# Patient Record
Sex: FEMALE | Race: WHITE | NOT HISPANIC OR LATINO | Employment: OTHER | URBAN - METROPOLITAN AREA
[De-identification: names, ages, dates, MRNs, and addresses within clinical notes are randomized per-mention and may not be internally consistent; named-entity substitution may affect disease eponyms.]

---

## 2017-04-18 ENCOUNTER — OFFICE VISIT (OUTPATIENT)
Dept: FAMILY MEDICINE CLINIC | Facility: CLINIC | Age: 82
End: 2017-04-18

## 2017-04-18 VITALS
DIASTOLIC BLOOD PRESSURE: 55 MMHG | WEIGHT: 152 LBS | RESPIRATION RATE: 16 BRPM | TEMPERATURE: 98.4 F | SYSTOLIC BLOOD PRESSURE: 139 MMHG | HEART RATE: 73 BPM | BODY MASS INDEX: 24.43 KG/M2 | HEIGHT: 66 IN

## 2017-04-18 DIAGNOSIS — Z00.00 ANNUAL PHYSICAL EXAM: Primary | ICD-10-CM

## 2017-04-18 DIAGNOSIS — I10 BENIGN ESSENTIAL HYPERTENSION: ICD-10-CM

## 2017-04-18 DIAGNOSIS — E03.9 ACQUIRED HYPOTHYROIDISM: ICD-10-CM

## 2017-04-18 DIAGNOSIS — G25.81 RESTLESS LEG SYNDROME: ICD-10-CM

## 2017-04-18 DIAGNOSIS — E78.2 MIXED HYPERLIPIDEMIA: ICD-10-CM

## 2017-04-18 DIAGNOSIS — E11.9 TYPE 2 DIABETES MELLITUS WITHOUT COMPLICATION, WITHOUT LONG-TERM CURRENT USE OF INSULIN (HCC): ICD-10-CM

## 2017-04-18 LAB
ALBUMIN SERPL-MCNC: 4.6 G/DL (ref 3.5–5.2)
ALBUMIN/GLOB SERPL: 1.9 G/DL
ALP SERPL-CCNC: 28 U/L (ref 39–117)
ALT SERPL-CCNC: 14 U/L (ref 1–33)
AST SERPL-CCNC: 19 U/L (ref 1–32)
BASOPHILS # BLD AUTO: 0.01 10*3/MM3 (ref 0–0.2)
BASOPHILS NFR BLD AUTO: 0.2 % (ref 0–1.5)
BILIRUB SERPL-MCNC: 0.4 MG/DL (ref 0.1–1.2)
BUN SERPL-MCNC: 27 MG/DL (ref 8–23)
BUN/CREAT SERPL: 12.7 (ref 7–25)
CALCIUM SERPL-MCNC: 10.2 MG/DL (ref 8.2–9.6)
CHLORIDE SERPL-SCNC: 103 MMOL/L (ref 98–107)
CHOLEST SERPL-MCNC: 134 MG/DL (ref 0–200)
CO2 SERPL-SCNC: 27 MMOL/L (ref 22–29)
CREAT SERPL-MCNC: 2.12 MG/DL (ref 0.57–1)
EOSINOPHIL # BLD AUTO: 0.02 10*3/MM3 (ref 0–0.7)
EOSINOPHIL NFR BLD AUTO: 0.3 % (ref 0.3–6.2)
ERYTHROCYTE [DISTWIDTH] IN BLOOD BY AUTOMATED COUNT: 14 % (ref 11.7–13)
GLOBULIN SER CALC-MCNC: 2.4 GM/DL
GLUCOSE SERPL-MCNC: 136 MG/DL (ref 65–99)
HCT VFR BLD AUTO: 39.2 % (ref 35.6–45.5)
HDLC SERPL-MCNC: 56 MG/DL (ref 40–60)
HGB BLD-MCNC: 12.3 G/DL (ref 11.9–15.5)
IMM GRANULOCYTES # BLD: 0 10*3/MM3 (ref 0–0.03)
IMM GRANULOCYTES NFR BLD: 0 % (ref 0–0.5)
LDLC SERPL CALC-MCNC: 62 MG/DL (ref 0–100)
LYMPHOCYTES # BLD AUTO: 1.38 10*3/MM3 (ref 0.9–4.8)
LYMPHOCYTES NFR BLD AUTO: 21.2 % (ref 19.6–45.3)
MCH RBC QN AUTO: 28.7 PG (ref 26.9–32)
MCHC RBC AUTO-ENTMCNC: 31.4 G/DL (ref 32.4–36.3)
MCV RBC AUTO: 91.6 FL (ref 80.5–98.2)
MONOCYTES # BLD AUTO: 0.57 10*3/MM3 (ref 0.2–1.2)
MONOCYTES NFR BLD AUTO: 8.8 % (ref 5–12)
NEUTROPHILS # BLD AUTO: 4.52 10*3/MM3 (ref 1.9–8.1)
NEUTROPHILS NFR BLD AUTO: 69.5 % (ref 42.7–76)
PLATELET # BLD AUTO: 205 10*3/MM3 (ref 140–500)
POTASSIUM SERPL-SCNC: 4.5 MMOL/L (ref 3.5–5.2)
PROT SERPL-MCNC: 7 G/DL (ref 6–8.5)
RBC # BLD AUTO: 4.28 10*6/MM3 (ref 3.9–5.2)
SODIUM SERPL-SCNC: 143 MMOL/L (ref 136–145)
T4 FREE SERPL-MCNC: 1.54 NG/DL (ref 0.93–1.7)
TRIGL SERPL-MCNC: 82 MG/DL (ref 0–150)
TSH SERPL DL<=0.005 MIU/L-ACNC: 1.12 MIU/ML (ref 0.27–4.2)
VLDLC SERPL CALC-MCNC: 16.4 MG/DL (ref 5–40)
WBC # BLD AUTO: 6.5 10*3/MM3 (ref 4.5–10.7)

## 2017-04-18 PROCEDURE — G0438 PPPS, INITIAL VISIT: HCPCS | Performed by: FAMILY MEDICINE

## 2017-04-18 PROCEDURE — 99214 OFFICE O/P EST MOD 30 MIN: CPT | Performed by: FAMILY MEDICINE

## 2017-04-18 RX ORDER — LEVOTHYROXINE SODIUM 0.07 MG/1
75 TABLET ORAL DAILY
Qty: 90 TABLET | Refills: 1 | Status: SHIPPED | OUTPATIENT
Start: 2017-04-18 | End: 2017-12-13 | Stop reason: SDUPTHER

## 2017-04-18 RX ORDER — LOVASTATIN 40 MG/1
40 TABLET ORAL DAILY
Qty: 90 TABLET | Refills: 1 | Status: SHIPPED | OUTPATIENT
Start: 2017-04-18 | End: 2017-12-13 | Stop reason: SDUPTHER

## 2017-04-18 RX ORDER — TRAZODONE HYDROCHLORIDE 100 MG/1
TABLET ORAL
Qty: 180 TABLET | Refills: 1 | Status: CANCELLED | OUTPATIENT
Start: 2017-04-18

## 2017-04-18 RX ORDER — PRAMIPEXOLE DIHYDROCHLORIDE 1 MG/1
1 TABLET ORAL DAILY
Qty: 90 TABLET | Refills: 1 | Status: SHIPPED | OUTPATIENT
Start: 2017-04-18 | End: 2017-12-13 | Stop reason: SDUPTHER

## 2017-04-18 RX ORDER — FENOFIBRATE 160 MG/1
160 TABLET ORAL DAILY
Qty: 90 TABLET | Refills: 1 | Status: SHIPPED | OUTPATIENT
Start: 2017-04-18 | End: 2017-12-13 | Stop reason: SDUPTHER

## 2017-04-18 RX ORDER — AMLODIPINE BESYLATE 5 MG/1
5 TABLET ORAL DAILY
Qty: 90 TABLET | Refills: 1 | Status: SHIPPED | OUTPATIENT
Start: 2017-04-18 | End: 2017-12-13 | Stop reason: SDUPTHER

## 2017-04-18 RX ORDER — GLIPIZIDE 5 MG/1
5 TABLET ORAL
Qty: 90 TABLET | Refills: 1 | Status: SHIPPED | OUTPATIENT
Start: 2017-04-18 | End: 2017-12-13 | Stop reason: SDUPTHER

## 2017-04-18 RX ORDER — CLOPIDOGREL BISULFATE 75 MG/1
75 TABLET ORAL DAILY
Qty: 90 TABLET | Refills: 1 | Status: SHIPPED | OUTPATIENT
Start: 2017-04-18 | End: 2017-12-13 | Stop reason: SDUPTHER

## 2017-04-18 NOTE — PROGRESS NOTES
Subjective   Elissa Marrero is a 89 y.o. female.     History of Present Illness     Chief Complaint:   Chief Complaint   Patient presents with   • Diabetes     MED REFILL - MEDICARE WELLNESS EXAM DUE  - PHQ2 PHQ9 DONE - NO DAILY ASA    • Hypertension   • Hyperlipidemia   • Hypothyroidism       Elissa Marrero 89 y.o. female who presents today for Medical Management of the below listed issues and medication refills.  she has a history of   Patient Active Problem List   Diagnosis   • Diabetes mellitus, type 2   • Hyperlipidemia   • Benign essential hypertension   • Hypothyroidism   • Insomnia   • Lymphedema   • Osteoarthritis   • Osteopenia   • Renal insufficiency   • Restless leg syndrome   • Vitamin D deficiency   .  Since the last visit, she has overall felt well.  she has been compliant with   Current Outpatient Prescriptions:   •  amLODIPine (NORVASC) 5 MG tablet, Take 1 tablet by mouth Daily., Disp: 90 tablet, Rfl: 1  •  clopidogrel (PLAVIX) 75 MG tablet, Take 1 tablet by mouth Daily., Disp: 90 tablet, Rfl: 1  •  fenofibrate 160 MG tablet, Take 1 tablet by mouth Daily., Disp: 90 tablet, Rfl: 1  •  glipiZIDE (GLUCOTROL) 5 MG tablet, Take 1 tablet by mouth Daily Before Lunch., Disp: 90 tablet, Rfl: 1  •  levothyroxine (SYNTHROID, LEVOTHROID) 75 MCG tablet, Take 1 tablet by mouth Daily., Disp: 90 tablet, Rfl: 1  •  lovastatin (MEVACOR) 40 MG tablet, Take 1 tablet by mouth Daily., Disp: 90 tablet, Rfl: 1  •  metoprolol tartrate (LOPRESSOR) 25 MG tablet, Take 1 tablet by mouth 2 (Two) Times a Day., Disp: 180 tablet, Rfl: 1  •  pramipexole (MIRAPEX) 1 MG tablet, Take 1 tablet by mouth Daily., Disp: 90 tablet, Rfl: 1  •  traZODone (DESYREL) 100 MG tablet, Take 0.5-2 tablets by oral route once a day (in the evening) for 90 days, Disp: 180 tablet, Rfl: 1.  she denies medication side effects.    All of the chronic condition(s) listed above are stable w/o issues.    /55  Pulse 73  Temp 98.4 °F (36.9 °C) (Oral)   Resp  "16  Ht 66\" (167.6 cm)  Wt 152 lb (68.9 kg)  BMI 24.53 kg/m2    Results for orders placed or performed in visit on 10/18/16   Hemoglobin A1c   Result Value Ref Range    Hemoglobin A1C 5.66 (H) 4.80 - 5.60 %   Basic Metabolic Panel   Result Value Ref Range    Glucose 63 (L) 65 - 99 mg/dL    BUN 16 8 - 23 mg/dL    Creatinine 1.87 (H) 0.57 - 1.00 mg/dL    eGFR Non African Am 25 (L) >60 mL/min/1.73    eGFR African Am 31 (L) >60 mL/min/1.73    BUN/Creatinine Ratio 8.6 7.0 - 25.0    Sodium 143 136 - 145 mmol/L    Potassium 5.3 (H) 3.5 - 5.2 mmol/L    Chloride 103 98 - 107 mmol/L    Total CO2 28.1 22.0 - 29.0 mmol/L    Calcium 10.3 (H) 8.2 - 9.6 mg/dL   MicroAlbumin, Urine, Random   Result Value Ref Range    Microalbumin, Urine 17.4 Not Estab. ug/mL         The following portions of the patient's history were reviewed and updated as appropriate: allergies, current medications, past family history, past medical history, past social history, past surgical history and problem list.    Review of Systems   Constitutional: Negative for activity change, chills, fatigue and fever.   Respiratory: Negative for cough and chest tightness.    Cardiovascular: Negative for chest pain and palpitations.   Gastrointestinal: Negative for abdominal pain and nausea.   Endocrine: Negative for cold intolerance.   Psychiatric/Behavioral: Negative for behavioral problems and dysphoric mood.       Objective   Physical Exam   Constitutional: She appears well-developed and well-nourished.   Neck: Neck supple. No thyromegaly present.   Cardiovascular: Normal rate and regular rhythm.    No murmur heard.  Pulmonary/Chest: Effort normal and breath sounds normal.   Abdominal: Bowel sounds are normal.   Psychiatric: She has a normal mood and affect. Her behavior is normal.   Nursing note and vitals reviewed.      Assessment/Plan   Elissa was seen today for diabetes, hypertension, hyperlipidemia and hypothyroidism.    Diagnoses and all orders for this " visit:    Annual physical exam    Type 2 diabetes mellitus without complication, without long-term current use of insulin  -     glipiZIDE (GLUCOTROL) 5 MG tablet; Take 1 tablet by mouth Daily Before Lunch.  -     clopidogrel (PLAVIX) 75 MG tablet; Take 1 tablet by mouth Daily.  -     Comprehensive metabolic panel  -     Lipid panel    Benign essential hypertension  -     amLODIPine (NORVASC) 5 MG tablet; Take 1 tablet by mouth Daily.  -     metoprolol tartrate (LOPRESSOR) 25 MG tablet; Take 1 tablet by mouth 2 (Two) Times a Day.  -     CBC and Differential    Mixed hyperlipidemia  -     fenofibrate 160 MG tablet; Take 1 tablet by mouth Daily.  -     lovastatin (MEVACOR) 40 MG tablet; Take 1 tablet by mouth Daily.    Acquired hypothyroidism  -     levothyroxine (SYNTHROID, LEVOTHROID) 75 MCG tablet; Take 1 tablet by mouth Daily.  -     TSH  -     T4, Free    Restless leg syndrome  -     pramipexole (MIRAPEX) 1 MG tablet; Take 1 tablet by mouth Daily.    Other orders  -     Cancel: traZODone (DESYREL) 100 MG tablet; Take 0.5-2 tablets by oral route once a day (in the evening) for 90 days

## 2017-04-18 NOTE — PROGRESS NOTES
QUICK REFERENCE INFORMATION:  The ABCs of the Annual Wellness Visit    Initial Medicare Wellness Visit    HEALTH RISK ASSESSMENT    3/15/1925    Recent Hospitalizations:  No recent hospitalization(s)..        Current Medical Providers:  Patient Care Team:  Onel Mann MD as PCP - General  Onel Mann MD as PCP - Family Medicine  Julien Gaming MD as PCP - Claims Attributed - PLEASE DO NOT REMOVE        Smoking Status:  History   Smoking Status   • Never Smoker   Smokeless Tobacco   • Not on file       Alcohol Consumption:  History   Alcohol Use No       Depression Screen:   PHQ-9 Depression Screening 4/18/2017   Little interest or pleasure in doing things 0   Feeling down, depressed, or hopeless 0   Trouble falling or staying asleep, or sleeping too much 0   Feeling tired or having little energy 0   Poor appetite or overeating 0   Feeling bad about yourself - or that you are a failure or have let yourself or your family down 0   Trouble concentrating on things, such as reading the newspaper or watching television 0   Moving or speaking so slowly that other people could have noticed. Or the opposite - being so fidgety or restless that you have been moving around a lot more than usual 0   Thoughts that you would be better off dead, or of hurting yourself in some way 0   PHQ-9 Total Score 0       Health Habits and Functional and Cognitive Screening:  Functional & Cognitive Status 4/18/2017   Do you have difficulty preparing food and eating? No   Do you have difficulty bathing yourself? No   Do you have difficulty getting dressed? No   Do you have difficulty using the toilet? No   Do you have difficulty moving around from place to place? No   In the past year have you fallen or experienced a near fall? No   Do you need help using the phone?  No   Are you deaf or do you have serious difficulty hearing?  No   Do you need help with transportation? No   Do you need help shopping? No   Do you need help preparing  meals?  No   Do you need help with housework?  No   Do you need help with laundry? No   Do you need help taking your medications? No   Do you need help managing money? No   Do you have difficulty concentrating, remembering or making decisions? No       Health Habits  Current Diet: Well Balanced Diet  Dental Exam: Unknown  Eye Exam: Up to date  Exercise (times per week): 3 times per week  Current Exercise Activities Include: Walking      Patient questioned in depth regarding fall risks and prevention (i.e.removing rugs that may slip, etc.) during visit today. They deny prior falls, gait, or balance issues today.      Does the patient have evidence of cognitive impairment? No    Asiprin use counseling: Does not need ASA (and currently is not on it)      Recent Lab Results:    Visual Acuity:  No exam data present    Age-appropriate Screening Schedule:  Refer to the list below for future screening recommendations based on patient's age, sex and/or medical conditions. Orders for these recommended tests are listed in the plan section. The patient has been provided with a written plan.    Health Maintenance   Topic Date Due   • LIPID PANEL  05/23/2017 (Originally 3/14/2017)   • HEMOGLOBIN A1C  05/26/2017 (Originally 4/18/2017)   • PNEUMOCOCCAL VACCINES (65+ LOW/MEDIUM RISK) (2 of 2 - PPSV23) 10/18/2017   • DIABETIC FOOT EXAM  10/18/2017   • MAMMOGRAM  10/18/2017   • URINE MICROALBUMIN  10/18/2017   • DIABETIC EYE EXAM  11/28/2017   • DXA SCAN  10/18/2018   • INFLUENZA VACCINE  Completed   • ZOSTER VACCINE  Addressed   • TDAP/TD VACCINES  Excluded        Subjective   History of Present Illness    Elissa Marrero is a 89 y.o. female who presents for an Annual Wellness Visit.    The following portions of the patient's history were reviewed and updated as appropriate: allergies, current medications, past family history, past medical history, past social history, past surgical history and problem list.    Outpatient Medications  "Prior to Visit   Medication Sig Dispense Refill   • traZODone (DESYREL) 100 MG tablet Take 0.5-2 tablets by oral route once a day (in the evening) for 90 days 180 tablet 1   • amLODIPine (NORVASC) 5 MG tablet Take 1 tablet by mouth Daily. 90 tablet 1   • clopidogrel (PLAVIX) 75 MG tablet Take 1 tablet by mouth Daily. 90 tablet 1   • fenofibrate 160 MG tablet Take 1 tablet by mouth Daily. 90 tablet 1   • glipiZIDE (GLUCOTROL) 5 MG tablet Take 1 tablet by mouth Daily Before Lunch. 90 tablet 1   • levothyroxine (SYNTHROID, LEVOTHROID) 75 MCG tablet Take 1 tablet by mouth Daily. 90 tablet 1   • lovastatin (MEVACOR) 40 MG tablet Take 1 tablet by mouth Daily. 90 tablet 1   • metoprolol tartrate (LOPRESSOR) 25 MG tablet Take 1 tablet by mouth 2 (Two) Times a Day. 180 tablet 1   • pramipexole (MIRAPEX) 1 MG tablet Take 1 tablet by mouth Daily. 90 tablet 1     No facility-administered medications prior to visit.        Patient Active Problem List   Diagnosis   • Diabetes mellitus, type 2   • Hyperlipidemia   • Benign essential hypertension   • Hypothyroidism   • Insomnia   • Lymphedema   • Osteoarthritis   • Osteopenia   • Renal insufficiency   • Restless leg syndrome   • Vitamin D deficiency       Advance Care Planning:  has an advance directive - a copy HAS NOT been provided    Identification of Risk Factors:  Risk factors include: cardiovascular risk.    Review of Systems    Compared to one year ago, the patient feels her physical health is the same.  Compared to one year ago, the patient feels her mental health is the same.    Objective     Physical Exam    Vitals:    04/18/17 0944   BP: 139/55   Pulse: 73   Resp: 16   Temp: 98.4 °F (36.9 °C)   TempSrc: Oral   Weight: 152 lb (68.9 kg)   Height: 66\" (167.6 cm)   PainSc: 0-No pain       Body mass index is 24.53 kg/(m^2).  Discussed the patient's BMI with her. The BMI is in the acceptable range.    Assessment/Plan   Patient Self-Management and Personalized Health " Advice  The patient has been provided with information about: diet, exercise and weight management and preventive services including:   · Exercise counseling provided, Fall Risk assessment done, Nutrition counseling provided.    Visit Diagnoses:    ICD-10-CM ICD-9-CM   1. Annual physical exam Z00.00 V70.0   2. Type 2 diabetes mellitus without complication, without long-term current use of insulin E11.9 250.00   3. Benign essential hypertension I10 401.1   4. Mixed hyperlipidemia E78.2 272.2   5. Acquired hypothyroidism E03.9 244.9   6. Restless leg syndrome G25.81 333.94       Orders Placed This Encounter   Procedures   • Comprehensive metabolic panel   • Lipid panel   • TSH   • T4, Free       Outpatient Encounter Prescriptions as of 4/18/2017   Medication Sig Dispense Refill   • amLODIPine (NORVASC) 5 MG tablet Take 1 tablet by mouth Daily. 90 tablet 1   • clopidogrel (PLAVIX) 75 MG tablet Take 1 tablet by mouth Daily. 90 tablet 1   • fenofibrate 160 MG tablet Take 1 tablet by mouth Daily. 90 tablet 1   • glipiZIDE (GLUCOTROL) 5 MG tablet Take 1 tablet by mouth Daily Before Lunch. 90 tablet 1   • levothyroxine (SYNTHROID, LEVOTHROID) 75 MCG tablet Take 1 tablet by mouth Daily. 90 tablet 1   • lovastatin (MEVACOR) 40 MG tablet Take 1 tablet by mouth Daily. 90 tablet 1   • metoprolol tartrate (LOPRESSOR) 25 MG tablet Take 1 tablet by mouth 2 (Two) Times a Day. 180 tablet 1   • pramipexole (MIRAPEX) 1 MG tablet Take 1 tablet by mouth Daily. 90 tablet 1   • traZODone (DESYREL) 100 MG tablet Take 0.5-2 tablets by oral route once a day (in the evening) for 90 days 180 tablet 1   • [DISCONTINUED] amLODIPine (NORVASC) 5 MG tablet Take 1 tablet by mouth Daily. 90 tablet 1   • [DISCONTINUED] clopidogrel (PLAVIX) 75 MG tablet Take 1 tablet by mouth Daily. 90 tablet 1   • [DISCONTINUED] fenofibrate 160 MG tablet Take 1 tablet by mouth Daily. 90 tablet 1   • [DISCONTINUED] glipiZIDE (GLUCOTROL) 5 MG tablet Take 1 tablet by  mouth Daily Before Lunch. 90 tablet 1   • [DISCONTINUED] levothyroxine (SYNTHROID, LEVOTHROID) 75 MCG tablet Take 1 tablet by mouth Daily. 90 tablet 1   • [DISCONTINUED] lovastatin (MEVACOR) 40 MG tablet Take 1 tablet by mouth Daily. 90 tablet 1   • [DISCONTINUED] metoprolol tartrate (LOPRESSOR) 25 MG tablet Take 1 tablet by mouth 2 (Two) Times a Day. 180 tablet 1   • [DISCONTINUED] pramipexole (MIRAPEX) 1 MG tablet Take 1 tablet by mouth Daily. 90 tablet 1     No facility-administered encounter medications on file as of 4/18/2017.        Reviewed use of high risk medication in the elderly: yes  Reviewed for potential of harmful drug interactions in the elderly: not applicable    Follow Up:  Return in about 6 months (around 10/18/2017) for Recheck.     An After Visit Summary and PPPS with all of these plans were given to the patient.

## 2017-11-28 DIAGNOSIS — I10 BENIGN ESSENTIAL HYPERTENSION: ICD-10-CM

## 2017-11-28 DIAGNOSIS — E78.2 MIXED HYPERLIPIDEMIA: ICD-10-CM

## 2017-11-28 DIAGNOSIS — E11.9 TYPE 2 DIABETES MELLITUS WITHOUT COMPLICATION, WITHOUT LONG-TERM CURRENT USE OF INSULIN (HCC): ICD-10-CM

## 2017-11-28 DIAGNOSIS — E03.9 ACQUIRED HYPOTHYROIDISM: ICD-10-CM

## 2017-11-29 RX ORDER — CLOPIDOGREL BISULFATE 75 MG/1
TABLET ORAL
Qty: 90 TABLET | Refills: 1 | OUTPATIENT
Start: 2017-11-29

## 2017-11-29 RX ORDER — AMLODIPINE BESYLATE 5 MG/1
TABLET ORAL
Qty: 90 TABLET | Refills: 1 | OUTPATIENT
Start: 2017-11-29

## 2017-11-29 RX ORDER — LOVASTATIN 40 MG/1
TABLET ORAL
Qty: 90 TABLET | Refills: 1 | OUTPATIENT
Start: 2017-11-29

## 2017-11-29 RX ORDER — FENOFIBRATE 160 MG/1
TABLET ORAL
Qty: 90 TABLET | Refills: 1 | OUTPATIENT
Start: 2017-11-29

## 2017-11-29 RX ORDER — LEVOTHYROXINE SODIUM 0.07 MG/1
TABLET ORAL
Qty: 90 TABLET | Refills: 1 | OUTPATIENT
Start: 2017-11-29

## 2017-11-29 RX ORDER — GLIPIZIDE 5 MG/1
TABLET ORAL
Qty: 90 TABLET | Refills: 1 | OUTPATIENT
Start: 2017-11-29

## 2017-12-13 ENCOUNTER — OFFICE VISIT (OUTPATIENT)
Dept: FAMILY MEDICINE CLINIC | Facility: CLINIC | Age: 82
End: 2017-12-13

## 2017-12-13 VITALS
BODY MASS INDEX: 23.07 KG/M2 | WEIGHT: 147 LBS | SYSTOLIC BLOOD PRESSURE: 136 MMHG | HEART RATE: 59 BPM | HEIGHT: 67 IN | RESPIRATION RATE: 16 BRPM | DIASTOLIC BLOOD PRESSURE: 62 MMHG | TEMPERATURE: 97.8 F

## 2017-12-13 DIAGNOSIS — E11.9 TYPE 2 DIABETES MELLITUS WITHOUT COMPLICATION, WITHOUT LONG-TERM CURRENT USE OF INSULIN (HCC): Primary | ICD-10-CM

## 2017-12-13 DIAGNOSIS — F51.01 PRIMARY INSOMNIA: ICD-10-CM

## 2017-12-13 DIAGNOSIS — E78.2 MIXED HYPERLIPIDEMIA: ICD-10-CM

## 2017-12-13 DIAGNOSIS — G25.81 RESTLESS LEG SYNDROME: ICD-10-CM

## 2017-12-13 DIAGNOSIS — E03.9 ACQUIRED HYPOTHYROIDISM: ICD-10-CM

## 2017-12-13 DIAGNOSIS — I10 BENIGN ESSENTIAL HYPERTENSION: ICD-10-CM

## 2017-12-13 PROCEDURE — 99214 OFFICE O/P EST MOD 30 MIN: CPT | Performed by: FAMILY MEDICINE

## 2017-12-13 RX ORDER — PRAMIPEXOLE DIHYDROCHLORIDE 1.5 MG/1
1.5 TABLET ORAL DAILY
Qty: 90 TABLET | Refills: 1 | Status: SHIPPED | OUTPATIENT
Start: 2017-12-13 | End: 2018-07-01 | Stop reason: SDUPTHER

## 2017-12-13 RX ORDER — LEVOTHYROXINE SODIUM 0.07 MG/1
75 TABLET ORAL DAILY
Qty: 90 TABLET | Refills: 1 | Status: SHIPPED | OUTPATIENT
Start: 2017-12-13 | End: 2018-07-01 | Stop reason: SDUPTHER

## 2017-12-13 RX ORDER — AMLODIPINE BESYLATE 5 MG/1
5 TABLET ORAL DAILY
Qty: 90 TABLET | Refills: 1 | Status: SHIPPED | OUTPATIENT
Start: 2017-12-13 | End: 2018-07-01 | Stop reason: SDUPTHER

## 2017-12-13 RX ORDER — GLIPIZIDE 5 MG/1
5 TABLET ORAL
Qty: 90 TABLET | Refills: 1 | Status: SHIPPED | OUTPATIENT
Start: 2017-12-13 | End: 2018-07-01 | Stop reason: SDUPTHER

## 2017-12-13 RX ORDER — LOVASTATIN 40 MG/1
40 TABLET ORAL DAILY
Qty: 90 TABLET | Refills: 1 | Status: SHIPPED | OUTPATIENT
Start: 2017-12-13 | End: 2018-07-01 | Stop reason: SDUPTHER

## 2017-12-13 RX ORDER — TRAZODONE HYDROCHLORIDE 100 MG/1
TABLET ORAL
Qty: 180 TABLET | Refills: 1 | Status: SHIPPED | OUTPATIENT
Start: 2017-12-13 | End: 2018-07-01 | Stop reason: SDUPTHER

## 2017-12-13 RX ORDER — FENOFIBRATE 160 MG/1
160 TABLET ORAL DAILY
Qty: 90 TABLET | Refills: 1 | Status: SHIPPED | OUTPATIENT
Start: 2017-12-13 | End: 2018-07-01 | Stop reason: SDUPTHER

## 2017-12-13 RX ORDER — CLOPIDOGREL BISULFATE 75 MG/1
75 TABLET ORAL DAILY
Qty: 90 TABLET | Refills: 1 | Status: SHIPPED | OUTPATIENT
Start: 2017-12-13 | End: 2018-07-01 | Stop reason: SDUPTHER

## 2017-12-13 RX ORDER — PRAMIPEXOLE DIHYDROCHLORIDE 1 MG/1
1 TABLET ORAL DAILY
Qty: 90 TABLET | Refills: 1 | Status: CANCELLED | OUTPATIENT
Start: 2017-12-13

## 2017-12-13 NOTE — PROGRESS NOTES
Subjective   Elissa Marrero is a 89 y.o. female.     History of Present Illness     Chief Complaint:   Chief Complaint   Patient presents with   • Diabetes     MED REFILL - NO LABS DONE   • Hypothyroidism   • Hypertension     MELISA BP  LEFT ARM    • Hyperlipidemia   • Anxiety       Elissa Marrero 89 y.o. female who presents today for Medical Management of the below listed issues and medication refills.  she has a problem list of   Patient Active Problem List   Diagnosis   • Diabetes mellitus, type 2   • Hyperlipidemia   • Benign essential hypertension   • Hypothyroidism   • Insomnia   • Lymphedema   • Osteoarthritis   • Osteopenia   • Renal insufficiency   • Restless leg syndrome   • Vitamin D deficiency   .  Since the last visit, she has overall felt well.  she has been compliant with   Current Outpatient Prescriptions:   •  amLODIPine (NORVASC) 5 MG tablet, Take 1 tablet by mouth Daily., Disp: 90 tablet, Rfl: 1  •  clopidogrel (PLAVIX) 75 MG tablet, Take 1 tablet by mouth Daily., Disp: 90 tablet, Rfl: 1  •  fenofibrate 160 MG tablet, Take 1 tablet by mouth Daily., Disp: 90 tablet, Rfl: 1  •  glipiZIDE (GLUCOTROL) 5 MG tablet, Take 1 tablet by mouth Daily Before Lunch., Disp: 90 tablet, Rfl: 1  •  levothyroxine (SYNTHROID, LEVOTHROID) 75 MCG tablet, Take 1 tablet by mouth Daily., Disp: 90 tablet, Rfl: 1  •  lovastatin (MEVACOR) 40 MG tablet, Take 1 tablet by mouth Daily., Disp: 90 tablet, Rfl: 1  •  pramipexole (MIRAPEX) 1.5 MG tablet, Take 1 tablet by mouth Daily., Disp: 90 tablet, Rfl: 1  •  traZODone (DESYREL) 100 MG tablet, Take 0.5-2 tablets by oral route once a day (in the evening) for 90 days, Disp: 180 tablet, Rfl: 1  •  metoprolol tartrate (LOPRESSOR) 25 MG tablet, Take 1 tablet by mouth 2 (Two) Times a Day., Disp: 180 tablet, Rfl: 1.  she denies medication side effects.    She is still having RLS sx on the current dose.    All of the chronic condition(s) listed above are stable w/o issues.    /62   "Pulse 59  Temp 97.8 °F (36.6 °C) (Oral)   Resp 16  Ht 170.2 cm (67\")  Wt 66.7 kg (147 lb)  BMI 23.02 kg/m2    Results for orders placed or performed in visit on 04/18/17   Comprehensive metabolic panel   Result Value Ref Range    Glucose 136 (H) 65 - 99 mg/dL    BUN 27 (H) 8 - 23 mg/dL    Creatinine 2.12 (H) 0.57 - 1.00 mg/dL    eGFR Non African Am 22 (L) >60 mL/min/1.73    eGFR African Am 26 (L) >60 mL/min/1.73    BUN/Creatinine Ratio 12.7 7.0 - 25.0    Sodium 143 136 - 145 mmol/L    Potassium 4.5 3.5 - 5.2 mmol/L    Chloride 103 98 - 107 mmol/L    Total CO2 27.0 22.0 - 29.0 mmol/L    Calcium 10.2 (H) 8.2 - 9.6 mg/dL    Total Protein 7.0 6.0 - 8.5 g/dL    Albumin 4.60 3.50 - 5.20 g/dL    Globulin 2.4 gm/dL    A/G Ratio 1.9 g/dL    Total Bilirubin 0.4 0.1 - 1.2 mg/dL    Alkaline Phosphatase 28 (L) 39 - 117 U/L    AST (SGOT) 19 1 - 32 U/L    ALT (SGPT) 14 1 - 33 U/L   Lipid panel   Result Value Ref Range    Total Cholesterol 134 0 - 200 mg/dL    Triglycerides 82 0 - 150 mg/dL    HDL Cholesterol 56 40 - 60 mg/dL    VLDL Cholesterol 16.4 5 - 40 mg/dL    LDL Cholesterol  62 0 - 100 mg/dL   TSH   Result Value Ref Range    TSH 1.120 0.270 - 4.200 mIU/mL   T4, Free   Result Value Ref Range    Free T4 1.54 0.93 - 1.70 ng/dL   CBC and Differential   Result Value Ref Range    WBC 6.50 4.50 - 10.70 10*3/mm3    RBC 4.28 3.90 - 5.20 10*6/mm3    Hemoglobin 12.3 11.9 - 15.5 g/dL    Hematocrit 39.2 35.6 - 45.5 %    MCV 91.6 80.5 - 98.2 fL    MCH 28.7 26.9 - 32.0 pg    MCHC 31.4 (L) 32.4 - 36.3 g/dL    RDW 14.0 (H) 11.7 - 13.0 %    Platelets 205 140 - 500 10*3/mm3    Neutrophil Rel % 69.5 42.7 - 76.0 %    Lymphocyte Rel % 21.2 19.6 - 45.3 %    Monocyte Rel % 8.8 5.0 - 12.0 %    Eosinophil Rel % 0.3 0.3 - 6.2 %    Basophil Rel % 0.2 0.0 - 1.5 %    Neutrophils Absolute 4.52 1.90 - 8.10 10*3/mm3    Lymphocytes Absolute 1.38 0.90 - 4.80 10*3/mm3    Monocytes Absolute 0.57 0.20 - 1.20 10*3/mm3    Eosinophils Absolute 0.02 0.00 - " 0.70 10*3/mm3    Basophils Absolute 0.01 0.00 - 0.20 10*3/mm3    Immature Granulocyte Rel % 0.0 0.0 - 0.5 %    Immature Grans Absolute 0.00 0.00 - 0.03 10*3/mm3           The following portions of the patient's history were reviewed and updated as appropriate: allergies, current medications, past family history, past medical history, past social history, past surgical history and problem list.    Review of Systems   Constitutional: Negative for activity change, chills, fatigue and fever.   Respiratory: Negative for cough and chest tightness.    Cardiovascular: Negative for chest pain and palpitations.   Gastrointestinal: Negative for abdominal pain and nausea.   Endocrine: Negative for cold intolerance.   Psychiatric/Behavioral: Negative for behavioral problems and dysphoric mood.       Objective   Physical Exam   Constitutional: She appears well-developed and well-nourished.   Neck: Neck supple. No thyromegaly present.   Cardiovascular: Normal rate and regular rhythm.    No murmur heard.  Pulmonary/Chest: Effort normal and breath sounds normal.   Abdominal: Bowel sounds are normal.   Psychiatric: She has a normal mood and affect. Her behavior is normal.   Nursing note and vitals reviewed.      Assessment/Plan   Elissa was seen today for diabetes, hypothyroidism, hypertension, hyperlipidemia and anxiety.    Diagnoses and all orders for this visit:    Type 2 diabetes mellitus without complication, without long-term current use of insulin  -     glipiZIDE (GLUCOTROL) 5 MG tablet; Take 1 tablet by mouth Daily Before Lunch.  -     clopidogrel (PLAVIX) 75 MG tablet; Take 1 tablet by mouth Daily.  -     Hemoglobin A1c  -     MicroAlbumin, Urine, Random - Urine, Clean Catch  -     Basic Metabolic Panel    Mixed hyperlipidemia  -     fenofibrate 160 MG tablet; Take 1 tablet by mouth Daily.  -     lovastatin (MEVACOR) 40 MG tablet; Take 1 tablet by mouth Daily.    Benign essential hypertension  -     amLODIPine (NORVASC) 5  MG tablet; Take 1 tablet by mouth Daily.  -     metoprolol tartrate (LOPRESSOR) 25 MG tablet; Take 1 tablet by mouth 2 (Two) Times a Day.    Acquired hypothyroidism  -     levothyroxine (SYNTHROID, LEVOTHROID) 75 MCG tablet; Take 1 tablet by mouth Daily.    Restless leg syndrome  -     pramipexole (MIRAPEX) 1.5 MG tablet; Take 1 tablet by mouth Daily.    Primary insomnia  -     traZODone (DESYREL) 100 MG tablet; Take 0.5-2 tablets by oral route once a day (in the evening) for 90 days    Other orders  -     Cancel: pramipexole (MIRAPEX) 1 MG tablet; Take 1 tablet by mouth Daily.

## 2017-12-14 LAB
BUN SERPL-MCNC: 27 MG/DL (ref 8–23)
BUN/CREAT SERPL: 16 (ref 7–25)
CALCIUM SERPL-MCNC: 10 MG/DL (ref 8.2–9.6)
CHLORIDE SERPL-SCNC: 105 MMOL/L (ref 98–107)
CO2 SERPL-SCNC: 27.2 MMOL/L (ref 22–29)
CREAT SERPL-MCNC: 1.69 MG/DL (ref 0.57–1)
GFR SERPLBLD CREATININE-BSD FMLA CKD-EPI: 28 ML/MIN/1.73
GFR SERPLBLD CREATININE-BSD FMLA CKD-EPI: 34 ML/MIN/1.73
GLUCOSE SERPL-MCNC: 116 MG/DL (ref 65–99)
HBA1C MFR BLD: 6.39 % (ref 4.8–5.6)
MICROALBUMIN UR-MCNC: 35.2 UG/ML
POTASSIUM SERPL-SCNC: 4.9 MMOL/L (ref 3.5–5.2)
SODIUM SERPL-SCNC: 144 MMOL/L (ref 136–145)

## 2018-05-19 ENCOUNTER — OFFICE VISIT (OUTPATIENT)
Dept: RETAIL CLINIC | Facility: CLINIC | Age: 83
End: 2018-05-19

## 2018-05-19 VITALS
OXYGEN SATURATION: 98 % | HEART RATE: 69 BPM | TEMPERATURE: 97.7 F | DIASTOLIC BLOOD PRESSURE: 77 MMHG | RESPIRATION RATE: 16 BRPM | SYSTOLIC BLOOD PRESSURE: 162 MMHG

## 2018-05-19 DIAGNOSIS — L03.116 CELLULITIS OF LEFT LOWER EXTREMITY: Primary | ICD-10-CM

## 2018-05-19 PROCEDURE — 99213 OFFICE O/P EST LOW 20 MIN: CPT | Performed by: NURSE PRACTITIONER

## 2018-05-19 RX ORDER — CEPHALEXIN 250 MG/1
250 CAPSULE ORAL 3 TIMES DAILY
Qty: 30 CAPSULE | Refills: 0 | Status: SHIPPED | OUTPATIENT
Start: 2018-05-19 | End: 2018-05-29

## 2018-05-19 NOTE — PATIENT INSTRUCTIONS
Cellulitis, Adult  Cellulitis is a skin infection. The infected area is usually red and tender. This condition occurs most often in the arms and lower legs. The infection can travel to the muscles, blood, and underlying tissue and become serious. It is very important to get treated for this condition.  What are the causes?  Cellulitis is caused by bacteria. The bacteria enter through a break in the skin, such as a cut, burn, insect bite, open sore, or crack.  What increases the risk?  This condition is more likely to occur in people who:  · Have a weak defense system (immune system).  · Have open wounds on the skin such as cuts, burns, bites, and scrapes. Bacteria can enter the body through these open wounds.  · Are older.  · Have diabetes.  · Have a type of long-lasting (chronic) liver disease (cirrhosis) or kidney disease.  · Use IV drugs.  What are the signs or symptoms?  Symptoms of this condition include:  · Redness, streaking, or spotting on the skin.  · Swollen area of the skin.  · Tenderness or pain when an area of the skin is touched.  · Warm skin.  · Fever.  · Chills.  · Blisters.  How is this diagnosed?  This condition is diagnosed based on a medical history and physical exam. You may also have tests, including:  · Blood tests.  · Lab tests.  · Imaging tests.  How is this treated?  Treatment for this condition may include:  · Medicines, such as antibiotic medicines or antihistamines.  · Supportive care, such as rest and application of cold or warm cloths (cold or warm compresses) to the skin.  · Hospital care, if the condition is severe.  The infection usually gets better within 1-2 days of treatment.  Follow these instructions at home:  · Take over-the-counter and prescription medicines only as told by your health care provider.  · If you were prescribed an antibiotic medicine, take it as told by your health care provider. Do not stop taking the antibiotic even if you start to feel better.  · Drink  enough fluid to keep your urine clear or pale yellow.  · Do not touch or rub the infected area.  · Raise (elevate) the infected area above the level of your heart while you are sitting or lying down.  · Apply warm or cold compresses to the affected area as told by your health care provider.  · Keep all follow-up visits as told by your health care provider. This is important. These visits let your health care provider make sure a more serious infection is not developing.  Contact a health care provider if:  · You have a fever.  · Your symptoms do not improve within 1-2 days of starting treatment.  · Your bone or joint underneath the infected area becomes painful after the skin has healed.  · Your infection returns in the same area or another area.  · You notice a swollen bump in the infected area.  · You develop new symptoms.  · You have a general ill feeling (malaise) with muscle aches and pains.  Get help right away if:  · Your symptoms get worse.  · You feel very sleepy.  · You develop vomiting or diarrhea that persists.  · You notice red streaks coming from the infected area.  · Your red area gets larger or turns dark in color.  This information is not intended to replace advice given to you by your health care provider. Make sure you discuss any questions you have with your health care provider.  Document Released: 09/27/2006 Document Revised: 04/27/2017 Document Reviewed: 10/26/2016  [employer] Interactive Patient Education © 2017 [employer]

## 2018-05-19 NOTE — PROGRESS NOTES
Waldo Marrero is a 89 y.o. female.     Patient reports working out in her garden 5/14/18 and later that night after she had returned inside noticed she had a small wound on her left lower leg. Her daughter has brought her to the clinic after noticing the wound looking red yesterday and pt had complaints it was becoming painful. Pt is diabetic.       Wound Infection   This is a new problem. The current episode started in the past 7 days. The problem occurs constantly. The problem has been gradually worsening. Pertinent negatives include no abdominal pain, chest pain, chills, coughing, diaphoresis, fatigue, fever, headaches, joint swelling, myalgias, nausea, neck pain, numbness, swollen glands, vomiting or weakness. Nothing aggravates the symptoms. She has tried nothing for the symptoms.       The following portions of the patient's history were reviewed and updated as appropriate: allergies, current medications, past medical history, past social history, past surgical history and problem list.    Review of Systems   Constitutional: Negative for appetite change, chills, diaphoresis, fatigue and fever.   HENT: Negative for facial swelling and mouth sores.    Respiratory: Negative for cough, chest tightness, shortness of breath, wheezing and stridor.    Cardiovascular: Negative for chest pain, palpitations and leg swelling.   Gastrointestinal: Negative for abdominal pain, diarrhea, nausea and vomiting.   Musculoskeletal: Negative for joint swelling, myalgias, neck pain and neck stiffness.   Skin: Positive for color change and wound.   Allergic/Immunologic: Positive for immunocompromised state.   Neurological: Negative for dizziness, syncope, weakness, numbness and headaches.   Psychiatric/Behavioral: Negative for confusion.       Objective   Physical Exam   Constitutional: She is oriented to person, place, and time. She appears well-developed and well-nourished. She is cooperative.  Non-toxic appearance.  She does not appear ill. No distress.   Cardiovascular: Normal rate, regular rhythm, S1 normal and S2 normal.    Pulmonary/Chest: Effort normal and breath sounds normal.   Neurological: She is alert and oriented to person, place, and time.   Skin: Skin is dry. Capillary refill takes less than 2 seconds. Lesion noted. She is not diaphoretic. There is erythema. No pallor.        Vitals reviewed.      Assessment/Plan   Elissa was seen today for wound check.    Diagnoses and all orders for this visit:    Cellulitis of left lower extremity  -     cephalexin (KEFLEX) 250 MG capsule; Take 1 capsule by mouth 3 (Three) Times a Day for 10 days.          -     Dose adjusted d/t most recent GFR 28        -     Clean area with mild soap and water daily, do not apply any bandages or peroxide        -     Follow up with PCP for persistent symptoms or ER for worsening symptoms

## 2018-07-01 NOTE — PROGRESS NOTES
Subjective   Elissa Marrero is a 89 y.o. female.     History of Present Illness     Chief Complaint:   Chief Complaint   Patient presents with   • MEDICARE WELLNESS MANUEL BP IN TODAY JS   • Hypothyroidism     MED REFILL    • Hypertension   • Hyperlipidemia   • Insomnia       Elissa Marrero 89 y.o. female who presents today for Medical Management of the below listed issues and medication refills.  she has a problem list of   Patient Active Problem List   Diagnosis   • Diabetes mellitus, type 2   • Hyperlipidemia   • Benign essential hypertension   • Hypothyroidism   • Insomnia   • Lymphedema   • Osteoarthritis   • Osteopenia   • Renal insufficiency   • Restless leg syndrome   • Vitamin D deficiency   .  Since the last visit, she has overall felt well.  she has been compliant with   Current Outpatient Prescriptions:   •  amLODIPine (NORVASC) 5 MG tablet, Take 1 tablet by mouth Daily., Disp: 90 tablet, Rfl: 1  •  Cholecalciferol (VITAMIN D3) 5000 units capsule capsule, Take 2,000 Units by mouth Daily., Disp: , Rfl:   •  clopidogrel (PLAVIX) 75 MG tablet, Take 1 tablet by mouth Daily., Disp: 90 tablet, Rfl: 1  •  fenofibrate 160 MG tablet, Take 1 tablet by mouth Daily., Disp: 90 tablet, Rfl: 1  •  glipiZIDE (GLUCOTROL) 5 MG tablet, Take 1 tablet by mouth Daily Before Lunch., Disp: 90 tablet, Rfl: 1  •  levothyroxine (SYNTHROID, LEVOTHROID) 75 MCG tablet, Take 1 tablet by mouth Daily., Disp: 90 tablet, Rfl: 1  •  lovastatin (MEVACOR) 40 MG tablet, Take 1 tablet by mouth Daily., Disp: 90 tablet, Rfl: 1  •  metoprolol tartrate (LOPRESSOR) 25 MG tablet, Take 1 tablet by mouth 2 (Two) Times a Day., Disp: 180 tablet, Rfl: 1  •  pramipexole (MIRAPEX) 1.5 MG tablet, Take 1 tablet by mouth Daily., Disp: 90 tablet, Rfl: 1  •  traZODone (DESYREL) 100 MG tablet, Take 0.5-2 tablets by oral route once a day (in the evening) for 90 days, Disp: 180 tablet, Rfl: 1.  she denies medication side effects.    All of the chronic  "condition(s) listed above are stable w/o issues.    /65   Pulse 58   Temp 98.2 °F (36.8 °C) (Oral)   Resp 16   Ht 170.2 cm (67\")   Wt 67.6 kg (149 lb)   BMI 23.34 kg/m²     Results for orders placed or performed in visit on 12/13/17   Hemoglobin A1c   Result Value Ref Range    Hemoglobin A1C 6.39 (H) 4.80 - 5.60 %   MicroAlbumin, Urine, Random - Urine, Clean Catch   Result Value Ref Range    Microalbumin, Urine 35.2 Not Estab. ug/mL   Basic Metabolic Panel   Result Value Ref Range    Glucose 116 (H) 65 - 99 mg/dL    BUN 27 (H) 8 - 23 mg/dL    Creatinine 1.69 (H) 0.57 - 1.00 mg/dL    eGFR Non African Am 28 (L) >60 mL/min/1.73    eGFR African Am 34 (L) >60 mL/min/1.73    BUN/Creatinine Ratio 16.0 7.0 - 25.0    Sodium 144 136 - 145 mmol/L    Potassium 4.9 3.5 - 5.2 mmol/L    Chloride 105 98 - 107 mmol/L    Total CO2 27.2 22.0 - 29.0 mmol/L    Calcium 10.0 (H) 8.2 - 9.6 mg/dL           The following portions of the patient's history were reviewed and updated as appropriate: allergies, current medications, past family history, past medical history, past social history, past surgical history and problem list.    Review of Systems   Constitutional: Negative for activity change, chills, fatigue and fever.   Respiratory: Negative for cough and chest tightness.    Cardiovascular: Negative for chest pain and palpitations.   Gastrointestinal: Negative for abdominal pain and nausea.   Endocrine: Negative for cold intolerance.   Psychiatric/Behavioral: Negative for behavioral problems and dysphoric mood.       Objective   Physical Exam   Constitutional: She appears well-developed and well-nourished.   Neck: Neck supple. No thyromegaly present.   Cardiovascular: Normal rate and regular rhythm.    No murmur heard.  Pulmonary/Chest: Effort normal and breath sounds normal.   Abdominal: Bowel sounds are normal. There is no tenderness.   Neurological: She is alert.   Psychiatric: She has a normal mood and affect. Her " behavior is normal.   Nursing note and vitals reviewed.      Assessment/Plan   Elissa was seen today for medicare wellness, hypothyroidism, hypertension, hyperlipidemia and insomnia.    Diagnoses and all orders for this visit:    Medicare annual wellness visit, subsequent    Type 2 diabetes mellitus without complication, without long-term current use of insulin  -     glipiZIDE (GLUCOTROL) 5 MG tablet; Take 1 tablet by mouth Daily Before Lunch.  -     clopidogrel (PLAVIX) 75 MG tablet; Take 1 tablet by mouth Daily.  -     Comprehensive metabolic panel  -     Lipid panel  -     Hemoglobin A1c    Benign essential hypertension  -     amLODIPine (NORVASC) 5 MG tablet; Take 1 tablet by mouth Daily.  -     metoprolol tartrate (LOPRESSOR) 25 MG tablet; Take 1 tablet by mouth 2 (Two) Times a Day.  -     Lipid panel  -     CBC and Differential    Mixed hyperlipidemia  -     fenofibrate 160 MG tablet; Take 1 tablet by mouth Daily.  -     lovastatin (MEVACOR) 40 MG tablet; Take 1 tablet by mouth Daily.  -     Lipid panel    Acquired hypothyroidism  -     levothyroxine (SYNTHROID, LEVOTHROID) 75 MCG tablet; Take 1 tablet by mouth Daily.  -     T4, Free  -     TSH    Restless leg syndrome  -     pramipexole (MIRAPEX) 1.5 MG tablet; Take 1 tablet by mouth Daily.    Primary insomnia  -     traZODone (DESYREL) 100 MG tablet; Take 0.5-2 tablets by oral route once a day (in the evening) for 90 days

## 2018-07-02 ENCOUNTER — OFFICE VISIT (OUTPATIENT)
Dept: FAMILY MEDICINE CLINIC | Facility: CLINIC | Age: 83
End: 2018-07-02

## 2018-07-02 VITALS
HEART RATE: 58 BPM | WEIGHT: 149 LBS | SYSTOLIC BLOOD PRESSURE: 150 MMHG | DIASTOLIC BLOOD PRESSURE: 65 MMHG | HEIGHT: 67 IN | RESPIRATION RATE: 16 BRPM | BODY MASS INDEX: 23.39 KG/M2 | TEMPERATURE: 98.2 F

## 2018-07-02 DIAGNOSIS — F51.01 PRIMARY INSOMNIA: ICD-10-CM

## 2018-07-02 DIAGNOSIS — I10 BENIGN ESSENTIAL HYPERTENSION: ICD-10-CM

## 2018-07-02 DIAGNOSIS — Z00.00 MEDICARE ANNUAL WELLNESS VISIT, SUBSEQUENT: Primary | ICD-10-CM

## 2018-07-02 DIAGNOSIS — G25.81 RESTLESS LEG SYNDROME: ICD-10-CM

## 2018-07-02 DIAGNOSIS — E11.9 TYPE 2 DIABETES MELLITUS WITHOUT COMPLICATION, WITHOUT LONG-TERM CURRENT USE OF INSULIN (HCC): ICD-10-CM

## 2018-07-02 DIAGNOSIS — E78.2 MIXED HYPERLIPIDEMIA: ICD-10-CM

## 2018-07-02 DIAGNOSIS — E03.9 ACQUIRED HYPOTHYROIDISM: ICD-10-CM

## 2018-07-02 PROCEDURE — G0439 PPPS, SUBSEQ VISIT: HCPCS | Performed by: FAMILY MEDICINE

## 2018-07-02 PROCEDURE — 99214 OFFICE O/P EST MOD 30 MIN: CPT | Performed by: FAMILY MEDICINE

## 2018-07-02 RX ORDER — GLIPIZIDE 5 MG/1
5 TABLET ORAL
Qty: 90 TABLET | Refills: 1 | Status: SHIPPED | OUTPATIENT
Start: 2018-07-02 | End: 2018-11-29 | Stop reason: SDUPTHER

## 2018-07-02 RX ORDER — LOVASTATIN 40 MG/1
40 TABLET ORAL DAILY
Qty: 90 TABLET | Refills: 1 | Status: SHIPPED | OUTPATIENT
Start: 2018-07-02 | End: 2018-11-29 | Stop reason: SDUPTHER

## 2018-07-02 RX ORDER — PRAMIPEXOLE DIHYDROCHLORIDE 1.5 MG/1
1.5 TABLET ORAL DAILY
Qty: 90 TABLET | Refills: 1 | Status: SHIPPED | OUTPATIENT
Start: 2018-07-02 | End: 2018-11-29 | Stop reason: SDUPTHER

## 2018-07-02 RX ORDER — LEVOTHYROXINE SODIUM 0.07 MG/1
75 TABLET ORAL DAILY
Qty: 90 TABLET | Refills: 1 | Status: SHIPPED | OUTPATIENT
Start: 2018-07-02 | End: 2018-11-29 | Stop reason: SDUPTHER

## 2018-07-02 RX ORDER — AMLODIPINE BESYLATE 5 MG/1
5 TABLET ORAL DAILY
Qty: 90 TABLET | Refills: 1 | Status: SHIPPED | OUTPATIENT
Start: 2018-07-02 | End: 2018-11-29 | Stop reason: SDUPTHER

## 2018-07-02 RX ORDER — FENOFIBRATE 160 MG/1
160 TABLET ORAL DAILY
Qty: 90 TABLET | Refills: 1 | Status: SHIPPED | OUTPATIENT
Start: 2018-07-02 | End: 2018-11-29 | Stop reason: SDUPTHER

## 2018-07-02 RX ORDER — TRAZODONE HYDROCHLORIDE 100 MG/1
TABLET ORAL
Qty: 180 TABLET | Refills: 1 | Status: SHIPPED | OUTPATIENT
Start: 2018-07-02 | End: 2018-11-29 | Stop reason: SDUPTHER

## 2018-07-02 RX ORDER — CLOPIDOGREL BISULFATE 75 MG/1
75 TABLET ORAL DAILY
Qty: 90 TABLET | Refills: 1 | Status: SHIPPED | OUTPATIENT
Start: 2018-07-02 | End: 2018-11-29 | Stop reason: SDUPTHER

## 2018-07-02 NOTE — PATIENT INSTRUCTIONS
Medicare Wellness  Personal Prevention Plan of Service     Date of Office Visit:  2018  Encounter Provider:  Onel Mann MD  Place of Service:  [facility]  Patient Name: Elissa Marrero  :  3/15/1925    As part of the Medicare Wellness portion of your visit today, we are providing you with this personalized preventive plan of services (PPPS). This plan is based upon recommendations of the [place] Preventive Services Task Force (USPSTF) and the Advisory Committee on Immunization Practices (ACIP).    This lists the preventive care services that should be considered, and provides dates of when you are due. Items listed as completed are up-to-date and do not require any further intervention.    Health Maintenance   Topic Date Due   • LIPID PANEL  2018   • HEMOGLOBIN A1C  2018   • DIABETIC FOOT EXAM  2018 (Originally 10/18/2017)   • ZOSTER VACCINE (2 of 2) 2019 (Originally 2016)   • PNEUMOCOCCAL VACCINES (65+ LOW/MEDIUM RISK) (2 of 2 - PPSV23) 2019 (Originally 10/18/2017)   • INFLUENZA VACCINE  2018   • DXA SCAN  10/18/2018   • MAMMOGRAM  2018   • URINE MICROALBUMIN  2018   • MEDICARE ANNUAL WELLNESS  2019   • TDAP/TD VACCINES  Excluded       Orders Placed This Encounter   Procedures   • T4, Free   • Comprehensive metabolic panel   • Lipid panel   • TSH   • Hemoglobin A1c   • CBC and Differential     Order Specific Question:   Manual Differential     Answer:   Yes       No Follow-up on file.

## 2018-07-02 NOTE — PROGRESS NOTES
QUICK REFERENCE INFORMATION:  The ABCs of the Annual Wellness Visit    Subsequent Medicare Wellness Visit    HEALTH RISK ASSESSMENT    3/15/1925    Recent Hospitalizations:  No hospitalization(s) within the last year..        Current Medical Providers:  Patient Care Team:  Oenl Mann MD as PCP - General  Onel Mann MD as PCP - Family Medicine  Anthony Sabillon MD as PCP - Claims Attributed  Anthony Sabillon MD as Consulting Physician (Nephrology)        Smoking Status:  History   Smoking Status   • Never Smoker   Smokeless Tobacco   • Never Used       Alcohol Consumption:  History   Alcohol Use No       Depression Screen:   PHQ-2/PHQ-9 Depression Screening 7/2/2018   Little interest or pleasure in doing things 0   Feeling down, depressed, or hopeless 0   Trouble falling or staying asleep, or sleeping too much -   Feeling tired or having little energy -   Poor appetite or overeating -   Feeling bad about yourself - or that you are a failure or have let yourself or your family down -   Trouble concentrating on things, such as reading the newspaper or watching television -   Moving or speaking so slowly that other people could have noticed. Or the opposite - being so fidgety or restless that you have been moving around a lot more than usual -   Thoughts that you would be better off dead, or of hurting yourself in some way -   Total Score 0       Health Habits and Functional and Cognitive Screening:  Functional & Cognitive Status 7/2/2018   Do you have difficulty preparing food and eating? No   Do you have difficulty bathing yourself, getting dressed or grooming yourself? No   Do you have difficulty using the toilet? No   Do you have difficulty moving around from place to place? No   Do you have trouble with steps or getting out of a bed or a chair? No   In the past year have you fallen or experienced a near fall? No   Current Diet Well Balanced Diet   Dental Exam Not up to date   Eye Exam Up to date    Exercise (times per week) 3 times per week   Current Exercise Activities Include -   Do you need help using the phone?  No   Are you deaf or do you have serious difficulty hearing?  No   Do you need help with transportation? Yes   Do you need help shopping? Yes   Do you need help preparing meals?  No   Do you need help with housework?  No   Do you need help with laundry? No   Do you need help taking your medications? No   Do you need help managing money? No   Do you ever drive or ride in a car without wearing a seat belt? No   Have you felt unusual stress, anger or loneliness in the last month? No   Who do you live with? Alone   If you need help, do you have trouble finding someone available to you? Yes   Have you been bothered in the last four weeks by sexual problems? No   Do you have difficulty concentrating, remembering or making decisions? No           Does the patient have evidence of cognitive impairment? No    Aspirin use counseling: Does not need ASA (and currently is not on it)      Recent Lab Results:  CMP:  Lab Results   Component Value Date     (H) 12/13/2017    BUN 27 (H) 12/13/2017    CREATININE 1.69 (H) 12/13/2017    EGFRIFNONA 28 (L) 12/13/2017    EGFRIFAFRI 34 (L) 12/13/2017    BCR 16.0 12/13/2017     12/13/2017    K 4.9 12/13/2017    CO2 27.2 12/13/2017    CALCIUM 10.0 (H) 12/13/2017    PROTENTOTREF 7.0 04/18/2017    ALBUMIN 4.60 04/18/2017    LABGLOBREF 2.4 04/18/2017    LABIL2 1.9 04/18/2017    BILITOT 0.4 04/18/2017    ALKPHOS 28 (L) 04/18/2017    AST 19 04/18/2017    ALT 14 04/18/2017     Lipid Panel:  Lab Results   Component Value Date    TRIG 82 04/18/2017    HDL 56 04/18/2017    VLDL 16.4 04/18/2017     HbA1c:  Lab Results   Component Value Date    HGBA1C 6.39 (H) 12/13/2017       Visual Acuity:  No exam data present    Age-appropriate Screening Schedule:  Refer to the list below for future screening recommendations based on patient's age, sex and/or medical conditions.  Orders for these recommended tests are listed in the plan section. The patient has been provided with a written plan.    Health Maintenance   Topic Date Due   • LIPID PANEL  04/18/2018   • HEMOGLOBIN A1C  06/13/2018   • DIABETIC FOOT EXAM  12/20/2018 (Originally 10/18/2017)   • ZOSTER VACCINE (2 of 2) 01/30/2019 (Originally 12/13/2016)   • PNEUMOCOCCAL VACCINES (65+ LOW/MEDIUM RISK) (2 of 2 - PPSV23) 02/20/2019 (Originally 10/18/2017)   • INFLUENZA VACCINE  08/01/2018   • DXA SCAN  10/18/2018   • MAMMOGRAM  12/13/2018   • URINE MICROALBUMIN  12/13/2018   • TDAP/TD VACCINES  Excluded        Subjective   History of Present Illness    Elissa Marrero is a 89 y.o. female who presents for an Subsequent Wellness Visit.    The following portions of the patient's history were reviewed and updated as appropriate: allergies, current medications, past family history, past medical history, past social history, past surgical history and problem list.    Outpatient Medications Prior to Visit   Medication Sig Dispense Refill   • Cholecalciferol (VITAMIN D3) 5000 units capsule capsule Take 2,000 Units by mouth Daily.     • amLODIPine (NORVASC) 5 MG tablet Take 1 tablet by mouth Daily. 90 tablet 1   • clopidogrel (PLAVIX) 75 MG tablet Take 1 tablet by mouth Daily. 90 tablet 1   • fenofibrate 160 MG tablet Take 1 tablet by mouth Daily. 90 tablet 1   • glipiZIDE (GLUCOTROL) 5 MG tablet Take 1 tablet by mouth Daily Before Lunch. 90 tablet 1   • levothyroxine (SYNTHROID, LEVOTHROID) 75 MCG tablet Take 1 tablet by mouth Daily. 90 tablet 1   • lovastatin (MEVACOR) 40 MG tablet Take 1 tablet by mouth Daily. 90 tablet 1   • metoprolol tartrate (LOPRESSOR) 25 MG tablet Take 1 tablet by mouth 2 (Two) Times a Day. 180 tablet 1   • pramipexole (MIRAPEX) 1.5 MG tablet Take 1 tablet by mouth Daily. 90 tablet 1   • traZODone (DESYREL) 100 MG tablet Take 0.5-2 tablets by oral route once a day (in the evening) for 90 days 180 tablet 1     No  "facility-administered medications prior to visit.        Patient Active Problem List   Diagnosis   • Diabetes mellitus, type 2   • Hyperlipidemia   • Benign essential hypertension   • Hypothyroidism   • Insomnia   • Lymphedema   • Osteoarthritis   • Osteopenia   • Renal insufficiency   • Restless leg syndrome   • Vitamin D deficiency       Advance Care Planning:  has an advance directive - a copy HAS NOT been provided    Identification of Risk Factors:  Risk factors include: cardiovascular risk.    Review of Systems    Compared to one year ago, the patient feels her physical health is the same.  Compared to one year ago, the patient feels her mental health is the same.    Objective     Physical Exam    Vitals:    07/02/18 0854   BP: 150/65   Pulse: 58   Resp: 16   Temp: 98.2 °F (36.8 °C)   TempSrc: Oral   Weight: 67.6 kg (149 lb)   Height: 170.2 cm (67\")   PainSc: 0-No pain       Patient's Body mass index is 23.34 kg/m². BMI is within normal parameters. No follow-up required.      Assessment/Plan   Patient Self-Management and Personalized Health Advice  The patient has been provided with information about: diet, exercise and weight management and preventive services including:   · Exercise counseling provided, Fall Risk assessment done, Nutrition counseling provided.    Visit Diagnoses:    ICD-10-CM ICD-9-CM   1. Medicare annual wellness visit, subsequent Z00.00 V70.0   2. Type 2 diabetes mellitus without complication, without long-term current use of insulin E11.9 250.00   3. Benign essential hypertension I10 401.1   4. Mixed hyperlipidemia E78.2 272.2   5. Acquired hypothyroidism E03.9 244.9   6. Restless leg syndrome G25.81 333.94   7. Primary insomnia F51.01 307.42       Orders Placed This Encounter   Procedures   • T4, Free   • Comprehensive metabolic panel   • Lipid panel   • TSH   • Hemoglobin A1c   • CBC and Differential     Order Specific Question:   Manual Differential     Answer:   Yes       Outpatient " Encounter Prescriptions as of 7/2/2018   Medication Sig Dispense Refill   • amLODIPine (NORVASC) 5 MG tablet Take 1 tablet by mouth Daily. 90 tablet 1   • Cholecalciferol (VITAMIN D3) 5000 units capsule capsule Take 2,000 Units by mouth Daily.     • clopidogrel (PLAVIX) 75 MG tablet Take 1 tablet by mouth Daily. 90 tablet 1   • fenofibrate 160 MG tablet Take 1 tablet by mouth Daily. 90 tablet 1   • glipiZIDE (GLUCOTROL) 5 MG tablet Take 1 tablet by mouth Daily Before Lunch. 90 tablet 1   • levothyroxine (SYNTHROID, LEVOTHROID) 75 MCG tablet Take 1 tablet by mouth Daily. 90 tablet 1   • lovastatin (MEVACOR) 40 MG tablet Take 1 tablet by mouth Daily. 90 tablet 1   • metoprolol tartrate (LOPRESSOR) 25 MG tablet Take 1 tablet by mouth 2 (Two) Times a Day. 180 tablet 1   • pramipexole (MIRAPEX) 1.5 MG tablet Take 1 tablet by mouth Daily. 90 tablet 1   • traZODone (DESYREL) 100 MG tablet Take 0.5-2 tablets by oral route once a day (in the evening) for 90 days 180 tablet 1   • [DISCONTINUED] amLODIPine (NORVASC) 5 MG tablet Take 1 tablet by mouth Daily. 90 tablet 1   • [DISCONTINUED] clopidogrel (PLAVIX) 75 MG tablet Take 1 tablet by mouth Daily. 90 tablet 1   • [DISCONTINUED] fenofibrate 160 MG tablet Take 1 tablet by mouth Daily. 90 tablet 1   • [DISCONTINUED] glipiZIDE (GLUCOTROL) 5 MG tablet Take 1 tablet by mouth Daily Before Lunch. 90 tablet 1   • [DISCONTINUED] levothyroxine (SYNTHROID, LEVOTHROID) 75 MCG tablet Take 1 tablet by mouth Daily. 90 tablet 1   • [DISCONTINUED] lovastatin (MEVACOR) 40 MG tablet Take 1 tablet by mouth Daily. 90 tablet 1   • [DISCONTINUED] metoprolol tartrate (LOPRESSOR) 25 MG tablet Take 1 tablet by mouth 2 (Two) Times a Day. 180 tablet 1   • [DISCONTINUED] pramipexole (MIRAPEX) 1.5 MG tablet Take 1 tablet by mouth Daily. 90 tablet 1   • [DISCONTINUED] traZODone (DESYREL) 100 MG tablet Take 0.5-2 tablets by oral route once a day (in the evening) for 90 days 180 tablet 1     No  facility-administered encounter medications on file as of 7/2/2018.        Reviewed use of high risk medication in the elderly: not applicable  Reviewed for potential of harmful drug interactions in the elderly: not applicable    Follow Up:  No Follow-up on file.     An After Visit Summary and PPPS with all of these plans were given to the patient.

## 2018-07-04 LAB
ALBUMIN SERPL-MCNC: 4.4 G/DL (ref 3.5–5.2)
ALBUMIN/GLOB SERPL: 2.1 G/DL
ALP SERPL-CCNC: 31 U/L (ref 39–117)
ALT SERPL-CCNC: 16 U/L (ref 1–33)
AST SERPL-CCNC: 17 U/L (ref 1–32)
BASOPHILS # BLD AUTO: 0.02 10*3/MM3 (ref 0–0.2)
BASOPHILS NFR BLD AUTO: 0.3 % (ref 0–1.5)
BILIRUB SERPL-MCNC: 0.3 MG/DL (ref 0.1–1.2)
BUN SERPL-MCNC: 19 MG/DL (ref 8–23)
BUN/CREAT SERPL: 9.4 (ref 7–25)
CALCIUM SERPL-MCNC: 10.2 MG/DL (ref 8.2–9.6)
CHLORIDE SERPL-SCNC: 103 MMOL/L (ref 98–107)
CHOLEST SERPL-MCNC: 131 MG/DL (ref 0–200)
CO2 SERPL-SCNC: 24.8 MMOL/L (ref 22–29)
CREAT SERPL-MCNC: 2.03 MG/DL (ref 0.57–1)
EOSINOPHIL # BLD AUTO: 0.1 10*3/MM3 (ref 0–0.7)
EOSINOPHIL NFR BLD AUTO: 1.5 % (ref 0.3–6.2)
ERYTHROCYTE [DISTWIDTH] IN BLOOD BY AUTOMATED COUNT: 13.8 % (ref 11.7–13)
GLOBULIN SER CALC-MCNC: 2.1 GM/DL
GLUCOSE SERPL-MCNC: 210 MG/DL (ref 65–99)
HBA1C MFR BLD: 6.83 % (ref 4.8–5.6)
HCT VFR BLD AUTO: 41.3 % (ref 35.6–45.5)
HDLC SERPL-MCNC: 40 MG/DL (ref 40–60)
HGB BLD-MCNC: 12.3 G/DL (ref 11.9–15.5)
IMM GRANULOCYTES # BLD: 0 10*3/MM3 (ref 0–0.03)
IMM GRANULOCYTES NFR BLD: 0 % (ref 0–0.5)
LDLC SERPL CALC-MCNC: 51 MG/DL (ref 0–100)
LYMPHOCYTES # BLD AUTO: 2.51 10*3/MM3 (ref 0.9–4.8)
LYMPHOCYTES NFR BLD AUTO: 37 % (ref 19.6–45.3)
MCH RBC QN AUTO: 28.2 PG (ref 26.9–32)
MCHC RBC AUTO-ENTMCNC: 29.8 G/DL (ref 32.4–36.3)
MCV RBC AUTO: 94.7 FL (ref 80.5–98.2)
MONOCYTES # BLD AUTO: 0.52 10*3/MM3 (ref 0.2–1.2)
MONOCYTES NFR BLD AUTO: 7.7 % (ref 5–12)
NEUTROPHILS # BLD AUTO: 3.64 10*3/MM3 (ref 1.9–8.1)
NEUTROPHILS NFR BLD AUTO: 53.5 % (ref 42.7–76)
PLATELET # BLD AUTO: 220 10*3/MM3 (ref 140–500)
POTASSIUM SERPL-SCNC: 5.8 MMOL/L (ref 3.5–5.2)
PROT SERPL-MCNC: 6.5 G/DL (ref 6–8.5)
RBC # BLD AUTO: 4.36 10*6/MM3 (ref 3.9–5.2)
SODIUM SERPL-SCNC: 141 MMOL/L (ref 136–145)
T4 FREE SERPL-MCNC: 1.45 NG/DL (ref 0.93–1.7)
TRIGL SERPL-MCNC: 199 MG/DL (ref 0–150)
TSH SERPL DL<=0.005 MIU/L-ACNC: 2.55 MIU/ML (ref 0.27–4.2)
VLDLC SERPL CALC-MCNC: 39.8 MG/DL (ref 5–40)
WBC # BLD AUTO: 6.79 10*3/MM3 (ref 4.5–10.7)

## 2018-07-05 DIAGNOSIS — E87.5 SERUM POTASSIUM ELEVATED: Primary | ICD-10-CM

## 2018-07-10 ENCOUNTER — RESULTS ENCOUNTER (OUTPATIENT)
Dept: FAMILY MEDICINE CLINIC | Facility: CLINIC | Age: 83
End: 2018-07-10

## 2018-07-10 DIAGNOSIS — E87.5 SERUM POTASSIUM ELEVATED: ICD-10-CM

## 2018-07-10 LAB
BUN SERPL-MCNC: 19 MG/DL (ref 8–23)
BUN/CREAT SERPL: 10.7 (ref 7–25)
CALCIUM SERPL-MCNC: 9.8 MG/DL (ref 8.2–9.6)
CHLORIDE SERPL-SCNC: 102 MMOL/L (ref 98–107)
CO2 SERPL-SCNC: 27.3 MMOL/L (ref 22–29)
CREAT SERPL-MCNC: 1.77 MG/DL (ref 0.57–1)
GLUCOSE SERPL-MCNC: 122 MG/DL (ref 65–99)
POTASSIUM SERPL-SCNC: 5.2 MMOL/L (ref 3.5–5.2)
SODIUM SERPL-SCNC: 141 MMOL/L (ref 136–145)

## 2018-08-20 ENCOUNTER — OFFICE VISIT (OUTPATIENT)
Dept: FAMILY MEDICINE CLINIC | Facility: CLINIC | Age: 83
End: 2018-08-20

## 2018-08-20 VITALS
DIASTOLIC BLOOD PRESSURE: 59 MMHG | BODY MASS INDEX: 22.6 KG/M2 | RESPIRATION RATE: 16 BRPM | OXYGEN SATURATION: 98 % | TEMPERATURE: 98 F | HEIGHT: 67 IN | HEART RATE: 64 BPM | SYSTOLIC BLOOD PRESSURE: 174 MMHG | WEIGHT: 144 LBS

## 2018-08-20 DIAGNOSIS — M25.551 RIGHT HIP PAIN: Primary | ICD-10-CM

## 2018-08-20 PROCEDURE — 73502 X-RAY EXAM HIP UNI 2-3 VIEWS: CPT | Performed by: FAMILY MEDICINE

## 2018-08-20 PROCEDURE — 99213 OFFICE O/P EST LOW 20 MIN: CPT | Performed by: FAMILY MEDICINE

## 2018-08-20 RX ORDER — PREDNISONE 20 MG/1
20 TABLET ORAL 2 TIMES DAILY
Qty: 10 TABLET | Refills: 0 | Status: SHIPPED | OUTPATIENT
Start: 2018-08-20 | End: 2018-08-28 | Stop reason: SDUPTHER

## 2018-08-20 NOTE — PROGRESS NOTES
"Subjective   Elissa Marrero is a 89 y.o. female.     CC: Leg Pain/Hip Pain    History of Present Illness     Pt comes in today c/o right hip/leg pain x 2 weeks w/o injury. Hurts just to walk, not with sitting or laying down. Tylenol mildly helps.      The following portions of the patient's history were reviewed and updated as appropriate: allergies, current medications, past family history, past medical history, past social history, past surgical history and problem list.    Review of Systems   Constitutional: Negative for activity change, chills, fatigue and fever.   Respiratory: Negative for cough and chest tightness.    Cardiovascular: Negative for chest pain and palpitations.   Gastrointestinal: Negative for abdominal pain and nausea.   Endocrine: Negative for cold intolerance.   Musculoskeletal:        Hip/leg pain   Psychiatric/Behavioral: Negative for behavioral problems and dysphoric mood.     /59   Pulse 64   Temp 98 °F (36.7 °C) (Oral)   Resp 16   Ht 170.2 cm (67\")   Wt 65.3 kg (144 lb)   SpO2 98%   BMI 22.55 kg/m²     Objective   Physical Exam   Constitutional: She appears well-developed and well-nourished.   Neck: Neck supple. No thyromegaly present.   Cardiovascular: Normal rate and regular rhythm.    No murmur heard.  Pulmonary/Chest: Effort normal and breath sounds normal.   Abdominal: Bowel sounds are normal. There is no tenderness.   Musculoskeletal: Normal range of motion. She exhibits no tenderness.   Neurological: She is alert.   Psychiatric: She has a normal mood and affect. Her behavior is normal.   Nursing note and vitals reviewed.  XR Right Hip: ordered due to pain, no comparison, read by me: age-related DJD    Assessment/Plan   Elissa was seen today for right hip pain.    Diagnoses and all orders for this visit:    Right hip pain  -     XR Hip With or Without Pelvis 2 - 3 View Right  -     predniSONE (DELTASONE) 20 MG tablet; Take 1 tablet by mouth 2 (Two) Times a Day for 5 " days.      Heat/stretching and appt with orttho.if not better.

## 2018-08-21 DIAGNOSIS — M25.551 HIP PAIN, ACUTE, RIGHT: Primary | ICD-10-CM

## 2018-08-28 DIAGNOSIS — M25.551 RIGHT HIP PAIN: ICD-10-CM

## 2018-08-28 RX ORDER — PREDNISONE 20 MG/1
20 TABLET ORAL 2 TIMES DAILY
Qty: 10 TABLET | Refills: 0 | Status: SHIPPED | OUTPATIENT
Start: 2018-08-28 | End: 2018-09-02

## 2018-09-17 ENCOUNTER — OFFICE VISIT (OUTPATIENT)
Dept: ORTHOPEDIC SURGERY | Facility: CLINIC | Age: 83
End: 2018-09-17

## 2018-09-17 VITALS — WEIGHT: 150 LBS | HEIGHT: 66 IN | BODY MASS INDEX: 24.11 KG/M2 | TEMPERATURE: 97.8 F

## 2018-09-17 DIAGNOSIS — M25.551 HIP PAIN, RIGHT: Primary | ICD-10-CM

## 2018-09-17 PROCEDURE — 73502 X-RAY EXAM HIP UNI 2-3 VIEWS: CPT | Performed by: ORTHOPAEDIC SURGERY

## 2018-09-17 PROCEDURE — 99203 OFFICE O/P NEW LOW 30 MIN: CPT | Performed by: ORTHOPAEDIC SURGERY

## 2018-09-17 NOTE — PROGRESS NOTES
Patient Name: Elissa Marrero   YOB: 1925  Referring Primary Care Physician: Onel Mann MD  BMI: Body mass index is 24.21 kg/m².    Chief Complaint:    Chief Complaint   Patient presents with   • Right Hip - Pain        Subjective:    HPI:   Elissa Marrero is a pleasant 89 y.o. year old who presents today for evaluation of   Chief Complaint   Patient presents with   • Right Hip - Pain    (intermittent right hip pain that radiates down the ant thigh. Here with daughter.  Denies falls etc.  Reports no chronic known medical problems.    This problem is new to this examiner.     Medications:   Home Medications:  Current Outpatient Prescriptions on File Prior to Visit   Medication Sig   • amLODIPine (NORVASC) 5 MG tablet Take 1 tablet by mouth Daily.   • clopidogrel (PLAVIX) 75 MG tablet Take 1 tablet by mouth Daily.   • fenofibrate 160 MG tablet Take 1 tablet by mouth Daily.   • glipiZIDE (GLUCOTROL) 5 MG tablet Take 1 tablet by mouth Daily Before Lunch.   • levothyroxine (SYNTHROID, LEVOTHROID) 75 MCG tablet Take 1 tablet by mouth Daily.   • lovastatin (MEVACOR) 40 MG tablet Take 1 tablet by mouth Daily.   • metoprolol tartrate (LOPRESSOR) 25 MG tablet Take 1 tablet by mouth 2 (Two) Times a Day.   • pramipexole (MIRAPEX) 1.5 MG tablet Take 1 tablet by mouth Daily.   • Cholecalciferol (VITAMIN D3) 5000 units capsule capsule Take 2,000 Units by mouth Daily.   • traZODone (DESYREL) 100 MG tablet Take 0.5-2 tablets by oral route once a day (in the evening) for 90 days     No current facility-administered medications on file prior to visit.      Current Medications:  Scheduled Meds:  Continuous Infusions:  No current facility-administered medications for this visit.   PRN Meds:.    I have reviewed the patient's medical history in detail and updated the computerized patient record.  Review and summarization of old records includes:    Past Medical History:   Diagnosis Date   • Benign essential hypertension     • Diabetes mellitus, type 2 (CMS/[facility]) 12/07/2012   • Encounter for diabetic foot exam (CMS/[facility]) 03/2015   • History of complete eye exam 11/01/2017    Dr Leslie   • Hyperlipidemia    • Hypothyroidism    • Insomnia    • Lymphedema 04/14/2010   • Osteoarthritis    • Osteopenia    • Renal insufficiency 03/04/2009   • Restless leg syndrome 12/05/2012   • Vitamin D deficiency 10/12/2011        Past Surgical History:   Procedure Laterality Date   • APPENDECTOMY     • CHOLECYSTECTOMY     • EYE SURGERY     • TONSILLECTOMY          Social History     Occupational History   • Not on file.     Social History Main Topics   • Smoking status: Never Smoker   • Smokeless tobacco: Never Used   • Alcohol use No   • Drug use: Unknown   • Sexual activity: Defer    Social History     Social History Narrative   • No narrative on file        Family History   Problem Relation Age of Onset   • Diabetes Sister    • Diabetes Brother    • Diabetes Daughter    • Cancer Son         pancreatic       ROS: 14 point review of systems was performed and all other systems were reviewed and are negative except for documented findings in HPI and today's encounter.     Allergies:   Allergies   Allergen Reactions   • Benazepril      Constitutional:  Denies fever, shaking or chills   Eyes:  Denies change in visual acuity   HENT:  Denies nasal congestion or sore throat   Respiratory:  Denies cough or shortness of breath   Cardiovascular:  Denies chest pain or severe LE edema   GI:  Denies abdominal pain, nausea, vomiting, bloody stools or diarrhea   Musculoskeletal:  Numbness, tingling, pain, or loss of motor function only as noted above in history of present illness.  : Denies painful urination or hematuria  Integument:  Denies rash, lesion or ulceration   Neurologic:  Denies headache or focal weakness  Endocrine:  Denies lymphadenopathy  Psych:  Denies confusion or change in mental status   Hem:  Denies active bleeding    Subjective  "    Objective:    Physical Exam: 89 y.o. female  Wt Readings from Last 3 Encounters:   09/17/18 68 kg (150 lb)   08/20/18 65.3 kg (144 lb)   07/02/18 67.6 kg (149 lb)     Ht Readings from Last 3 Encounters:   09/17/18 167.6 cm (66\")   08/20/18 170.2 cm (67\")   07/02/18 170.2 cm (67\")     Body mass index is 24.21 kg/m².    Vitals:    09/17/18 1001   Temp: 97.8 °F (36.6 °C)       Vital signs reviewed.   General Appearance:    Alert, cooperative, in no acute distress                  Eyes: conjunctiva clear  ENT: external ears and nose atraumatic  CV: no peripheral edema  Resp: normal respiratory effort  Skin: no rashes or wounds; normal turgor  Psych: mood and affect appropriate  Lymph: no nodes appreciated  Neuro: gross sensation intact  Vascular:  Palpable peripheral pulse in noted extremity  Musculoskeletal Extremities: HIP Exam: stiff-legged gait without assistive device right hip, stiffness, guteal pain and [place] slightly positive 2+ pedal pulses and brisk capillary refill Pedal edema none      Radiology:   Imaging done today and discussed at length with the patient:    Indication: pain related symptoms,  Views: 2V AP&LAT right hip(s)   Findings: moderate arthritis, but with asymetric widespread osteopenia and cortical differences versus the left hip.  Comparison views: not available      Assessment:     ICD-10-CM ICD-9-CM   1. Hip pain, right M25.551 719.45        Procedures       Plan: Biomechanics of pertinent body area discussed.  Risks, benefits, alternatives, comparisons, and complications of accepted medicines, injections, recommendations, surgical procedures, and therapies explained and education provided in laymen's terms. The patient was given the opportunity to ask questions and they were answerved to their satisfaction.   Natural history and expected course of this patient's diagnosis discussed along with evaluation of therapies. Questions answered.  MRI.to ro stress fx or intramedullary " process      9/17/2018

## 2018-09-25 ENCOUNTER — HOSPITAL ENCOUNTER (OUTPATIENT)
Dept: MRI IMAGING | Facility: HOSPITAL | Age: 83
Discharge: HOME | End: 2018-09-25
Attending: ORTHOPAEDIC SURGERY | Admitting: ORTHOPAEDIC SURGERY

## 2018-09-25 DIAGNOSIS — M25.551 HIP PAIN, RIGHT: ICD-10-CM

## 2018-09-25 PROCEDURE — 73721 MRI JNT OF LWR EXTRE W/O DYE: CPT

## 2018-10-02 ENCOUNTER — TELEPHONE (OUTPATIENT)
Dept: ORTHOPEDIC SURGERY | Facility: CLINIC | Age: 83
End: 2018-10-02

## 2018-10-02 NOTE — TELEPHONE ENCOUNTER
Called patient to advise of MRI results. Patient voiced her understanding and thanked me for calling her back.     ----- Message from TEE Monrela sent at 9/26/2018  5:11 PM EDT -----    SPM reviewed MRI result, there is no evidence of fracture in the right hip.

## 2018-11-27 ENCOUNTER — TELEPHONE (OUTPATIENT)
Dept: FAMILY MEDICINE CLINIC | Facility: CLINIC | Age: 83
End: 2018-11-27

## 2018-11-27 DIAGNOSIS — E03.9 HYPOTHYROIDISM, ACQUIRED: Primary | ICD-10-CM

## 2018-11-27 RX ORDER — LEVOTHYROXINE SODIUM 0.07 MG/1
75 TABLET ORAL DAILY
Qty: 30 TABLET | Refills: 0 | Status: SHIPPED | OUTPATIENT
Start: 2018-11-27 | End: 2019-07-15 | Stop reason: SDUPTHER

## 2018-11-29 DIAGNOSIS — E11.9 TYPE 2 DIABETES MELLITUS WITHOUT COMPLICATION, WITHOUT LONG-TERM CURRENT USE OF INSULIN (HCC): ICD-10-CM

## 2018-11-29 DIAGNOSIS — F51.01 PRIMARY INSOMNIA: ICD-10-CM

## 2018-11-29 DIAGNOSIS — I10 BENIGN ESSENTIAL HYPERTENSION: ICD-10-CM

## 2018-11-29 DIAGNOSIS — E78.2 MIXED HYPERLIPIDEMIA: ICD-10-CM

## 2018-11-29 DIAGNOSIS — E03.9 ACQUIRED HYPOTHYROIDISM: ICD-10-CM

## 2018-11-29 DIAGNOSIS — G25.81 RESTLESS LEG SYNDROME: ICD-10-CM

## 2018-11-29 RX ORDER — FENOFIBRATE 160 MG/1
TABLET ORAL
Qty: 90 TABLET | Refills: 0 | Status: SHIPPED | OUTPATIENT
Start: 2018-11-29 | End: 2019-07-15 | Stop reason: SDUPTHER

## 2018-11-29 RX ORDER — LOVASTATIN 40 MG/1
TABLET ORAL
Qty: 90 TABLET | Refills: 0 | Status: SHIPPED | OUTPATIENT
Start: 2018-11-29 | End: 2019-07-15 | Stop reason: SDUPTHER

## 2018-11-29 RX ORDER — LEVOTHYROXINE SODIUM 0.07 MG/1
TABLET ORAL
Qty: 90 TABLET | Refills: 0 | Status: SHIPPED | OUTPATIENT
Start: 2018-11-29 | End: 2019-07-15 | Stop reason: SDUPTHER

## 2018-11-29 RX ORDER — PRAMIPEXOLE DIHYDROCHLORIDE 1.5 MG/1
TABLET ORAL
Qty: 90 TABLET | Refills: 1 | Status: SHIPPED | OUTPATIENT
Start: 2018-11-29 | End: 2019-07-15 | Stop reason: SDUPTHER

## 2018-11-29 RX ORDER — CLOPIDOGREL BISULFATE 75 MG/1
TABLET ORAL
Qty: 90 TABLET | Refills: 0 | Status: SHIPPED | OUTPATIENT
Start: 2018-11-29 | End: 2019-07-15 | Stop reason: SDUPTHER

## 2018-11-29 RX ORDER — GLIPIZIDE 5 MG/1
TABLET ORAL
Qty: 90 TABLET | Refills: 1 | Status: SHIPPED | OUTPATIENT
Start: 2018-11-29 | End: 2019-07-15 | Stop reason: SDUPTHER

## 2018-11-29 RX ORDER — AMLODIPINE BESYLATE 5 MG/1
TABLET ORAL
Qty: 90 TABLET | Refills: 0 | Status: SHIPPED | OUTPATIENT
Start: 2018-11-29 | End: 2019-07-15 | Stop reason: SDUPTHER

## 2018-11-29 RX ORDER — TRAZODONE HYDROCHLORIDE 100 MG/1
TABLET ORAL
Qty: 180 TABLET | Refills: 1 | Status: SHIPPED | OUTPATIENT
Start: 2018-11-29 | End: 2019-07-15 | Stop reason: SDUPTHER

## 2019-01-01 NOTE — PATIENT INSTRUCTIONS
Medicare Wellness  Personal Prevention Plan of Service     Date of Office Visit:  2017  Encounter Provider:  Onel Mann MD  Place of Service:  [facility]  Patient Name: Elissa Marrero  :  3/15/1925    As part of the Medicare Wellness portion of your visit today, we are providing you with this personalized preventive plan of services (PPPS). This plan is based upon recommendations of the [place] Preventive Services Task Force (USPSTF) and the Advisory Committee on Immunization Practices (ACIP).    This lists the preventive care services that should be considered, and provides dates of when you are due. Items listed as completed are up-to-date and do not require any further intervention.    Health Maintenance   Topic Date Due   • LIPID PANEL  2017 (Originally 3/14/2017)   • HEMOGLOBIN A1C  2017 (Originally 2017)   • PNEUMOCOCCAL VACCINES (65+ LOW/MEDIUM RISK) (2 of 2 - PPSV23) 10/18/2017   • DIABETIC FOOT EXAM  10/18/2017   • MAMMOGRAM  10/18/2017   • URINE MICROALBUMIN  10/18/2017   • DIABETIC EYE EXAM  2017   • MEDICARE ANNUAL WELLNESS  2018   • DXA SCAN  10/18/2018   • INFLUENZA VACCINE  Completed   • ZOSTER VACCINE  Addressed   • TDAP/TD VACCINES  Excluded       Orders Placed This Encounter   Procedures   • Comprehensive metabolic panel   • Lipid panel   • TSH   • T4, Free       Return in about 6 months (around 10/18/2017) for Recheck.        
14.1

## 2019-07-08 ENCOUNTER — TELEPHONE (OUTPATIENT)
Dept: FAMILY MEDICINE CLINIC | Facility: CLINIC | Age: 84
End: 2019-07-08

## 2019-07-08 DIAGNOSIS — I10 BENIGN ESSENTIAL HYPERTENSION: Primary | ICD-10-CM

## 2019-07-08 DIAGNOSIS — E55.9 VITAMIN D DEFICIENCY: ICD-10-CM

## 2019-07-08 DIAGNOSIS — E78.2 MIXED HYPERLIPIDEMIA: ICD-10-CM

## 2019-07-08 DIAGNOSIS — E03.9 ACQUIRED HYPOTHYROIDISM: ICD-10-CM

## 2019-07-08 DIAGNOSIS — E11.9 TYPE 2 DIABETES MELLITUS WITHOUT COMPLICATION, WITHOUT LONG-TERM CURRENT USE OF INSULIN (HCC): ICD-10-CM

## 2019-07-08 NOTE — TELEPHONE ENCOUNTER
----- Message from Sandor Ford sent at 7/5/2019 11:27 AM EDT -----  Regarding: LAB ORDERS  PLEASE PUT IN LAB ORDERS FOR 7/15 APPT WITH DR BURKETT

## 2019-07-10 LAB
25(OH)D3+25(OH)D2 SERPL-MCNC: 46 NG/ML (ref 30–100)
ALBUMIN SERPL-MCNC: 4.4 G/DL (ref 3.5–5.2)
ALBUMIN/GLOB SERPL: 1.8 G/DL
ALP SERPL-CCNC: 33 U/L (ref 39–117)
ALT SERPL-CCNC: 15 U/L (ref 1–33)
AST SERPL-CCNC: 18 U/L (ref 1–32)
BASOPHILS # BLD AUTO: 0.03 10*3/MM3 (ref 0–0.2)
BASOPHILS NFR BLD AUTO: 0.4 % (ref 0–1.5)
BILIRUB SERPL-MCNC: 0.3 MG/DL (ref 0.2–1.2)
BUN SERPL-MCNC: 26 MG/DL (ref 8–23)
BUN/CREAT SERPL: 14.1 (ref 7–25)
CALCIUM SERPL-MCNC: 10 MG/DL (ref 8.2–9.6)
CHLORIDE SERPL-SCNC: 104 MMOL/L (ref 98–107)
CHOLEST SERPL-MCNC: 130 MG/DL (ref 0–200)
CO2 SERPL-SCNC: 26 MMOL/L (ref 22–29)
CREAT SERPL-MCNC: 1.85 MG/DL (ref 0.57–1)
EOSINOPHIL # BLD AUTO: 0.06 10*3/MM3 (ref 0–0.4)
EOSINOPHIL NFR BLD AUTO: 0.8 % (ref 0.3–6.2)
ERYTHROCYTE [DISTWIDTH] IN BLOOD BY AUTOMATED COUNT: 13.6 % (ref 12.3–15.4)
GLOBULIN SER CALC-MCNC: 2.4 GM/DL
GLUCOSE SERPL-MCNC: 123 MG/DL (ref 65–99)
HBA1C MFR BLD: 6.5 % (ref 4.8–5.6)
HCT VFR BLD AUTO: 41.2 % (ref 34–46.6)
HDLC SERPL-MCNC: 48 MG/DL (ref 40–60)
HGB BLD-MCNC: 12.1 G/DL (ref 12–15.9)
IMM GRANULOCYTES # BLD AUTO: 0.02 10*3/MM3 (ref 0–0.05)
IMM GRANULOCYTES NFR BLD AUTO: 0.3 % (ref 0–0.5)
LDLC SERPL CALC-MCNC: 62 MG/DL (ref 0–100)
LDLC/HDLC SERPL: 1.29 {RATIO}
LYMPHOCYTES # BLD AUTO: 2.87 10*3/MM3 (ref 0.7–3.1)
LYMPHOCYTES NFR BLD AUTO: 38.1 % (ref 19.6–45.3)
MCH RBC QN AUTO: 27.8 PG (ref 26.6–33)
MCHC RBC AUTO-ENTMCNC: 29.4 G/DL (ref 31.5–35.7)
MCV RBC AUTO: 94.5 FL (ref 79–97)
MICROALBUMIN UR-MCNC: 24.3 UG/ML
MONOCYTES # BLD AUTO: 0.61 10*3/MM3 (ref 0.1–0.9)
MONOCYTES NFR BLD AUTO: 8.1 % (ref 5–12)
NEUTROPHILS # BLD AUTO: 3.94 10*3/MM3 (ref 1.7–7)
NEUTROPHILS NFR BLD AUTO: 52.3 % (ref 42.7–76)
NRBC BLD AUTO-RTO: 0 /100 WBC (ref 0–0.2)
PLATELET # BLD AUTO: 203 10*3/MM3 (ref 140–450)
POTASSIUM SERPL-SCNC: 5.5 MMOL/L (ref 3.5–5.2)
PROT SERPL-MCNC: 6.8 G/DL (ref 6–8.5)
RBC # BLD AUTO: 4.36 10*6/MM3 (ref 3.77–5.28)
SODIUM SERPL-SCNC: 143 MMOL/L (ref 136–145)
T4 FREE SERPL-MCNC: 1.33 NG/DL (ref 0.93–1.7)
TRIGL SERPL-MCNC: 101 MG/DL (ref 0–150)
TSH SERPL DL<=0.005 MIU/L-ACNC: 2.33 MIU/ML (ref 0.27–4.2)
VLDLC SERPL CALC-MCNC: 20.2 MG/DL
WBC # BLD AUTO: 7.53 10*3/MM3 (ref 3.4–10.8)

## 2019-07-15 ENCOUNTER — OFFICE VISIT (OUTPATIENT)
Dept: FAMILY MEDICINE CLINIC | Facility: CLINIC | Age: 84
End: 2019-07-15

## 2019-07-15 VITALS
SYSTOLIC BLOOD PRESSURE: 147 MMHG | BODY MASS INDEX: 23.07 KG/M2 | HEIGHT: 67 IN | HEART RATE: 66 BPM | WEIGHT: 147 LBS | DIASTOLIC BLOOD PRESSURE: 56 MMHG | RESPIRATION RATE: 14 BRPM | TEMPERATURE: 98.7 F

## 2019-07-15 DIAGNOSIS — Z00.00 MEDICARE ANNUAL WELLNESS VISIT, SUBSEQUENT: Primary | ICD-10-CM

## 2019-07-15 DIAGNOSIS — E03.9 ACQUIRED HYPOTHYROIDISM: ICD-10-CM

## 2019-07-15 DIAGNOSIS — E11.9 TYPE 2 DIABETES MELLITUS WITHOUT COMPLICATION, WITHOUT LONG-TERM CURRENT USE OF INSULIN (HCC): ICD-10-CM

## 2019-07-15 DIAGNOSIS — G25.81 RESTLESS LEG SYNDROME: ICD-10-CM

## 2019-07-15 DIAGNOSIS — I10 BENIGN ESSENTIAL HYPERTENSION: ICD-10-CM

## 2019-07-15 DIAGNOSIS — E78.2 MIXED HYPERLIPIDEMIA: ICD-10-CM

## 2019-07-15 DIAGNOSIS — F51.01 PRIMARY INSOMNIA: ICD-10-CM

## 2019-07-15 PROCEDURE — G0439 PPPS, SUBSEQ VISIT: HCPCS | Performed by: FAMILY MEDICINE

## 2019-07-15 PROCEDURE — 99214 OFFICE O/P EST MOD 30 MIN: CPT | Performed by: FAMILY MEDICINE

## 2019-07-15 RX ORDER — AMLODIPINE BESYLATE 5 MG/1
5 TABLET ORAL DAILY
Qty: 90 TABLET | Refills: 1 | Status: SHIPPED | OUTPATIENT
Start: 2019-07-15 | End: 2020-01-13 | Stop reason: SDUPTHER

## 2019-07-15 RX ORDER — GLIPIZIDE 5 MG/1
5 TABLET ORAL DAILY
Qty: 90 TABLET | Refills: 1 | Status: SHIPPED | OUTPATIENT
Start: 2019-07-15 | End: 2020-01-13 | Stop reason: SDUPTHER

## 2019-07-15 RX ORDER — FENOFIBRATE 160 MG/1
160 TABLET ORAL DAILY
Qty: 90 TABLET | Refills: 1 | Status: SHIPPED | OUTPATIENT
Start: 2019-07-15 | End: 2020-01-13 | Stop reason: SDUPTHER

## 2019-07-15 RX ORDER — LEVOTHYROXINE SODIUM 0.07 MG/1
75 TABLET ORAL DAILY
Qty: 90 TABLET | Refills: 1 | Status: SHIPPED | OUTPATIENT
Start: 2019-07-15 | End: 2020-01-13 | Stop reason: SDUPTHER

## 2019-07-15 RX ORDER — CLOPIDOGREL BISULFATE 75 MG/1
75 TABLET ORAL DAILY
Qty: 90 TABLET | Refills: 1 | Status: SHIPPED | OUTPATIENT
Start: 2019-07-15 | End: 2020-01-13 | Stop reason: SDUPTHER

## 2019-07-15 RX ORDER — PRAMIPEXOLE DIHYDROCHLORIDE 1.5 MG/1
1.5 TABLET ORAL DAILY
Qty: 90 TABLET | Refills: 1 | Status: SHIPPED | OUTPATIENT
Start: 2019-07-15 | End: 2020-01-13 | Stop reason: SDUPTHER

## 2019-07-15 RX ORDER — LOVASTATIN 40 MG/1
40 TABLET ORAL DAILY
Qty: 90 TABLET | Refills: 1 | Status: SHIPPED | OUTPATIENT
Start: 2019-07-15 | End: 2020-01-13 | Stop reason: SDUPTHER

## 2019-07-15 RX ORDER — TRAZODONE HYDROCHLORIDE 100 MG/1
TABLET ORAL
Qty: 180 TABLET | Refills: 1 | Status: SHIPPED | OUTPATIENT
Start: 2019-07-15 | End: 2020-01-13 | Stop reason: SDUPTHER

## 2019-07-15 NOTE — PATIENT INSTRUCTIONS
Medicare Wellness  Personal Prevention Plan of Service     Date of Office Visit:  07/15/2019  Encounter Provider:  Onel Mann MD  Place of Service:  [facility]  Patient Name: Elissa Marrero  :  3/15/1925    As part of the Medicare Wellness portion of your visit today, we are providing you with this personalized preventive plan of services (PPPS). This plan is based upon recommendations of the [place] Preventive Services Task Force (USPSTF) and the Advisory Committee on Immunization Practices (ACIP).    This lists the preventive care services that should be considered, and provides dates of when you are due. Items listed as completed are up-to-date and do not require any further intervention.    Health Maintenance   Topic Date Due   • PNEUMOCOCCAL VACCINES (65+ LOW/MEDIUM RISK) (2 of 2 - PPSV23) 10/18/2017   • ZOSTER VACCINE (2 of 2) 2020 (Originally 2016)   • INFLUENZA VACCINE  2019   • HEMOGLOBIN A1C  2020   • LIPID PANEL  2020   • URINE MICROALBUMIN  2020   • MEDICARE ANNUAL WELLNESS  07/15/2020   • MAMMOGRAM  Discontinued   • DXA SCAN  Discontinued   • TDAP/TD VACCINES  Discontinued       No orders of the defined types were placed in this encounter.      Return in about 6 months (around 1/15/2020) for Recheck.

## 2019-07-15 NOTE — PROGRESS NOTES
QUICK REFERENCE INFORMATION:  The ABCs of the Annual Wellness Visit    Subsequent Medicare Wellness Visit     HEALTH RISK ASSESSMENT    : 3/15/1925    Recent Hospitalizations:  No hospitalization(s) within the last year..  ccc      Current Medical Providers:  Patient Care Team:  Onel Mann MD as PCP - General  Onel Mann MD as PCP - Family Medicine  Anthony Sabillon MD as PCP - Claims Attributed  Ridge, Anthony Sykes MD as Consulting Physician (Nephrology)        Smoking Status:  Social History     Tobacco Use   Smoking Status Never Smoker   Smokeless Tobacco Never Used       Alcohol Consumption:  Social History     Substance and Sexual Activity   Alcohol Use No       Depression Screen:   PHQ-2/PHQ-9 Depression Screening 7/15/2019   Little interest or pleasure in doing things 0   Feeling down, depressed, or hopeless 0   Trouble falling or staying asleep, or sleeping too much -   Feeling tired or having little energy -   Poor appetite or overeating -   Feeling bad about yourself - or that you are a failure or have let yourself or your family down -   Trouble concentrating on things, such as reading the newspaper or watching television -   Moving or speaking so slowly that other people could have noticed. Or the opposite - being so fidgety or restless that you have been moving around a lot more than usual -   Thoughts that you would be better off dead, or of hurting yourself in some way -   Total Score 0       Health Habits and Functional and Cognitive Screening:  Functional & Cognitive Status 7/15/2019   Do you have difficulty preparing food and eating? No   Do you have difficulty bathing yourself, getting dressed or grooming yourself? No   Do you have difficulty using the toilet? No   Do you have difficulty moving around from place to place? No   Do you have trouble with steps or getting out of a bed or a chair? No   In the past year have you fallen or experienced a near fall? No   Current Diet  Well Balanced Diet   Dental Exam Up to date   Eye Exam Up to date   Exercise (times per week) 0 times per week   Current Exercise Activities Include None   Do you need help using the phone?  No   Are you deaf or do you have serious difficulty hearing?  Yes   Do you need help with transportation? Yes   Do you need help shopping? Yes   Do you need help preparing meals?  No   Do you need help with housework?  No   Do you need help with laundry? No   Do you need help taking your medications? No   Do you need help managing money? No   Do you ever drive or ride in a car without wearing a seat belt? No   Have you felt unusual stress, anger or loneliness in the last month? No   Who do you live with? Alone   If you need help, do you have trouble finding someone available to you? Yes   Have you been bothered in the last four weeks by sexual problems? No   Do you have difficulty concentrating, remembering or making decisions? No           Does the patient have evidence of cognitive impairment? No    Asiprin use counseling: On clopidrogel as an alternative (due to ASA contraindication)      Recent Lab Results:    Lab Results   Component Value Date     (H) 07/09/2019     Lab Results   Component Value Date    HGBA1C 6.50 (H) 07/09/2019     Lab Results   Component Value Date    TRIG 101 07/09/2019    HDL 48 07/09/2019    VLDL 20.2 07/09/2019    LDLHDL 1.29 07/09/2019           Age-appropriate Screening Schedule:  Refer to the list below for future screening recommendations based on patient's age, sex and/or medical conditions. Orders for these recommended tests are listed in the plan section. The patient has been provided with a written plan.    Health Maintenance   Topic Date Due   • PNEUMOCOCCAL VACCINES (65+ LOW/MEDIUM RISK) (2 of 2 - PPSV23) 10/18/2017   • ZOSTER VACCINE (2 of 2) 07/18/2020 (Originally 12/13/2016)   • INFLUENZA VACCINE  08/01/2019   • HEMOGLOBIN A1C  01/09/2020   • LIPID PANEL  07/09/2020   • URINE  MICROALBUMIN  07/09/2020   • MAMMOGRAM  Discontinued   • DXA SCAN  Discontinued   • TDAP/TD VACCINES  Discontinued        Subjective   History of Present Illness    Elissa Marrero is a 89 y.o. female who presents for an Annual Wellness Visit.    The following portions of the patient's history were reviewed and updated as appropriate: allergies, current medications, past family history, past medical history, past social history, past surgical history and problem list.    Outpatient Medications Prior to Visit   Medication Sig Dispense Refill   • amLODIPine (NORVASC) 5 MG tablet TAKE 1 TABLET EVERY DAY 90 tablet 0   • clopidogrel (PLAVIX) 75 MG tablet TAKE 1 TABLET EVERY DAY 90 tablet 0   • fenofibrate 160 MG tablet TAKE 1 TABLET EVERY DAY 90 tablet 0   • glipiZIDE (GLUCOTROL) 5 MG tablet TAKE 1 TABLET EVERY DAY  BEFORE  LUNCH 90 tablet 1   • levothyroxine (SYNTHROID, LEVOTHROID) 75 MCG tablet TAKE 1 TABLET EVERY DAY 90 tablet 0   • lovastatin (MEVACOR) 40 MG tablet TAKE 1 TABLET EVERY DAY 90 tablet 0   • metoprolol tartrate (LOPRESSOR) 25 MG tablet TAKE 1 TABLET TWICE DAILY 180 tablet 0   • pramipexole (MIRAPEX) 1.5 MG tablet TAKE 1 TABLET EVERY DAY 90 tablet 1   • traZODone (DESYREL) 100 MG tablet TAKE 1/2 TO 2 TABLETS ONE TIME DAILY IN THE EVENING 180 tablet 1   • Cholecalciferol (VITAMIN D PO) Take  by mouth.     • Cholecalciferol (VITAMIN D3) 5000 units capsule capsule Take 2,000 Units by mouth Daily.     • levothyroxine (SYNTHROID) 75 MCG tablet Take 1 tablet by mouth Daily. 30 tablet 0     No facility-administered medications prior to visit.        Patient Active Problem List   Diagnosis   • Diabetes mellitus, type 2 (CMS/HCC)   • Hyperlipidemia   • Benign essential hypertension   • Hypothyroidism   • Insomnia   • Lymphedema   • Osteoarthritis   • Osteopenia   • Renal insufficiency   • Restless leg syndrome   • Vitamin D deficiency   • Hip pain, right       Advance Care Planning:  Patient has an advance directive  "- a copy has been provided and is visible in patient header    Identification of Risk Factors:  Risk factors include: age.    Review of Systems    Compared to one year ago, the patient feels her physical health is the same.  Compared to one year ago, the patient feels her mental health is the same.    Objective     Physical Exam    Vitals:    07/15/19 0851   BP: 147/56   Pulse: 66   Resp: 14   Temp: 98.7 °F (37.1 °C)   TempSrc: Oral   Weight: 66.7 kg (147 lb)   Height: 170.2 cm (67\")   PainSc:   4       Patient's Body mass index is 23.02 kg/m². BMI is within normal parameters. No follow-up required..      Assessment/Plan   Patient Self-Management and Personalized Health Advice  The patient has been provided with information about: diet, exercise and weight management and preventive services including:   · Exercise counseling provided, Fall Risk assessment done, Nutrition counseling provided.    Visit Diagnoses:    ICD-10-CM ICD-9-CM   1. Medicare annual wellness visit, subsequent Z00.00 V70.0   2. Type 2 diabetes mellitus without complication, without long-term current use of insulin (CMS/[facility]) E11.9 250.00   3. Restless leg syndrome G25.81 333.94   4. Benign essential hypertension I10 401.1   5. Mixed hyperlipidemia E78.2 272.2   6. Primary insomnia F51.01 307.42   7. Acquired hypothyroidism E03.9 244.9       No orders of the defined types were placed in this encounter.      Outpatient Encounter Medications as of 7/15/2019   Medication Sig Dispense Refill   • amLODIPine (NORVASC) 5 MG tablet Take 1 tablet by mouth Daily. 90 tablet 1   • clopidogrel (PLAVIX) 75 MG tablet Take 1 tablet by mouth Daily. 90 tablet 1   • fenofibrate 160 MG tablet Take 1 tablet by mouth Daily. 90 tablet 1   • glipiZIDE (GLUCOTROL) 5 MG tablet Take 1 tablet by mouth Daily. 90 tablet 1   • levothyroxine (SYNTHROID, LEVOTHROID) 75 MCG tablet Take 1 tablet by mouth Daily. 90 tablet 1   • lovastatin (MEVACOR) 40 MG tablet Take 1 tablet by mouth " Daily. 90 tablet 1   • metoprolol tartrate (LOPRESSOR) 25 MG tablet Take 1 tablet by mouth 2 (Two) Times a Day. 180 tablet 1   • pramipexole (MIRAPEX) 1.5 MG tablet Take 1 tablet by mouth Daily. 90 tablet 1   • traZODone (DESYREL) 100 MG tablet TAKE 1/2 TO 2 TABLETS ONE TIME DAILY IN THE EVENING 180 tablet 1   • [DISCONTINUED] amLODIPine (NORVASC) 5 MG tablet TAKE 1 TABLET EVERY DAY 90 tablet 0   • [DISCONTINUED] clopidogrel (PLAVIX) 75 MG tablet TAKE 1 TABLET EVERY DAY 90 tablet 0   • [DISCONTINUED] fenofibrate 160 MG tablet TAKE 1 TABLET EVERY DAY 90 tablet 0   • [DISCONTINUED] glipiZIDE (GLUCOTROL) 5 MG tablet TAKE 1 TABLET EVERY DAY  BEFORE  LUNCH 90 tablet 1   • [DISCONTINUED] levothyroxine (SYNTHROID, LEVOTHROID) 75 MCG tablet TAKE 1 TABLET EVERY DAY 90 tablet 0   • [DISCONTINUED] lovastatin (MEVACOR) 40 MG tablet TAKE 1 TABLET EVERY DAY 90 tablet 0   • [DISCONTINUED] metoprolol tartrate (LOPRESSOR) 25 MG tablet TAKE 1 TABLET TWICE DAILY 180 tablet 0   • [DISCONTINUED] pramipexole (MIRAPEX) 1.5 MG tablet TAKE 1 TABLET EVERY DAY 90 tablet 1   • [DISCONTINUED] traZODone (DESYREL) 100 MG tablet TAKE 1/2 TO 2 TABLETS ONE TIME DAILY IN THE EVENING 180 tablet 1   • Cholecalciferol (VITAMIN D PO) Take  by mouth.     • Cholecalciferol (VITAMIN D3) 5000 units capsule capsule Take 2,000 Units by mouth Daily.     • [DISCONTINUED] levothyroxine (SYNTHROID) 75 MCG tablet Take 1 tablet by mouth Daily. 30 tablet 0     No facility-administered encounter medications on file as of 7/15/2019.        Reviewed use of high risk medication in the elderly: not applicable  Reviewed for potential of harmful drug interactions in the elderly: not applicable    Follow Up:  Return in about 6 months (around 1/15/2020) for Recheck.     An After Visit Summary and PPPS with all of these plans were given to the patient.

## 2019-07-15 NOTE — PROGRESS NOTES
Subjective   Elissa Marrero is a 89 y.o. female.     History of Present Illness     Chief Complaint:   Chief Complaint   Patient presents with   • medicare wellness   • Diabetes     med refill - lab results  - [facility]    • Hypertension   • Hyperlipidemia   • Hypothyroidism   • Insomnia       Elissa Marrero 89 y.o. female who presents today for Medical Management of the below listed issues and medication refills.  she has a problem list of   Patient Active Problem List   Diagnosis   • Diabetes mellitus, type 2 (CMS/[facility])   • Hyperlipidemia   • Benign essential hypertension   • Hypothyroidism   • Insomnia   • Lymphedema   • Osteoarthritis   • Osteopenia   • Renal insufficiency   • Restless leg syndrome   • Vitamin D deficiency   • Hip pain, right   .  Since the last visit, she has overall felt well.  she has been compliant with   Current Outpatient Medications:   •  amLODIPine (NORVASC) 5 MG tablet, Take 1 tablet by mouth Daily., Disp: 90 tablet, Rfl: 1  •  clopidogrel (PLAVIX) 75 MG tablet, Take 1 tablet by mouth Daily., Disp: 90 tablet, Rfl: 1  •  fenofibrate 160 MG tablet, Take 1 tablet by mouth Daily., Disp: 90 tablet, Rfl: 1  •  glipiZIDE (GLUCOTROL) 5 MG tablet, Take 1 tablet by mouth Daily., Disp: 90 tablet, Rfl: 1  •  levothyroxine (SYNTHROID, LEVOTHROID) 75 MCG tablet, Take 1 tablet by mouth Daily., Disp: 90 tablet, Rfl: 1  •  lovastatin (MEVACOR) 40 MG tablet, Take 1 tablet by mouth Daily., Disp: 90 tablet, Rfl: 1  •  metoprolol tartrate (LOPRESSOR) 25 MG tablet, Take 1 tablet by mouth 2 (Two) Times a Day., Disp: 180 tablet, Rfl: 1  •  pramipexole (MIRAPEX) 1.5 MG tablet, Take 1 tablet by mouth Daily., Disp: 90 tablet, Rfl: 1  •  traZODone (DESYREL) 100 MG tablet, TAKE 1/2 TO 2 TABLETS ONE TIME DAILY IN THE EVENING, Disp: 180 tablet, Rfl: 1  •  Cholecalciferol (VITAMIN D PO), Take  by mouth., Disp: , Rfl:   •  Cholecalciferol (VITAMIN D3) 5000 units capsule capsule, Take 2,000 Units by mouth Daily., Disp: , Rfl:  ".  she denies medication side effects.    All of the chronic condition(s) listed above are stable w/o issues.    /56   Pulse 66   Temp 98.7 °F (37.1 °C) (Oral)   Resp 14   Ht 170.2 cm (67\")   Wt 66.7 kg (147 lb)   BMI 23.02 kg/m²     Results for orders placed or performed in visit on 07/08/19   Comprehensive metabolic panel   Result Value Ref Range    Glucose 123 (H) 65 - 99 mg/dL    BUN 26 (H) 8 - 23 mg/dL    Creatinine 1.85 (H) 0.57 - 1.00 mg/dL    eGFR Non African Am 25 (L) >60 mL/min/1.73    eGFR African Am 31 (L) >60 mL/min/1.73    BUN/Creatinine Ratio 14.1 7.0 - 25.0    Sodium 143 136 - 145 mmol/L    Potassium 5.5 (H) 3.5 - 5.2 mmol/L    Chloride 104 98 - 107 mmol/L    Total CO2 26.0 22.0 - 29.0 mmol/L    Calcium 10.0 (H) 8.2 - 9.6 mg/dL    Total Protein 6.8 6.0 - 8.5 g/dL    Albumin 4.40 3.50 - 5.20 g/dL    Globulin 2.4 gm/dL    A/G Ratio 1.8 g/dL    Total Bilirubin 0.3 0.2 - 1.2 mg/dL    Alkaline Phosphatase 33 (L) 39 - 117 U/L    AST (SGOT) 18 1 - 32 U/L    ALT (SGPT) 15 1 - 33 U/L   Lipid Panel With LDL/HDL Ratio   Result Value Ref Range    Total Cholesterol 130 0 - 200 mg/dL    Triglycerides 101 0 - 150 mg/dL    HDL Cholesterol 48 40 - 60 mg/dL    VLDL Cholesterol 20.2 mg/dL    LDL Cholesterol  62 0 - 100 mg/dL    LDL/HDL Ratio 1.29    TSH   Result Value Ref Range    TSH 2.330 0.270 - 4.200 mIU/mL   Hemoglobin A1c   Result Value Ref Range    Hemoglobin A1C 6.50 (H) 4.80 - 5.60 %   T4, Free   Result Value Ref Range    Free T4 1.33 0.93 - 1.70 ng/dL   Vitamin D 25 Hydroxy   Result Value Ref Range    25 Hydroxy, Vitamin D 46.0 30.0 - 100.0 ng/ml   MicroAlbumin, Urine, Random - Urine, Clean Catch   Result Value Ref Range    Microalbumin, Urine 24.3 Not Estab. ug/mL   CBC and Differential   Result Value Ref Range    WBC 7.53 3.40 - 10.80 10*3/mm3    RBC 4.36 3.77 - 5.28 10*6/mm3    Hemoglobin 12.1 12.0 - 15.9 g/dL    Hematocrit 41.2 34.0 - 46.6 %    MCV 94.5 79.0 - 97.0 fL    MCH 27.8 26.6 - 33.0 " pg    MCHC 29.4 (L) 31.5 - 35.7 g/dL    RDW 13.6 12.3 - 15.4 %    Platelets 203 140 - 450 10*3/mm3    Neutrophil Rel % 52.3 42.7 - 76.0 %    Lymphocyte Rel % 38.1 19.6 - 45.3 %    Monocyte Rel % 8.1 5.0 - 12.0 %    Eosinophil Rel % 0.8 0.3 - 6.2 %    Basophil Rel % 0.4 0.0 - 1.5 %    Neutrophils Absolute 3.94 1.70 - 7.00 10*3/mm3    Lymphocytes Absolute 2.87 0.70 - 3.10 10*3/mm3    Monocytes Absolute 0.61 0.10 - 0.90 10*3/mm3    Eosinophils Absolute 0.06 0.00 - 0.40 10*3/mm3    Basophils Absolute 0.03 0.00 - 0.20 10*3/mm3    Immature Granulocyte Rel % 0.3 0.0 - 0.5 %    Immature Grans Absolute 0.02 0.00 - 0.05 10*3/mm3    nRBC 0.0 0.0 - 0.2 /100 WBC           The following portions of the patient's history were reviewed and updated as appropriate: allergies, current medications, past family history, past medical history, past social history, past surgical history and problem list.    Review of Systems   Constitutional: Negative for activity change, chills, fatigue and fever.   Respiratory: Negative for cough and chest tightness.    Cardiovascular: Negative for chest pain and palpitations.   Gastrointestinal: Negative for abdominal pain and nausea.   Endocrine: Negative for cold intolerance.   Psychiatric/Behavioral: Negative for behavioral problems and dysphoric mood.       Objective   Physical Exam   Constitutional: She appears well-developed and well-nourished.   Neck: Neck supple. No thyromegaly present.   Cardiovascular: Normal rate and regular rhythm.   No murmur heard.  Pulmonary/Chest: Effort normal and breath sounds normal.   Abdominal: Bowel sounds are normal. There is no tenderness.   Neurological: She is alert.   Psychiatric: She has a normal mood and affect. Her behavior is normal.   Nursing note and vitals reviewed.  Labs reviewed with pt today during visit. All questions answered.      Assessment/Plan   Elissa was seen today for medicare wellness, diabetes, hypertension, hyperlipidemia, hypothyroidism  and insomnia.    Diagnoses and all orders for this visit:    Medicare annual wellness visit, subsequent    Type 2 diabetes mellitus without complication, without long-term current use of insulin (CMS/[facility])  -     clopidogrel (PLAVIX) 75 MG tablet; Take 1 tablet by mouth Daily.  -     glipiZIDE (GLUCOTROL) 5 MG tablet; Take 1 tablet by mouth Daily.    Restless leg syndrome  -     pramipexole (MIRAPEX) 1.5 MG tablet; Take 1 tablet by mouth Daily.    Benign essential hypertension  -     metoprolol tartrate (LOPRESSOR) 25 MG tablet; Take 1 tablet by mouth 2 (Two) Times a Day.  -     amLODIPine (NORVASC) 5 MG tablet; Take 1 tablet by mouth Daily.    Mixed hyperlipidemia  -     fenofibrate 160 MG tablet; Take 1 tablet by mouth Daily.  -     lovastatin (MEVACOR) 40 MG tablet; Take 1 tablet by mouth Daily.    Primary insomnia  -     traZODone (DESYREL) 100 MG tablet; TAKE 1/2 TO 2 TABLETS ONE TIME DAILY IN THE EVENING    Acquired hypothyroidism  -     levothyroxine (SYNTHROID, LEVOTHROID) 75 MCG tablet; Take 1 tablet by mouth Daily.

## 2020-01-13 ENCOUNTER — OFFICE VISIT (OUTPATIENT)
Dept: FAMILY MEDICINE CLINIC | Facility: CLINIC | Age: 85
End: 2020-01-13

## 2020-01-13 VITALS
WEIGHT: 146 LBS | HEIGHT: 67 IN | SYSTOLIC BLOOD PRESSURE: 138 MMHG | DIASTOLIC BLOOD PRESSURE: 52 MMHG | BODY MASS INDEX: 22.91 KG/M2 | HEART RATE: 78 BPM | TEMPERATURE: 98.4 F | RESPIRATION RATE: 16 BRPM

## 2020-01-13 DIAGNOSIS — E03.9 ACQUIRED HYPOTHYROIDISM: ICD-10-CM

## 2020-01-13 DIAGNOSIS — E78.2 MIXED HYPERLIPIDEMIA: ICD-10-CM

## 2020-01-13 DIAGNOSIS — M19.90 OSTEOARTHRITIS, UNSPECIFIED OSTEOARTHRITIS TYPE, UNSPECIFIED SITE: ICD-10-CM

## 2020-01-13 DIAGNOSIS — G25.81 RESTLESS LEG SYNDROME: ICD-10-CM

## 2020-01-13 DIAGNOSIS — F51.01 PRIMARY INSOMNIA: ICD-10-CM

## 2020-01-13 DIAGNOSIS — I10 BENIGN ESSENTIAL HYPERTENSION: ICD-10-CM

## 2020-01-13 DIAGNOSIS — E11.9 TYPE 2 DIABETES MELLITUS WITHOUT COMPLICATION, WITHOUT LONG-TERM CURRENT USE OF INSULIN (HCC): Primary | ICD-10-CM

## 2020-01-13 DIAGNOSIS — N18.4 CHRONIC KIDNEY DISEASE, STAGE 4 (SEVERE) (HCC): ICD-10-CM

## 2020-01-13 PROBLEM — E87.5 HYPERKALEMIA: Status: ACTIVE | Noted: 2019-08-08

## 2020-01-13 LAB
BUN SERPL-MCNC: 22 MG/DL (ref 8–23)
BUN/CREAT SERPL: 12.1 (ref 7–25)
CALCIUM SERPL-MCNC: 10.2 MG/DL (ref 8.2–9.6)
CHLORIDE SERPL-SCNC: 103 MMOL/L (ref 98–107)
CHOLEST SERPL-MCNC: 125 MG/DL (ref 0–200)
CO2 SERPL-SCNC: 29 MMOL/L (ref 22–29)
CREAT SERPL-MCNC: 1.82 MG/DL (ref 0.57–1)
GLUCOSE SERPL-MCNC: 65 MG/DL (ref 65–99)
HBA1C MFR BLD: 6.3 % (ref 4.8–5.6)
HDLC SERPL-MCNC: 38 MG/DL (ref 40–60)
LDLC SERPL CALC-MCNC: 42 MG/DL (ref 0–100)
POTASSIUM SERPL-SCNC: 5.2 MMOL/L (ref 3.5–5.2)
SODIUM SERPL-SCNC: 142 MMOL/L (ref 136–145)
T4 FREE SERPL-MCNC: 1.46 NG/DL (ref 0.93–1.7)
TRIGL SERPL-MCNC: 223 MG/DL (ref 0–150)
TSH SERPL DL<=0.005 MIU/L-ACNC: 6.78 UIU/ML (ref 0.27–4.2)
VLDLC SERPL CALC-MCNC: 44.6 MG/DL (ref 5–40)

## 2020-01-13 PROCEDURE — 99214 OFFICE O/P EST MOD 30 MIN: CPT | Performed by: FAMILY MEDICINE

## 2020-01-13 RX ORDER — GLIPIZIDE 5 MG/1
5 TABLET ORAL DAILY
Qty: 90 TABLET | Refills: 1 | Status: SHIPPED | OUTPATIENT
Start: 2020-01-13 | End: 2020-07-13 | Stop reason: SDUPTHER

## 2020-01-13 RX ORDER — FENOFIBRATE 160 MG/1
160 TABLET ORAL DAILY
Qty: 90 TABLET | Refills: 1 | Status: SHIPPED | OUTPATIENT
Start: 2020-01-13 | End: 2020-07-13

## 2020-01-13 RX ORDER — PRAMIPEXOLE DIHYDROCHLORIDE 1.5 MG/1
1.5 TABLET ORAL DAILY
Qty: 90 TABLET | Refills: 1 | Status: SHIPPED | OUTPATIENT
Start: 2020-01-13 | End: 2020-07-13 | Stop reason: SDUPTHER

## 2020-01-13 RX ORDER — LOVASTATIN 40 MG/1
40 TABLET ORAL DAILY
Qty: 90 TABLET | Refills: 1 | Status: SHIPPED | OUTPATIENT
Start: 2020-01-13 | End: 2020-07-13 | Stop reason: SDUPTHER

## 2020-01-13 RX ORDER — LEVOTHYROXINE SODIUM 0.07 MG/1
75 TABLET ORAL DAILY
Qty: 90 TABLET | Refills: 1 | Status: SHIPPED | OUTPATIENT
Start: 2020-01-13 | End: 2020-01-15 | Stop reason: DRUGHIGH

## 2020-01-13 RX ORDER — AMLODIPINE BESYLATE 5 MG/1
5 TABLET ORAL DAILY
Qty: 90 TABLET | Refills: 1 | Status: SHIPPED | OUTPATIENT
Start: 2020-01-13 | End: 2020-07-13 | Stop reason: SDUPTHER

## 2020-01-13 RX ORDER — CLOPIDOGREL BISULFATE 75 MG/1
75 TABLET ORAL DAILY
Qty: 90 TABLET | Refills: 1 | Status: SHIPPED | OUTPATIENT
Start: 2020-01-13 | End: 2020-07-13 | Stop reason: SDUPTHER

## 2020-01-13 RX ORDER — TRAZODONE HYDROCHLORIDE 100 MG/1
TABLET ORAL
Qty: 180 TABLET | Refills: 1 | Status: SHIPPED | OUTPATIENT
Start: 2020-01-13 | End: 2020-07-13 | Stop reason: SDUPTHER

## 2020-01-13 NOTE — PROGRESS NOTES
Subjective   Elissa Marrero is a 89 y.o. female.     History of Present Illness     Chief Complaint:   Chief Complaint   Patient presents with   • Diabetes     med refill -  no labs - meds reviewed with pt today    • Hypertension     [facility]  - NO BP MEDS TODAY PER PT    • Hyperlipidemia   • Insomnia   • Arthritis       Elissa Marrreo 89 y.o. female who presents today for Medical Management of the below listed issues and medication refills.  she has a problem list of   Patient Active Problem List   Diagnosis   • Diabetes mellitus, type 2 (CMS/HCC)   • Hyperlipidemia   • Benign essential hypertension   • Hypothyroidism   • Insomnia   • Lymphedema   • Osteoarthritis   • Osteopenia   • Renal insufficiency   • Restless leg syndrome   • Vitamin D deficiency   • Hip pain, right   • Acute kidney failure (CMS/HCC)   • Chronic kidney disease, stage 4 (severe) (CMS/HCC)   • Disorder of bone and cartilage   • Edema   • Hyperkalemia   • Urinary tract infection   .  Since the last visit, she has overall felt well.  she has been compliant with   Current Outpatient Medications:   •  amLODIPine (NORVASC) 5 MG tablet, Take 1 tablet by mouth Daily., Disp: 90 tablet, Rfl: 1  •  clopidogrel (PLAVIX) 75 MG tablet, Take 1 tablet by mouth Daily., Disp: 90 tablet, Rfl: 1  •  fenofibrate 160 MG tablet, Take 1 tablet by mouth Daily., Disp: 90 tablet, Rfl: 1  •  glipizide (GLUCOTROL) 5 MG tablet, Take 1 tablet by mouth Daily., Disp: 90 tablet, Rfl: 1  •  levothyroxine (SYNTHROID, LEVOTHROID) 75 MCG tablet, Take 1 tablet by mouth Daily., Disp: 90 tablet, Rfl: 1  •  lovastatin (MEVACOR) 40 MG tablet, Take 1 tablet by mouth Daily., Disp: 90 tablet, Rfl: 1  •  metoprolol tartrate (LOPRESSOR) 25 MG tablet, Take 1 tablet by mouth 2 (Two) Times a Day., Disp: 180 tablet, Rfl: 1  •  pramipexole (MIRAPEX) 1.5 MG tablet, Take 1 tablet by mouth Daily., Disp: 90 tablet, Rfl: 1  •  traZODone (DESYREL) 100 MG tablet, TAKE 1/2 TO 2 TABLETS ONE TIME DAILY  "IN THE EVENING, Disp: 180 tablet, Rfl: 1  •  Cholecalciferol (VITAMIN D PO), Take  by mouth., Disp: , Rfl:   •  diclofenac (VOLTAREN) 1 % gel gel, Apply 4 g topically to the appropriate area as directed 4 (Four) Times a Day., Disp: 5 tube, Rfl: 3.  she denies medication side effects.    All of the other chronic condition(s) listed above are stable w/o issues.    /52   Pulse 78   Temp 98.4 °F (36.9 °C) (Oral)   Resp 16   Ht 170.2 cm (67\")   Wt 66.2 kg (146 lb)   BMI 22.87 kg/m²     Results for orders placed or performed in visit on 07/08/19   Comprehensive metabolic panel   Result Value Ref Range    Glucose 123 (H) 65 - 99 mg/dL    BUN 26 (H) 8 - 23 mg/dL    Creatinine 1.85 (H) 0.57 - 1.00 mg/dL    eGFR Non African Am 25 (L) >60 mL/min/1.73    eGFR African Am 31 (L) >60 mL/min/1.73    BUN/Creatinine Ratio 14.1 7.0 - 25.0    Sodium 143 136 - 145 mmol/L    Potassium 5.5 (H) 3.5 - 5.2 mmol/L    Chloride 104 98 - 107 mmol/L    Total CO2 26.0 22.0 - 29.0 mmol/L    Calcium 10.0 (H) 8.2 - 9.6 mg/dL    Total Protein 6.8 6.0 - 8.5 g/dL    Albumin 4.40 3.50 - 5.20 g/dL    Globulin 2.4 gm/dL    A/G Ratio 1.8 g/dL    Total Bilirubin 0.3 0.2 - 1.2 mg/dL    Alkaline Phosphatase 33 (L) 39 - 117 U/L    AST (SGOT) 18 1 - 32 U/L    ALT (SGPT) 15 1 - 33 U/L   Lipid Panel With LDL/HDL Ratio   Result Value Ref Range    Total Cholesterol 130 0 - 200 mg/dL    Triglycerides 101 0 - 150 mg/dL    HDL Cholesterol 48 40 - 60 mg/dL    VLDL Cholesterol 20.2 mg/dL    LDL Cholesterol  62 0 - 100 mg/dL    LDL/HDL Ratio 1.29    TSH   Result Value Ref Range    TSH 2.330 0.270 - 4.200 mIU/mL   Hemoglobin A1c   Result Value Ref Range    Hemoglobin A1C 6.50 (H) 4.80 - 5.60 %   T4, Free   Result Value Ref Range    Free T4 1.33 0.93 - 1.70 ng/dL   Vitamin D 25 Hydroxy   Result Value Ref Range    25 Hydroxy, Vitamin D 46.0 30.0 - 100.0 ng/ml   MicroAlbumin, Urine, Random - Urine, Clean Catch   Result Value Ref Range    Microalbumin, Urine 24.3 " Not Estab. ug/mL   CBC and Differential   Result Value Ref Range    WBC 7.53 3.40 - 10.80 10*3/mm3    RBC 4.36 3.77 - 5.28 10*6/mm3    Hemoglobin 12.1 12.0 - 15.9 g/dL    Hematocrit 41.2 34.0 - 46.6 %    MCV 94.5 79.0 - 97.0 fL    MCH 27.8 26.6 - 33.0 pg    MCHC 29.4 (L) 31.5 - 35.7 g/dL    RDW 13.6 12.3 - 15.4 %    Platelets 203 140 - 450 10*3/mm3    Neutrophil Rel % 52.3 42.7 - 76.0 %    Lymphocyte Rel % 38.1 19.6 - 45.3 %    Monocyte Rel % 8.1 5.0 - 12.0 %    Eosinophil Rel % 0.8 0.3 - 6.2 %    Basophil Rel % 0.4 0.0 - 1.5 %    Neutrophils Absolute 3.94 1.70 - 7.00 10*3/mm3    Lymphocytes Absolute 2.87 0.70 - 3.10 10*3/mm3    Monocytes Absolute 0.61 0.10 - 0.90 10*3/mm3    Eosinophils Absolute 0.06 0.00 - 0.40 10*3/mm3    Basophils Absolute 0.03 0.00 - 0.20 10*3/mm3    Immature Granulocyte Rel % 0.3 0.0 - 0.5 %    Immature Grans Absolute 0.02 0.00 - 0.05 10*3/mm3    nRBC 0.0 0.0 - 0.2 /100 WBC           The following portions of the patient's history were reviewed and updated as appropriate: allergies, current medications, past family history, past medical history, past social history, past surgical history and problem list.    Review of Systems   Constitutional: Negative for activity change, chills, fatigue and fever.   Respiratory: Negative for cough and chest tightness.    Cardiovascular: Negative for chest pain and palpitations.   Gastrointestinal: Negative for abdominal pain and nausea.   Endocrine: Negative for cold intolerance.   Psychiatric/Behavioral: Negative for behavioral problems and dysphoric mood.       Objective   Physical Exam   Constitutional: She appears well-developed and well-nourished.   Neck: Neck supple. No thyromegaly present.   Cardiovascular: Normal rate and regular rhythm.   No murmur heard.  Pulmonary/Chest: Effort normal and breath sounds normal.   Abdominal: Bowel sounds are normal. There is no tenderness.   Neurological: She is alert.   Psychiatric: She has a normal mood and  affect. Her behavior is normal.   Nursing note and vitals reviewed.      Assessment/Plan   Elissa was seen today for diabetes, hypertension, hyperlipidemia, insomnia and arthritis.    Diagnoses and all orders for this visit:    Type 2 diabetes mellitus without complication, without long-term current use of insulin (CMS/[facility])  -     Hemoglobin A1c  -     Basic Metabolic Panel  -     clopidogrel (PLAVIX) 75 MG tablet; Take 1 tablet by mouth Daily.  -     glipizide (GLUCOTROL) 5 MG tablet; Take 1 tablet by mouth Daily.    Mixed hyperlipidemia  -     Lipid Panel  -     fenofibrate 160 MG tablet; Take 1 tablet by mouth Daily.  -     lovastatin (MEVACOR) 40 MG tablet; Take 1 tablet by mouth Daily.    Benign essential hypertension  -     Basic Metabolic Panel  -     metoprolol tartrate (LOPRESSOR) 25 MG tablet; Take 1 tablet by mouth 2 (Two) Times a Day.  -     amLODIPine (NORVASC) 5 MG tablet; Take 1 tablet by mouth Daily.    Acquired hypothyroidism  -     T4, Free  -     TSH  -     levothyroxine (SYNTHROID, LEVOTHROID) 75 MCG tablet; Take 1 tablet by mouth Daily.    Restless leg syndrome  -     pramipexole (MIRAPEX) 1.5 MG tablet; Take 1 tablet by mouth Daily.    Primary insomnia  -     traZODone (DESYREL) 100 MG tablet; TAKE 1/2 TO 2 TABLETS ONE TIME DAILY IN THE EVENING    Chronic kidney disease, stage 4 (severe) (CMS/[facility])    Osteoarthritis, unspecified osteoarthritis type, unspecified site  -     diclofenac (VOLTAREN) 1 % gel gel; Apply 4 g topically to the appropriate area as directed 4 (Four) Times a Day.

## 2020-01-15 DIAGNOSIS — E03.9 HYPOTHYROIDISM, UNSPECIFIED TYPE: Primary | ICD-10-CM

## 2020-01-15 RX ORDER — LEVOTHYROXINE SODIUM 88 UG/1
88 TABLET ORAL DAILY
Qty: 90 TABLET | Refills: 1 | Status: SHIPPED | OUTPATIENT
Start: 2020-01-15 | End: 2020-07-13 | Stop reason: SDUPTHER

## 2020-02-28 LAB
T3 SERPL-MCNC: 71.8 NG/DL (ref 80–200)
T4 FREE SERPL-MCNC: 1.34 NG/DL (ref 0.93–1.7)
TSH SERPL DL<=0.005 MIU/L-ACNC: 2.2 UIU/ML (ref 0.27–4.2)

## 2020-06-22 ENCOUNTER — TELEPHONE (OUTPATIENT)
Dept: FAMILY MEDICINE CLINIC | Facility: CLINIC | Age: 85
End: 2020-06-22

## 2020-06-22 DIAGNOSIS — E78.2 MIXED HYPERLIPIDEMIA: Primary | ICD-10-CM

## 2020-06-22 DIAGNOSIS — N18.4 CHRONIC KIDNEY DISEASE, STAGE 4 (SEVERE) (HCC): ICD-10-CM

## 2020-06-22 DIAGNOSIS — E03.9 ACQUIRED HYPOTHYROIDISM: ICD-10-CM

## 2020-06-22 DIAGNOSIS — I10 BENIGN ESSENTIAL HYPERTENSION: ICD-10-CM

## 2020-06-22 DIAGNOSIS — E11.9 TYPE 2 DIABETES MELLITUS WITHOUT COMPLICATION, WITHOUT LONG-TERM CURRENT USE OF INSULIN (HCC): ICD-10-CM

## 2020-06-22 DIAGNOSIS — E55.9 VITAMIN D DEFICIENCY: ICD-10-CM

## 2020-07-06 LAB
25(OH)D3+25(OH)D2 SERPL-MCNC: 69.7 NG/ML (ref 30–100)
ALBUMIN SERPL-MCNC: 4.4 G/DL (ref 3.5–5.2)
ALBUMIN/GLOB SERPL: 2 G/DL
ALP SERPL-CCNC: 30 U/L (ref 39–117)
ALT SERPL-CCNC: 12 U/L (ref 1–33)
AST SERPL-CCNC: 16 U/L (ref 1–32)
BASOPHILS # BLD AUTO: 0.03 10*3/MM3 (ref 0–0.2)
BASOPHILS NFR BLD AUTO: 0.4 % (ref 0–1.5)
BILIRUB SERPL-MCNC: 0.4 MG/DL (ref 0–1.2)
BUN SERPL-MCNC: 23 MG/DL (ref 8–23)
BUN/CREAT SERPL: 10.7 (ref 7–25)
CALCIUM SERPL-MCNC: 9.9 MG/DL (ref 8.2–9.6)
CHLORIDE SERPL-SCNC: 103 MMOL/L (ref 98–107)
CHOLEST SERPL-MCNC: 137 MG/DL (ref 0–200)
CO2 SERPL-SCNC: 26.6 MMOL/L (ref 22–29)
CREAT SERPL-MCNC: 2.14 MG/DL (ref 0.57–1)
EOSINOPHIL # BLD AUTO: 0.07 10*3/MM3 (ref 0–0.4)
EOSINOPHIL NFR BLD AUTO: 1 % (ref 0.3–6.2)
ERYTHROCYTE [DISTWIDTH] IN BLOOD BY AUTOMATED COUNT: 12.9 % (ref 12.3–15.4)
GLOBULIN SER CALC-MCNC: 2.2 GM/DL
GLUCOSE SERPL-MCNC: 136 MG/DL (ref 65–99)
HBA1C MFR BLD: 6.3 % (ref 4.8–5.6)
HCT VFR BLD AUTO: 37.4 % (ref 34–46.6)
HDLC SERPL-MCNC: 45 MG/DL (ref 40–60)
HGB BLD-MCNC: 12.1 G/DL (ref 12–15.9)
IMM GRANULOCYTES # BLD AUTO: 0.02 10*3/MM3 (ref 0–0.05)
IMM GRANULOCYTES NFR BLD AUTO: 0.3 % (ref 0–0.5)
LDLC SERPL CALC-MCNC: 66 MG/DL (ref 0–100)
LDLC/HDLC SERPL: 1.47 {RATIO}
LYMPHOCYTES # BLD AUTO: 2 10*3/MM3 (ref 0.7–3.1)
LYMPHOCYTES NFR BLD AUTO: 28.4 % (ref 19.6–45.3)
MCH RBC QN AUTO: 28.1 PG (ref 26.6–33)
MCHC RBC AUTO-ENTMCNC: 32.4 G/DL (ref 31.5–35.7)
MCV RBC AUTO: 87 FL (ref 79–97)
MONOCYTES # BLD AUTO: 0.58 10*3/MM3 (ref 0.1–0.9)
MONOCYTES NFR BLD AUTO: 8.3 % (ref 5–12)
NEUTROPHILS # BLD AUTO: 4.33 10*3/MM3 (ref 1.7–7)
NEUTROPHILS NFR BLD AUTO: 61.6 % (ref 42.7–76)
NRBC BLD AUTO-RTO: 0 /100 WBC (ref 0–0.2)
PLATELET # BLD AUTO: 204 10*3/MM3 (ref 140–450)
POTASSIUM SERPL-SCNC: 5.1 MMOL/L (ref 3.5–5.2)
PROT SERPL-MCNC: 6.6 G/DL (ref 6–8.5)
RBC # BLD AUTO: 4.3 10*6/MM3 (ref 3.77–5.28)
SODIUM SERPL-SCNC: 139 MMOL/L (ref 136–145)
T4 FREE SERPL-MCNC: 1.14 NG/DL (ref 0.93–1.7)
TRIGL SERPL-MCNC: 129 MG/DL (ref 0–150)
TSH SERPL DL<=0.005 MIU/L-ACNC: 1.27 UIU/ML (ref 0.27–4.2)
VLDLC SERPL CALC-MCNC: 25.8 MG/DL
WBC # BLD AUTO: 7.03 10*3/MM3 (ref 3.4–10.8)

## 2020-07-13 ENCOUNTER — OFFICE VISIT (OUTPATIENT)
Dept: FAMILY MEDICINE CLINIC | Facility: CLINIC | Age: 85
End: 2020-07-13

## 2020-07-13 VITALS
RESPIRATION RATE: 16 BRPM | WEIGHT: 145.8 LBS | TEMPERATURE: 98.1 F | HEART RATE: 82 BPM | SYSTOLIC BLOOD PRESSURE: 136 MMHG | BODY MASS INDEX: 22.88 KG/M2 | DIASTOLIC BLOOD PRESSURE: 56 MMHG | HEIGHT: 67 IN

## 2020-07-13 DIAGNOSIS — G25.81 RESTLESS LEG SYNDROME: ICD-10-CM

## 2020-07-13 DIAGNOSIS — E78.2 MIXED HYPERLIPIDEMIA: ICD-10-CM

## 2020-07-13 DIAGNOSIS — E03.9 ACQUIRED HYPOTHYROIDISM: ICD-10-CM

## 2020-07-13 DIAGNOSIS — E11.9 TYPE 2 DIABETES MELLITUS WITHOUT COMPLICATION, WITHOUT LONG-TERM CURRENT USE OF INSULIN (HCC): Primary | ICD-10-CM

## 2020-07-13 DIAGNOSIS — F51.01 PRIMARY INSOMNIA: ICD-10-CM

## 2020-07-13 DIAGNOSIS — I10 BENIGN ESSENTIAL HYPERTENSION: ICD-10-CM

## 2020-07-13 DIAGNOSIS — N18.4 CHRONIC KIDNEY DISEASE, STAGE 4 (SEVERE) (HCC): ICD-10-CM

## 2020-07-13 PROCEDURE — 99214 OFFICE O/P EST MOD 30 MIN: CPT | Performed by: FAMILY MEDICINE

## 2020-07-13 RX ORDER — GLIPIZIDE 5 MG/1
5 TABLET ORAL DAILY
Qty: 90 TABLET | Refills: 1 | Status: SHIPPED | OUTPATIENT
Start: 2020-07-13 | End: 2021-01-12 | Stop reason: SDUPTHER

## 2020-07-13 RX ORDER — CLOPIDOGREL BISULFATE 75 MG/1
75 TABLET ORAL DAILY
Qty: 90 TABLET | Refills: 1 | Status: SHIPPED | OUTPATIENT
Start: 2020-07-13 | End: 2021-01-12 | Stop reason: SDUPTHER

## 2020-07-13 RX ORDER — LEVOTHYROXINE SODIUM 88 UG/1
88 TABLET ORAL DAILY
Qty: 90 TABLET | Refills: 1 | Status: SHIPPED | OUTPATIENT
Start: 2020-07-13 | End: 2021-01-12 | Stop reason: SDUPTHER

## 2020-07-13 RX ORDER — LOVASTATIN 40 MG/1
40 TABLET ORAL DAILY
Qty: 90 TABLET | Refills: 1 | Status: SHIPPED | OUTPATIENT
Start: 2020-07-13 | End: 2021-01-12 | Stop reason: SDUPTHER

## 2020-07-13 RX ORDER — TRAZODONE HYDROCHLORIDE 100 MG/1
TABLET ORAL
Qty: 180 TABLET | Refills: 1 | Status: SHIPPED | OUTPATIENT
Start: 2020-07-13 | End: 2021-01-13

## 2020-07-13 RX ORDER — PRAMIPEXOLE DIHYDROCHLORIDE 1.5 MG/1
1.5 TABLET ORAL DAILY
Qty: 90 TABLET | Refills: 1 | Status: SHIPPED | OUTPATIENT
Start: 2020-07-13 | End: 2021-01-12 | Stop reason: SDUPTHER

## 2020-07-13 RX ORDER — FENOFIBRATE 160 MG/1
160 TABLET ORAL DAILY
Qty: 90 TABLET | Refills: 1 | Status: CANCELLED | OUTPATIENT
Start: 2020-07-13

## 2020-07-13 RX ORDER — AMLODIPINE BESYLATE 5 MG/1
5 TABLET ORAL DAILY
Qty: 90 TABLET | Refills: 1 | Status: SHIPPED | OUTPATIENT
Start: 2020-07-13 | End: 2021-01-12 | Stop reason: SDUPTHER

## 2020-07-13 NOTE — PROGRESS NOTES
Subjective   Elissa Marrero is a 89 y.o. female.     History of Present Illness     Chief Complaint:   Chief Complaint   Patient presents with   • Diabetes   • Hypertension     med refill - meds  reviewed with pt today  - lab results   • Hyperlipidemia     [facility]    • Hypothyroidism   • Insomnia       Elissa Marrero 89 y.o. female who presents today for Medical Management of the below listed issues and medication refills.  she has a problem list of   Patient Active Problem List   Diagnosis   • Diabetes mellitus, type 2 (CMS/HCC)   • Hyperlipidemia   • Benign essential hypertension   • Hypothyroidism   • Insomnia   • Lymphedema   • Osteoarthritis   • Osteopenia   • Renal insufficiency   • Restless leg syndrome   • Vitamin D deficiency   • Hip pain, right   • Acute kidney failure (CMS/HCC)   • Chronic kidney disease, stage 4 (severe) (CMS/HCC)   • Disorder of bone and cartilage   • Edema   • Hyperkalemia   • Urinary tract infection   .  Since the last visit, she has overall felt well.  she has been compliant with   Current Outpatient Medications:   •  amLODIPine (NORVASC) 5 MG tablet, Take 1 tablet by mouth Daily., Disp: 90 tablet, Rfl: 1  •  Cholecalciferol (VITAMIN D PO), Take  by mouth., Disp: , Rfl:   •  clopidogrel (PLAVIX) 75 MG tablet, Take 1 tablet by mouth Daily., Disp: 90 tablet, Rfl: 1  •  diclofenac (VOLTAREN) 1 % gel gel, Apply 4 g topically to the appropriate area as directed 4 (Four) Times a Day., Disp: 5 tube, Rfl: 3  •  glipizide (GLUCOTROL) 5 MG tablet, Take 1 tablet by mouth Daily., Disp: 90 tablet, Rfl: 1  •  levothyroxine (Synthroid) 88 MCG tablet, Take 1 tablet by mouth Daily., Disp: 90 tablet, Rfl: 1  •  lovastatin (MEVACOR) 40 MG tablet, Take 1 tablet by mouth Daily., Disp: 90 tablet, Rfl: 1  •  metoprolol tartrate (LOPRESSOR) 25 MG tablet, Take 1 tablet by mouth 2 (Two) Times a Day., Disp: 180 tablet, Rfl: 1  •  pramipexole (MIRAPEX) 1.5 MG tablet, Take 1 tablet by mouth Daily., Disp: 90  "tablet, Rfl: 1  •  traZODone (DESYREL) 100 MG tablet, TAKE 1/2 TO 2 TABLETS ONE TIME DAILY IN THE EVENING, Disp: 180 tablet, Rfl: 1.  she denies medication side effects.    All of the other chronic condition(s) listed above are stable w/o issues.    /56 Comment: no bp meds today  Pulse 82   Temp 98.1 °F (36.7 °C) (Oral)   Resp 16   Ht 170.2 cm (67\")   Wt 66.1 kg (145 lb 12.8 oz)   BMI 22.84 kg/m²     Results for orders placed or performed in visit on 06/22/20   Comprehensive metabolic panel   Result Value Ref Range    Glucose 136 (H) 65 - 99 mg/dL    BUN 23 8 - 23 mg/dL    Creatinine 2.14 (H) 0.57 - 1.00 mg/dL    eGFR Non African Am 21 (L) >60 mL/min/1.73    eGFR African Am 26 (L) >60 mL/min/1.73    BUN/Creatinine Ratio 10.7 7.0 - 25.0    Sodium 139 136 - 145 mmol/L    Potassium 5.1 3.5 - 5.2 mmol/L    Chloride 103 98 - 107 mmol/L    Total CO2 26.6 22.0 - 29.0 mmol/L    Calcium 9.9 (H) 8.2 - 9.6 mg/dL    Total Protein 6.6 6.0 - 8.5 g/dL    Albumin 4.40 3.50 - 5.20 g/dL    Globulin 2.2 gm/dL    A/G Ratio 2.0 g/dL    Total Bilirubin 0.4 0.0 - 1.2 mg/dL    Alkaline Phosphatase 30 (L) 39 - 117 U/L    AST (SGOT) 16 1 - 32 U/L    ALT (SGPT) 12 1 - 33 U/L   Lipid Panel With LDL/HDL Ratio   Result Value Ref Range    Total Cholesterol 137 0 - 200 mg/dL    Triglycerides 129 0 - 150 mg/dL    HDL Cholesterol 45 40 - 60 mg/dL    VLDL Cholesterol 25.8 mg/dL    LDL Cholesterol  66 0 - 100 mg/dL    LDL/HDL Ratio 1.47    TSH   Result Value Ref Range    TSH 1.270 0.270 - 4.200 uIU/mL   Hemoglobin A1c   Result Value Ref Range    Hemoglobin A1C 6.30 (H) 4.80 - 5.60 %   T4, Free   Result Value Ref Range    Free T4 1.14 0.93 - 1.70 ng/dL   Vitamin D 25 Hydroxy   Result Value Ref Range    25 Hydroxy, Vitamin D 69.7 30.0 - 100.0 ng/ml   CBC and Differential   Result Value Ref Range    WBC 7.03 3.40 - 10.80 10*3/mm3    RBC 4.30 3.77 - 5.28 10*6/mm3    Hemoglobin 12.1 12.0 - 15.9 g/dL    Hematocrit 37.4 34.0 - 46.6 %    MCV 87.0 " 79.0 - 97.0 fL    MCH 28.1 26.6 - 33.0 pg    MCHC 32.4 31.5 - 35.7 g/dL    RDW 12.9 12.3 - 15.4 %    Platelets 204 140 - 450 10*3/mm3    Neutrophil Rel % 61.6 42.7 - 76.0 %    Lymphocyte Rel % 28.4 19.6 - 45.3 %    Monocyte Rel % 8.3 5.0 - 12.0 %    Eosinophil Rel % 1.0 0.3 - 6.2 %    Basophil Rel % 0.4 0.0 - 1.5 %    Neutrophils Absolute 4.33 1.70 - 7.00 10*3/mm3    Lymphocytes Absolute 2.00 0.70 - 3.10 10*3/mm3    Monocytes Absolute 0.58 0.10 - 0.90 10*3/mm3    Eosinophils Absolute 0.07 0.00 - 0.40 10*3/mm3    Basophils Absolute 0.03 0.00 - 0.20 10*3/mm3    Immature Granulocyte Rel % 0.3 0.0 - 0.5 %    Immature Grans Absolute 0.02 0.00 - 0.05 10*3/mm3    nRBC 0.0 0.0 - 0.2 /100 WBC           The following portions of the patient's history were reviewed and updated as appropriate: allergies, current medications, past family history, past medical history, past social history, past surgical history and problem list.    Review of Systems   Constitutional: Negative for activity change, chills, fatigue and fever.   Respiratory: Negative for cough and chest tightness.    Cardiovascular: Negative for chest pain and palpitations.   Gastrointestinal: Negative for abdominal pain and nausea.   Endocrine: Negative for cold intolerance.   Psychiatric/Behavioral: Negative for behavioral problems and dysphoric mood.       Objective   Physical Exam   Constitutional: She appears well-developed and well-nourished.   Neck: Neck supple. No thyromegaly present.   Cardiovascular: Normal rate and regular rhythm.   No murmur heard.  Pulmonary/Chest: Effort normal and breath sounds normal.   Abdominal: Bowel sounds are normal. There is no tenderness.   Neurological: She is alert.   Psychiatric: She has a normal mood and affect. Her behavior is normal.   Nursing note and vitals reviewed.  Labs reviewed with pt today during visit. All questions answered.      Assessment/Plan   Elissa was seen today for diabetes, hypertension, hyperlipidemia,  hypothyroidism and insomnia.    Diagnoses and all orders for this visit:    Type 2 diabetes mellitus without complication, without long-term current use of insulin (CMS/[facility])  -     glipizide (GLUCOTROL) 5 MG tablet; Take 1 tablet by mouth Daily.  -     clopidogrel (PLAVIX) 75 MG tablet; Take 1 tablet by mouth Daily.    Mixed hyperlipidemia  -     lovastatin (MEVACOR) 40 MG tablet; Take 1 tablet by mouth Daily.    Benign essential hypertension  -     amLODIPine (NORVASC) 5 MG tablet; Take 1 tablet by mouth Daily.  -     metoprolol tartrate (LOPRESSOR) 25 MG tablet; Take 1 tablet by mouth 2 (Two) Times a Day.    Acquired hypothyroidism  -     levothyroxine (Synthroid) 88 MCG tablet; Take 1 tablet by mouth Daily.    Restless leg syndrome  -     pramipexole (MIRAPEX) 1.5 MG tablet; Take 1 tablet by mouth Daily.    Primary insomnia  -     traZODone (DESYREL) 100 MG tablet; TAKE 1/2 TO 2 TABLETS ONE TIME DAILY IN THE EVENING    Chronic kidney disease, stage 4 (severe) (CMS/[facility])    Pt sees nephrology and is encouraged to f/u due to slow worsening of renal function.

## 2021-01-12 RX ORDER — TRAZODONE HYDROCHLORIDE 100 MG/1
TABLET ORAL
Qty: 180 TABLET | Refills: 1 | Status: CANCELLED | OUTPATIENT
Start: 2021-01-12

## 2021-01-12 NOTE — PROGRESS NOTES
Subjective   Elissa Marrero is a 89 y.o. female.     History of Present Illness     Chief Complaint:   Chief Complaint   Patient presents with   • Diabetes     med refill - HARD OF HEARING  - NO LABS    • Hypertension   • Hyperlipidemia   • Hypothyroidism   • Arthritis   • Insomnia   • medicare wellness     due        Elissa Marrero 89 y.o. female who presents today for Medical Management of the below listed issues and medication refills.  she has a problem list of   Patient Active Problem List   Diagnosis   • Diabetes mellitus, type 2 (CMS/HCC)   • Hyperlipidemia   • Benign essential hypertension   • Hypothyroidism   • Insomnia   • Lymphedema   • Osteoarthritis   • Osteopenia   • Renal insufficiency   • Restless leg syndrome   • Vitamin D deficiency   • Hip pain, right   • Acute kidney failure (CMS/HCC)   • Chronic kidney disease, stage 4 (severe) (CMS/[facility])   • Disorder of bone and cartilage   • Edema   • Hyperkalemia   • Urinary tract infection   .  Since the last visit, she has overall felt well.  she has been compliant with   Current Outpatient Medications:   •  amLODIPine (NORVASC) 5 MG tablet, Take 1 tablet by mouth Daily., Disp: 90 tablet, Rfl: 1  •  Cholecalciferol (VITAMIN D PO), Take  by mouth., Disp: , Rfl:   •  clopidogrel (PLAVIX) 75 MG tablet, Take 1 tablet by mouth Daily., Disp: 90 tablet, Rfl: 1  •  diclofenac (VOLTAREN) 1 % gel gel, Apply 4 g topically to the appropriate area as directed 4 (Four) Times a Day., Disp: 5 tube, Rfl: 3  •  glipizide (GLUCOTROL) 5 MG tablet, Take 1 tablet by mouth Daily., Disp: 90 tablet, Rfl: 1  •  levothyroxine (Synthroid) 88 MCG tablet, Take 1 tablet by mouth Daily., Disp: 90 tablet, Rfl: 1  •  lovastatin (MEVACOR) 40 MG tablet, Take 1 tablet by mouth Daily., Disp: 90 tablet, Rfl: 1  •  metoprolol tartrate (LOPRESSOR) 25 MG tablet, Take 1 tablet by mouth 2 (Two) Times a Day., Disp: 180 tablet, Rfl: 1  •  pramipexole (MIRAPEX) 1.5 MG tablet, Take 1 tablet by mouth Daily.,  "Disp: 90 tablet, Rfl: 1.  she denies medication side effects.    All of the other chronic condition(s) listed above are stable w/o issues.    /69   Pulse 78   Temp 97.7 °F (36.5 °C) (Oral)   Resp 16   Ht 170.2 cm (67\")   Wt 64.4 kg (142 lb)   BMI 22.24 kg/m²     Results for orders placed or performed in visit on 06/22/20   Comprehensive metabolic panel    Specimen: Blood   Result Value Ref Range    Glucose 136 (H) 65 - 99 mg/dL    BUN 23 8 - 23 mg/dL    Creatinine 2.14 (H) 0.57 - 1.00 mg/dL    eGFR Non African Am 21 (L) >60 mL/min/1.73    eGFR African Am 26 (L) >60 mL/min/1.73    BUN/Creatinine Ratio 10.7 7.0 - 25.0    Sodium 139 136 - 145 mmol/L    Potassium 5.1 3.5 - 5.2 mmol/L    Chloride 103 98 - 107 mmol/L    Total CO2 26.6 22.0 - 29.0 mmol/L    Calcium 9.9 (H) 8.2 - 9.6 mg/dL    Total Protein 6.6 6.0 - 8.5 g/dL    Albumin 4.40 3.50 - 5.20 g/dL    Globulin 2.2 gm/dL    A/G Ratio 2.0 g/dL    Total Bilirubin 0.4 0.0 - 1.2 mg/dL    Alkaline Phosphatase 30 (L) 39 - 117 U/L    AST (SGOT) 16 1 - 32 U/L    ALT (SGPT) 12 1 - 33 U/L   Lipid Panel With LDL/HDL Ratio    Specimen: Blood   Result Value Ref Range    Total Cholesterol 137 0 - 200 mg/dL    Triglycerides 129 0 - 150 mg/dL    HDL Cholesterol 45 40 - 60 mg/dL    VLDL Cholesterol 25.8 mg/dL    LDL Cholesterol  66 0 - 100 mg/dL    LDL/HDL Ratio 1.47    TSH    Specimen: Blood   Result Value Ref Range    TSH 1.270 0.270 - 4.200 uIU/mL   Hemoglobin A1c    Specimen: Blood   Result Value Ref Range    Hemoglobin A1C 6.30 (H) 4.80 - 5.60 %   T4, Free    Specimen: Blood   Result Value Ref Range    Free T4 1.14 0.93 - 1.70 ng/dL   Vitamin D 25 Hydroxy    Specimen: Blood   Result Value Ref Range    25 Hydroxy, Vitamin D 69.7 30.0 - 100.0 ng/ml   CBC and Differential    Specimen: Blood   Result Value Ref Range    WBC 7.03 3.40 - 10.80 10*3/mm3    RBC 4.30 3.77 - 5.28 10*6/mm3    Hemoglobin 12.1 12.0 - 15.9 g/dL    Hematocrit 37.4 34.0 - 46.6 %    MCV 87.0 79.0 - " 97.0 fL    MCH 28.1 26.6 - 33.0 pg    MCHC 32.4 31.5 - 35.7 g/dL    RDW 12.9 12.3 - 15.4 %    Platelets 204 140 - 450 10*3/mm3    Neutrophil Rel % 61.6 42.7 - 76.0 %    Lymphocyte Rel % 28.4 19.6 - 45.3 %    Monocyte Rel % 8.3 5.0 - 12.0 %    Eosinophil Rel % 1.0 0.3 - 6.2 %    Basophil Rel % 0.4 0.0 - 1.5 %    Neutrophils Absolute 4.33 1.70 - 7.00 10*3/mm3    Lymphocytes Absolute 2.00 0.70 - 3.10 10*3/mm3    Monocytes Absolute 0.58 0.10 - 0.90 10*3/mm3    Eosinophils Absolute 0.07 0.00 - 0.40 10*3/mm3    Basophils Absolute 0.03 0.00 - 0.20 10*3/mm3    Immature Granulocyte Rel % 0.3 0.0 - 0.5 %    Immature Grans Absolute 0.02 0.00 - 0.05 10*3/mm3    nRBC 0.0 0.0 - 0.2 /100 WBC           The following portions of the patient's history were reviewed and updated as appropriate: allergies, current medications, past family history, past medical history, past social history, past surgical history and problem list.    Review of Systems   Constitutional: Negative for activity change, chills and fever.   Respiratory: Negative for cough.    Cardiovascular: Negative for chest pain.   Psychiatric/Behavioral: Negative for dysphoric mood.       Objective   Physical Exam  Constitutional:       General: She is not in acute distress.     Appearance: She is well-developed.   Cardiovascular:      Rate and Rhythm: Normal rate and regular rhythm.   Pulmonary:      Effort: Pulmonary effort is normal.      Breath sounds: Normal breath sounds.   Neurological:      Mental Status: She is alert and oriented to person, place, and time.   Psychiatric:         Behavior: Behavior normal.         Thought Content: Thought content normal.         Assessment/Plan   Diagnoses and all orders for this visit:    1. Medicare annual wellness visit, subsequent (Primary)    2. Type 2 diabetes mellitus without complication, without long-term current use of insulin (CMS/[facility])  -     glipizide (GLUCOTROL) 5 MG tablet; Take 1 tablet by mouth Daily.  Dispense: 90  tablet; Refill: 1  -     clopidogrel (PLAVIX) 75 MG tablet; Take 1 tablet by mouth Daily.  Dispense: 90 tablet; Refill: 1  -     Hemoglobin A1c  -     Basic Metabolic Panel  -     Comprehensive metabolic panel; Future  -     Lipid panel; Future  -     Hemoglobin A1c; Future  -     MicroAlbumin, Urine, Random - Urine, Clean Catch; Future    3. Benign essential hypertension  -     amLODIPine (NORVASC) 5 MG tablet; Take 1 tablet by mouth Daily.  Dispense: 90 tablet; Refill: 1  -     metoprolol tartrate (LOPRESSOR) 25 MG tablet; Take 1 tablet by mouth 2 (Two) Times a Day.  Dispense: 180 tablet; Refill: 1  -     Comprehensive metabolic panel; Future  -     Lipid panel; Future  -     CBC and Differential; Future  -     TSH; Future    4. Acquired hypothyroidism  -     levothyroxine (Synthroid) 88 MCG tablet; Take 1 tablet by mouth Daily.  Dispense: 90 tablet; Refill: 1  -     TSH; Future  -     T4, Free; Future    5. Mixed hyperlipidemia  -     lovastatin (MEVACOR) 40 MG tablet; Take 1 tablet by mouth Daily.  Dispense: 90 tablet; Refill: 1    6. Restless leg syndrome  -     pramipexole (MIRAPEX) 1.5 MG tablet; Take 1 tablet by mouth Daily.  Dispense: 90 tablet; Refill: 1    Other orders  -     Cancel: traZODone (DESYREL) 100 MG tablet; TAKE 1/2 TO 2 TABLETS ONE TIME DAILY IN THE EVENING  Dispense: 180 tablet; Refill: 1

## 2021-01-13 ENCOUNTER — OFFICE VISIT (OUTPATIENT)
Dept: FAMILY MEDICINE CLINIC | Facility: CLINIC | Age: 86
End: 2021-01-13

## 2021-01-13 VITALS
RESPIRATION RATE: 16 BRPM | HEART RATE: 78 BPM | BODY MASS INDEX: 22.29 KG/M2 | DIASTOLIC BLOOD PRESSURE: 69 MMHG | HEIGHT: 67 IN | TEMPERATURE: 97.7 F | WEIGHT: 142 LBS | SYSTOLIC BLOOD PRESSURE: 151 MMHG

## 2021-01-13 DIAGNOSIS — E78.2 MIXED HYPERLIPIDEMIA: Chronic | ICD-10-CM

## 2021-01-13 DIAGNOSIS — Z00.00 MEDICARE ANNUAL WELLNESS VISIT, SUBSEQUENT: Primary | ICD-10-CM

## 2021-01-13 DIAGNOSIS — G25.81 RESTLESS LEG SYNDROME: Chronic | ICD-10-CM

## 2021-01-13 DIAGNOSIS — E03.9 ACQUIRED HYPOTHYROIDISM: Chronic | ICD-10-CM

## 2021-01-13 DIAGNOSIS — E11.9 TYPE 2 DIABETES MELLITUS WITHOUT COMPLICATION, WITHOUT LONG-TERM CURRENT USE OF INSULIN (HCC): Chronic | ICD-10-CM

## 2021-01-13 DIAGNOSIS — I10 BENIGN ESSENTIAL HYPERTENSION: Chronic | ICD-10-CM

## 2021-01-13 PROCEDURE — G0439 PPPS, SUBSEQ VISIT: HCPCS | Performed by: FAMILY MEDICINE

## 2021-01-13 PROCEDURE — 99214 OFFICE O/P EST MOD 30 MIN: CPT | Performed by: FAMILY MEDICINE

## 2021-01-13 RX ORDER — LOVASTATIN 40 MG/1
40 TABLET ORAL DAILY
Qty: 90 TABLET | Refills: 1 | Status: SHIPPED | OUTPATIENT
Start: 2021-01-13 | End: 2021-07-12

## 2021-01-13 RX ORDER — AMLODIPINE BESYLATE 5 MG/1
5 TABLET ORAL DAILY
Qty: 90 TABLET | Refills: 1 | Status: SHIPPED | OUTPATIENT
Start: 2021-01-13 | End: 2021-07-01 | Stop reason: SDUPTHER

## 2021-01-13 RX ORDER — LEVOTHYROXINE SODIUM 88 UG/1
88 TABLET ORAL DAILY
Qty: 90 TABLET | Refills: 1 | Status: SHIPPED | OUTPATIENT
Start: 2021-01-13 | End: 2021-07-12

## 2021-01-13 RX ORDER — CLOPIDOGREL BISULFATE 75 MG/1
75 TABLET ORAL DAILY
Qty: 90 TABLET | Refills: 1 | Status: SHIPPED | OUTPATIENT
Start: 2021-01-13 | End: 2021-07-01 | Stop reason: SDUPTHER

## 2021-01-13 RX ORDER — GLIPIZIDE 5 MG/1
5 TABLET ORAL DAILY
Qty: 90 TABLET | Refills: 1 | Status: SHIPPED | OUTPATIENT
Start: 2021-01-13 | End: 2021-07-01 | Stop reason: SDUPTHER

## 2021-01-13 RX ORDER — PRAMIPEXOLE DIHYDROCHLORIDE 1.5 MG/1
1.5 TABLET ORAL DAILY
Qty: 90 TABLET | Refills: 1 | Status: SHIPPED | OUTPATIENT
Start: 2021-01-13 | End: 2021-07-01 | Stop reason: SDUPTHER

## 2021-01-13 NOTE — PATIENT INSTRUCTIONS
Medicare Wellness  Personal Prevention Plan of Service     Date of Office Visit:  2021  Encounter Provider:  Onel Mann MD  Place of Service:  [facility]  Patient Name: Elissa Marrero  :  3/15/1925    As part of the Medicare Wellness portion of your visit today, we are providing you with this personalized preventive plan of services (PPPS). This plan is based upon recommendations of the [place] Preventive Services Task Force (USPSTF) and the Advisory Committee on Immunization Practices (ACIP).    This lists the preventive care services that should be considered, and provides dates of when you are due. Items listed as completed are up-to-date and do not require any further intervention.    Health Maintenance   Topic Date Due   • Pneumococcal Vaccine 65+ (1 of 1 - PPSV23) 03/15/1990   • URINE MICROALBUMIN  2020   • HEMOGLOBIN A1C  2021   • ZOSTER VACCINE (2 of 2) 2022 (Originally 2016)   • DIABETIC EYE EXAM  2021   • LIPID PANEL  2021   • ANNUAL WELLNESS VISIT  2022   • INFLUENZA VACCINE  Completed   • MENINGOCOCCAL VACCINE  Aged Out   • MAMMOGRAM  Discontinued   • DXA SCAN  Discontinued   • TDAP/TD VACCINES  Discontinued       No orders of the defined types were placed in this encounter.      Return in about 6 months (around 2021) for Recheck.

## 2021-01-13 NOTE — PROGRESS NOTES
The ABCs of the Annual Wellness Visit  Subsequent Medicare Wellness Visit    Chief Complaint   Patient presents with   • Diabetes     med refill - HARD OF HEARING  - NO LABS    • Hypertension   • Hyperlipidemia   • Hypothyroidism   • Arthritis   • Insomnia   • medicare wellness     due        Subjective   History of Present Illness:  Elissa Marrero is a 89 y.o. female who presents for a Subsequent Medicare Wellness Visit.    HEALTH RISK ASSESSMENT    Recent Hospitalizations:  No hospitalization(s) within the last year.    Current Medical Providers:  Patient Care Team:  Onel Mann MD as PCP - General  Onel Mann MD as PCP - Family Medicine  GueydanAnthony MD as Consulting Physician (Nephrology)    Smoking Status:  Social History     Tobacco Use   Smoking Status Never Smoker   Smokeless Tobacco Never Used       Alcohol Consumption:  Social History     Substance and Sexual Activity   Alcohol Use No       Depression Screen:   PHQ-2/PHQ-9 Depression Screening 1/13/2021   Little interest or pleasure in doing things 0   Feeling down, depressed, or hopeless 0   Trouble falling or staying asleep, or sleeping too much -   Feeling tired or having little energy -   Poor appetite or overeating -   Feeling bad about yourself - or that you are a failure or have let yourself or your family down -   Trouble concentrating on things, such as reading the newspaper or watching television -   Moving or speaking so slowly that other people could have noticed. Or the opposite - being so fidgety or restless that you have been moving around a lot more than usual -   Thoughts that you would be better off dead, or of hurting yourself in some way -   Total Score 0   If you checked off any problems, how difficult have these problems made it for you to do your work, take care of things at home, or get along with other people? -       Fall Risk Screen:  STEADI Fall Risk Assessment was completed, and patient is at LOW risk for  falls.Assessment completed on:1/13/2021    Health Habits and Functional and Cognitive Screening:  Functional & Cognitive Status 1/13/2021   Do you have difficulty preparing food and eating? No   Do you have difficulty bathing yourself, getting dressed or grooming yourself? No   Do you have difficulty using the toilet? No   Do you have difficulty moving around from place to place? No   Do you have trouble with steps or getting out of a bed or a chair? No   Current Diet Well Balanced Diet   Dental Exam Up to date        Dental Exam Comment -   Eye Exam Up to date   Exercise (times per week) 0 times per week   Current Exercises Include Walking;No Regular Exercise   Current Exercise Activities Include -   Do you need help using the phone?  No   Are you deaf or do you have serious difficulty hearing?  Yes   Do you need help with transportation? Yes   Do you need help shopping? Yes   Do you need help preparing meals?  No   Do you need help with housework?  No   Do you need help with laundry? No   Do you need help taking your medications? No   Do you need help managing money? No   Do you ever drive or ride in a car without wearing a seat belt? No   Have you felt unusual stress, anger or loneliness in the last month? No   Who do you live with? Other   If you need help, do you have trouble finding someone available to you? Yes   Have you been bothered in the last four weeks by sexual problems? No   Do you have difficulty concentrating, remembering or making decisions? No         Does the patient have evidence of cognitive impairment? No    Asprin use counseling:On clopidrogel as an alternative (due to ASA contraindication)    Age-appropriate Screening Schedule:  Refer to the list below for future screening recommendations based on patient's age, sex and/or medical conditions. Orders for these recommended tests are listed in the plan section. The patient has been provided with a written plan.    Health Maintenance   Topic  Date Due   • URINE MICROALBUMIN  07/09/2020   • HEMOGLOBIN A1C  01/06/2021   • ZOSTER VACCINE (2 of 2) 01/21/2022 (Originally 12/13/2016)   • DIABETIC EYE EXAM  07/01/2021   • LIPID PANEL  07/06/2021   • INFLUENZA VACCINE  Completed   • DXA SCAN  Discontinued   • TDAP/TD VACCINES  Discontinued          The following portions of the patient's history were reviewed and updated as appropriate: allergies, current medications, past family history, past medical history, past social history, past surgical history and problem list.    Outpatient Medications Prior to Visit   Medication Sig Dispense Refill   • traZODone (DESYREL) 100 MG tablet TAKE 1/2 TO 2 TABLETS ONE TIME DAILY IN THE EVENING 180 tablet 1   • Cholecalciferol (VITAMIN D PO) Take  by mouth.     • diclofenac (VOLTAREN) 1 % gel gel Apply 4 g topically to the appropriate area as directed 4 (Four) Times a Day. 5 tube 3   • amLODIPine (NORVASC) 5 MG tablet Take 1 tablet by mouth Daily. 90 tablet 1   • clopidogrel (PLAVIX) 75 MG tablet Take 1 tablet by mouth Daily. 90 tablet 1   • glipizide (GLUCOTROL) 5 MG tablet Take 1 tablet by mouth Daily. 90 tablet 1   • levothyroxine (Synthroid) 88 MCG tablet Take 1 tablet by mouth Daily. 90 tablet 1   • lovastatin (MEVACOR) 40 MG tablet Take 1 tablet by mouth Daily. 90 tablet 1   • metoprolol tartrate (LOPRESSOR) 25 MG tablet Take 1 tablet by mouth 2 (Two) Times a Day. 180 tablet 1   • pramipexole (MIRAPEX) 1.5 MG tablet Take 1 tablet by mouth Daily. 90 tablet 1     No facility-administered medications prior to visit.        Patient Active Problem List   Diagnosis   • Diabetes mellitus, type 2 (CMS/HCC)   • Hyperlipidemia   • Benign essential hypertension   • Hypothyroidism   • Insomnia   • Lymphedema   • Osteoarthritis   • Osteopenia   • Renal insufficiency   • Restless leg syndrome   • Vitamin D deficiency   • Hip pain, right   • Acute kidney failure (CMS/HCC)   • Chronic kidney disease, stage 4 (severe) (CMS/HCC)   •  "Disorder of bone and cartilage   • Edema   • Hyperkalemia   • Urinary tract infection       Advanced Care Planning:  ACP discussion was held with the patient during this visit. Patient has an advance directive in EMR which is still valid.     Review of Systems    Compared to one year ago, the patient feels her physical health is the same.  Compared to one year ago, the patient feels her mental health is the same.    Reviewed chart for potential of high risk medication in the elderly: yes  Reviewed chart for potential of harmful drug interactions in the elderly:yes    Objective         Vitals:    01/13/21 0808   BP: 151/69   Pulse: 78   Resp: 16   Temp: 97.7 °F (36.5 °C)   TempSrc: Oral   Weight: 64.4 kg (142 lb)   Height: 170.2 cm (67\")   PainSc: 0-No pain       Body mass index is 22.24 kg/m².  Discussed the patient's BMI with her. The BMI is in the acceptable range.    Physical Exam          Assessment/Plan   Medicare Risks and Personalized Health Plan  CMS Preventative Services Quick Reference  Cardiovascular risk    The above risks/problems have been discussed with the patient.  Pertinent information has been shared with the patient in the After Visit Summary.  Follow up plans and orders are seen below in the Assessment/Plan Section.    Diagnoses and all orders for this visit:    1. Medicare annual wellness visit, subsequent (Primary)    2. Type 2 diabetes mellitus without complication, without long-term current use of insulin (CMS/[facility])  -     glipizide (GLUCOTROL) 5 MG tablet; Take 1 tablet by mouth Daily.  Dispense: 90 tablet; Refill: 1  -     clopidogrel (PLAVIX) 75 MG tablet; Take 1 tablet by mouth Daily.  Dispense: 90 tablet; Refill: 1  -     Hemoglobin A1c  -     Basic Metabolic Panel  -     Comprehensive metabolic panel; Future  -     Lipid panel; Future  -     Hemoglobin A1c; Future  -     MicroAlbumin, Urine, Random - Urine, Clean Catch; Future    3. Benign essential hypertension  -     amLODIPine " (NORVASC) 5 MG tablet; Take 1 tablet by mouth Daily.  Dispense: 90 tablet; Refill: 1  -     metoprolol tartrate (LOPRESSOR) 25 MG tablet; Take 1 tablet by mouth 2 (Two) Times a Day.  Dispense: 180 tablet; Refill: 1  -     Comprehensive metabolic panel; Future  -     Lipid panel; Future  -     CBC and Differential; Future  -     TSH; Future    4. Acquired hypothyroidism  -     levothyroxine (Synthroid) 88 MCG tablet; Take 1 tablet by mouth Daily.  Dispense: 90 tablet; Refill: 1  -     TSH; Future  -     T4, Free; Future    5. Mixed hyperlipidemia  -     lovastatin (MEVACOR) 40 MG tablet; Take 1 tablet by mouth Daily.  Dispense: 90 tablet; Refill: 1    6. Restless leg syndrome  -     pramipexole (MIRAPEX) 1.5 MG tablet; Take 1 tablet by mouth Daily.  Dispense: 90 tablet; Refill: 1    Other orders  -     Cancel: traZODone (DESYREL) 100 MG tablet; TAKE 1/2 TO 2 TABLETS ONE TIME DAILY IN THE EVENING  Dispense: 180 tablet; Refill: 1      Follow Up:  Return in about 6 months (around 7/13/2021) for Recheck.     An After Visit Summary and PPPS were given to the patient.

## 2021-01-14 LAB
BUN SERPL-MCNC: 16 MG/DL (ref 8–23)
BUN/CREAT SERPL: 9 (ref 7–25)
CALCIUM SERPL-MCNC: 9.9 MG/DL (ref 8.2–9.6)
CHLORIDE SERPL-SCNC: 105 MMOL/L (ref 98–107)
CO2 SERPL-SCNC: 25.2 MMOL/L (ref 22–29)
CREAT SERPL-MCNC: 1.77 MG/DL (ref 0.57–1)
GLUCOSE SERPL-MCNC: 125 MG/DL (ref 65–99)
HBA1C MFR BLD: 6.6 % (ref 4.8–5.6)
POTASSIUM SERPL-SCNC: 4.7 MMOL/L (ref 3.5–5.2)
SODIUM SERPL-SCNC: 142 MMOL/L (ref 136–145)

## 2021-07-12 RX ORDER — LEVOTHYROXINE SODIUM 88 UG/1
88 TABLET ORAL DAILY
Qty: 90 TABLET | Refills: 1 | Status: CANCELLED | OUTPATIENT
Start: 2021-07-12

## 2021-07-12 RX ORDER — LOVASTATIN 40 MG/1
40 TABLET ORAL DAILY
Qty: 90 TABLET | Refills: 1 | Status: CANCELLED | OUTPATIENT
Start: 2021-07-12

## 2021-07-12 NOTE — PROGRESS NOTES
Subjective   Elissa Marrero is a 89 y.o. female.     History of Present Illness     Chief Complaint:   Chief Complaint   Patient presents with   • Diabetes     med reifll due -   • Hypertension     [facility] mail order    • Hyperlipidemia     diabetic eye exam due     • Hypothyroidism   • Restless Legs Syndrome       Elissa Marrero 89 y.o. female who presents today for Medical Management of the below listed issues and medication refills.  she has a problem list of   Patient Active Problem List   Diagnosis   • Diabetes mellitus, type 2 (CMS/HCC)   • Hyperlipidemia   • Benign essential hypertension   • Hypothyroidism   • Insomnia   • Lymphedema   • Osteoarthritis   • Osteopenia   • Renal insufficiency   • Restless leg syndrome   • Vitamin D deficiency   • Hip pain, right   • Acute kidney failure (CMS/HCC)   • Chronic kidney disease, stage 4 (severe) (CMS/HCC)   • Disorder of bone and cartilage   • Edema   • Hyperkalemia   • Urinary tract infection   .  Since the last visit, she has overall felt well.  she has been compliant with   Current Outpatient Medications:   •  amLODIPine (NORVASC) 5 MG tablet, Take 1 tablet by mouth Daily., Disp: 90 tablet, Rfl: 1  •  clopidogrel (PLAVIX) 75 MG tablet, Take 1 tablet by mouth Daily., Disp: 90 tablet, Rfl: 1  •  glipizide (GLUCOTROL) 5 MG tablet, Take 1 tablet by mouth Daily., Disp: 90 tablet, Rfl: 1  •  levothyroxine (SYNTHROID, LEVOTHROID) 100 MCG tablet, Take 1 tablet by mouth Daily., Disp: 90 tablet, Rfl: 1  •  metoprolol tartrate (LOPRESSOR) 25 MG tablet, Take 1 tablet by mouth 2 (Two) Times a Day., Disp: 180 tablet, Rfl: 1  •  pramipexole (MIRAPEX) 1.5 MG tablet, Take 1 tablet by mouth Daily., Disp: 90 tablet, Rfl: 1  •  Cholecalciferol (VITAMIN D PO), Take  by mouth., Disp: , Rfl:   •  diclofenac (VOLTAREN) 1 % gel gel, Apply 4 g topically to the appropriate area as directed 4 (Four) Times a Day., Disp: 5 tube, Rfl: 3  •  rosuvastatin (Crestor) 10 MG tablet, Take 1 tablet by  "mouth Daily., Disp: 90 tablet, Rfl: 1.  she denies medication side effects.    All of the other chronic condition(s) listed above are stable w/o issues.    /74 Comment: aimee bp no bp meds  Pulse 80   Temp 97.4 °F (36.3 °C) (Oral)   Resp 16   Ht 170.2 cm (67\")   Wt 62.1 kg (137 lb)   BMI 21.46 kg/m²     Results for orders placed or performed in visit on 06/12/21   Comprehensive metabolic panel    Specimen: Blood   Result Value Ref Range    Glucose 183 (H) 65 - 99 mg/dL    BUN 15 8 - 23 mg/dL    Creatinine 1.33 (H) 0.57 - 1.00 mg/dL    eGFR Non African Am 37 (L) >60 mL/min/1.73    eGFR African Am 45 (L) >60 mL/min/1.73    BUN/Creatinine Ratio 11.3 7.0 - 25.0    Sodium 139 136 - 145 mmol/L    Potassium 4.7 3.5 - 5.2 mmol/L    Chloride 102 98 - 107 mmol/L    Total CO2 26.0 22.0 - 29.0 mmol/L    Calcium 9.9 (H) 8.2 - 9.6 mg/dL    Total Protein 6.4 6.0 - 8.5 g/dL    Albumin 4.30 3.50 - 5.20 g/dL    Globulin 2.1 gm/dL    A/G Ratio 2.0 g/dL    Total Bilirubin 0.5 0.0 - 1.2 mg/dL    Alkaline Phosphatase 71 39 - 117 U/L    AST (SGOT) 11 1 - 32 U/L    ALT (SGPT) 7 1 - 33 U/L   Lipid panel    Specimen: Blood   Result Value Ref Range    Total Cholesterol 187 0 - 200 mg/dL    Triglycerides 122 0 - 150 mg/dL    HDL Cholesterol 44 40 - 60 mg/dL    VLDL Cholesterol Walter 22 5 - 40 mg/dL    LDL Chol Calc (NIH) 121 (H) 0 - 100 mg/dL   TSH    Specimen: Blood   Result Value Ref Range    TSH 9.310 (H) 0.270 - 4.200 uIU/mL   Hemoglobin A1c    Specimen: Blood   Result Value Ref Range    Hemoglobin A1C 7.50 (H) 4.80 - 5.60 %   MicroAlbumin, Urine, Random - Urine, Clean Catch    Specimen: Urine, Clean Catch   Result Value Ref Range    Microalbumin, Urine 23.6 Not Estab. ug/mL   T4, Free    Specimen: Blood   Result Value Ref Range    Free T4 1.01 0.93 - 1.70 ng/dL   CBC and Differential    Specimen: Blood   Result Value Ref Range    WBC 7.98 3.40 - 10.80 10*3/mm3    RBC 4.82 3.77 - 5.28 10*6/mm3    Hemoglobin 13.5 12.0 - 15.9 g/dL "    Hematocrit 42.2 34.0 - 46.6 %    MCV 87.6 79.0 - 97.0 fL    MCH 28.0 26.6 - 33.0 pg    MCHC 32.0 31.5 - 35.7 g/dL    RDW 12.8 12.3 - 15.4 %    Platelets 221 140 - 450 10*3/mm3    Neutrophil Rel % 68.4 42.7 - 76.0 %    Lymphocyte Rel % 23.1 19.6 - 45.3 %    Monocyte Rel % 7.1 5.0 - 12.0 %    Eosinophil Rel % 0.6 0.3 - 6.2 %    Basophil Rel % 0.4 0.0 - 1.5 %    Neutrophils Absolute 5.46 1.70 - 7.00 10*3/mm3    Lymphocytes Absolute 1.84 0.70 - 3.10 10*3/mm3    Monocytes Absolute 0.57 0.10 - 0.90 10*3/mm3    Eosinophils Absolute 0.05 0.00 - 0.40 10*3/mm3    Basophils Absolute 0.03 0.00 - 0.20 10*3/mm3    Immature Granulocyte Rel % 0.4 0.0 - 0.5 %    Immature Grans Absolute 0.03 0.00 - 0.05 10*3/mm3    nRBC 0.0 0.0 - 0.2 /100 WBC           The following portions of the patient's history were reviewed and updated as appropriate: allergies, current medications, past family history, past medical history, past social history, past surgical history and problem list.    Review of Systems   Constitutional: Negative for activity change, chills and fever.   Respiratory: Negative for cough.    Cardiovascular: Negative for chest pain.   Psychiatric/Behavioral: Negative for dysphoric mood.       Objective   Physical Exam  Constitutional:       General: She is not in acute distress.     Appearance: She is well-developed.   Cardiovascular:      Rate and Rhythm: Normal rate and regular rhythm.   Pulmonary:      Effort: Pulmonary effort is normal.      Breath sounds: Normal breath sounds.   Neurological:      Mental Status: She is alert and oriented to person, place, and time.   Psychiatric:         Behavior: Behavior normal.         Thought Content: Thought content normal.     Labs reviewed with pt today during visit. All questions answered.      Assessment/Plan   Diagnoses and all orders for this visit:    1. Type 2 diabetes mellitus without complication, without long-term current use of insulin (CMS/[facility])  -     glipizide  (GLUCOTROL) 5 MG tablet; Take 1 tablet by mouth Daily.  Dispense: 90 tablet; Refill: 1  -     clopidogrel (PLAVIX) 75 MG tablet; Take 1 tablet by mouth Daily.  Dispense: 90 tablet; Refill: 1  -     Basic Metabolic Panel; Future  -     Hemoglobin A1c; Future  -     Lipid Panel; Future    2. Acquired hypothyroidism  Comments:  out of range  Orders:  -     levothyroxine (SYNTHROID, LEVOTHROID) 100 MCG tablet; Take 1 tablet by mouth Daily.  Dispense: 90 tablet; Refill: 1  -     TSH; Future  -     T4, Free; Future  -     T3, Free; Future    3. Mixed hyperlipidemia  Comments:  not to goal  Orders:  -     Lipid Panel; Future  -     rosuvastatin (Crestor) 10 MG tablet; Take 1 tablet by mouth Daily.  Dispense: 90 tablet; Refill: 1    4. Benign essential hypertension  -     amLODIPine (NORVASC) 5 MG tablet; Take 1 tablet by mouth Daily.  Dispense: 90 tablet; Refill: 1  -     metoprolol tartrate (LOPRESSOR) 25 MG tablet; Take 1 tablet by mouth 2 (Two) Times a Day.  Dispense: 180 tablet; Refill: 1    5. Restless leg syndrome  -     pramipexole (MIRAPEX) 1.5 MG tablet; Take 1 tablet by mouth Daily.  Dispense: 90 tablet; Refill: 1    Other orders  -     Cancel: levothyroxine (Synthroid) 88 MCG tablet; Take 1 tablet by mouth Daily.  Dispense: 90 tablet; Refill: 1  -     Cancel: lovastatin (MEVACOR) 40 MG tablet; Take 1 tablet by mouth Daily.  Dispense: 90 tablet; Refill: 1

## 2021-07-13 ENCOUNTER — OFFICE VISIT (OUTPATIENT)
Dept: FAMILY MEDICINE CLINIC | Facility: CLINIC | Age: 86
End: 2021-07-13

## 2021-07-13 VITALS
BODY MASS INDEX: 21.5 KG/M2 | TEMPERATURE: 97.4 F | HEIGHT: 67 IN | RESPIRATION RATE: 16 BRPM | HEART RATE: 80 BPM | SYSTOLIC BLOOD PRESSURE: 176 MMHG | DIASTOLIC BLOOD PRESSURE: 74 MMHG | WEIGHT: 137 LBS

## 2021-07-13 DIAGNOSIS — G25.81 RESTLESS LEG SYNDROME: Chronic | ICD-10-CM

## 2021-07-13 DIAGNOSIS — E03.9 ACQUIRED HYPOTHYROIDISM: Chronic | ICD-10-CM

## 2021-07-13 DIAGNOSIS — E11.9 TYPE 2 DIABETES MELLITUS WITHOUT COMPLICATION, WITHOUT LONG-TERM CURRENT USE OF INSULIN (HCC): Chronic | ICD-10-CM

## 2021-07-13 DIAGNOSIS — E78.2 MIXED HYPERLIPIDEMIA: Chronic | ICD-10-CM

## 2021-07-13 DIAGNOSIS — I10 BENIGN ESSENTIAL HYPERTENSION: Chronic | ICD-10-CM

## 2021-07-13 PROCEDURE — 99214 OFFICE O/P EST MOD 30 MIN: CPT | Performed by: FAMILY MEDICINE

## 2021-07-13 RX ORDER — GLIPIZIDE 5 MG/1
5 TABLET ORAL DAILY
Qty: 90 TABLET | Refills: 1 | Status: SHIPPED | OUTPATIENT
Start: 2021-07-13 | End: 2022-01-13 | Stop reason: SDUPTHER

## 2021-07-13 RX ORDER — LEVOTHYROXINE SODIUM 0.1 MG/1
100 TABLET ORAL DAILY
Qty: 90 TABLET | Refills: 1 | Status: SHIPPED | OUTPATIENT
Start: 2021-07-13 | End: 2022-01-13

## 2021-07-13 RX ORDER — ROSUVASTATIN CALCIUM 10 MG/1
10 TABLET, COATED ORAL DAILY
Qty: 90 TABLET | Refills: 1 | Status: SHIPPED | OUTPATIENT
Start: 2021-07-13 | End: 2022-01-13 | Stop reason: SDUPTHER

## 2021-07-13 RX ORDER — PRAMIPEXOLE DIHYDROCHLORIDE 1.5 MG/1
1.5 TABLET ORAL DAILY
Qty: 90 TABLET | Refills: 1 | Status: SHIPPED | OUTPATIENT
Start: 2021-07-13 | End: 2022-01-13 | Stop reason: SDUPTHER

## 2021-07-13 RX ORDER — CLOPIDOGREL BISULFATE 75 MG/1
75 TABLET ORAL DAILY
Qty: 90 TABLET | Refills: 1 | Status: SHIPPED | OUTPATIENT
Start: 2021-07-13 | End: 2022-01-13 | Stop reason: SDUPTHER

## 2021-07-13 RX ORDER — AMLODIPINE BESYLATE 5 MG/1
5 TABLET ORAL DAILY
Qty: 90 TABLET | Refills: 1 | Status: SHIPPED | OUTPATIENT
Start: 2021-07-13 | End: 2022-01-13 | Stop reason: SDUPTHER

## 2021-09-02 ENCOUNTER — TELEPHONE (OUTPATIENT)
Dept: FAMILY MEDICINE CLINIC | Facility: CLINIC | Age: 86
End: 2021-09-02

## 2021-09-02 NOTE — TELEPHONE ENCOUNTER
Caller: ALEXANDRIA SANDOVAL    Relationship: Niece/Nephew    Best call back number:[phone]    Caller requesting test results: PATIENT'S NIECE    What test was performed: 8/29/21    When was the test performed: COVID TEST     Where was the test performed: [facility] [address]    Additional notes: PATIENT'S NIECE CALLED IN ON THE PATIENT'S BEHALF TO FIND OUT HER COVID RESULTS. SHE WAS TOLD TO CHECK HER MYCHART BUT THE PATIENT DOES NOT HAVE ONE. SHE WANTED TO KNOW IF THE RESULTS HAD BEEN SEEN BY THE DOCTOR. THE NIECE IS NOT ON THE  VERBAL. PLEASE CALL PATIENT AND ADVISE.

## 2022-01-13 ENCOUNTER — OFFICE VISIT (OUTPATIENT)
Dept: FAMILY MEDICINE CLINIC | Facility: CLINIC | Age: 87
End: 2022-01-13

## 2022-01-13 VITALS
HEART RATE: 84 BPM | WEIGHT: 129 LBS | HEIGHT: 67 IN | DIASTOLIC BLOOD PRESSURE: 76 MMHG | BODY MASS INDEX: 20.25 KG/M2 | TEMPERATURE: 97.2 F | RESPIRATION RATE: 16 BRPM | SYSTOLIC BLOOD PRESSURE: 132 MMHG

## 2022-01-13 DIAGNOSIS — E78.2 MIXED HYPERLIPIDEMIA: Chronic | ICD-10-CM

## 2022-01-13 DIAGNOSIS — E11.65 TYPE 2 DIABETES MELLITUS WITH HYPERGLYCEMIA, WITHOUT LONG-TERM CURRENT USE OF INSULIN: Primary | ICD-10-CM

## 2022-01-13 DIAGNOSIS — M54.50 CHRONIC MIDLINE LOW BACK PAIN WITHOUT SCIATICA: ICD-10-CM

## 2022-01-13 DIAGNOSIS — G25.81 RESTLESS LEG SYNDROME: Chronic | ICD-10-CM

## 2022-01-13 DIAGNOSIS — G89.29 CHRONIC MIDLINE LOW BACK PAIN WITHOUT SCIATICA: ICD-10-CM

## 2022-01-13 DIAGNOSIS — E03.9 ACQUIRED HYPOTHYROIDISM: Chronic | ICD-10-CM

## 2022-01-13 DIAGNOSIS — I10 BENIGN ESSENTIAL HYPERTENSION: Chronic | ICD-10-CM

## 2022-01-13 PROCEDURE — 72100 X-RAY EXAM L-S SPINE 2/3 VWS: CPT | Performed by: FAMILY MEDICINE

## 2022-01-13 PROCEDURE — 99214 OFFICE O/P EST MOD 30 MIN: CPT | Performed by: FAMILY MEDICINE

## 2022-01-13 RX ORDER — ROSUVASTATIN CALCIUM 10 MG/1
10 TABLET, COATED ORAL DAILY
Qty: 90 TABLET | Refills: 1 | Status: SHIPPED | OUTPATIENT
Start: 2022-01-13 | End: 2022-07-12 | Stop reason: SDUPTHER

## 2022-01-13 RX ORDER — PRAMIPEXOLE DIHYDROCHLORIDE 1.5 MG/1
1.5 TABLET ORAL DAILY
Qty: 90 TABLET | Refills: 1 | Status: SHIPPED | OUTPATIENT
Start: 2022-01-13 | End: 2022-07-12 | Stop reason: SDUPTHER

## 2022-01-13 RX ORDER — AMLODIPINE BESYLATE 5 MG/1
5 TABLET ORAL DAILY
Qty: 90 TABLET | Refills: 1 | Status: SHIPPED | OUTPATIENT
Start: 2022-01-13 | End: 2022-07-12 | Stop reason: SDUPTHER

## 2022-01-13 RX ORDER — LEVOTHYROXINE SODIUM 0.1 MG/1
100 TABLET ORAL DAILY
Qty: 90 TABLET | Refills: 1 | Status: CANCELLED | OUTPATIENT
Start: 2022-01-13

## 2022-01-13 RX ORDER — METHYLPREDNISOLONE 4 MG/1
TABLET ORAL
Qty: 21 TABLET | Refills: 0 | Status: SHIPPED | OUTPATIENT
Start: 2022-01-13 | End: 2022-07-12

## 2022-01-13 RX ORDER — CLOPIDOGREL BISULFATE 75 MG/1
75 TABLET ORAL DAILY
Qty: 90 TABLET | Refills: 1 | Status: SHIPPED | OUTPATIENT
Start: 2022-01-13 | End: 2022-07-12 | Stop reason: SDUPTHER

## 2022-01-13 RX ORDER — GLIPIZIDE 5 MG/1
5 TABLET ORAL
Qty: 180 TABLET | Refills: 1 | Status: SHIPPED | OUTPATIENT
Start: 2022-01-13 | End: 2022-07-12 | Stop reason: SDUPTHER

## 2022-01-13 RX ORDER — LEVOTHYROXINE SODIUM 112 UG/1
112 TABLET ORAL DAILY
Qty: 90 TABLET | Refills: 1 | Status: SHIPPED | OUTPATIENT
Start: 2022-01-13 | End: 2022-07-12 | Stop reason: SDUPTHER

## 2022-01-13 NOTE — PROGRESS NOTES
Subjective   Elissa Marrero is a 89 y.o. female.     History of Present Illness     Chief Complaint:   Chief Complaint   Patient presents with   • Diabetes     schedule error ok per dr bazan to work in    • Hypertension     med refill  -    • Hyperlipidemia   • Hypothyroidism   • Arthritis   • Back Pain       Elissa Marrero 89 y.o. female who presents today for Medical Management of the below listed issues and medication refills. She  has a problem list of   Patient Active Problem List   Diagnosis   • Diabetes mellitus, type 2 (HCC)   • Hyperlipidemia   • Benign essential hypertension   • Hypothyroidism   • Insomnia   • Lymphedema   • Osteoarthritis   • Osteopenia   • Renal insufficiency   • Restless leg syndrome   • Vitamin D deficiency   • Hip pain, right   • Acute kidney failure (HCC)   • Chronic kidney disease, stage 4 (severe) (HCC)   • Disorder of bone and cartilage   • Edema   • Hyperkalemia   • Urinary tract infection   .  Since the last visit, She has overall felt well.  she has been compliant with   Current Outpatient Medications:   •  amLODIPine (NORVASC) 5 MG tablet, Take 1 tablet by mouth Daily., Disp: 90 tablet, Rfl: 1  •  clopidogrel (PLAVIX) 75 MG tablet, Take 1 tablet by mouth Daily., Disp: 90 tablet, Rfl: 1  •  glipizide (GLUCOTROL) 5 MG tablet, Take 1 tablet by mouth 2 (Two) Times a Day Before Meals., Disp: 180 tablet, Rfl: 1  •  levothyroxine (SYNTHROID, LEVOTHROID) 112 MCG tablet, Take 1 tablet by mouth Daily., Disp: 90 tablet, Rfl: 1  •  metoprolol tartrate (LOPRESSOR) 25 MG tablet, Take 1 tablet by mouth 2 (Two) Times a Day., Disp: 180 tablet, Rfl: 1  •  pramipexole (MIRAPEX) 1.5 MG tablet, Take 1 tablet by mouth Daily., Disp: 90 tablet, Rfl: 1  •  rosuvastatin (Crestor) 10 MG tablet, Take 1 tablet by mouth Daily., Disp: 90 tablet, Rfl: 1  •  Cholecalciferol (VITAMIN D PO), Take  by mouth., Disp: , Rfl:   •  diclofenac (VOLTAREN) 1 % gel gel, Apply 4 g topically to the appropriate area as  "directed 4 (Four) Times a Day., Disp: 5 tube, Rfl: 3  •  methylPREDNISolone (Medrol) 4 MG dose pack, follow package directions, Disp: 21 tablet, Rfl: 0.  She denies medication side effects.    All of the other chronic condition(s) listed above are stable w/o issues.    /76   Pulse 84   Temp 97.2 °F (36.2 °C) (Oral)   Resp 16   Ht 170.2 cm (67\")   Wt 58.5 kg (129 lb)   BMI 20.20 kg/m²     Results for orders placed or performed in visit on 12/12/21   TSH    Specimen: Blood   Result Value Ref Range    TSH 6.540 (H) 0.450 - 4.500 uIU/mL   T4, Free    Specimen: Blood   Result Value Ref Range    Free T4 1.11 0.82 - 1.77 ng/dL   T3, Free    Specimen: Blood   Result Value Ref Range    T3, Free 2.1 2.0 - 4.4 pg/mL   Basic Metabolic Panel    Specimen: Blood   Result Value Ref Range    Glucose 196 (H) 65 - 99 mg/dL    BUN 17 10 - 36 mg/dL    Creatinine 1.37 (H) 0.57 - 1.00 mg/dL    eGFR Non African Am 33 (L) >59 mL/min/1.73    eGFR  Am 38 (L) >59 mL/min/1.73    BUN/Creatinine Ratio 12 12 - 28    Sodium 141 134 - 144 mmol/L    Potassium 4.6 3.5 - 5.2 mmol/L    Chloride 102 96 - 106 mmol/L    Total CO2 24 20 - 29 mmol/L    Calcium 9.9 8.7 - 10.3 mg/dL   Hemoglobin A1c    Specimen: Blood   Result Value Ref Range    Hemoglobin A1C 8.4 (H) 4.8 - 5.6 %   Lipid Panel    Specimen: Blood   Result Value Ref Range    Total Cholesterol 209 (H) 100 - 199 mg/dL    Triglycerides 180 (H) 0 - 149 mg/dL    HDL Cholesterol 45 >39 mg/dL    VLDL Cholesterol Walter 32 5 - 40 mg/dL    LDL Chol Calc (NIH) 132 (H) 0 - 99 mg/dL             The following portions of the patient's history were reviewed and updated as appropriate: allergies, current medications, past family history, past medical history, past social history, past surgical history, and problem list.    Review of Systems   Constitutional: Negative for activity change, chills and fever.   Respiratory: Negative for cough.    Cardiovascular: Negative for chest pain. "   Musculoskeletal: Positive for back pain (lower (R>L) about 3 weeks, not responding to Tylenol and heat, no injury reported. No radiation or changge to B/B.).   Psychiatric/Behavioral: Negative for dysphoric mood.       Objective   Physical Exam  Constitutional:       General: She is not in acute distress.     Appearance: She is well-developed.   Cardiovascular:      Rate and Rhythm: Normal rate and regular rhythm.   Pulmonary:      Effort: Pulmonary effort is normal.      Breath sounds: Normal breath sounds.   Neurological:      Mental Status: She is alert and oriented to person, place, and time.   Psychiatric:         Behavior: Behavior normal.         Thought Content: Thought content normal.     Labs reviewed with pt today during visit. All questions answered.  XR L-Spine: ordered due to pain, no comparison, read by me: Age-related DDD with L5/S1 spondylosis noted.      Assessment/Plan   Diagnoses and all orders for this visit:    1. Type 2 diabetes mellitus with hyperglycemia, without long-term current use of insulin (HCC) (Primary)  -     glipizide (GLUCOTROL) 5 MG tablet; Take 1 tablet by mouth 2 (Two) Times a Day Before Meals.  Dispense: 180 tablet; Refill: 1  -     clopidogrel (PLAVIX) 75 MG tablet; Take 1 tablet by mouth Daily.  Dispense: 90 tablet; Refill: 1  -     Lipid Panel; Future  -     Hemoglobin A1c; Future  -     Basic Metabolic Panel; Future    2. Acquired hypothyroidism  Comments:  out of range  Orders:  -     levothyroxine (SYNTHROID, LEVOTHROID) 112 MCG tablet; Take 1 tablet by mouth Daily.  Dispense: 90 tablet; Refill: 1  -     TSH; Future  -     T4, Free; Future  -     T3, Free; Future    3. Mixed hyperlipidemia  Comments:  not to goal  Orders:  -     rosuvastatin (Crestor) 10 MG tablet; Take 1 tablet by mouth Daily.  Dispense: 90 tablet; Refill: 1  -     Lipid Panel; Future    4. Benign essential hypertension  -     amLODIPine (NORVASC) 5 MG tablet; Take 1 tablet by mouth Daily.  Dispense:  90 tablet; Refill: 1  -     metoprolol tartrate (LOPRESSOR) 25 MG tablet; Take 1 tablet by mouth 2 (Two) Times a Day.  Dispense: 180 tablet; Refill: 1    5. Restless leg syndrome  -     pramipexole (MIRAPEX) 1.5 MG tablet; Take 1 tablet by mouth Daily.  Dispense: 90 tablet; Refill: 1    6. Chronic midline low back pain without sciatica  -     XR Spine Lumbar 2 or 3 View  -     methylPREDNISolone (Medrol) 4 MG dose pack; follow package directions  Dispense: 21 tablet; Refill: 0    Heat/Tylenol/Stretching discussed. Will consider PT next week if not improving.

## 2022-07-12 NOTE — PROGRESS NOTES
Subjective   Elissa Marrero is a 89 y.o. female.     History of Present Illness     Chief Complaint:   Chief Complaint   Patient presents with   • Diabetes     Med refill / lab results    • Hypertension   • Hyperlipidemia   • Hypothyroidism   • Restless Legs Syndrome   • medicare wellness     Due        Elissa Marrero 89 y.o. female who presents today for Medical Management of the below listed issues. She  has a problem list of   Patient Active Problem List   Diagnosis   • Diabetes mellitus, type 2 (HCC)   • Hyperlipidemia   • Benign essential hypertension   • Hypothyroidism   • Insomnia   • Lymphedema   • Osteoarthritis   • Osteopenia   • Renal insufficiency   • Restless leg syndrome   • Vitamin D deficiency   • Hip pain, right   • Acute kidney failure (HCC)   • Chronic kidney disease, stage 4 (severe) (HCC)   • Disorder of bone and cartilage   • Edema   • Hyperkalemia   • Urinary tract infection   .  Since the last visit, She has overall felt well.  she has been compliant with   Current Outpatient Medications:   •  amLODIPine (NORVASC) 5 MG tablet, Take 1 tablet by mouth Daily., Disp: 90 tablet, Rfl: 1  •  clopidogrel (PLAVIX) 75 MG tablet, Take 1 tablet by mouth Daily., Disp: 90 tablet, Rfl: 1  •  glipizide (GLUCOTROL) 5 MG tablet, Take 1 tablet by mouth 2 (Two) Times a Day Before Meals., Disp: 180 tablet, Rfl: 1  •  levothyroxine (SYNTHROID, LEVOTHROID) 112 MCG tablet, Take 1 tablet by mouth Daily., Disp: 90 tablet, Rfl: 1  •  metoprolol tartrate (LOPRESSOR) 25 MG tablet, Take 1 tablet by mouth 2 (Two) Times a Day., Disp: 180 tablet, Rfl: 1  •  pramipexole (MIRAPEX) 1.5 MG tablet, Take 1 tablet by mouth Daily., Disp: 90 tablet, Rfl: 1  •  rosuvastatin (Crestor) 10 MG tablet, Take 1 tablet by mouth Daily., Disp: 90 tablet, Rfl: 1  •  Cholecalciferol (VITAMIN D PO), Take  by mouth., Disp: , Rfl:   •  diclofenac (VOLTAREN) 1 % gel gel, Apply 4 g topically to the appropriate area as directed 4 (Four) Times a Day.,  "Disp: 5 tube, Rfl: 3.  She denies medication side effects.    All of the other chronic condition(s) listed above are stable w/o issues.    /74   Pulse 88   Temp 97.4 °F (36.3 °C) (Oral)   Resp 16   Ht 170.2 cm (67\")   Wt 58.1 kg (128 lb)   BMI 20.05 kg/m²     Results for orders placed or performed in visit on 06/13/22   TSH    Specimen: Blood   Result Value Ref Range    TSH 4.340 0.450 - 4.500 uIU/mL   T4, Free    Specimen: Blood   Result Value Ref Range    Free T4 1.44 0.82 - 1.77 ng/dL   T3, Free    Specimen: Blood   Result Value Ref Range    T3, Free 2.3 2.0 - 4.4 pg/mL   Lipid Panel    Specimen: Blood   Result Value Ref Range    Total Cholesterol 234 (H) 100 - 199 mg/dL    Triglycerides 223 (H) 0 - 149 mg/dL    HDL Cholesterol 50 >39 mg/dL    VLDL Cholesterol Walter 40 5 - 40 mg/dL    LDL Chol Calc (NIH) 144 (H) 0 - 99 mg/dL   Hemoglobin A1c    Specimen: Blood   Result Value Ref Range    Hemoglobin A1C 7.6 (H) 4.8 - 5.6 %   Basic Metabolic Panel    Specimen: Blood   Result Value Ref Range    Glucose 68 65 - 99 mg/dL    BUN 11 10 - 36 mg/dL    Creatinine 1.60 (H) 0.57 - 1.00 mg/dL    EGFR Result 29 (L) >59 mL/min/1.73    BUN/Creatinine Ratio 7 (L) 12 - 28    Sodium 142 134 - 144 mmol/L    Potassium 5.0 3.5 - 5.2 mmol/L    Chloride 105 96 - 106 mmol/L    Total CO2 22 20 - 29 mmol/L    Calcium 9.0 8.7 - 10.3 mg/dL             The following portions of the patient's history were reviewed and updated as appropriate: allergies, current medications, past family history, past medical history, past social history, past surgical history, and problem list.    Review of Systems   Constitutional: Negative for activity change, chills and fever.   Respiratory: Negative for cough.    Cardiovascular: Negative for chest pain.   Psychiatric/Behavioral: Negative for dysphoric mood.       Objective   Physical Exam  Constitutional:       General: She is not in acute distress.     Appearance: She is well-developed. "   Cardiovascular:      Rate and Rhythm: Normal rate and regular rhythm.   Pulmonary:      Effort: Pulmonary effort is normal.      Breath sounds: Normal breath sounds.   Neurological:      Mental Status: She is alert and oriented to person, place, and time.   Psychiatric:         Behavior: Behavior normal.         Thought Content: Thought content normal.     Labs reviewed with pt today during visit. All questions answered.  Pt sees nephrology, and is asked to check in with them as renal function worsening.        Diagnoses and all orders for this visit:    1. Medicare annual wellness visit, subsequent (Primary)    2. Acquired hypothyroidism  Comments:  out of range  Orders:  -     levothyroxine (SYNTHROID, LEVOTHROID) 112 MCG tablet; Take 1 tablet by mouth Daily.  Dispense: 90 tablet; Refill: 1    3. Type 2 diabetes mellitus with hyperglycemia, without long-term current use of insulin (HCC)  -     glipizide (GLUCOTROL) 5 MG tablet; Take 1 tablet by mouth 2 (Two) Times a Day Before Meals.  Dispense: 180 tablet; Refill: 1  -     clopidogrel (PLAVIX) 75 MG tablet; Take 1 tablet by mouth Daily.  Dispense: 90 tablet; Refill: 1  -     Comprehensive Metabolic Panel; Future  -     Hemoglobin A1c; Future  -     Lipid Panel; Future    4. Mixed hyperlipidemia  Comments:  not to goal  Orders:  -     rosuvastatin (Crestor) 10 MG tablet; Take 1 tablet by mouth Daily.  Dispense: 90 tablet; Refill: 1    5. Benign essential hypertension  -     amLODIPine (NORVASC) 5 MG tablet; Take 1 tablet by mouth Daily.  Dispense: 90 tablet; Refill: 1  -     metoprolol tartrate (LOPRESSOR) 25 MG tablet; Take 1 tablet by mouth 2 (Two) Times a Day.  Dispense: 180 tablet; Refill: 1    6. Restless leg syndrome  -     pramipexole (MIRAPEX) 1.5 MG tablet; Take 1 tablet by mouth Daily.  Dispense: 90 tablet; Refill: 1

## 2022-07-13 ENCOUNTER — OFFICE VISIT (OUTPATIENT)
Dept: FAMILY MEDICINE CLINIC | Facility: CLINIC | Age: 87
End: 2022-07-13

## 2022-07-13 VITALS
SYSTOLIC BLOOD PRESSURE: 161 MMHG | DIASTOLIC BLOOD PRESSURE: 74 MMHG | HEART RATE: 88 BPM | WEIGHT: 128 LBS | HEIGHT: 67 IN | TEMPERATURE: 97.4 F | RESPIRATION RATE: 16 BRPM | BODY MASS INDEX: 20.09 KG/M2

## 2022-07-13 DIAGNOSIS — E78.2 MIXED HYPERLIPIDEMIA: Chronic | ICD-10-CM

## 2022-07-13 DIAGNOSIS — Z00.00 MEDICARE ANNUAL WELLNESS VISIT, SUBSEQUENT: Primary | ICD-10-CM

## 2022-07-13 DIAGNOSIS — I10 BENIGN ESSENTIAL HYPERTENSION: Chronic | ICD-10-CM

## 2022-07-13 DIAGNOSIS — E03.9 ACQUIRED HYPOTHYROIDISM: Chronic | ICD-10-CM

## 2022-07-13 DIAGNOSIS — E11.65 TYPE 2 DIABETES MELLITUS WITH HYPERGLYCEMIA, WITHOUT LONG-TERM CURRENT USE OF INSULIN: ICD-10-CM

## 2022-07-13 DIAGNOSIS — G25.81 RESTLESS LEG SYNDROME: Chronic | ICD-10-CM

## 2022-07-13 PROCEDURE — 99214 OFFICE O/P EST MOD 30 MIN: CPT | Performed by: FAMILY MEDICINE

## 2022-07-13 RX ORDER — CLOPIDOGREL BISULFATE 75 MG/1
75 TABLET ORAL DAILY
Qty: 90 TABLET | Refills: 1 | Status: SHIPPED | OUTPATIENT
Start: 2022-07-13 | End: 2023-01-13 | Stop reason: SDUPTHER

## 2022-07-13 RX ORDER — GLIPIZIDE 5 MG/1
5 TABLET ORAL
Qty: 180 TABLET | Refills: 1 | Status: SHIPPED | OUTPATIENT
Start: 2022-07-13 | End: 2023-01-13

## 2022-07-13 RX ORDER — ROSUVASTATIN CALCIUM 10 MG/1
10 TABLET, COATED ORAL DAILY
Qty: 90 TABLET | Refills: 1 | Status: SHIPPED | OUTPATIENT
Start: 2022-07-13 | End: 2023-01-13 | Stop reason: SDUPTHER

## 2022-07-13 RX ORDER — LEVOTHYROXINE SODIUM 112 UG/1
112 TABLET ORAL DAILY
Qty: 90 TABLET | Refills: 1 | Status: SHIPPED | OUTPATIENT
Start: 2022-07-13 | End: 2023-01-13 | Stop reason: SDUPTHER

## 2022-07-13 RX ORDER — PRAMIPEXOLE DIHYDROCHLORIDE 1.5 MG/1
1.5 TABLET ORAL DAILY
Qty: 90 TABLET | Refills: 1 | Status: SHIPPED | OUTPATIENT
Start: 2022-07-13 | End: 2023-01-13 | Stop reason: SDUPTHER

## 2022-07-13 RX ORDER — AMLODIPINE BESYLATE 5 MG/1
5 TABLET ORAL DAILY
Qty: 90 TABLET | Refills: 1 | Status: SHIPPED | OUTPATIENT
Start: 2022-07-13 | End: 2023-01-13 | Stop reason: SDUPTHER

## 2022-12-08 ENCOUNTER — OFFICE VISIT (OUTPATIENT)
Dept: FAMILY MEDICINE CLINIC | Facility: CLINIC | Age: 87
End: 2022-12-08

## 2022-12-08 VITALS
HEART RATE: 66 BPM | SYSTOLIC BLOOD PRESSURE: 142 MMHG | BODY MASS INDEX: 20.88 KG/M2 | RESPIRATION RATE: 16 BRPM | DIASTOLIC BLOOD PRESSURE: 72 MMHG | OXYGEN SATURATION: 98 % | WEIGHT: 133 LBS | HEIGHT: 67 IN | TEMPERATURE: 97.3 F

## 2022-12-08 DIAGNOSIS — G45.9 TIA (TRANSIENT ISCHEMIC ATTACK): Primary | ICD-10-CM

## 2022-12-08 PROCEDURE — 99214 OFFICE O/P EST MOD 30 MIN: CPT | Performed by: FAMILY MEDICINE

## 2022-12-08 NOTE — PROGRESS NOTES
"Waldo Marrero is a 89 y.o. female.     CC: Slurred Speech/Weakness    History of Present Illness     Pt comes in today after EMS came to the house yesterday due to some slurring of speech and weakness. Pt declined trip to the hospital and chose instead to come here today.   Pt reports this started when she was at the store shopping and came on fast. Went home and called EMS, who said her BP and the glucose was good. Episode lasted about 15-20 minutes. Pt doesn't have a recollection of the events.    Pt has a h/o prior TIA.      The following portions of the patient's history were reviewed and updated as appropriate: allergies, current medications, past family history, past medical history, past social history, past surgical history and problem list.    Review of Systems   Constitutional: Negative for activity change, chills and fever.   Respiratory: Negative for cough.    Cardiovascular: Negative for chest pain.   Psychiatric/Behavioral: Negative for dysphoric mood.       /72 Comment: aimee bp left arm  Pulse 66   Temp 97.3 °F (36.3 °C) (Oral)   Resp 16   Ht 170.2 cm (67\")   Wt 60.3 kg (133 lb)   SpO2 98%   BMI 20.83 kg/m²     Objective   Physical Exam  Constitutional:       General: She is not in acute distress.     Appearance: She is well-developed.   Cardiovascular:      Rate and Rhythm: Normal rate and regular rhythm.   Pulmonary:      Effort: Pulmonary effort is normal.      Breath sounds: Normal breath sounds.   Neurological:      General: No focal deficit present.      Mental Status: She is alert and oriented to person, place, and time.   Psychiatric:         Behavior: Behavior normal.         Thought Content: Thought content normal.         Assessment & Plan   Diagnoses and all orders for this visit:    1. TIA (transient ischemic attack) (Primary)  -     Duplex Carotid Ultrasound CAR; Future  -     MRI Brain Without Contrast; Future  -     Ambulatory Referral to Neurology  -     " Adult Transthoracic Echo Complete W/ Cont if Necessary Per Protocol; Future    Pt already on Plavix and is to continue.

## 2022-12-13 ENCOUNTER — TELEPHONE (OUTPATIENT)
Dept: FAMILY MEDICINE CLINIC | Facility: CLINIC | Age: 87
End: 2022-12-13

## 2023-01-05 ENCOUNTER — HOSPITAL ENCOUNTER (OUTPATIENT)
Dept: CARDIOLOGY | Facility: HOSPITAL | Age: 88
Discharge: HOME | End: 2023-01-05
Admitting: FAMILY MEDICINE
Payer: MEDICARE

## 2023-01-05 VITALS
DIASTOLIC BLOOD PRESSURE: 72 MMHG | WEIGHT: 133 LBS | BODY MASS INDEX: 20.88 KG/M2 | HEIGHT: 67 IN | HEART RATE: 60 BPM | SYSTOLIC BLOOD PRESSURE: 142 MMHG

## 2023-01-05 DIAGNOSIS — I34.0 NONRHEUMATIC MITRAL VALVE REGURGITATION: Primary | ICD-10-CM

## 2023-01-05 DIAGNOSIS — G45.9 TIA (TRANSIENT ISCHEMIC ATTACK): ICD-10-CM

## 2023-01-05 LAB
AORTIC ARCH: 2.5 CM
ASCENDING AORTA: 2.9 CM
BH CV ECHO MEAS - ACS: 1.58 CM
BH CV ECHO MEAS - AO MAX PG: 4.3 MMHG
BH CV ECHO MEAS - AO MEAN PG: 2.7 MMHG
BH CV ECHO MEAS - AO ROOT DIAM: 3 CM
BH CV ECHO MEAS - AO V2 MAX: 104.2 CM/SEC
BH CV ECHO MEAS - AO V2 VTI: 26.4 CM
BH CV ECHO MEAS - AVA(I,D): 2.8 CM2
BH CV ECHO MEAS - EDV(CUBED): 75.3 ML
BH CV ECHO MEAS - EDV(MOD-SP2): 51 ML
BH CV ECHO MEAS - EDV(MOD-SP4): 54 ML
BH CV ECHO MEAS - EF(MOD-BP): 68.2 %
BH CV ECHO MEAS - EF(MOD-SP2): 66.7 %
BH CV ECHO MEAS - EF(MOD-SP4): 68.5 %
BH CV ECHO MEAS - ESV(CUBED): 12.7 ML
BH CV ECHO MEAS - ESV(MOD-SP2): 17 ML
BH CV ECHO MEAS - ESV(MOD-SP4): 17 ML
BH CV ECHO MEAS - FS: 44.8 %
BH CV ECHO MEAS - IVS/LVPW: 0.92 CM
BH CV ECHO MEAS - IVSD: 0.89 CM
BH CV ECHO MEAS - LAT PEAK E' VEL: 5.3 CM/SEC
BH CV ECHO MEAS - LV DIASTOLIC VOL/BSA (35-75): 31.8 CM2
BH CV ECHO MEAS - LV MASS(C)D: 124.4 GRAMS
BH CV ECHO MEAS - LV MAX PG: 3.9 MMHG
BH CV ECHO MEAS - LV MEAN PG: 2.09 MMHG
BH CV ECHO MEAS - LV SYSTOLIC VOL/BSA (12-30): 10 CM2
BH CV ECHO MEAS - LV V1 MAX: 98.8 CM/SEC
BH CV ECHO MEAS - LV V1 VTI: 24.5 CM
BH CV ECHO MEAS - LVIDD: 4.2 CM
BH CV ECHO MEAS - LVIDS: 2.33 CM
BH CV ECHO MEAS - LVOT AREA: 3 CM2
BH CV ECHO MEAS - LVOT DIAM: 1.95 CM
BH CV ECHO MEAS - LVPWD: 0.96 CM
BH CV ECHO MEAS - MED PEAK E' VEL: 7.1 CM/SEC
BH CV ECHO MEAS - MV A DUR: 0.16 SEC
BH CV ECHO MEAS - MV A MAX VEL: 141.4 CM/SEC
BH CV ECHO MEAS - MV DEC SLOPE: 601.8 CM/SEC2
BH CV ECHO MEAS - MV DEC TIME: 0.32 MSEC
BH CV ECHO MEAS - MV E MAX VEL: 114 CM/SEC
BH CV ECHO MEAS - MV E/A: 0.81
BH CV ECHO MEAS - MV MAX PG: 11 MMHG
BH CV ECHO MEAS - MV MEAN PG: 4.1 MMHG
BH CV ECHO MEAS - MV P1/2T: 63 MSEC
BH CV ECHO MEAS - MV V2 VTI: 39.1 CM
BH CV ECHO MEAS - MVA(P1/2T): 3.5 CM2
BH CV ECHO MEAS - MVA(VTI): 1.88 CM2
BH CV ECHO MEAS - PA ACC TIME: 0.11 SEC
BH CV ECHO MEAS - PA PR(ACCEL): 27.9 MMHG
BH CV ECHO MEAS - PA V2 MAX: 89.6 CM/SEC
BH CV ECHO MEAS - PULM A REVS DUR: 0.13 SEC
BH CV ECHO MEAS - PULM A REVS VEL: 27.9 CM/SEC
BH CV ECHO MEAS - PULM DIAS VEL: 39.1 CM/SEC
BH CV ECHO MEAS - PULM S/D: 1.37
BH CV ECHO MEAS - PULM SYS VEL: 53.7 CM/SEC
BH CV ECHO MEAS - QP/QS: 0.84
BH CV ECHO MEAS - RAP SYSTOLE: 3 MMHG
BH CV ECHO MEAS - RV MAX PG: 1.72 MMHG
BH CV ECHO MEAS - RV V1 MAX: 65.6 CM/SEC
BH CV ECHO MEAS - RV V1 VTI: 14 CM
BH CV ECHO MEAS - RVOT DIAM: 2.36 CM
BH CV ECHO MEAS - RVSP: 20 MMHG
BH CV ECHO MEAS - SI(MOD-SP2): 20 ML/M2
BH CV ECHO MEAS - SI(MOD-SP4): 21.8 ML/M2
BH CV ECHO MEAS - SV(LVOT): 73.3 ML
BH CV ECHO MEAS - SV(MOD-SP2): 34 ML
BH CV ECHO MEAS - SV(MOD-SP4): 37 ML
BH CV ECHO MEAS - SV(RVOT): 61.4 ML
BH CV ECHO MEAS - TAPSE (>1.6): 1.61 CM
BH CV ECHO MEAS - TR MAX PG: 17.9 MMHG
BH CV ECHO MEAS - TR MAX VEL: 211.7 CM/SEC
BH CV ECHO MEASUREMENTS AVERAGE E/E' RATIO: 18.39
BH CV ECHO SHUNT ASSESSMENT PERFORMED (HIDDEN SCRIPTING): 1
BH CV XLRA - RV BASE: 3.2 CM
BH CV XLRA - RV LENGTH: 5 CM
BH CV XLRA - RV MID: 3.2 CM
BH CV XLRA - TDI S': 8.8 CM/SEC
LEFT ATRIUM VOLUME INDEX: 16.9 ML/M2
MAXIMAL PREDICTED HEART RATE: 123 BPM
SINUS: 2.9 CM
STJ: 2.9 CM
STRESS TARGET HR: 105 BPM

## 2023-01-05 PROCEDURE — 93306 TTE W/DOPPLER COMPLETE: CPT | Performed by: INTERNAL MEDICINE

## 2023-01-05 PROCEDURE — 93306 TTE W/DOPPLER COMPLETE: CPT

## 2023-01-13 PROBLEM — G45.9 TIA (TRANSIENT ISCHEMIC ATTACK): Status: ACTIVE | Noted: 2023-01-13

## 2023-01-13 NOTE — PROGRESS NOTES
Subjective   Elissa Marrero is a 89 y.o. female.     History of Present Illness     Chief Complaint:   Chief Complaint   Patient presents with   • Diabetes     MED REFILL DUE    • Hypertension     DIABETIC EYE EXAM DUE     • Hyperlipidemia   • Hypothyroidism   • TIA       Elissa Marrero 89 y.o. female who presents today for Medical Management of the below listed issues. She  has a problem list of   Patient Active Problem List   Diagnosis   • Diabetes mellitus, type 2 (HCC)   • Hyperlipidemia   • Benign essential hypertension   • Hypothyroidism   • Insomnia   • Lymphedema   • Osteoarthritis   • Osteopenia   • Renal insufficiency   • Restless leg syndrome   • Vitamin D deficiency   • Hip pain, right   • Acute kidney failure (HCC)   • Chronic kidney disease, stage 4 (severe) (HCC)   • Disorder of bone and cartilage   • Edema   • Hyperkalemia   • Urinary tract infection   • TIA (transient ischemic attack)   .  Since the last visit, She has overall felt well and has been recently referred to cardiology due to recent TIA and ECHO that showed Mitral Valve issues.   she has been compliant with   Current Outpatient Medications:   •  amLODIPine (NORVASC) 5 MG tablet, Take 1 tablet by mouth Daily., Disp: 90 tablet, Rfl: 1  •  clopidogrel (PLAVIX) 75 MG tablet, Take 1 tablet by mouth Daily., Disp: 90 tablet, Rfl: 1  •  glipizide (GLUCOTROL) 5 MG tablet, Take 1 tablet by mouth 3 (Three) Times a Day., Disp: 270 tablet, Rfl: 1  •  levothyroxine (SYNTHROID, LEVOTHROID) 112 MCG tablet, Take 1 tablet by mouth Daily., Disp: 90 tablet, Rfl: 1  •  metoprolol tartrate (LOPRESSOR) 25 MG tablet, Take 1 tablet by mouth 2 (Two) Times a Day., Disp: 180 tablet, Rfl: 1  •  pramipexole (MIRAPEX) 1.5 MG tablet, Take 1 tablet by mouth Daily., Disp: 90 tablet, Rfl: 1  •  rosuvastatin (Crestor) 10 MG tablet, Take 1 tablet by mouth Daily., Disp: 90 tablet, Rfl: 1  •  Cholecalciferol (VITAMIN D PO), Take  by mouth., Disp: , Rfl:   •  diclofenac  "(VOLTAREN) 1 % gel gel, Apply 4 g topically to the appropriate area as directed 4 (Four) Times a Day., Disp: 5 tube, Rfl: 3.  She denies medication side effects.    All of the other chronic condition(s) listed above are stable w/o issues.    /58 Comment: aimee bp left arm  Pulse 96   Temp 97.5 °F (36.4 °C) (Oral)   Resp 18   Ht 170.2 cm (67\")   Wt 61.7 kg (136 lb)   BMI 21.30 kg/m²     Results for orders placed or performed during the hospital encounter of 01/16/23   Duplex Carotid Ultrasound CAR   Result Value Ref Range    Target HR (85%) 105 bpm    Max. Pred. HR (100%) 123 bpm    Prox CCA PSV -55.4 cm/sec    Prox CCA EDV -8.2 cm/sec    Dist CCA PSV -46.8 cm/sec    Dist CCA EDV -7.1 cm/sec    Prox ICA PSV -51.9 cm/sec    Prox ICA EDV -11.4 cm/sec    Mid ICA PSV -57.4 cm/sec    Mid ICA EDV 7.86 cm/sec    Dist ICA PSV -51.1 cm/sec    Dist ICA EDV -10.2 cm/sec    Prox ECA PSV -37.9 cm/sec    Vertebral A PSV 22.80 cm/sec    Vertebral A EDV 6.88 cm/sec    Prox SCLA PSV 66.80 cm/sec    Prox CCA PSV 49.2 cm/sec    Prox CCA EDV 7.6 cm/sec    Dist CCA PSV 46.9 cm/sec    Dist CCA EDV 10.6 cm/sec    Prox ICA PSV -40.4 cm/sec    Prox ICA EDV -8.1 cm/sec    Mid ICA PSV -41.3 cm/sec    Mid ICA EDV -8.5 cm/sec    Dist ICA PSV -62.5 cm/sec    Dist ICA EDV -16.0 cm/sec    Prox ECA PSV 42.30 cm/sec    Prox ECA EDV 3.71 cm/sec    Vertebral A PSV 45.1 cm/sec    Vertebral A EDV 13.5 cm/sec    Prox SCLA PSV 81.7 cm/sec    ICA/CCA ratio 1.23     ICA/CCA ratio 1.33     Right arm /87 mmHg    Left arm /78 mmHg             The following portions of the patient's history were reviewed and updated as appropriate: allergies, current medications, past family history, past medical history, past social history, past surgical history, and problem list.    Review of Systems   Constitutional: Negative for activity change, chills and fever.   Respiratory: Negative for cough.    Cardiovascular: Negative for chest pain. "   Psychiatric/Behavioral: Negative for dysphoric mood.       Objective   Physical Exam  Constitutional:       General: She is not in acute distress.     Appearance: She is well-developed.   Cardiovascular:      Rate and Rhythm: Normal rate and regular rhythm.   Pulmonary:      Effort: Pulmonary effort is normal.      Breath sounds: Normal breath sounds.   Neurological:      Mental Status: She is alert and oriented to person, place, and time.   Psychiatric:         Behavior: Behavior normal.         Thought Content: Thought content normal.             Diagnoses and all orders for this visit:    1. Type 2 diabetes mellitus with hyperglycemia, without long-term current use of insulin (HCC) (Primary)  Comments:  worsening  Orders:  -     glipizide (GLUCOTROL) 5 MG tablet; Take 1 tablet by mouth 3 (Three) Times a Day.  Dispense: 270 tablet; Refill: 1  -     Basic Metabolic Panel; Future  -     Hemoglobin A1c; Future    2. Acquired hypothyroidism  -     levothyroxine (SYNTHROID, LEVOTHROID) 112 MCG tablet; Take 1 tablet by mouth Daily.  Dispense: 90 tablet; Refill: 1    3. Benign essential hypertension  -     metoprolol tartrate (LOPRESSOR) 25 MG tablet; Take 1 tablet by mouth 2 (Two) Times a Day.  Dispense: 180 tablet; Refill: 1  -     amLODIPine (NORVASC) 5 MG tablet; Take 1 tablet by mouth Daily.  Dispense: 90 tablet; Refill: 1    4. Restless leg syndrome  -     pramipexole (MIRAPEX) 1.5 MG tablet; Take 1 tablet by mouth Daily.  Dispense: 90 tablet; Refill: 1    5. Mixed hyperlipidemia  Comments:  not to goal  Orders:  -     rosuvastatin (Crestor) 10 MG tablet; Take 1 tablet by mouth Daily.  Dispense: 90 tablet; Refill: 1    6. TIA (transient ischemic attack)  -     clopidogrel (PLAVIX) 75 MG tablet; Take 1 tablet by mouth Daily.  Dispense: 90 tablet; Refill: 1

## 2023-01-16 ENCOUNTER — APPOINTMENT (OUTPATIENT)
Dept: CARDIOLOGY | Facility: HOSPITAL | Age: 88
End: 2023-01-16

## 2023-01-16 ENCOUNTER — APPOINTMENT (OUTPATIENT)
Dept: MRI IMAGING | Facility: HOSPITAL | Age: 88
End: 2023-01-16

## 2023-01-16 ENCOUNTER — HOSPITAL ENCOUNTER (OUTPATIENT)
Dept: MRI IMAGING | Facility: HOSPITAL | Age: 88
Discharge: HOME | End: 2023-01-16
Payer: MEDICARE

## 2023-01-16 ENCOUNTER — HOSPITAL ENCOUNTER (OUTPATIENT)
Dept: CARDIOLOGY | Facility: HOSPITAL | Age: 88
Discharge: HOME | End: 2023-01-16
Payer: MEDICARE

## 2023-01-16 ENCOUNTER — OFFICE VISIT (OUTPATIENT)
Dept: FAMILY MEDICINE CLINIC | Facility: CLINIC | Age: 88
End: 2023-01-16
Payer: MEDICARE

## 2023-01-16 VITALS
SYSTOLIC BLOOD PRESSURE: 144 MMHG | WEIGHT: 136 LBS | DIASTOLIC BLOOD PRESSURE: 58 MMHG | TEMPERATURE: 97.5 F | BODY MASS INDEX: 21.35 KG/M2 | HEIGHT: 67 IN | HEART RATE: 96 BPM | RESPIRATION RATE: 18 BRPM

## 2023-01-16 DIAGNOSIS — I10 BENIGN ESSENTIAL HYPERTENSION: Chronic | ICD-10-CM

## 2023-01-16 DIAGNOSIS — G25.81 RESTLESS LEG SYNDROME: Chronic | ICD-10-CM

## 2023-01-16 DIAGNOSIS — G45.9 TIA (TRANSIENT ISCHEMIC ATTACK): ICD-10-CM

## 2023-01-16 DIAGNOSIS — E78.2 MIXED HYPERLIPIDEMIA: Chronic | ICD-10-CM

## 2023-01-16 DIAGNOSIS — E11.65 TYPE 2 DIABETES MELLITUS WITH HYPERGLYCEMIA, WITHOUT LONG-TERM CURRENT USE OF INSULIN: Primary | ICD-10-CM

## 2023-01-16 DIAGNOSIS — E03.9 ACQUIRED HYPOTHYROIDISM: Chronic | ICD-10-CM

## 2023-01-16 LAB
BH CV XLRA MEAS LEFT DIST CCA EDV: 10.6 CM/SEC
BH CV XLRA MEAS LEFT DIST CCA PSV: 46.9 CM/SEC
BH CV XLRA MEAS LEFT DIST ICA EDV: -16 CM/SEC
BH CV XLRA MEAS LEFT DIST ICA PSV: -62.5 CM/SEC
BH CV XLRA MEAS LEFT ICA/CCA RATIO: 1.33
BH CV XLRA MEAS LEFT MID ICA EDV: -8.5 CM/SEC
BH CV XLRA MEAS LEFT MID ICA PSV: -41.3 CM/SEC
BH CV XLRA MEAS LEFT PROX CCA EDV: 7.6 CM/SEC
BH CV XLRA MEAS LEFT PROX CCA PSV: 49.2 CM/SEC
BH CV XLRA MEAS LEFT PROX ECA EDV: 3.71 CM/SEC
BH CV XLRA MEAS LEFT PROX ECA PSV: 42.3 CM/SEC
BH CV XLRA MEAS LEFT PROX ICA EDV: -8.1 CM/SEC
BH CV XLRA MEAS LEFT PROX ICA PSV: -40.4 CM/SEC
BH CV XLRA MEAS LEFT PROX SCLA PSV: 81.7 CM/SEC
BH CV XLRA MEAS LEFT VERTEBRAL A EDV: 13.5 CM/SEC
BH CV XLRA MEAS LEFT VERTEBRAL A PSV: 45.1 CM/SEC
BH CV XLRA MEAS RIGHT DIST CCA EDV: -7.1 CM/SEC
BH CV XLRA MEAS RIGHT DIST CCA PSV: -46.8 CM/SEC
BH CV XLRA MEAS RIGHT DIST ICA EDV: -10.2 CM/SEC
BH CV XLRA MEAS RIGHT DIST ICA PSV: -51.1 CM/SEC
BH CV XLRA MEAS RIGHT ICA/CCA RATIO: 1.23
BH CV XLRA MEAS RIGHT MID ICA EDV: 7.86 CM/SEC
BH CV XLRA MEAS RIGHT MID ICA PSV: -57.4 CM/SEC
BH CV XLRA MEAS RIGHT PROX CCA EDV: -8.2 CM/SEC
BH CV XLRA MEAS RIGHT PROX CCA PSV: -55.4 CM/SEC
BH CV XLRA MEAS RIGHT PROX ECA PSV: -37.9 CM/SEC
BH CV XLRA MEAS RIGHT PROX ICA EDV: -11.4 CM/SEC
BH CV XLRA MEAS RIGHT PROX ICA PSV: -51.9 CM/SEC
BH CV XLRA MEAS RIGHT PROX SCLA PSV: 66.8 CM/SEC
BH CV XLRA MEAS RIGHT VERTEBRAL A EDV: 6.88 CM/SEC
BH CV XLRA MEAS RIGHT VERTEBRAL A PSV: 22.8 CM/SEC
LEFT ARM BP: NORMAL MMHG
MAXIMAL PREDICTED HEART RATE: 123 BPM
RIGHT ARM BP: NORMAL MMHG
STRESS TARGET HR: 105 BPM

## 2023-01-16 PROCEDURE — 99214 OFFICE O/P EST MOD 30 MIN: CPT | Performed by: FAMILY MEDICINE

## 2023-01-16 PROCEDURE — 93880 EXTRACRANIAL BILAT STUDY: CPT

## 2023-01-16 PROCEDURE — 70551 MRI BRAIN STEM W/O DYE: CPT

## 2023-01-16 RX ORDER — CLOPIDOGREL BISULFATE 75 MG/1
75 TABLET ORAL DAILY
Qty: 90 TABLET | Refills: 1 | Status: SHIPPED | OUTPATIENT
Start: 2023-01-16

## 2023-01-16 RX ORDER — PRAMIPEXOLE DIHYDROCHLORIDE 1.5 MG/1
1.5 TABLET ORAL DAILY
Qty: 90 TABLET | Refills: 1 | Status: SHIPPED | OUTPATIENT
Start: 2023-01-16

## 2023-01-16 RX ORDER — LEVOTHYROXINE SODIUM 112 UG/1
112 TABLET ORAL DAILY
Qty: 90 TABLET | Refills: 1 | Status: SHIPPED | OUTPATIENT
Start: 2023-01-16

## 2023-01-16 RX ORDER — AMLODIPINE BESYLATE 5 MG/1
5 TABLET ORAL DAILY
Qty: 90 TABLET | Refills: 1 | Status: SHIPPED | OUTPATIENT
Start: 2023-01-16

## 2023-01-16 RX ORDER — GLIPIZIDE 5 MG/1
5 TABLET ORAL 3 TIMES DAILY
Qty: 270 TABLET | Refills: 1 | Status: SHIPPED | OUTPATIENT
Start: 2023-01-16

## 2023-01-16 RX ORDER — ROSUVASTATIN CALCIUM 10 MG/1
10 TABLET, COATED ORAL DAILY
Qty: 90 TABLET | Refills: 1 | Status: SHIPPED | OUTPATIENT
Start: 2023-01-16

## 2023-11-17 ENCOUNTER — APPOINTMENT (OUTPATIENT)
Dept: CT IMAGING | Facility: HOSPITAL | Age: 88
End: 2023-11-17
Payer: MEDICARE

## 2023-11-17 ENCOUNTER — HOSPITAL ENCOUNTER (INPATIENT)
Facility: HOSPITAL | Age: 88
LOS: 4 days | Discharge: HOME HEALTH | End: 2023-11-21
Attending: STUDENT IN AN ORGANIZED HEALTH CARE EDUCATION/TRAINING PROGRAM | Admitting: INTERNAL MEDICINE
Payer: MEDICARE

## 2023-11-17 ENCOUNTER — APPOINTMENT (OUTPATIENT)
Dept: GENERAL RADIOLOGY | Facility: HOSPITAL | Age: 88
End: 2023-11-17
Payer: MEDICARE

## 2023-11-17 DIAGNOSIS — N30.00 ACUTE CYSTITIS WITHOUT HEMATURIA: ICD-10-CM

## 2023-11-17 DIAGNOSIS — G93.40 ACUTE ENCEPHALOPATHY: Primary | ICD-10-CM

## 2023-11-17 DIAGNOSIS — M19.90 OSTEOARTHRITIS, UNSPECIFIED OSTEOARTHRITIS TYPE, UNSPECIFIED SITE: ICD-10-CM

## 2023-11-17 PROBLEM — G93.41 ACUTE METABOLIC ENCEPHALOPATHY: Status: ACTIVE | Noted: 2023-11-17

## 2023-11-17 LAB
ALBUMIN SERPL-MCNC: 4.3 G/DL (ref 3.5–5.2)
ALBUMIN/GLOB SERPL: 1.7 G/DL
ALP SERPL-CCNC: 62 U/L (ref 39–117)
ALT SERPL W P-5'-P-CCNC: 9 U/L (ref 1–33)
ANION GAP SERPL CALCULATED.3IONS-SCNC: 9.5 MMOL/L (ref 5–15)
AST SERPL-CCNC: 10 U/L (ref 1–32)
BACTERIA UR QL AUTO: ABNORMAL /HPF
BASOPHILS # BLD AUTO: 0.02 10*3/MM3 (ref 0–0.2)
BASOPHILS NFR BLD AUTO: 0.3 % (ref 0–1.5)
BILIRUB SERPL-MCNC: 0.4 MG/DL (ref 0–1.2)
BILIRUB UR QL STRIP: NEGATIVE
BUN SERPL-MCNC: 20 MG/DL (ref 8–23)
BUN/CREAT SERPL: 13.9 (ref 7–25)
CALCIUM SPEC-SCNC: 9.7 MG/DL (ref 8.2–9.6)
CHLORIDE SERPL-SCNC: 104 MMOL/L (ref 98–107)
CLARITY UR: ABNORMAL
CO2 SERPL-SCNC: 27.5 MMOL/L (ref 22–29)
COLOR UR: YELLOW
CREAT SERPL-MCNC: 1.44 MG/DL (ref 0.57–1)
D-LACTATE SERPL-SCNC: 1.2 MMOL/L (ref 0.5–2)
DEPRECATED RDW RBC AUTO: 41.5 FL (ref 37–54)
EGFRCR SERPLBLD CKD-EPI 2021: 32.9 ML/MIN/1.73
EOSINOPHIL # BLD AUTO: 0.04 10*3/MM3 (ref 0–0.4)
EOSINOPHIL NFR BLD AUTO: 0.5 % (ref 0.3–6.2)
ERYTHROCYTE [DISTWIDTH] IN BLOOD BY AUTOMATED COUNT: 12.9 % (ref 12.3–15.4)
GEN 5 2HR TROPONIN T REFLEX: 30 NG/L
GLOBULIN UR ELPH-MCNC: 2.5 GM/DL
GLUCOSE BLDC GLUCOMTR-MCNC: 113 MG/DL (ref 70–130)
GLUCOSE SERPL-MCNC: 172 MG/DL (ref 65–99)
GLUCOSE UR STRIP-MCNC: ABNORMAL MG/DL
HCT VFR BLD AUTO: 39.5 % (ref 34–46.6)
HGB BLD-MCNC: 13 G/DL (ref 12–15.9)
HGB UR QL STRIP.AUTO: ABNORMAL
HOLD SPECIMEN: NORMAL
HOLD SPECIMEN: NORMAL
HYALINE CASTS UR QL AUTO: ABNORMAL /LPF
IMM GRANULOCYTES # BLD AUTO: 0.02 10*3/MM3 (ref 0–0.05)
IMM GRANULOCYTES NFR BLD AUTO: 0.3 % (ref 0–0.5)
KETONES UR QL STRIP: ABNORMAL
LEUKOCYTE ESTERASE UR QL STRIP.AUTO: ABNORMAL
LYMPHOCYTES # BLD AUTO: 2.11 10*3/MM3 (ref 0.7–3.1)
LYMPHOCYTES NFR BLD AUTO: 27.8 % (ref 19.6–45.3)
MAGNESIUM SERPL-MCNC: 2.2 MG/DL (ref 1.7–2.3)
MCH RBC QN AUTO: 29 PG (ref 26.6–33)
MCHC RBC AUTO-ENTMCNC: 32.9 G/DL (ref 31.5–35.7)
MCV RBC AUTO: 88 FL (ref 79–97)
MONOCYTES # BLD AUTO: 0.46 10*3/MM3 (ref 0.1–0.9)
MONOCYTES NFR BLD AUTO: 6.1 % (ref 5–12)
NEUTROPHILS NFR BLD AUTO: 4.94 10*3/MM3 (ref 1.7–7)
NEUTROPHILS NFR BLD AUTO: 65 % (ref 42.7–76)
NITRITE UR QL STRIP: POSITIVE
NRBC BLD AUTO-RTO: 0.1 /100 WBC (ref 0–0.2)
PH UR STRIP.AUTO: 6.5 [PH] (ref 5–8)
PLATELET # BLD AUTO: 184 10*3/MM3 (ref 140–450)
PMV BLD AUTO: 11.9 FL (ref 6–12)
POTASSIUM SERPL-SCNC: 4.5 MMOL/L (ref 3.5–5.2)
PROT SERPL-MCNC: 6.8 G/DL (ref 6–8.5)
PROT UR QL STRIP: ABNORMAL
QT INTERVAL: 431 MS
QTC INTERVAL: 452 MS
RBC # BLD AUTO: 4.49 10*6/MM3 (ref 3.77–5.28)
RBC # UR STRIP: ABNORMAL /HPF
REF LAB TEST METHOD: ABNORMAL
SODIUM SERPL-SCNC: 141 MMOL/L (ref 136–145)
SP GR UR STRIP: 1.02 (ref 1–1.03)
SQUAMOUS #/AREA URNS HPF: ABNORMAL /HPF
TROPONIN T DELTA: 3 NG/L
TROPONIN T SERPL HS-MCNC: 27 NG/L
TROPONIN T SERPL HS-MCNC: 29 NG/L
UROBILINOGEN UR QL STRIP: ABNORMAL
WBC # UR STRIP: ABNORMAL /HPF
WBC NRBC COR # BLD AUTO: 7.59 10*3/MM3 (ref 3.4–10.8)
WHOLE BLOOD HOLD COAG: NORMAL
WHOLE BLOOD HOLD SPECIMEN: NORMAL

## 2023-11-17 PROCEDURE — 93005 ELECTROCARDIOGRAM TRACING: CPT | Performed by: STUDENT IN AN ORGANIZED HEALTH CARE EDUCATION/TRAINING PROGRAM

## 2023-11-17 PROCEDURE — 87186 SC STD MICRODIL/AGAR DIL: CPT | Performed by: STUDENT IN AN ORGANIZED HEALTH CARE EDUCATION/TRAINING PROGRAM

## 2023-11-17 PROCEDURE — 93010 ELECTROCARDIOGRAM REPORT: CPT | Performed by: INTERNAL MEDICINE

## 2023-11-17 PROCEDURE — 84484 ASSAY OF TROPONIN QUANT: CPT | Performed by: INTERNAL MEDICINE

## 2023-11-17 PROCEDURE — 36415 COLL VENOUS BLD VENIPUNCTURE: CPT

## 2023-11-17 PROCEDURE — 83605 ASSAY OF LACTIC ACID: CPT | Performed by: STUDENT IN AN ORGANIZED HEALTH CARE EDUCATION/TRAINING PROGRAM

## 2023-11-17 PROCEDURE — 99285 EMERGENCY DEPT VISIT HI MDM: CPT

## 2023-11-17 PROCEDURE — 71045 X-RAY EXAM CHEST 1 VIEW: CPT

## 2023-11-17 PROCEDURE — 80053 COMPREHEN METABOLIC PANEL: CPT | Performed by: STUDENT IN AN ORGANIZED HEALTH CARE EDUCATION/TRAINING PROGRAM

## 2023-11-17 PROCEDURE — 70450 CT HEAD/BRAIN W/O DYE: CPT

## 2023-11-17 PROCEDURE — 83735 ASSAY OF MAGNESIUM: CPT | Performed by: STUDENT IN AN ORGANIZED HEALTH CARE EDUCATION/TRAINING PROGRAM

## 2023-11-17 PROCEDURE — 25010000002 CEFTRIAXONE PER 250 MG: Performed by: INTERNAL MEDICINE

## 2023-11-17 PROCEDURE — 82948 REAGENT STRIP/BLOOD GLUCOSE: CPT

## 2023-11-17 PROCEDURE — 85025 COMPLETE CBC W/AUTO DIFF WBC: CPT | Performed by: STUDENT IN AN ORGANIZED HEALTH CARE EDUCATION/TRAINING PROGRAM

## 2023-11-17 PROCEDURE — 81001 URINALYSIS AUTO W/SCOPE: CPT | Performed by: STUDENT IN AN ORGANIZED HEALTH CARE EDUCATION/TRAINING PROGRAM

## 2023-11-17 PROCEDURE — 87077 CULTURE AEROBIC IDENTIFY: CPT | Performed by: STUDENT IN AN ORGANIZED HEALTH CARE EDUCATION/TRAINING PROGRAM

## 2023-11-17 PROCEDURE — 25810000003 SODIUM CHLORIDE 0.9 % SOLUTION: Performed by: INTERNAL MEDICINE

## 2023-11-17 PROCEDURE — 87040 BLOOD CULTURE FOR BACTERIA: CPT | Performed by: STUDENT IN AN ORGANIZED HEALTH CARE EDUCATION/TRAINING PROGRAM

## 2023-11-17 PROCEDURE — 87086 URINE CULTURE/COLONY COUNT: CPT | Performed by: STUDENT IN AN ORGANIZED HEALTH CARE EDUCATION/TRAINING PROGRAM

## 2023-11-17 PROCEDURE — 84484 ASSAY OF TROPONIN QUANT: CPT | Performed by: STUDENT IN AN ORGANIZED HEALTH CARE EDUCATION/TRAINING PROGRAM

## 2023-11-17 RX ORDER — ACETAMINOPHEN 325 MG/1
650 TABLET ORAL EVERY 4 HOURS PRN
Status: DISCONTINUED | OUTPATIENT
Start: 2023-11-17 | End: 2023-11-21 | Stop reason: HOSPADM

## 2023-11-17 RX ORDER — ONDANSETRON 2 MG/ML
4 INJECTION INTRAMUSCULAR; INTRAVENOUS EVERY 6 HOURS PRN
Status: DISCONTINUED | OUTPATIENT
Start: 2023-11-17 | End: 2023-11-21 | Stop reason: HOSPADM

## 2023-11-17 RX ORDER — PRAMIPEXOLE DIHYDROCHLORIDE 1.5 MG/1
1.5 TABLET ORAL DAILY
Status: DISCONTINUED | OUTPATIENT
Start: 2023-11-18 | End: 2023-11-21 | Stop reason: HOSPADM

## 2023-11-17 RX ORDER — ONDANSETRON 4 MG/1
4 TABLET, FILM COATED ORAL EVERY 6 HOURS PRN
Status: DISCONTINUED | OUTPATIENT
Start: 2023-11-17 | End: 2023-11-21 | Stop reason: HOSPADM

## 2023-11-17 RX ORDER — IBUPROFEN 600 MG/1
1 TABLET ORAL
Status: DISCONTINUED | OUTPATIENT
Start: 2023-11-17 | End: 2023-11-21 | Stop reason: HOSPADM

## 2023-11-17 RX ORDER — BISACODYL 5 MG/1
5 TABLET, DELAYED RELEASE ORAL DAILY PRN
Status: DISCONTINUED | OUTPATIENT
Start: 2023-11-17 | End: 2023-11-21 | Stop reason: HOSPADM

## 2023-11-17 RX ORDER — POLYETHYLENE GLYCOL 3350 17 G/17G
17 POWDER, FOR SOLUTION ORAL DAILY PRN
Status: DISCONTINUED | OUTPATIENT
Start: 2023-11-17 | End: 2023-11-21 | Stop reason: HOSPADM

## 2023-11-17 RX ORDER — NICOTINE POLACRILEX 4 MG
15 LOZENGE BUCCAL
Status: DISCONTINUED | OUTPATIENT
Start: 2023-11-17 | End: 2023-11-21 | Stop reason: HOSPADM

## 2023-11-17 RX ORDER — LEVOTHYROXINE SODIUM 112 UG/1
112 TABLET ORAL DAILY
Status: DISCONTINUED | OUTPATIENT
Start: 2023-11-18 | End: 2023-11-21 | Stop reason: HOSPADM

## 2023-11-17 RX ORDER — AMOXICILLIN 250 MG
2 CAPSULE ORAL 2 TIMES DAILY
Status: DISCONTINUED | OUTPATIENT
Start: 2023-11-17 | End: 2023-11-21 | Stop reason: HOSPADM

## 2023-11-17 RX ORDER — SODIUM CHLORIDE 0.9 % (FLUSH) 0.9 %
10 SYRINGE (ML) INJECTION AS NEEDED
Status: DISCONTINUED | OUTPATIENT
Start: 2023-11-17 | End: 2023-11-21 | Stop reason: HOSPADM

## 2023-11-17 RX ORDER — MELATONIN
2000 DAILY
Status: DISCONTINUED | OUTPATIENT
Start: 2023-11-18 | End: 2023-11-21 | Stop reason: HOSPADM

## 2023-11-17 RX ORDER — BISACODYL 10 MG
10 SUPPOSITORY, RECTAL RECTAL DAILY PRN
Status: DISCONTINUED | OUTPATIENT
Start: 2023-11-17 | End: 2023-11-21 | Stop reason: HOSPADM

## 2023-11-17 RX ORDER — INSULIN LISPRO 100 [IU]/ML
2-7 INJECTION, SOLUTION INTRAVENOUS; SUBCUTANEOUS
Status: DISCONTINUED | OUTPATIENT
Start: 2023-11-17 | End: 2023-11-21 | Stop reason: HOSPADM

## 2023-11-17 RX ORDER — ROSUVASTATIN CALCIUM 10 MG/1
10 TABLET, COATED ORAL DAILY
Status: DISCONTINUED | OUTPATIENT
Start: 2023-11-18 | End: 2023-11-21 | Stop reason: HOSPADM

## 2023-11-17 RX ORDER — DEXTROSE MONOHYDRATE 25 G/50ML
25 INJECTION, SOLUTION INTRAVENOUS
Status: DISCONTINUED | OUTPATIENT
Start: 2023-11-17 | End: 2023-11-21 | Stop reason: HOSPADM

## 2023-11-17 RX ORDER — CLOPIDOGREL BISULFATE 75 MG/1
75 TABLET ORAL DAILY
Status: DISCONTINUED | OUTPATIENT
Start: 2023-11-18 | End: 2023-11-21 | Stop reason: HOSPADM

## 2023-11-17 RX ORDER — SODIUM CHLORIDE 9 MG/ML
75 INJECTION, SOLUTION INTRAVENOUS CONTINUOUS
Status: DISCONTINUED | OUTPATIENT
Start: 2023-11-17 | End: 2023-11-19

## 2023-11-17 RX ORDER — UREA 10 %
3 LOTION (ML) TOPICAL NIGHTLY PRN
Status: DISCONTINUED | OUTPATIENT
Start: 2023-11-17 | End: 2023-11-21 | Stop reason: HOSPADM

## 2023-11-17 RX ORDER — AMLODIPINE BESYLATE 5 MG/1
5 TABLET ORAL DAILY
Status: DISCONTINUED | OUTPATIENT
Start: 2023-11-18 | End: 2023-11-21 | Stop reason: HOSPADM

## 2023-11-17 RX ADMIN — CEFTRIAXONE 2000 MG: 2 INJECTION, POWDER, FOR SOLUTION INTRAMUSCULAR; INTRAVENOUS at 18:00

## 2023-11-17 RX ADMIN — SODIUM CHLORIDE 75 ML/HR: 9 INJECTION, SOLUTION INTRAVENOUS at 22:24

## 2023-11-17 NOTE — ED PROVIDER NOTES
ED Course as of 11/17/23 1608   Fri Nov 17, 2023   1529 Creatinine(!): 1.44 [FS]   1530 HS Troponin T(!): 29 [FS]   1530 EKG interpreted myself:  1444, sinus rhythm rate of 66, LVH with associated repolarization abnormality. [FS]   1537 WBC, UA(!): Too Numerous to Count [FS]   1537 Bacteria, UA(!): 4+ [FS]   1537 Nitrite, UA(!): Positive [FS]   1608 Pt care assumed from Dr. Hodgson pending admission. [MP]      ED Course User Index  [FS] Ashu Hodgson MD  [MP] Miriam Swan, PARayneC

## 2023-11-17 NOTE — ED PROVIDER NOTES
EMERGENCY DEPARTMENT ENCOUNTER    Room Number:  35/35  PCP: Onel Mann MD  History obtained from: Patient, daughter      HPI:  Chief Complaint: Mental status change  A complete HPI/ROS/PMH/PSH/SH/FH are unobtainable due to: Not applicable  Context: Elissa Marrero is a 89 y.o. female who presents to the ED c/o mental status change.  When her daughter was on the phone with her earlier her speech was not normal, by the time her other daughter was able to come to the house patient was back to normal.  Has had some slowed speech and balance difficulty.  History of TIA.  Is also been complaining of some back pain.  No fever.  No nausea or vomiting.  Sent from [facility].            PAST MEDICAL HISTORY  Active Ambulatory Problems     Diagnosis Date Noted   • Diabetes mellitus, type 2 12/07/2012   • Hyperlipidemia    • Benign essential hypertension    • Hypothyroidism    • Insomnia    • Lymphedema 04/14/2010   • Osteoarthritis    • Osteopenia    • Renal insufficiency 03/04/2009   • Restless leg syndrome 12/05/2012   • Vitamin D deficiency 10/12/2011   • Hip pain, right 09/17/2018   • Acute kidney failure 12/11/2014   • Chronic kidney disease, stage 4 (severe) 12/11/2014   • Disorder of bone and cartilage 12/11/2014   • Edema 08/06/2015   • Hyperkalemia 08/08/2019   • Urinary tract infection 08/06/2015   • TIA (transient ischemic attack) 01/13/2023     Resolved Ambulatory Problems     Diagnosis Date Noted   • No Resolved Ambulatory Problems     Past Medical History:   Diagnosis Date   • Encounter for diabetic foot exam 03/2015   • History of complete eye exam 11/01/2017         PAST SURGICAL HISTORY  Past Surgical History:   Procedure Laterality Date   • APPENDECTOMY     • CHOLECYSTECTOMY     • EYE SURGERY     • TONSILLECTOMY           FAMILY HISTORY  Family History   Problem Relation Age of Onset   • Diabetes Sister    • Diabetes Brother    • Diabetes Daughter    • Cancer Son         pancreatic         SOCIAL HISTORY  Social  History     Socioeconomic History   • Marital status:    Tobacco Use   • Smoking status: Never   • Smokeless tobacco: Never   Vaping Use   • Vaping Use: Never used   Substance and Sexual Activity   • Alcohol use: No   • Drug use: Defer   • Sexual activity: Defer         ALLERGIES  Benazepril        REVIEW OF SYSTEMS    As per HPI      PHYSICAL EXAM  ED Triage Vitals [11/17/23 1353]   Temp Heart Rate Resp BP SpO2   98.6 °F (37 °C) 60 16 163/76 99 %      Temp src Heart Rate Source Patient Position BP Location FiO2 (%)   Tympanic Monitor Lying -- --       Physical Exam  Constitutional:       General: She is not in acute distress.  HENT:      Head: Normocephalic and atraumatic.   Cardiovascular:      Rate and Rhythm: Normal rate and regular rhythm.   Pulmonary:      Effort: Pulmonary effort is normal. No respiratory distress.   Abdominal:      General: There is no distension.      Palpations: Abdomen is soft.      Tenderness: There is no abdominal tenderness.   Musculoskeletal:         General: No swelling or deformity.   Skin:     General: Skin is warm and dry.   Neurological:      Mental Status: She is alert. Mental status is at baseline.           Vital signs and nursing notes reviewed.          LAB RESULTS  Recent Results (from the past 24 hour(s))   POC Urinalysis Dipstick, Multipro (Automated Dipstick)    Collection Time: 11/17/23 12:32 PM    Specimen: Urine   Result Value Ref Range    Color Yellow     Clarity, UA Cloudy (A)     Glucose,  mg/dL (A) mg/dL    Bilirubin Negative     Ketones, UA Trace (A)     Specific Gravity  1.025 1.005 - 1.030    Blood, UA Trace (A)     pH, Urine 6.0 5.0 - 8.0    Protein, POC 30 mg/dL (A) mg/dL    Urobilinogen, UA Normal     Nitrite, UA Positive (A)     Leukocytes Small (1+) (A)    ECG 12 Lead ED Triage Standing Order; Weak / Dizzy / AMS    Collection Time: 11/17/23  2:44 PM   Result Value Ref Range    QT Interval 431 ms    QTC Interval 452 ms   Comprehensive Metabolic  Panel    Collection Time: 11/17/23  2:51 PM    Specimen: Arm, Left; Blood   Result Value Ref Range    Glucose 172 (H) 65 - 99 mg/dL    BUN 20 8 - 23 mg/dL    Creatinine 1.44 (H) 0.57 - 1.00 mg/dL    Sodium 141 136 - 145 mmol/L    Potassium 4.5 3.5 - 5.2 mmol/L    Chloride 104 98 - 107 mmol/L    CO2 27.5 22.0 - 29.0 mmol/L    Calcium 9.7 (H) 8.2 - 9.6 mg/dL    Total Protein 6.8 6.0 - 8.5 g/dL    Albumin 4.3 3.5 - 5.2 g/dL    ALT (SGPT) 9 1 - 33 U/L    AST (SGOT) 10 1 - 32 U/L    Alkaline Phosphatase 62 39 - 117 U/L    Total Bilirubin 0.4 0.0 - 1.2 mg/dL    Globulin 2.5 gm/dL    A/G Ratio 1.7 g/dL    BUN/Creatinine Ratio 13.9 7.0 - 25.0    Anion Gap 9.5 5.0 - 15.0 mmol/L    eGFR 32.9 (L) >60.0 mL/min/1.73   Single High Sensitivity Troponin T    Collection Time: 11/17/23  2:51 PM    Specimen: Arm, Left; Blood   Result Value Ref Range    HS Troponin T 29 (H) <14 ng/L   Magnesium    Collection Time: 11/17/23  2:51 PM    Specimen: Arm, Left; Blood   Result Value Ref Range    Magnesium 2.2 1.7 - 2.3 mg/dL   CBC Auto Differential    Collection Time: 11/17/23  2:51 PM    Specimen: Arm, Left; Blood   Result Value Ref Range    WBC 7.59 3.40 - 10.80 10*3/mm3    RBC 4.49 3.77 - 5.28 10*6/mm3    Hemoglobin 13.0 12.0 - 15.9 g/dL    Hematocrit 39.5 34.0 - 46.6 %    MCV 88.0 79.0 - 97.0 fL    MCH 29.0 26.6 - 33.0 pg    MCHC 32.9 31.5 - 35.7 g/dL    RDW 12.9 12.3 - 15.4 %    RDW-SD 41.5 37.0 - 54.0 fl    MPV 11.9 6.0 - 12.0 fL    Platelets 184 140 - 450 10*3/mm3    Neutrophil % 65.0 42.7 - 76.0 %    Lymphocyte % 27.8 19.6 - 45.3 %    Monocyte % 6.1 5.0 - 12.0 %    Eosinophil % 0.5 0.3 - 6.2 %    Basophil % 0.3 0.0 - 1.5 %    Immature Grans % 0.3 0.0 - 0.5 %    Neutrophils, Absolute 4.94 1.70 - 7.00 10*3/mm3    Lymphocytes, Absolute 2.11 0.70 - 3.10 10*3/mm3    Monocytes, Absolute 0.46 0.10 - 0.90 10*3/mm3    Eosinophils, Absolute 0.04 0.00 - 0.40 10*3/mm3    Basophils, Absolute 0.02 0.00 - 0.20 10*3/mm3    Immature Grans, Absolute  0.02 0.00 - 0.05 10*3/mm3    nRBC 0.1 0.0 - 0.2 /100 WBC   Urinalysis With Microscopic If Indicated (No Culture) - Urine, Clean Catch    Collection Time: 11/17/23  3:21 PM    Specimen: Urine, Clean Catch   Result Value Ref Range    Color, UA Yellow Yellow, Straw    Appearance, UA Cloudy (A) Clear    pH, UA 6.5 5.0 - 8.0    Specific Gravity, UA 1.017 1.005 - 1.030    Glucose,  mg/dL (Trace) (A) Negative    Ketones, UA Trace (A) Negative    Bilirubin, UA Negative Negative    Blood, UA Trace (A) Negative    Protein, UA Trace (A) Negative    Leuk Esterase, UA Moderate (2+) (A) Negative    Nitrite, UA Positive (A) Negative    Urobilinogen, UA 1.0 E.U./dL 0.2 - 1.0 E.U./dL   Urinalysis, Microscopic Only - Urine, Clean Catch    Collection Time: 11/17/23  3:21 PM    Specimen: Urine, Clean Catch   Result Value Ref Range    RBC, UA 6-10 (A) None Seen, 0-2 /HPF    WBC, UA Too Numerous to Count (A) None Seen, 0-2 /HPF    Bacteria, UA 4+ (A) None Seen /HPF    Squamous Epithelial Cells, UA 0-2 None Seen, 0-2 /HPF    Hyaline Casts, UA 21-30 None Seen /LPF    Methodology Automated Microscopy        Ordered the above labs and reviewed the results.        RADIOLOGY  XR Chest 1 View    Result Date: 11/17/2023  ONE-VIEW PORTABLE CHEST  HISTORY: Weakness and dizziness.  FINDINGS: The lungs are well expanded and clear. The heart is borderline enlarged and no acute abnormality is seen.  This report was finalized on 11/17/2023 2:38 PM by Dr. Davy Marrero M.D on Workstation: [email or web address]       Ordered the above noted radiological studies. Reviewed by me in PACS.              MEDICATIONS GIVEN IN ER  Medications   sodium chloride 0.9 % flush 10 mL (has no administration in time range)   cefTRIAXone (ROCEPHIN) 2,000 mg in sodium chloride 0.9 % 100 mL IVPB-VTB (has no administration in time range)               MEDICAL DECISION MAKING, PROGRESS, and CONSULTS    MDM: Patient presented the emergency department with mental status change,  otherwise well-appearing, vital significant for hypertension.  Labs otherwise reassuring.  Urine showing evidence of infection, treated empirically with Rocephin.  CT head is pending.  Given mental status change will treat and admit for further management evaluation of her symptoms.    All labs have been independently reviewed by me.  All radiology studies have been reviewed by me and I have also reviewed the radiology report.   EKG's independently viewed and interpreted by me.  Discussion below represents my analysis of pertinent findings related to patient's condition, differential diagnosis, treatment plan and final disposition.      Additional sources:  - Discussed/ obtained information from independent historians: Obtained additional history from daughter at bedside    - External (non-ED) record review:     - Chronic or social conditions impacting care: Age    - Shared decision making: Discussed plan for admission, patient and family agree      Orders placed during this visit:  Orders Placed This Encounter   Procedures   • Blood Culture - Blood,   • Blood Culture - Blood,   • Urine Culture - Urine, Urine, Clean Catch   • XR Chest 1 View   • CT Head Without Contrast   • [place] Draw   • Comprehensive Metabolic Panel   • Single High Sensitivity Troponin T   • Magnesium   • Urinalysis With Microscopic If Indicated (No Culture) - Urine, Clean Catch   • CBC Auto Differential   • Urinalysis, Microscopic Only - Urine, Clean Catch   • Lactic Acid, Plasma   • NPO Diet NPO Type: Strict NPO   • Undress & Gown   • Continuous Pulse Oximetry   • Vital Signs   • Orthostatic Blood Pressure   • LHA (on-call MD unless specified) Details   • Oxygen Therapy- Nasal Cannula; Titrate 1-6 LPM Per SpO2; 90 - 95%   • POC Glucose Once   • ECG 12 Lead ED Triage Standing Order; Weak / Dizzy / AMS   • Insert Peripheral IV   • Fall Precautions   • CBC & Differential   • Green Top (Gel)   • Lavender Top   • Gold Top - SST   • Light Blue Top          Additional orders considered but not ordered:  Considered CT angiogram/MRI of the brain however will defer given the patient is currently asymptomatic.        Differential diagnosis includes but is not limited to:    TIA, metabolic encephalopathy, electrolyte abnormality, urinary tract infection      Independent interpretation of labs, radiology studies, and discussions with consultants:  ED Course as of 11/19/23 1807 Fri Nov 17, 2023   1529 Creatinine(!): 1.44 [FS]   1530 HS Troponin T(!): 29 [FS]   1530 EKG interpreted myself:  1444, sinus rhythm rate of 66, LVH with associated repolarization abnormality. [FS]   1537 WBC, UA(!): Too Numerous to Count [FS]   1537 Bacteria, UA(!): 4+ [FS]   1537 Nitrite, UA(!): Positive [FS]   1608 Pt care assumed from Dr. Hodgson pending admission. [MP]      ED Course User Index  [FS] Ashu Hodgson MD  [MP] Miriam Swan, PARayneC           DIAGNOSIS  Final diagnoses:   Acute encephalopathy   Acute cystitis without hematuria         DISPOSITION  Admitted to [facility]        Latest Documented Vital Signs:  As of 15:58 EST  BP- (!) 184/87 HR- 68 Temp- 98.6 °F (37 °C) (Tympanic) O2 sat- 96%              --    Please note that portions of this were completed with a voice recognition program.       Note Disclaimer: At [facility], we believe that sharing information builds trust and better relationships. You are receiving this note because you are receiving care at [facility] or recently visited. It is possible you will see health information before a provider has talked with you about it. This kind of information can be easy to misunderstand. To help you fully understand what it means for your health, we urge you to discuss this note with your provider.             Ashu Hodgson MD  11/17/23 1557       Ashu Hodgson MD  11/19/23 1807

## 2023-11-17 NOTE — ED NOTES
"Nursing report ED to floor  Elissa Marrero  89 y.o.  female    HPI :   Chief Complaint   Patient presents with    Altered Mental Status       Admitting doctor:   Lexii Hernandez MD    Admitting diagnosis:   The primary encounter diagnosis was Acute encephalopathy. A diagnosis of Acute cystitis without hematuria was also pertinent to this visit.    Code status:   Current Code Status       Date Active Code Status Order ID Comments User Context       Not on file            Allergies:   Benazepril    Isolation:   No active isolations    Intake and Output  No intake or output data in the 24 hours ending 11/17/23 1820    Weight:       11/17/23  1353   Weight: 63.5 kg (140 lb)       Most recent vitals:   Vitals:    11/17/23 1353 11/17/23 1454   BP: 163/76 (!) 184/87   Patient Position: Lying    Pulse: 60 68   Resp: 16    Temp: 98.6 °F (37 °C)    TempSrc: Tympanic    SpO2: 99% 96%   Weight: 63.5 kg (140 lb)    Height: 170.2 cm (67\")        Active LDAs/IV Access:   Lines, Drains & Airways       Active LDAs       Name Placement date Placement time Site Days    Peripheral IV 11/17/23 1352 Left Antecubital 11/17/23  1352  Antecubital  less than 1                    Labs (abnormal labs have a star):   Labs Reviewed   COMPREHENSIVE METABOLIC PANEL - Abnormal; Notable for the following components:       Result Value    Glucose 172 (*)     Creatinine 1.44 (*)     Calcium 9.7 (*)     eGFR 32.9 (*)     All other components within normal limits    Narrative:     GFR Normal >60  Chronic Kidney Disease <60  Kidney Failure <15    The GFR formula is only valid for adults with stable renal function between ages 18 and 70.   SINGLE HSTROPONIN T - Abnormal; Notable for the following components:    HS Troponin T 29 (*)     All other components within normal limits    Narrative:     High Sensitive Troponin T Reference Range:  <14.0 ng/L- Negative Female for AMI  <22.0 ng/L- Negative Male for AMI  >=14 - Abnormal Female indicating possible " myocardial injury.  >=22 - Abnormal Male indicating possible myocardial injury.   Clinicians would have to utilize clinical acumen, EKG, Troponin, and serial changes to determine if it is an Acute Myocardial Infarction or myocardial injury due to an underlying chronic condition.        URINALYSIS W/ MICROSCOPIC IF INDICATED (NO CULTURE) - Abnormal; Notable for the following components:    Appearance, UA Cloudy (*)     Glucose,  mg/dL (Trace) (*)     Ketones, UA Trace (*)     Blood, UA Trace (*)     Protein, UA Trace (*)     Leuk Esterase, UA Moderate (2+) (*)     Nitrite, UA Positive (*)     All other components within normal limits   URINALYSIS, MICROSCOPIC ONLY - Abnormal; Notable for the following components:    RBC, UA 6-10 (*)     WBC, UA Too Numerous to Count (*)     Bacteria, UA 4+ (*)     All other components within normal limits   MAGNESIUM - Normal   CBC WITH AUTO DIFFERENTIAL - Normal   LACTIC ACID, PLASMA - Normal   BLOOD CULTURE   BLOOD CULTURE   URINE CULTURE   RAINBOW DRAW    Narrative:     The following orders were created for panel order [place] Draw.  Procedure                               Abnormality         Status                     ---------                               -----------         ------                     Green Top (Gel)[[id number]]                                  Final result               Lavender Top[[id number]]                                     Final result               Gold Top - SST[[id number]]                                   Final result               Light Blue Top[[id number]]                                   Final result                 Please view results for these tests on the individual orders.   POCT GLUCOSE FINGERSTICK   CBC AND DIFFERENTIAL    Narrative:     The following orders were created for panel order CBC & Differential.  Procedure                               Abnormality         Status                     ---------                                -----------         ------                     CBC Auto Differential[[id number]]        Normal              Final result                 Please view results for these tests on the individual orders.   GREEN TOP   LAVENDER TOP   GOLD TOP - SST   LIGHT BLUE TOP       EKG:   ECG 12 Lead ED Triage Standing Order; Weak / Dizzy / AMS   Final Result   HEART RATE= 66  bpm   RR Interval= 909  ms   OK Interval= 181  ms   P Horizontal Axis= 12  deg   P Front Axis= 53  deg   QRSD Interval= 95  ms   QT Interval= 431  ms   QTcB= 452  ms   QRS Axis= -14  deg   T Wave Axis= 67  deg   - ABNORMAL ECG -   Sinus rhythm   Left ventricular hypertrophy   No Prior Tracing for Comparison   Electronically Signed By: Brenda Washington ([facility]) 17-Nov-2023 17:03:21   Date and Time of Study: 2023-11-17 14:44:07          Meds given in ED:   Medications   sodium chloride 0.9 % flush 10 mL (has no administration in time range)   cefTRIAXone (ROCEPHIN) 2,000 mg in sodium chloride 0.9 % 100 mL IVPB-VTB (2,000 mg Intravenous New Bag 11/17/23 1800)       Imaging results:  CT Head Without Contrast    Result Date: 11/17/2023   No acute intracranial hemorrhage or hydrocephalus. Chronic appearing changes of the brain. If there is further clinical concern, MRI could be considered for further evaluation.  This report was finalized on 11/17/2023 4:34 PM by Dr. Anthony Giles M.D on Workstation: [employer]       Ambulatory status:   - with assist    Social issues:   Social History     Socioeconomic History    Marital status:    Tobacco Use    Smoking status: Never    Smokeless tobacco: Never   Vaping Use    Vaping Use: Never used   Substance and Sexual Activity    Alcohol use: No    Drug use: Defer    Sexual activity: Defer       NIH Stroke Scale:       Kathy Holm RN  11/17/23 18:20 EST

## 2023-11-17 NOTE — ED NOTES
Pt arrives vis EMS from [facility]. States that they went for slurred speech and abnormal gait. Pt currently does not have any speech or gait issues. Pt has been confused to situation. Was diagnosed with a UTI but sent for further eval.

## 2023-11-18 ENCOUNTER — APPOINTMENT (OUTPATIENT)
Dept: MRI IMAGING | Facility: HOSPITAL | Age: 88
End: 2023-11-18
Payer: MEDICARE

## 2023-11-18 PROBLEM — N30.00 ACUTE CYSTITIS WITHOUT HEMATURIA: Status: ACTIVE | Noted: 2023-11-18

## 2023-11-18 LAB
ANION GAP SERPL CALCULATED.3IONS-SCNC: 11.4 MMOL/L (ref 5–15)
BUN SERPL-MCNC: 16 MG/DL (ref 8–23)
BUN/CREAT SERPL: 12.4 (ref 7–25)
CALCIUM SPEC-SCNC: 9.2 MG/DL (ref 8.2–9.6)
CHLORIDE SERPL-SCNC: 105 MMOL/L (ref 98–107)
CO2 SERPL-SCNC: 22.6 MMOL/L (ref 22–29)
CREAT SERPL-MCNC: 1.29 MG/DL (ref 0.57–1)
DEPRECATED RDW RBC AUTO: 39.9 FL (ref 37–54)
EGFRCR SERPLBLD CKD-EPI 2021: 37.6 ML/MIN/1.73
ERYTHROCYTE [DISTWIDTH] IN BLOOD BY AUTOMATED COUNT: 12.5 % (ref 12.3–15.4)
GLUCOSE BLDC GLUCOMTR-MCNC: 112 MG/DL (ref 70–130)
GLUCOSE BLDC GLUCOMTR-MCNC: 114 MG/DL (ref 70–130)
GLUCOSE BLDC GLUCOMTR-MCNC: 144 MG/DL (ref 70–130)
GLUCOSE BLDC GLUCOMTR-MCNC: 214 MG/DL (ref 70–130)
GLUCOSE SERPL-MCNC: 146 MG/DL (ref 65–99)
HCT VFR BLD AUTO: 37.5 % (ref 34–46.6)
HGB BLD-MCNC: 12.2 G/DL (ref 12–15.9)
MCH RBC QN AUTO: 28.3 PG (ref 26.6–33)
MCHC RBC AUTO-ENTMCNC: 32.5 G/DL (ref 31.5–35.7)
MCV RBC AUTO: 87 FL (ref 79–97)
PLATELET # BLD AUTO: 167 10*3/MM3 (ref 140–450)
PMV BLD AUTO: 11.7 FL (ref 6–12)
POTASSIUM SERPL-SCNC: 3.9 MMOL/L (ref 3.5–5.2)
RBC # BLD AUTO: 4.31 10*6/MM3 (ref 3.77–5.28)
SODIUM SERPL-SCNC: 139 MMOL/L (ref 136–145)
WBC NRBC COR # BLD AUTO: 10.83 10*3/MM3 (ref 3.4–10.8)

## 2023-11-18 PROCEDURE — 80048 BASIC METABOLIC PNL TOTAL CA: CPT | Performed by: INTERNAL MEDICINE

## 2023-11-18 PROCEDURE — 97162 PT EVAL MOD COMPLEX 30 MIN: CPT

## 2023-11-18 PROCEDURE — 25010000002 CEFTRIAXONE PER 250 MG: Performed by: INTERNAL MEDICINE

## 2023-11-18 PROCEDURE — 85027 COMPLETE CBC AUTOMATED: CPT | Performed by: INTERNAL MEDICINE

## 2023-11-18 PROCEDURE — 97530 THERAPEUTIC ACTIVITIES: CPT

## 2023-11-18 PROCEDURE — 70551 MRI BRAIN STEM W/O DYE: CPT

## 2023-11-18 PROCEDURE — 82948 REAGENT STRIP/BLOOD GLUCOSE: CPT

## 2023-11-18 PROCEDURE — 63710000001 INSULIN LISPRO (HUMAN) PER 5 UNITS: Performed by: INTERNAL MEDICINE

## 2023-11-18 RX ORDER — SODIUM CHLORIDE 9 MG/ML
40 INJECTION, SOLUTION INTRAVENOUS AS NEEDED
Status: DISCONTINUED | OUTPATIENT
Start: 2023-11-18 | End: 2023-11-21 | Stop reason: HOSPADM

## 2023-11-18 RX ORDER — SODIUM CHLORIDE 0.9 % (FLUSH) 0.9 %
10 SYRINGE (ML) INJECTION EVERY 12 HOURS SCHEDULED
Status: DISCONTINUED | OUTPATIENT
Start: 2023-11-18 | End: 2023-11-21 | Stop reason: HOSPADM

## 2023-11-18 RX ORDER — SODIUM CHLORIDE 0.9 % (FLUSH) 0.9 %
10 SYRINGE (ML) INJECTION AS NEEDED
Status: DISCONTINUED | OUTPATIENT
Start: 2023-11-18 | End: 2023-11-21 | Stop reason: HOSPADM

## 2023-11-18 RX ADMIN — SENNOSIDES AND DOCUSATE SODIUM 2 TABLET: 50; 8.6 TABLET ORAL at 08:34

## 2023-11-18 RX ADMIN — METOPROLOL TARTRATE 25 MG: 25 TABLET, FILM COATED ORAL at 21:46

## 2023-11-18 RX ADMIN — ACETAMINOPHEN 650 MG: 325 TABLET ORAL at 21:46

## 2023-11-18 RX ADMIN — METOPROLOL TARTRATE 25 MG: 25 TABLET, FILM COATED ORAL at 08:34

## 2023-11-18 RX ADMIN — AMLODIPINE BESYLATE 5 MG: 5 TABLET ORAL at 08:34

## 2023-11-18 RX ADMIN — Medication 3 MG: at 21:46

## 2023-11-18 RX ADMIN — ACETAMINOPHEN 650 MG: 325 TABLET ORAL at 02:03

## 2023-11-18 RX ADMIN — LEVOTHYROXINE SODIUM 112 MCG: 112 TABLET ORAL at 08:34

## 2023-11-18 RX ADMIN — SENNOSIDES AND DOCUSATE SODIUM 2 TABLET: 50; 8.6 TABLET ORAL at 21:46

## 2023-11-18 RX ADMIN — CLOPIDOGREL BISULFATE 75 MG: 75 TABLET, FILM COATED ORAL at 08:34

## 2023-11-18 RX ADMIN — ROSUVASTATIN CALCIUM 10 MG: 10 TABLET, FILM COATED ORAL at 08:34

## 2023-11-18 RX ADMIN — Medication 2000 UNITS: at 08:34

## 2023-11-18 RX ADMIN — Medication 10 ML: at 21:51

## 2023-11-18 RX ADMIN — CEFTRIAXONE 2000 MG: 2 INJECTION, POWDER, FOR SOLUTION INTRAMUSCULAR; INTRAVENOUS at 17:30

## 2023-11-18 RX ADMIN — Medication 3 MG: at 02:03

## 2023-11-18 RX ADMIN — PRAMIPEXOLE DIHYDROCHLORIDE 1.5 MG: 1.5 TABLET ORAL at 08:35

## 2023-11-18 RX ADMIN — INSULIN LISPRO 3 UNITS: 100 INJECTION, SOLUTION INTRAVENOUS; SUBCUTANEOUS at 11:34

## 2023-11-18 NOTE — PLAN OF CARE
Goal Outcome Evaluation:        Problem: Adult Inpatient Plan of Care  Goal: Plan of Care Review  Outcome: Ongoing, Progressing  Goal: Patient-Specific Goal (Individualized)  Outcome: Ongoing, Progressing  Goal: Absence of Hospital-Acquired Illness or Injury  Outcome: Ongoing, Progressing  Goal: Optimal Comfort and Wellbeing  Outcome: Ongoing, Progressing  Intervention: Provide Person-Centered Care  Recent Flowsheet Documentation  Taken 11/18/2023 0833 by Zahra Gonzalez RN  Trust Relationship/Rapport:   care explained   choices provided  Goal: Readiness for Transition of Care  Outcome: Ongoing, Progressing     Problem: Skin Injury Risk Increased  Goal: Skin Health and Integrity  Outcome: Ongoing, Progressing     Problem: Diabetes Comorbidity  Goal: Blood Glucose Level Within Targeted Range  Outcome: Ongoing, Progressing  Intervention: Monitor and Manage Glycemia  Recent Flowsheet Documentation  Taken 11/18/2023 0833 by Zahra Gonzalez RN  Glycemic Management: blood glucose monitored     Problem: Hypertension Comorbidity  Goal: Blood Pressure in Desired Range  Outcome: Ongoing, Progressing     Problem: UTI (Urinary Tract Infection)  Goal: Improved Infection Symptoms  Outcome: Ongoing, Progressing     Problem: Confusion Acute  Goal: Optimal Cognitive Function  Outcome: Ongoing, Progressing     Problem: Fall Injury Risk  Goal: Absence of Fall and Fall-Related Injury  Outcome: Ongoing, Progressing

## 2023-11-18 NOTE — THERAPY EVALUATION
Patient Name: Elissa Marrero  : 3/15/1925    MRN: [id number]                              Today's Date: 2023       Admit Date: 2023    Visit Dx:     ICD-10-CM ICD-9-CM   1. Acute encephalopathy  G93.40 348.30   2. Acute cystitis without hematuria  N30.00 595.0     Patient Active Problem List   Diagnosis    Diabetes mellitus, type 2    Hyperlipidemia    Benign essential hypertension    Hypothyroidism    Insomnia    Lymphedema    Osteoarthritis    Osteopenia    Renal insufficiency    Restless leg syndrome    Vitamin D deficiency    Hip pain, right    Acute kidney failure    Chronic kidney disease, stage 4 (severe)    Disorder of bone and cartilage    Edema    Hyperkalemia    Urinary tract infection    TIA (transient ischemic attack)    Acute metabolic encephalopathy    Acute cystitis without hematuria     Past Medical History:   Diagnosis Date    Benign essential hypertension     Diabetes mellitus, type 2 2012    Encounter for diabetic foot exam 2015    History of complete eye exam 2017    Dr Leslie    Hyperlipidemia     Hypothyroidism     Insomnia     Lymphedema 2010    Osteoarthritis     Osteopenia     Renal insufficiency 2009    Restless leg syndrome 2012    Vitamin D deficiency 10/12/2011     Past Surgical History:   Procedure Laterality Date    APPENDECTOMY      CHOLECYSTECTOMY      EYE SURGERY      TONSILLECTOMY        General Information       Row Name 23 1456          Physical Therapy Time and Intention    Document Type evaluation  -CB     Mode of Treatment individual therapy;physical therapy  -CB       Row Name 23 1456          General Information    Patient Profile Reviewed yes  -CB     Prior Level of Function independent:;gait;transfer;bed mobility  SPC  -CB     Existing Precautions/Restrictions fall  -CB     Barriers to Rehab none identified  -CB       Row Name 23 1456          Living Environment    People in Home alone  -CB       Row Name  11/18/23 1456          Home Main Entrance    Number of Stairs, Main Entrance none  -CB       Row Name 11/18/23 1456          Cognition    Orientation Status (Cognition) oriented x 3  -CB       Row Name 11/18/23 1456          Safety Issues, Functional Mobility    Impairments Affecting Function (Mobility) endurance/activity tolerance;strength;balance  -CB               User Key  (r) = Recorded By, (t) = Taken By, (c) = Cosigned By      Initials Name Provider Type    CB Karlie Vega, PT Physical Therapist                   Mobility       Row Name 11/18/23 1457          Bed Mobility    Bed Mobility supine-sit  -CB     Supine-Sit [place] (Bed Mobility) standby assist  -CB     Assistive Device (Bed Mobility) head of bed elevated  -CB       Row Name 11/18/23 1457          Bed-Chair Transfer    Bed-Chair [place] (Transfers) contact guard;verbal cues  -CB     Assistive Device (Bed-Chair Transfers) walker, front-wheeled  -CB       Row Name 11/18/23 1457          Sit-Stand Transfer    Sit-Stand [place] (Transfers) contact guard;verbal cues  -CB     Assistive Device (Sit-Stand Transfers) walker, front-wheeled  -CB     Comment, (Sit-Stand Transfer) x2 STS  -CB       Row Name 11/18/23 1457          Gait/Stairs (Locomotion)    [place] Level (Gait) minimum assist (75% patient effort)  -CB     Assistive Device (Gait) walker, front-wheeled  -CB     Distance in Feet (Gait) 40ft  -CB     Deviations/Abnormal Patterns (Gait) stride length decreased;clyde decreased  -CB     Comment, (Gait/Stairs) no overt LOB  but unsteadiness noted during ambulation  -CB               User Key  (r) = Recorded By, (t) = Taken By, (c) = Cosigned By      Initials Name Provider Type    CB Karlie Vega, PT Physical Therapist                   Obj/Interventions       Row Name 11/18/23 1458          Range of Motion Comprehensive    General Range of Motion bilateral lower extremity ROM WFL  -CB       Row Name 11/18/23 145           Strength Comprehensive (MMT)    Comment, General Manual Muscle Testing (MMT) Assessment generalized LE weakness  -CB       Row Name 11/18/23 1458          Balance    Balance Assessment standing static balance;standing dynamic balance  -CB     Static Standing Balance contact guard  -CB     Dynamic Standing Balance contact guard;minimal assist  -CB     Position/Device Used, Standing Balance supported;walker, front-wheeled  -CB     Balance Interventions sitting;standing;sit to stand;supported;static;dynamic;minimal challenge  -CB       Row Name 11/18/23 1458          Sensory Assessment (Somatosensory)    Sensory Assessment (Somatosensory) sensation intact  -CB               User Key  (r) = Recorded By, (t) = Taken By, (c) = Cosigned By      Initials Name Provider Type    CB Karlie Vega, PT Physical Therapist                   Goals/Plan       Row Name 11/18/23 1456          Bed Mobility Goal 1 (PT)    Activity/Assistive Device (Bed Mobility Goal 1, PT) bed mobility activities, all  -CB     [place] Level/Cues Needed (Bed Mobility Goal 1, PT) modified independence  -CB     Time Frame (Bed Mobility Goal 1, PT) long term goal (LTG);1 week  -CB       Row Name 11/18/23 1456          Transfer Goal 1 (PT)    Activity/Assistive Device (Transfer Goal 1, PT) sit-to-stand/stand-to-sit;bed-to-chair/chair-to-bed;walker, rolling  -CB     [place] Level/Cues Needed (Transfer Goal 1, PT) standby assist  -CB     Time Frame (Transfer Goal 1, PT) long term goal (LTG);1 week  -CB       Row Name 11/18/23 145          Gait Training Goal 1 (PT)    Activity/Assistive Device (Gait Training Goal 1, PT) gait (walking locomotion);assistive device use;improve balance and speed;increase endurance/gait distance;decrease fall risk;diminish gait deviation  -CB     [place] Level (Gait Training Goal 1, PT) standby assist  -CB     Distance (Gait Training Goal 1, PT) 100ft  -CB     Time Frame (Gait Training Goal 1, PT) long term goal  (LTG);1 week  -CB       Row Name 11/18/23 1459          Therapy Assessment/Plan (PT)    Planned Therapy Interventions (PT) balance training;gait training;bed mobility training;home exercise program;patient/family education;transfer training;strengthening  -CB               User Key  (r) = Recorded By, (t) = Taken By, (c) = Cosigned By      Initials Name Provider Type    Karlie Avalos, PT Physical Therapist                   Clinical Impression       Row Name 11/18/23 1458          Pain    Pretreatment Pain Rating 0/10 - no pain  -CB     Posttreatment Pain Rating 0/10 - no pain  -CB       Row Name 11/18/23 1458          Plan of Care Review    Plan of Care Reviewed With patient;daughter  -CB     Outcome Evaluation Patient is a 89 y.o. female admitted to [facility] for acute encephalopathy on 11/17/2023. PMHx includes DM, HTN, CKD. Patient is ind at baseline with use of SPC and lives alone with no steps to enter home. Today, patient performed bed mobility with SBA, required CGA for transfers, and ambulated 40ft using rwx requiring Villa. Strength, activity tolerance, balance deficits noted. Patient may benefit from skilled PT services to address functional deficits and improve level of independence prior to discharge. Anticipate home with 24/7 care and HHPT vs SNF upon DC. Pt is agreeable to go to daughters at [place] so she could assist pt. Pt is hesitant to go to rehab. Will continue to progress.  -CB       Row Name 11/18/23 1458          Therapy Assessment/Plan (PT)    Patient/Family Therapy Goals Statement (PT) return home at [place]  -CB     Rehab Potential (PT) good, to achieve stated therapy goals  -CB     Criteria for Skilled Interventions Met (PT) yes  -CB     Therapy Frequency (PT) 5 times/wk  -CB       Row Name 11/18/23 1458          Positioning and Restraints    Pre-Treatment Position in bed  -CB     Post Treatment Position chair  -CB     In Chair notified nsg;reclined;call light within reach;encouraged to call for  assist;exit alarm on;with family/caregiver  -CB               User Key  (r) = Recorded By, (t) = Taken By, (c) = Cosigned By      Initials Name Provider Type    Karlie Avalos, MAIKOL Physical Therapist                   Outcome Measures       Row Name 11/18/23 1459 11/18/23 0730       How much help from another person do you currently need...    Turning from your back to your side while in flat bed without using bedrails? 3  -CB 3  -MR    Moving from lying on back to sitting on the side of a flat bed without bedrails? 3  -CB 3  -MR    Moving to and from a bed to a chair (including a wheelchair)? 3  -CB 2  -MR    Standing up from a chair using your arms (e.g., wheelchair, bedside chair)? 3  -CB 2  -MR    Climbing 3-5 steps with a railing? 2  -CB 2  -MR    To walk in hospital room? 3  -CB 2  -MR    AM-PAC 6 Clicks Score (PT) 17  -CB 14  -MR    Highest Level of Mobility Goal 5 --> Static standing  -CB 4 --> Transfer to chair/commode  -MR      Row Name 11/18/23 1459          Functional Assessment    Outcome Measure Options AM-PAC 6 Clicks Basic Mobility (PT)  -CB               User Key  (r) = Recorded By, (t) = Taken By, (c) = Cosigned By      Initials Name Provider Type    Karlie Avalos, PT Physical Therapist    Zahra Delaney, RN Registered Nurse                                 Physical Therapy Education       Title: PT OT SLP Therapies (In Progress)       Topic: Physical Therapy (In Progress)       Point: Mobility training (Done)       Learning Progress Summary             Patient Acceptance, E,TB, VU,NR by CB at 11/18/2023 1500                         Point: Home exercise program (Not Started)       Learner Progress:  Not documented in this visit.              Point: Body mechanics (Done)       Learning Progress Summary             Patient Acceptance, E,TB, VU,NR by  at 11/18/2023 1500                         Point: Precautions (Not Started)       Learner Progress:  Not documented in this visit.                               User Key       Initials Effective Dates Name Provider Type Discipline    CB 10/22/21 -  Karlie Vega PT Physical Therapist PT                  PT Recommendation and Plan  Planned Therapy Interventions (PT): balance training, gait training, bed mobility training, home exercise program, patient/family education, transfer training, strengthening  Plan of Care Reviewed With: patient, daughter  Outcome Evaluation: Patient is a 89 y.o. female admitted to [facility] for acute encephalopathy on 11/17/2023. PMHx includes DM, HTN, CKD. Patient is ind at baseline with use of SPC and lives alone with no steps to enter home. Today, patient performed bed mobility with SBA, required CGA for transfers, and ambulated 40ft using rwx requiring Villa. Strength, activity tolerance, balance deficits noted. Patient may benefit from skilled PT services to address functional deficits and improve level of independence prior to discharge. Anticipate home with 24/7 care and HHPT vs SNF upon DC. Pt is agreeable to go to daughters at [place] so she could assist pt. Pt is hesitant to go to rehab. Will continue to progress.     Time Calculation:         PT Charges       Row Name 11/18/23 1502             Time Calculation    Start Time 1338  -CB      Stop Time 1351  -CB      Time Calculation (min) 13 min  -CB      PT Received On 11/18/23  -CB      PT - Next Appointment 11/20/23  -CB      PT Goal Re-Cert Due Date 11/25/23  -CB         Time Calculation- PT    Total Timed Code Minutes- PT 8 minute(s)  -CB         Timed Charges    41532 - PT Therapeutic Activity Minutes 8  -CB         Total Minutes    Timed Charges Total Minutes 8  -CB       Total Minutes 8  -CB                User Key  (r) = Recorded By, (t) = Taken By, (c) = Cosigned By      Initials Name Provider Type    CB Karlie Vega, PT Physical Therapist                  Therapy Charges for Today       Code Description Service Date Service Provider Modifiers Qty    31562754216   PT EVAL MOD COMPLEXITY 2 11/18/2023 Karlie Vega, PT GP 1    [id number] HC PT THERAPEUTIC ACT EA 15 MIN 11/18/2023 Karlie Vega, PT GP 1            PT G-Codes  Outcome Measure Options: AM-PAC 6 Clicks Basic Mobility (PT)  AM-PAC 6 Clicks Score (PT): 17  PT Discharge Summary  Anticipated Discharge Disposition (PT): home with home health, home with 24/7 care, skilled nursing facility (pending progress. pt is motivated to return home and agreeable to go to [facility] home who could assist her at dc.)    Karlie Vega, PT  11/18/2023

## 2023-11-18 NOTE — NURSING NOTE
Pt had MRI brain today and resulted. PT NIH a 4 at 1800. PT receiving IV abx for uti. Pt remains on IVF. Pt remains on purwick. Pt seen by PT and 1A with FWW. Pt remains CPR in medical chart as no order placed for DNR yet Dr. Hernandez's note from 11/17 states family states she is DNR. Md notified about MRI results.     1830  MD returned call out and stroke order set placed. Pt has neuro consult pending. Pt already on statin and anticoag on MAR.

## 2023-11-18 NOTE — H&P
HISTORY AND PHYSICAL   [facility]        Date of Admission: 2023  Patient Identification:  Name: Elissa Marrero  Age: 89 y.o.  Sex: female  :  3/15/1925  MRN: [id number]                     Primary Care Physician: Onel Mann MD    Chief Complaint:  89 year old female who presented to the emergency room with weakness, confusion noted by her daughter when she talked to the patient on the phone earlier; she also had difficulty walking and keeping her balance; she was seen at an urgent care center and sent to the ED; presently her symptoms have resolved    History of Present Illness:   As above    Past Medical History:  Past Medical History:   Diagnosis Date    Benign essential hypertension     Diabetes mellitus, type 2 2012    Encounter for diabetic foot exam 2015    History of complete eye exam 2017    Dr Leslie    Hyperlipidemia     Hypothyroidism     Insomnia     Lymphedema 2010    Osteoarthritis     Osteopenia     Renal insufficiency 2009    Restless leg syndrome 2012    Vitamin D deficiency 10/12/2011     Past Surgical History:  Past Surgical History:   Procedure Laterality Date    APPENDECTOMY      CHOLECYSTECTOMY      EYE SURGERY      TONSILLECTOMY        Home Meds:  Medications Prior to Admission   Medication Sig Dispense Refill Last Dose    amLODIPine (NORVASC) 5 MG tablet Take 1 tablet by mouth Daily. 90 tablet 1     Cholecalciferol (VITAMIN D PO) Take  by mouth.       clopidogrel (PLAVIX) 75 MG tablet Take 1 tablet by mouth Daily. 90 tablet 1     diclofenac (VOLTAREN) 1 % gel gel Apply 4 g topically to the appropriate area as directed 4 (Four) Times a Day. 5 tube 3     glipizide (GLUCOTROL) 5 MG tablet Take 1 tablet by mouth 3 (Three) Times a Day. 270 tablet 1     levothyroxine (SYNTHROID, LEVOTHROID) 112 MCG tablet Take 1 tablet by mouth Daily. 90 tablet 1     metoprolol tartrate (LOPRESSOR) 25 MG tablet Take 1 tablet by mouth 2 (Two) Times a  "Day. 180 tablet 1     pramipexole (MIRAPEX) 1.5 MG tablet Take 1 tablet by mouth Daily. 90 tablet 1     rosuvastatin (Crestor) 10 MG tablet Take 1 tablet by mouth Daily. 90 tablet 1        Allergies:  Allergies   Allergen Reactions    Benazepril      Immunizations:  Immunization History   Administered Date(s) Administered    COVID-19 (PFIZER) Purple Cap Monovalent 2021, 2021, 2021, 2021, 2021    FLUAD TRI 65YR+ 2019, 2019    Flu Vaccine Quad PF >36MO 2015    FluMist 2-49yrs 10/01/2016, 10/01/2016    Fluzone High Dose =>65 Years ([employer] ONLY) 10/01/2016, 2017, 2017, 2018, 2018, 2020, 10/15/2021    Fluzone High-Dose 65+yrs 2020, 10/15/2021, 10/18/2022    Fluzone Quad >6mos (Multi-dose) 10/01/2016    Influenza, Unspecified 2019, 2019, 2020, 2020     Social History:   Social History     Social History Narrative    Not on file     Social History     Socioeconomic History    Marital status:    Tobacco Use    Smoking status: Never    Smokeless tobacco: Never   Vaping Use    Vaping Use: Never used   Substance and Sexual Activity    Alcohol use: No    Drug use: Defer    Sexual activity: Defer       Family History:  Family History   Problem Relation Age of Onset    Diabetes Sister     Diabetes Brother     Diabetes Daughter     Cancer Son         pancreatic        Review of Systems  Not obtainable from the patient due to confusion.    Objective:  T Max 24 hrs: Temp (24hrs), Av.2 °F (36.8 °C), Min:97.9 °F (36.6 °C), Max:98.6 °F (37 °C)    Vitals Ranges:   Temp:  [97.9 °F (36.6 °C)-98.6 °F (37 °C)] 97.9 °F (36.6 °C)  Heart Rate:  [60-71] 71  Resp:  [16] 16  BP: (136-184)/() 174/85      Exam:  /85 (BP Location: Left arm, Patient Position: Lying)   Pulse 71   Temp 97.9 °F (36.6 °C) (Oral)   Resp 16   Ht 170.2 cm (67\")   Wt 63.5 kg (140 lb)   SpO2 100%   BMI 21.93 kg/m²     General Appearance:   "  Alert, cooperative, no distress, appears stated age   Head:    Normocephalic, without obvious abnormality, atraumatic   Eyes:    PERRL, conjunctivae/corneas clear, EOM's intact, both eyes   Ears:    Normal external ear canals, both ears   Nose:   Nares normal, septum midline, mucosa normal, no drainage    or sinus tenderness   Throat:   Lips, mucosa, and tongue normal   Neck:   Supple, symmetrical, trachea midline, no adenopathy;     thyroid:  no enlargement/tenderness/nodules; no carotid    bruit or JVD   Back:     Symmetric, no curvature, ROM normal, no CVA tenderness   Lungs:     Decreased breath dounds bilaterally, respirations unlabored   Chest Wall:    No tenderness or deformity    Heart:    Regular rate and rhythm, S1 and S2 normal, no murmur, rub   or gallop   Abdomen:     Soft, nontender, bowel sounds active all four quadrants,     no masses, no hepatomegaly, no splenomegaly   Extremities:   Extremities normal, atraumatic, no cyanosis or edema                       .    Data Review:  Labs in chart were reviewed.  WBC   Date Value Ref Range Status   11/17/2023 7.59 3.40 - 10.80 10*3/mm3 Final     Hemoglobin   Date Value Ref Range Status   11/17/2023 13.0 12.0 - 15.9 g/dL Final     Hematocrit   Date Value Ref Range Status   11/17/2023 39.5 34.0 - 46.6 % Final     Platelets   Date Value Ref Range Status   11/17/2023 184 140 - 450 10*3/mm3 Final     Sodium   Date Value Ref Range Status   11/17/2023 141 136 - 145 mmol/L Final     Potassium   Date Value Ref Range Status   11/17/2023 4.5 3.5 - 5.2 mmol/L Final     Chloride   Date Value Ref Range Status   11/17/2023 104 98 - 107 mmol/L Final     CO2   Date Value Ref Range Status   11/17/2023 27.5 22.0 - 29.0 mmol/L Final     BUN   Date Value Ref Range Status   11/17/2023 20 8 - 23 mg/dL Final     Creatinine   Date Value Ref Range Status   11/17/2023 1.44 (H) 0.57 - 1.00 mg/dL Final     Glucose   Date Value Ref Range Status   11/17/2023 172 (H) 65 - 99 mg/dL Final      Calcium   Date Value Ref Range Status   11/17/2023 9.7 (H) 8.2 - 9.6 mg/dL Final     Magnesium   Date Value Ref Range Status   11/17/2023 2.2 1.7 - 2.3 mg/dL Final                Imaging Results (All)       Procedure Component Value Units Date/Time    CT Head Without Contrast [[id number]] Collected: 11/17/23 1633     Updated: 11/17/23 1638    Narrative:      CT HEAD WO CONTRAST-     INDICATIONS: Mental status change     TECHNIQUE: Radiation dose reduction techniques were utilized, including  automated exposure control and exposure modulation based on body size.  Noncontrast head CT     COMPARISON: None available     FINDINGS:           No acute intracranial hemorrhage, midline shift or mass effect. No acute  territorial infarct is identified.     Moderate periventricular hypodensities suggest chronic small vessel  ischemic change in a patient this age.     Arterial calcifications are seen at the base of the brain.     Ventricles, cisterns, cerebral sulci are unremarkable for patient age.     Minimal paranasal sinus mucosal thickening is present. The visualized  paranasal sinuses, orbits, mastoid air cells are otherwise unremarkable.          Impression:         No acute intracranial hemorrhage or hydrocephalus. Chronic appearing  changes of the brain. If there is further clinical concern, MRI could be  considered for further evaluation.     This report was finalized on 11/17/2023 4:34 PM by Dr. Anthony Giles M.D on Workstation: [id number]       XR Chest 1 View [[id number]] Collected: 11/17/23 1437     Updated: 11/17/23 1441    Narrative:      ONE-VIEW PORTABLE CHEST     HISTORY: Weakness and dizziness.     FINDINGS: The lungs are well expanded and clear. The heart is borderline  enlarged and no acute abnormality is seen.     This report was finalized on 11/17/2023 2:38 PM by Dr. Davy Marrero M.D on Workstation: [id number]                 Assessment:  Active Hospital Problems    Diagnosis  POA    **Acute  metabolic encephalopathy [G93.41]  Yes      Resolved Hospital Problems   No resolved problems to display.   Uti  Diabetes  Hypertension  Hypothyroidism  Ckd3      Plan:  Continue antibiotics  Check mri brain  Monitor on telemetry  Accu checks, insulin sliding scale  Dw patient and ed provider as well as daughter  Patient is dnr per family     Lexii Hernandez MD  11/17/2023  20:01 EST

## 2023-11-18 NOTE — PLAN OF CARE
Goal Outcome Evaluation:  Plan of Care Reviewed With: patient, daughter              Patient is a 89 y.o. female admitted to [facility] for acute encephalopathy on 11/17/2023. PMHx includes DM, HTN, CKD. Patient is ind at baseline with use of SPC and lives alone with no steps to enter home. Today, patient performed bed mobility with SBA, required CGA for transfers, and ambulated 40ft using rwx requiring Villa. Strength, activity tolerance, balance deficits noted. Patient may benefit from skilled PT services to address functional deficits and improve level of independence prior to discharge. Anticipate home with 24/7 care and HHPT vs SNF upon DC. Pt is agreeable to go to daughters at [place] so she could assist pt. Pt is hesitant to go to rehab. Will continue to progress.       Anticipated Discharge Disposition (PT): home with home health, home with 24/7 care, skilled nursing facility (pending progress. pt is motivated to return home and agreeable to go to daughters home who could assist her at [place].)

## 2023-11-18 NOTE — PROGRESS NOTES
"DAILY PROGRESS NOTE  [facility]    Patient Identification:  Name: Elissa Marrero  Age: 89 y.o.  Sex: female  :  3/15/1925  MRN: [id number]         Primary Care Physician: Onel Mann MD    Subjective:  Interval History: She is feeling better today.  Neurologically about at baseline according to her family.    Objective:    Scheduled Meds:amLODIPine, 5 mg, Oral, Daily  cefTRIAXone, 2,000 mg, Intravenous, Q24H  cefTRIAXone, 2,000 mg, Intravenous, Q24H  cholecalciferol, 2,000 Units, Oral, Daily  clopidogrel, 75 mg, Oral, Daily  insulin lispro, 2-7 Units, Subcutaneous, 4x Daily AC & at Bedtime  levothyroxine, 112 mcg, Oral, Daily  metoprolol tartrate, 25 mg, Oral, BID  pramipexole, 1.5 mg, Oral, Daily  rosuvastatin, 10 mg, Oral, Daily  senna-docusate sodium, 2 tablet, Oral, BID      Continuous Infusions:sodium chloride, 75 mL/hr, Last Rate: 75 mL/hr (23 1355)        Vital signs in last 24 hours:  Temp:  [97.5 °F (36.4 °C)-98.8 °F (37.1 °C)] 97.5 °F (36.4 °C)  Heart Rate:  [60-76] 73  Resp:  [16-18] 16  BP: (132-184)/(53-87) 174/77    Intake/Output:  No intake or output data in the 24 hours ending 23 1359    Exam:  /77 (BP Location: Left arm, Patient Position: Lying)   Pulse 73   Temp 97.5 °F (36.4 °C) (Oral)   Resp 16   Ht 170.2 cm (67\")   Wt 60.7 kg (133 lb 13.1 oz)   SpO2 96%   BMI 20.96 kg/m²     General Appearance:    Alert, cooperative, no distress   Head:    Normocephalic, without obvious abnormality, atraumatic   Eyes:       Throat:   Lips, tongue, gums normal   Neck:   Supple, symmetrical, trachea midline, no JVD   Lungs:     Clear to auscultation bilaterally, respirations unlabored   Chest Wall:    No tenderness or deformity    Heart:    Regular rate and rhythm, S1 and S2 normal, no murmur,no  Rub or gallop   Abdomen:     Soft, nontender, bowel sounds active, no masses, no organomegaly    Extremities:   Extremities normal, atraumatic, no cyanosis or edema "   Pulses:      Skin:   Skin is warm and dry,  no rashes or palpable lesions   Neurologic:   no focal deficits noted      Lab Results (last 72 hours)       Procedure Component Value Units Date/Time    POC Glucose Once [[id number]]  (Abnormal) Collected: 11/18/23 1115    Specimen: Blood Updated: 11/18/23 1116     Glucose 214 mg/dL     Urine Culture - Urine, Urine, Clean Catch [[id number]]  (Abnormal) Collected: 11/17/23 1521    Specimen: Urine, Clean Catch Updated: 11/18/23 1046     Urine Culture >100,000 CFU/mL Gram Negative Bacilli    Narrative:      Colonization of the urinary tract without infection is common. Treatment is discouraged unless the patient is symptomatic, pregnant, or undergoing an invasive urologic procedure.    POC Glucose Once [[id number]]  (Abnormal) Collected: 11/18/23 0723    Specimen: Blood Updated: 11/18/23 0724     Glucose 144 mg/dL     Basic Metabolic Panel [[id number]]  (Abnormal) Collected: 11/18/23 0520    Specimen: Blood Updated: 11/18/23 0644     Glucose 146 mg/dL      BUN 16 mg/dL      Creatinine 1.29 mg/dL      Sodium 139 mmol/L      Potassium 3.9 mmol/L      Chloride 105 mmol/L      CO2 22.6 mmol/L      Calcium 9.2 mg/dL      BUN/Creatinine Ratio 12.4     Anion Gap 11.4 mmol/L      eGFR 37.6 mL/min/1.73     Narrative:      GFR Normal >60  Chronic Kidney Disease <60  Kidney Failure <15    The GFR formula is only valid for adults with stable renal function between ages 18 and 70.    CBC (No Diff) [[id number]]  (Abnormal) Collected: 11/18/23 0520    Specimen: Blood Updated: 11/18/23 0611     WBC 10.83 10*3/mm3      RBC 4.31 10*6/mm3      Hemoglobin 12.2 g/dL      Hematocrit 37.5 %      MCV 87.0 fL      MCH 28.3 pg      MCHC 32.5 g/dL      RDW 12.5 %      RDW-SD 39.9 fl      MPV 11.7 fL      Platelets 167 10*3/mm3     High Sensitivity Troponin T 2Hr [[id number]]  (Abnormal) Collected: 11/17/23 2317    Specimen: Blood Updated: 11/17/23 2357     HS Troponin T 30 ng/L      Troponin T Delta 3  ng/L     Narrative:      High Sensitive Troponin T Reference Range:  <14.0 ng/L- Negative Female for AMI  <22.0 ng/L- Negative Male for AMI  >=14 - Abnormal Female indicating possible myocardial injury.  >=22 - Abnormal Male indicating possible myocardial injury.   Clinicians would have to utilize clinical acumen, EKG, Troponin, and serial changes to determine if it is an Acute Myocardial Infarction or myocardial injury due to an underlying chronic condition.         High Sensitivity Troponin T [[id number]]  (Abnormal) Collected: 11/17/23 2057    Specimen: Blood Updated: 11/17/23 2130     HS Troponin T 27 ng/L     Narrative:      High Sensitive Troponin T Reference Range:  <14.0 ng/L- Negative Female for AMI  <22.0 ng/L- Negative Male for AMI  >=14 - Abnormal Female indicating possible myocardial injury.  >=22 - Abnormal Male indicating possible myocardial injury.   Clinicians would have to utilize clinical acumen, EKG, Troponin, and serial changes to determine if it is an Acute Myocardial Infarction or myocardial injury due to an underlying chronic condition.         POC Glucose Once [[id number]]  (Normal) Collected: 11/17/23 2128    Specimen: Blood Updated: 11/17/23 2129     Glucose 113 mg/dL     Blood Culture - Blood, Arm, Left [[id number]] Collected: 11/17/23 1800    Specimen: Blood from Arm, Left Updated: 11/17/23 1806    Lactic Acid, Plasma [[id number]]  (Normal) Collected: 11/17/23 1654    Specimen: Blood from Arm, Right Updated: 11/17/23 1724     Lactate 1.2 mmol/L     Blood Culture - Blood, Arm, Right [[id number]] Collected: 11/17/23 1654    Specimen: Blood from Arm, Right Updated: 11/17/23 1659    [place] Draw [[id number]] Collected: 11/17/23 1451    Specimen: Blood from Arm, Left Updated: 11/17/23 1600    Narrative:      The following orders were created for panel order [place] Draw.  Procedure                               Abnormality         Status                     ---------                                -----------         ------                     Green Top (Gel)[[id number]]                                  Final result               Lavender Top[[id number]]                                     Final result               Gold Top - SST[[id number]]                                   Final result               Light Blue Top[[id number]]                                   Final result                 Please view results for these tests on the individual orders.    Green Top (Gel) [[id number]] Collected: 11/17/23 1451    Specimen: Blood from Arm, Left Updated: 11/17/23 1600     Extra Tube Hold for add-ons.     Comment: Auto resulted.       Lavender Top [[id number]] Collected: 11/17/23 1451    Specimen: Blood from Arm, Left Updated: 11/17/23 1600     Extra Tube hold for add-on     Comment: Auto resulted       Gold Top - SST [[id number]] Collected: 11/17/23 1451    Specimen: Blood from Arm, Left Updated: 11/17/23 1600     Extra Tube Hold for add-ons.     Comment: Auto resulted.       Light Blue Top [[id number]] Collected: 11/17/23 1451    Specimen: Blood from Arm, Left Updated: 11/17/23 1600     Extra Tube Hold for add-ons.     Comment: Auto resulted       Urinalysis With Microscopic If Indicated (No Culture) - Urine, Clean Catch [[id number]]  (Abnormal) Collected: 11/17/23 1521    Specimen: Urine, Clean Catch Updated: 11/17/23 1536     Color, UA Yellow     Appearance, UA Cloudy     pH, UA 6.5     Specific Gravity, UA 1.017     Glucose,  mg/dL (Trace)     Ketones, UA Trace     Bilirubin, UA Negative     Blood, UA Trace     Protein, UA Trace     Leuk Esterase, UA Moderate (2+)     Nitrite, UA Positive     Urobilinogen, UA 1.0 E.U./dL    Urinalysis, Microscopic Only - Urine, Clean Catch [[id number]]  (Abnormal) Collected: 11/17/23 1521    Specimen: Urine, Clean Catch Updated: 11/17/23 1536     RBC, UA 6-10 /HPF      WBC, UA Too Numerous to Count /HPF      Bacteria, UA 4+ /HPF      Squamous Epithelial Cells, UA 0-2 /HPF       Hyaline Casts, UA 21-30 /LPF      Methodology Automated Microscopy    Comprehensive Metabolic Panel [[id number]]  (Abnormal) Collected: 11/17/23 1451    Specimen: Blood from Arm, Left Updated: 11/17/23 1523     Glucose 172 mg/dL      BUN 20 mg/dL      Creatinine 1.44 mg/dL      Sodium 141 mmol/L      Potassium 4.5 mmol/L      Chloride 104 mmol/L      CO2 27.5 mmol/L      Calcium 9.7 mg/dL      Total Protein 6.8 g/dL      Albumin 4.3 g/dL      ALT (SGPT) 9 U/L      AST (SGOT) 10 U/L      Alkaline Phosphatase 62 U/L      Total Bilirubin 0.4 mg/dL      Globulin 2.5 gm/dL      A/G Ratio 1.7 g/dL      BUN/Creatinine Ratio 13.9     Anion Gap 9.5 mmol/L      eGFR 32.9 mL/min/1.73     Narrative:      GFR Normal >60  Chronic Kidney Disease <60  Kidney Failure <15    The GFR formula is only valid for adults with stable renal function between ages 18 and 70.    Single High Sensitivity Troponin T [[id number]]  (Abnormal) Collected: 11/17/23 1451    Specimen: Blood from Arm, Left Updated: 11/17/23 1523     HS Troponin T 29 ng/L     Narrative:      High Sensitive Troponin T Reference Range:  <14.0 ng/L- Negative Female for AMI  <22.0 ng/L- Negative Male for AMI  >=14 - Abnormal Female indicating possible myocardial injury.  >=22 - Abnormal Male indicating possible myocardial injury.   Clinicians would have to utilize clinical acumen, EKG, Troponin, and serial changes to determine if it is an Acute Myocardial Infarction or myocardial injury due to an underlying chronic condition.         Magnesium [[id number]]  (Normal) Collected: 11/17/23 1451    Specimen: Blood from Arm, Left Updated: 11/17/23 1523     Magnesium 2.2 mg/dL     CBC & Differential [[id number]]  (Normal) Collected: 11/17/23 1451    Specimen: Blood from Arm, Left Updated: 11/17/23 1513    Narrative:      The following orders were created for panel order CBC & Differential.  Procedure                               Abnormality         Status                     ---------                                -----------         ------                     CBC Auto Differential[[id number]]        Normal              Final result                 Please view results for these tests on the individual orders.    CBC Auto Differential [[id number]]  (Normal) Collected: 11/17/23 1451    Specimen: Blood from Arm, Left Updated: 11/17/23 1513     WBC 7.59 10*3/mm3      RBC 4.49 10*6/mm3      Hemoglobin 13.0 g/dL      Hematocrit 39.5 %      MCV 88.0 fL      MCH 29.0 pg      MCHC 32.9 g/dL      RDW 12.9 %      RDW-SD 41.5 fl      MPV 11.9 fL      Platelets 184 10*3/mm3      Neutrophil % 65.0 %      Lymphocyte % 27.8 %      Monocyte % 6.1 %      Eosinophil % 0.5 %      Basophil % 0.3 %      Immature Grans % 0.3 %      Neutrophils, Absolute 4.94 10*3/mm3      Lymphocytes, Absolute 2.11 10*3/mm3      Monocytes, Absolute 0.46 10*3/mm3      Eosinophils, Absolute 0.04 10*3/mm3      Basophils, Absolute 0.02 10*3/mm3      Immature Grans, Absolute 0.02 10*3/mm3      nRBC 0.1 /100 WBC           Data Review:  Results from last 7 days   Lab Units 11/18/23  0520 11/17/23  1451   SODIUM mmol/L 139 141   POTASSIUM mmol/L 3.9 4.5   CHLORIDE mmol/L 105 104   CO2 mmol/L 22.6 27.5   BUN mg/dL 16 20   CREATININE mg/dL 1.29* 1.44*   GLUCOSE mg/dL 146* 172*   CALCIUM mg/dL 9.2 9.7*     Results from last 7 days   Lab Units 11/18/23  0520 11/17/23  1451   WBC 10*3/mm3 10.83* 7.59   HEMOGLOBIN g/dL 12.2 13.0   HEMATOCRIT % 37.5 39.5   PLATELETS 10*3/mm3 167 184             Lab Results   Lab Value Date/Time    TROPONINT 30 (H) 11/17/2023 2317    TROPONINT 27 (H) 11/17/2023 2057    TROPONINT 29 (H) 11/17/2023 1451         Results from last 7 days   Lab Units 11/17/23  1451   ALK PHOS U/L 62   BILIRUBIN mg/dL 0.4   ALT (SGPT) U/L 9   AST (SGOT) U/L 10             Glucose   Date/Time Value Ref Range Status   11/18/2023 1115 214 (H) 70 - 130 mg/dL Final   11/18/2023 0723 144 (H) 70 - 130 mg/dL Final   11/17/2023 2128 113 70 - 130 mg/dL  Final           Past Medical History:   Diagnosis Date    Benign essential hypertension     Diabetes mellitus, type 2 12/07/2012    Encounter for diabetic foot exam 03/2015    History of complete eye exam 11/01/2017    Dr Leslie    Hyperlipidemia     Hypothyroidism     Insomnia     Lymphedema 04/14/2010    Osteoarthritis     Osteopenia     Renal insufficiency 03/04/2009    Restless leg syndrome 12/05/2012    Vitamin D deficiency 10/12/2011       Assessment:  Active Hospital Problems    Diagnosis  POA    **Acute metabolic encephalopathy [G93.41]  Yes    Acute cystitis without hematuria [N30.00]  Unknown    Hyperlipidemia [E78.5]  Yes    Benign essential hypertension [I10]  Yes    Hypothyroidism [E03.9]  Yes    Urinary tract infection [N39.0]  Yes    Diabetes mellitus, type 2 [E11.9]  Yes      Resolved Hospital Problems   No resolved problems to display.       Plan:  Continue with antibiotics and IV fluids.  Follow-up lab studies.  Await results of cultures.  Follow-up with physical therapy.  DC planning.  May need a skilled nursing unit for rehab.  Await MRI brain.    Glynn Perales MD  11/18/2023  13:59 EST

## 2023-11-19 LAB
ANION GAP SERPL CALCULATED.3IONS-SCNC: 11.6 MMOL/L (ref 5–15)
BACTERIA SPEC AEROBE CULT: ABNORMAL
BASOPHILS # BLD AUTO: 0.01 10*3/MM3 (ref 0–0.2)
BASOPHILS NFR BLD AUTO: 0.2 % (ref 0–1.5)
BUN SERPL-MCNC: 11 MG/DL (ref 8–23)
BUN/CREAT SERPL: 9.7 (ref 7–25)
CALCIUM SPEC-SCNC: 9 MG/DL (ref 8.2–9.6)
CHLORIDE SERPL-SCNC: 104 MMOL/L (ref 98–107)
CHOLEST SERPL-MCNC: 139 MG/DL (ref 0–200)
CO2 SERPL-SCNC: 22.4 MMOL/L (ref 22–29)
CREAT SERPL-MCNC: 1.13 MG/DL (ref 0.57–1)
DEPRECATED RDW RBC AUTO: 39.6 FL (ref 37–54)
EGFRCR SERPLBLD CKD-EPI 2021: 44.1 ML/MIN/1.73
EOSINOPHIL # BLD AUTO: 0.08 10*3/MM3 (ref 0–0.4)
EOSINOPHIL NFR BLD AUTO: 1.3 % (ref 0.3–6.2)
ERYTHROCYTE [DISTWIDTH] IN BLOOD BY AUTOMATED COUNT: 12.7 % (ref 12.3–15.4)
GLUCOSE BLDC GLUCOMTR-MCNC: 114 MG/DL (ref 70–130)
GLUCOSE BLDC GLUCOMTR-MCNC: 135 MG/DL (ref 70–130)
GLUCOSE BLDC GLUCOMTR-MCNC: 137 MG/DL (ref 70–130)
GLUCOSE BLDC GLUCOMTR-MCNC: 168 MG/DL (ref 70–130)
GLUCOSE SERPL-MCNC: 113 MG/DL (ref 65–99)
HBA1C MFR BLD: 10.1 % (ref 4.8–5.6)
HCT VFR BLD AUTO: 36.3 % (ref 34–46.6)
HDLC SERPL-MCNC: 37 MG/DL (ref 40–60)
HGB BLD-MCNC: 11.9 G/DL (ref 12–15.9)
IMM GRANULOCYTES # BLD AUTO: 0.02 10*3/MM3 (ref 0–0.05)
IMM GRANULOCYTES NFR BLD AUTO: 0.3 % (ref 0–0.5)
LDLC SERPL CALC-MCNC: 76 MG/DL (ref 0–100)
LDLC/HDLC SERPL: 1.94 {RATIO}
LYMPHOCYTES # BLD AUTO: 1.66 10*3/MM3 (ref 0.7–3.1)
LYMPHOCYTES NFR BLD AUTO: 26.1 % (ref 19.6–45.3)
MCH RBC QN AUTO: 28.4 PG (ref 26.6–33)
MCHC RBC AUTO-ENTMCNC: 32.8 G/DL (ref 31.5–35.7)
MCV RBC AUTO: 86.6 FL (ref 79–97)
MONOCYTES # BLD AUTO: 0.45 10*3/MM3 (ref 0.1–0.9)
MONOCYTES NFR BLD AUTO: 7.1 % (ref 5–12)
NEUTROPHILS NFR BLD AUTO: 4.14 10*3/MM3 (ref 1.7–7)
NEUTROPHILS NFR BLD AUTO: 65 % (ref 42.7–76)
NRBC BLD AUTO-RTO: 0 /100 WBC (ref 0–0.2)
PLATELET # BLD AUTO: 173 10*3/MM3 (ref 140–450)
PMV BLD AUTO: 11.7 FL (ref 6–12)
POTASSIUM SERPL-SCNC: 3.7 MMOL/L (ref 3.5–5.2)
RBC # BLD AUTO: 4.19 10*6/MM3 (ref 3.77–5.28)
SODIUM SERPL-SCNC: 138 MMOL/L (ref 136–145)
TRIGL SERPL-MCNC: 152 MG/DL (ref 0–150)
VLDLC SERPL-MCNC: 26 MG/DL (ref 5–40)
WBC NRBC COR # BLD AUTO: 6.36 10*3/MM3 (ref 3.4–10.8)

## 2023-11-19 PROCEDURE — 83036 HEMOGLOBIN GLYCOSYLATED A1C: CPT | Performed by: HOSPITALIST

## 2023-11-19 PROCEDURE — 80048 BASIC METABOLIC PNL TOTAL CA: CPT | Performed by: HOSPITALIST

## 2023-11-19 PROCEDURE — 85025 COMPLETE CBC W/AUTO DIFF WBC: CPT | Performed by: HOSPITALIST

## 2023-11-19 PROCEDURE — 63710000001 INSULIN LISPRO (HUMAN) PER 5 UNITS: Performed by: INTERNAL MEDICINE

## 2023-11-19 PROCEDURE — 25010000002 CEFTRIAXONE PER 250 MG: Performed by: INTERNAL MEDICINE

## 2023-11-19 PROCEDURE — 80061 LIPID PANEL: CPT | Performed by: HOSPITALIST

## 2023-11-19 PROCEDURE — 99222 1ST HOSP IP/OBS MODERATE 55: CPT | Performed by: PSYCHIATRY & NEUROLOGY

## 2023-11-19 PROCEDURE — 82948 REAGENT STRIP/BLOOD GLUCOSE: CPT

## 2023-11-19 PROCEDURE — 97166 OT EVAL MOD COMPLEX 45 MIN: CPT | Performed by: OCCUPATIONAL THERAPIST

## 2023-11-19 PROCEDURE — 25810000003 SODIUM CHLORIDE 0.9 % SOLUTION: Performed by: INTERNAL MEDICINE

## 2023-11-19 RX ADMIN — PRAMIPEXOLE DIHYDROCHLORIDE 1.5 MG: 1.5 TABLET ORAL at 09:02

## 2023-11-19 RX ADMIN — SODIUM CHLORIDE 75 ML/HR: 9 INJECTION, SOLUTION INTRAVENOUS at 04:51

## 2023-11-19 RX ADMIN — CEFTRIAXONE 2000 MG: 2 INJECTION, POWDER, FOR SOLUTION INTRAMUSCULAR; INTRAVENOUS at 17:47

## 2023-11-19 RX ADMIN — Medication 2000 UNITS: at 09:02

## 2023-11-19 RX ADMIN — SENNOSIDES AND DOCUSATE SODIUM 2 TABLET: 50; 8.6 TABLET ORAL at 09:02

## 2023-11-19 RX ADMIN — METOPROLOL TARTRATE 25 MG: 25 TABLET, FILM COATED ORAL at 09:02

## 2023-11-19 RX ADMIN — CLOPIDOGREL BISULFATE 75 MG: 75 TABLET, FILM COATED ORAL at 09:02

## 2023-11-19 RX ADMIN — ROSUVASTATIN CALCIUM 10 MG: 10 TABLET, FILM COATED ORAL at 09:02

## 2023-11-19 RX ADMIN — Medication 10 ML: at 21:00

## 2023-11-19 RX ADMIN — SENNOSIDES AND DOCUSATE SODIUM 2 TABLET: 50; 8.6 TABLET ORAL at 21:45

## 2023-11-19 RX ADMIN — Medication 3 MG: at 21:45

## 2023-11-19 RX ADMIN — Medication 10 ML: at 09:02

## 2023-11-19 RX ADMIN — METOPROLOL TARTRATE 25 MG: 25 TABLET, FILM COATED ORAL at 21:45

## 2023-11-19 RX ADMIN — INSULIN LISPRO 2 UNITS: 100 INJECTION, SOLUTION INTRAVENOUS; SUBCUTANEOUS at 11:30

## 2023-11-19 RX ADMIN — AMLODIPINE BESYLATE 5 MG: 5 TABLET ORAL at 09:02

## 2023-11-19 RX ADMIN — LEVOTHYROXINE SODIUM 112 MCG: 112 TABLET ORAL at 09:02

## 2023-11-19 RX ADMIN — ACETAMINOPHEN 650 MG: 325 TABLET ORAL at 21:45

## 2023-11-19 NOTE — NURSING NOTE
Pt seen by OT. Pt continues on IV abx. Pt IVF dc. Pt pending cultures and possible rehab. Pt to be followed by [facility] for DC planning. Pt ambulating with 1A and FWW to bathroom. Pt remains on tele.

## 2023-11-19 NOTE — CONSULTS
"Neurology Consult Note    Consult Date: 11/19/2023    Referring MD: Lexii Hernandez, *    Reason for Consult I have been asked to see the patient in neurological consultation to render advice and opinion regarding possible stroke    Elissa Marrero is a 89 y.o. female with hypertension, diabetes, hyperlipidemia, reported prior TIA for which she takes Plavix and Crestor.  Yesterday she spoke with one of her daughters on the phone who noticed that her speech was slow and slurred.  Her other daughter came to check on her and she was back to her neurologic baseline at that time.  They took her to an urgent care where she was diagnosed with a urinary tract infection.  She was then sent to [facility] where she was admitted for an MRI.  MRI was done which was read as a possible stroke.  The patient this morning appears to be back to her neurologic baseline.    Past Medical History:   Diagnosis Date    Benign essential hypertension     Diabetes mellitus, type 2 12/07/2012    Encounter for diabetic foot exam 03/2015    History of complete eye exam 11/01/2017    Dr Leslie    Hyperlipidemia     Hypothyroidism     Insomnia     Lymphedema 04/14/2010    Osteoarthritis     Osteopenia     Renal insufficiency 03/04/2009    Restless leg syndrome 12/05/2012    Vitamin D deficiency 10/12/2011       Exam  /79 (BP Location: Left arm, Patient Position: Sitting)   Pulse 77   Temp 97.5 °F (36.4 °C) (Oral)   Resp 16   Ht 170.2 cm (67\")   Wt 60.7 kg (133 lb 13.1 oz)   SpO2 100%   BMI 20.96 kg/m²   Gen: NAD, vitals reviewed  MS: oriented x3, recent/remote memory intact, normal attention/concentration, language intact, no neglect.  CN: visual acuity grossly normal, PERRL, EOMI, no facial droop, no dysarthria  Motor: 5/5 throughout upper and lower extremities, normal tone    DATA:    Lab Results   Component Value Date    GLUCOSE 113 (H) 11/19/2023    CALCIUM 9.0 11/19/2023     11/19/2023    K 3.7 11/19/2023 "    CO2 22.4 11/19/2023     11/19/2023    BUN 11 11/19/2023    CREATININE 1.13 (H) 11/19/2023    EGFRIFAFRI 38 (L) 01/06/2022    EGFRIFNONA 33 (L) 01/06/2022    BCR 9.7 11/19/2023    ANIONGAP 11.6 11/19/2023     Lab Results   Component Value Date    WBC 6.36 11/19/2023    HGB 11.9 (L) 11/19/2023    HCT 36.3 11/19/2023    MCV 86.6 11/19/2023     11/19/2023       Lab review: GFR 33, hemoglobin 11.9    Imaging review: I personally reviewed her brain MRI performed yesterday afternoon which is significant for moderate generalized atrophy, mild to moderate white matter disease.  She has an area of diffusion hyperintensity in the left frontal subcortical white matter without definite ADC correlation that in my opinion is more consistent with T2 shine through from chronic white matter disease.  Radiology report reviewed.  Documented possible stroke in the left frontal lobe.    Diagnoses:  Slurred speech, resolved  Chronic white matter disease  History of TIA    Pre-stroke MRS: 0  NIHSS: 0    Comment: Etiology of her symptoms is unknown.  Unclear whether this could have been related to cystitis.  Her findings on the brain MRI in my opinion are more suggestive of chronic white matter disease.  Alternative diagnosis would be a small lacunar infarct.  Regardless I would recommend continuing a single antiplatelet with Plavix and continuing her Crestor.    PLAN:   Continue Plavix 75 mg  Continue Crestor  At 89 years old I think the benefit of escalating any therapy is minimal    Okay for discharge today from neurology standpoint.  Discussed with Dr. Perales

## 2023-11-19 NOTE — THERAPY EVALUATION
Patient Name: Elissa Marrero  : 3/15/1925    MRN: [id number]                              Today's Date: 2023       Admit Date: 2023    Visit Dx:     ICD-10-CM ICD-9-CM   1. Acute encephalopathy  G93.40 348.30   2. Acute cystitis without hematuria  N30.00 595.0     Patient Active Problem List   Diagnosis    Diabetes mellitus, type 2    Hyperlipidemia    Benign essential hypertension    Hypothyroidism    Insomnia    Lymphedema    Osteoarthritis    Osteopenia    Renal insufficiency    Restless leg syndrome    Vitamin D deficiency    Hip pain, right    Acute kidney failure    Chronic kidney disease, stage 4 (severe)    Disorder of bone and cartilage    Edema    Hyperkalemia    Urinary tract infection    TIA (transient ischemic attack)    Acute metabolic encephalopathy    Acute cystitis without hematuria     Past Medical History:   Diagnosis Date    Benign essential hypertension     Diabetes mellitus, type 2 2012    Encounter for diabetic foot exam 2015    History of complete eye exam 2017    Dr Leslie    Hyperlipidemia     Hypothyroidism     Insomnia     Lymphedema 2010    Osteoarthritis     Osteopenia     Renal insufficiency 2009    Restless leg syndrome 2012    Vitamin D deficiency 10/12/2011     Past Surgical History:   Procedure Laterality Date    APPENDECTOMY      CHOLECYSTECTOMY      EYE SURGERY      TONSILLECTOMY        General Information       Row Name 23 1231          OT Time and Intention    Document Type evaluation  -KP     Mode of Treatment individual therapy;occupational therapy  -       Row Name 23 1231          General Information    Patient Profile Reviewed yes  -KP     Prior Level of Function independent:;all household mobility;community mobility;gait;transfer;bed mobility;ADL's  -KP     Existing Precautions/Restrictions fall  -KP     Barriers to Rehab none identified  -KP       Row Name 23 1231          Living Environment    People in  Home alone  states she can go home w her dght  -       Row Name 11/19/23 1231          Cognition    Orientation Status (Cognition) oriented x 3  -       Row Name 11/19/23 1231          Safety Issues, Functional Mobility    Impairments Affecting Function (Mobility) balance;endurance/activity tolerance;strength  -               User Key  (r) = Recorded By, (t) = Taken By, (c) = Cosigned By      Initials Name Provider Type    Mariah Amaya OTR Occupational Therapist                     Mobility/ADL's       Row Name 11/19/23 1232          Bed Mobility    Comment, (Bed Mobility) [facility]IC  -       Row Name 11/19/23 1232          Transfers    Transfers sit-stand transfer;stand-sit transfer  -       Row Name 11/19/23 1232          Sit-Stand Transfer    Sit-Stand [place] (Transfers) set up;contact guard;verbal cues  -     Assistive Device (Sit-Stand Transfers) walker, front-wheeled  -       Row Name 11/19/23 1232          Stand-Sit Transfer    Stand-Sit [place] (Transfers) set up;contact guard  -     Assistive Device (Stand-Sit Transfers) walker, front-wheeled  -       Row Name 11/19/23 1232          Functional Mobility    Functional Mobility- Ind. Level set up required;contact guard assist  -     Functional Mobility- Device walker, front-wheeled  -     Functional Mobility- Comment in room, to door and back two times.  -       Row Name 11/19/23 1232          Activities of Daily Living    BADL Assessment/Intervention grooming  -       Row Name 11/19/23 1232          Grooming Assessment/Training    [place] Level (Grooming) grooming skills;wash face, hands;set up;standby assist  -     Position (Grooming) supported sitting  -     Comment, (Grooming) pt reports has false teeth does not need to brush them right now.  -               User Key  (r) = Recorded By, (t) = Taken By, (c) = Cosigned By      Initials Name Provider Type    Mariah Amaya OTR Occupational  Therapist                   Obj/Interventions       Row Name 11/19/23 1232          Sensory Assessment (Somatosensory)    Sensory Assessment (Somatosensory) UE sensation intact  -KP       Row Name 11/19/23 1232          Vision Assessment/Intervention    Visual Impairment/Limitations WFL  -       Row Name 11/19/23 1232          Range of Motion Comprehensive    General Range of Motion bilateral upper extremity ROM WNL  -     Comment, General Range of Motion B UE 8/8  -KP       Row Name 11/19/23 1232          Strength Comprehensive (MMT)    General Manual Muscle Testing (MMT) Assessment upper extremity strength deficits identified  -     Comment, General Manual Muscle Testing (MMT) Assessment B UE 4-/5  -KP       Row Name 11/19/23 1232          Motor Skills    Motor Skills coordination;functional endurance  -     Coordination WFL  -     Functional Endurance fair  -KP       [facility] Name 11/19/23 1232          Balance    Balance Assessment sitting static balance  -KP     Static Sitting Balance independent  -KP     Static Standing Balance contact guard  -KP     Dynamic Standing Balance contact guard  -KP     Position/Device Used, Standing Balance walker, rolling  -KP     Balance Interventions sitting;standing;sit to stand;supported;static;dynamic  -               User Key  (r) = Recorded By, (t) = Taken By, (c) = Cosigned By      Initials Name Provider Type     Mariah Barron, OTR Occupational Therapist                   Goals/Plan       Row Name 11/19/23 1235          Transfer Goal 1 (OT)    Activity/Assistive Device (Transfer Goal 1, OT) bed-to-chair/chair-to-bed;sit-to-stand/stand-to-sit;toilet  -     [place] Level/Cues Needed (Transfer Goal 1, OT) standby assist  -     Time Frame (Transfer Goal 1, OT) short term goal (STG);2 weeks  -     Progress/Outcome (Transfer Goal 1, OT) goal ongoing  -       Row Name 11/19/23 1235          Bathing Goal 1 (OT)    Activity/Device (Bathing Goal 1, OT)  bathing skills, all  -KP     [place] Level/Cues Needed (Bathing Goal 1, OT) standby assist  -KP     Time Frame (Bathing Goal 1, OT) short term goal (STG);2 weeks  -KP     Progress/Outcomes (Bathing Goal 1, OT) goal ongoing  -       Row Name 11/19/23 1235          Grooming Goal 1 (OT)    Activity/Device (Grooming Goal 1, OT) grooming skills, all  -KP     [place] (Grooming Goal 1, OT) modified independence  -KP     Time Frame (Grooming Goal 1, OT) short term goal (STG);2 weeks  -KP     Progress/Outcome (Grooming Goal 1, OT) goal ongoing  -       Row Name 11/19/23 1235          Strength Goal 1 (OT)    Strength Goal 1 (OT) incr UE to 4/5  -KP     Time Frame (Strength Goal 1, OT) short term goal (STG);2 weeks  -KP     Progress/Outcome (Strength Goal 1, OT) goal ongoing  -       Row Name 11/19/23 1235          Therapy Assessment/Plan (OT)    Planned Therapy Interventions (OT) activity tolerance training;patient/caregiver education/training;BADL retraining;ROM/therapeutic exercise;strengthening exercise;occupation/activity based interventions;transfer/mobility retraining;functional balance retraining  -               User Key  (r) = Recorded By, (t) = Taken By, (c) = Cosigned By      Initials Name Provider Type    Mariah Amaya, OTR Occupational Therapist                   Clinical Impression       Row Name 11/19/23 1236          Pain Assessment    Pretreatment Pain Rating 0/10 - no pain  -     Posttreatment Pain Rating 0/10 - no pain  -       Row Name 11/19/23 1233          Plan of Care Review    Plan of Care Reviewed With patient  -     Progress improving  -     Outcome Evaluation pt evaluated for OT. pt admitted from home where she lives alone. pt admitted w metablolic encephelopathy. pt reports being independent PTA and not using her walker much. pt reports being able to get into her tub on her own. pt this date demo good ROM in UE but decr strength and endurance. pt able to walk  and tsf w CGA and walker in room to door and back two times. pt completed grooming w SBA. pt cont to benefit from OT to incr ADL ,strength, balance, and tsf. pt wants to return home and can if family can provide A for safety when up and for ADLs. if DC home rec HH OT to evaluate.  -       Row Name 11/19/23 1233          Therapy Assessment/Plan (OT)    Rehab Potential (OT) good, to achieve stated therapy goals  -     Criteria for Skilled Therapeutic Interventions Met (OT) yes;skilled treatment is necessary;meets criteria  -     Therapy Frequency (OT) 5 times/wk  -       Row Name 11/19/23 1233          Therapy Plan Review/Discharge Plan (OT)    Anticipated Discharge Disposition (OT) home with /7 care;home with assist;home with home health  -       Row Name 11/19/23 1233          Positioning and Restraints    Pre-Treatment Position sitting in chair/recliner  -     Post Treatment Position chair  -KP     In Chair reclined;call light within reach;encouraged to call for assist;exit alarm on  -               User Key  (r) = Recorded By, (t) = Taken By, (c) = Cosigned By      Initials Name Provider Type     Mariah Barron, OTR Occupational Therapist                   Outcome Measures       Row Name 11/19/23 1236          How much help from another is currently needed...    Putting on and taking off regular lower body clothing? 3  -KP     Bathing (including washing, rinsing, and drying) 3  -KP     Toileting (which includes using toilet bed pan or urinal) 3  -KP     Putting on and taking off regular upper body clothing 3  -KP     Taking care of personal grooming (such as brushing teeth) 3  -KP     Eating meals 3  -KP     AM-PAC 6 Clicks Score (OT) 18  -       Row Name 11/19/23 0900          How much help from another person do you currently need...    Turning from your back to your side while in flat bed without using bedrails? 3  -MR     Moving from lying on back to sitting on the side of a flat  bed without bedrails? 3  -MR     Moving to and from a bed to a chair (including a wheelchair)? 3  -MR     Standing up from a chair using your arms (e.g., wheelchair, bedside chair)? 3  -MR     Climbing 3-5 steps with a railing? 2  -MR     To walk in hospital room? 3  -MR     AM-PAC 6 Clicks Score (PT) 17  -MR     Highest Level of Mobility Goal 5 --> Static standing  -MR       Row Name 11/19/23 1236          Modified Fabius Scale    Modified Parmjit Scale 4 - Moderately severe disability.  Unable to walk without assistance, and unable to attend to own bodily needs without assistance.  -       Row Name 11/19/23 1236          Functional Assessment    Outcome Measure Options AM-PAC 6 Clicks Daily Activity (OT);Modified Fabius  -               User Key  (r) = Recorded By, (t) = Taken By, (c) = Cosigned By      Initials Name Provider Type    Mariah Amaya OTR Occupational Therapist     Zahra Gonzalez, RN Registered Nurse                    Occupational Therapy Education       Title: PT OT SLP Therapies (In Progress)       Topic: Occupational Therapy (Done)       Point: ADL training (Done)       Description:   Instruct learner(s) on proper safety adaptation and remediation techniques during self care or transfers.   Instruct in proper use of assistive devices.                  Learning Progress Summary             Patient Acceptance, E,TB,D, DU,VU by  at 11/19/2023 1237    Comment: ed pt on role of OT. benefit of therapy, POC w. OT ed on safety w ADL and tsf. pt demo grooming SBA and tsf CGA w walker                         Point: Home exercise program (Done)       Description:   Instruct learner(s) on appropriate technique for monitoring, assisting and/or progressing therapeutic exercises/activities.                  Learning Progress Summary             Patient Acceptance, E,TB,D, DU,VU by  at 11/19/2023 1237    Comment: ed pt on role of OT. benefit of therapy, POC w. OT ed on safety w ADL and  tsf. pt demo grooming SBA and tsf CGA w walker                         Point: Precautions (Done)       Description:   Instruct learner(s) on prescribed precautions during self-care and functional transfers.                  Learning Progress Summary             Patient Acceptance, E,TB,D, DU,VU by  at 11/19/2023 1237    Comment: ed pt on role of OT. benefit of therapy, POC w. OT ed on safety w ADL and tsf. pt demo grooming SBA and tsf CGA w walker                         Point: Body mechanics (Done)       Description:   Instruct learner(s) on proper positioning and spine alignment during self-care, functional mobility activities and/or exercises.                  Learning Progress Summary             Patient Acceptance, E,TB,D, DU,VU by  at 11/19/2023 1237    Comment: ed pt on role of OT. benefit of therapy, POC w. OT ed on safety w ADL and tsf. pt demo grooming SBA and tsf CGA w walker                                         User Key       Initials Effective Dates Name Provider Type Discipline     07/11/23 -  Mariah Barron, OTR Occupational Therapist OT                  OT Recommendation and Plan  Planned Therapy Interventions (OT): activity tolerance training, patient/caregiver education/training, BADL retraining, ROM/therapeutic exercise, strengthening exercise, occupation/activity based interventions, transfer/mobility retraining, functional balance retraining  Therapy Frequency (OT): 5 times/wk  Plan of Care Review  Plan of Care Reviewed With: patient  Progress: improving  Outcome Evaluation: pt evaluated for OT. pt admitted from home where she lives alone. pt admitted w metablolic encephelopathy. pt reports being independent PTA and not using her walker much. pt reports being able to get into her tub on her own. pt this date demo good ROM in UE but decr strength and endurance. pt able to walk and tsf w CGA and walker in room to door and back two times. pt completed grooming w SBA. pt cont to  benefit from OT to incr ADL ,strength, balance, and tsf. pt wants to return home and can if family can provide A for safety when up and for ADLs. if DC home rec HH OT to evaluate.     Time Calculation:   Evaluation Complexity (OT)  Review Occupational Profile/Medical/Therapy History Complexity: expanded/moderate complexity  Assessment, Occupational Performance/Identification of Deficit Complexity: 3-5 performance deficits  Clinical Decision Making Complexity (OT): detailed assessment/moderate complexity  Overall Complexity of Evaluation (OT): moderate complexity     Time Calculation- OT       Row Name 11/19/23 1237             Time Calculation- OT    OT Start Time 0940  -      OT Stop Time 0952  -      OT Time Calculation (min) 12 min  -KP      OT Received On 11/19/23  -      OT - Next Appointment 11/20/23  -      OT Goal Re-Cert Due Date 12/04/23  -         Untimed Charges    OT Eval/Re-eval Minutes 12  -KP         Total Minutes    Untimed Charges Total Minutes 12  -KP       Total Minutes 12  -KP                User Key  (r) = Recorded By, (t) = Taken By, (c) = Cosigned By      Initials Name Provider Type    Mariah Amaya OTR Occupational Therapist                  Therapy Charges for Today       Code Description Service Date Service Provider Modifiers Qty    [id number] HC OT EVAL MOD COMPLEXITY 2 11/19/2023 Mariah Barron OTR GO 1                 ANNMARIE Underwood  11/19/2023

## 2023-11-19 NOTE — PLAN OF CARE
Goal Outcome Evaluation:    Problem: Adult Inpatient Plan of Care  Goal: Plan of Care Review  Outcome: Ongoing, Progressing  Goal: Patient-Specific Goal (Individualized)  Outcome: Ongoing, Progressing  Goal: Absence of Hospital-Acquired Illness or Injury  Outcome: Ongoing, Progressing  Intervention: Prevent and Manage VTE (Venous Thromboembolism) Risk  Recent Flowsheet Documentation  Taken 11/19/2023 0900 by Zahra Gonzalez RN  VTE Prevention/Management: (up in chair)   bilateral   sequential compression devices off  Goal: Optimal Comfort and Wellbeing  Outcome: Ongoing, Progressing  Intervention: Provide Person-Centered Care  Recent Flowsheet Documentation  Taken 11/19/2023 0900 by Zahra Gonzalez RN  Trust Relationship/Rapport:   care explained   choices provided  Goal: Readiness for Transition of Care  Outcome: Ongoing, Progressing     Problem: Skin Injury Risk Increased  Goal: Skin Health and Integrity  Outcome: Ongoing, Progressing     Problem: Diabetes Comorbidity  Goal: Blood Glucose Level Within Targeted Range  Outcome: Ongoing, Progressing  Intervention: Monitor and Manage Glycemia  Recent Flowsheet Documentation  Taken 11/19/2023 0900 by Zahra Gonzalez RN  Glycemic Management: blood glucose monitored     Problem: Hypertension Comorbidity  Goal: Blood Pressure in Desired Range  Outcome: Ongoing, Progressing     Problem: UTI (Urinary Tract Infection)  Goal: Improved Infection Symptoms  Outcome: Ongoing, Progressing  Intervention: Facilitate Optimal Urinary Elimination  Recent Flowsheet Documentation  Taken 11/19/2023 0900 by Zahra Gonzalez RN  Urinary Elimination Promotion:   absorbent pad/diaper use encouraged   positioned for ease of voiding   toileting device within reach     Problem: Confusion Acute  Goal: Optimal Cognitive Function  Outcome: Ongoing, Progressing     Problem: Fall Injury Risk  Goal: Absence of Fall and Fall-Related Injury  Outcome: Ongoing, Progressing

## 2023-11-19 NOTE — PROGRESS NOTES
[facility]    Acute rehab screening referral received via stroke order set. Please note that the acute rehab admission RNs will not be actively evaluating this patient. If it is felt that the patient is or will be acute rehab appropriate, please call the admissions office at [phone] and a full evaluation will be initiated. Thank you.     Sandra Lew RN  Rehab Admissions Coordinator  [phone]

## 2023-11-19 NOTE — PROGRESS NOTES
"DAILY PROGRESS NOTE  [facility]    Patient Identification:  Name: Elissa Marrero  Age: 89 y.o.  Sex: female  :  3/15/1925  MRN: [id number]         Primary Care Physician: Onel Mann MD    Subjective:  Interval History: She is feeling better today.  Neurologically about at baseline according to her family.    Objective:    Scheduled Meds:amLODIPine, 5 mg, Oral, Daily  cefTRIAXone, 2,000 mg, Intravenous, Q24H  cholecalciferol, 2,000 Units, Oral, Daily  clopidogrel, 75 mg, Oral, Daily  insulin lispro, 2-7 Units, Subcutaneous, 4x Daily AC & at Bedtime  levothyroxine, 112 mcg, Oral, Daily  metoprolol tartrate, 25 mg, Oral, BID  pramipexole, 1.5 mg, Oral, Daily  rosuvastatin, 10 mg, Oral, Daily  senna-docusate sodium, 2 tablet, Oral, BID  sodium chloride, 10 mL, Intravenous, Q12H      Continuous Infusions:sodium chloride, 75 mL/hr, Last Rate: 75 mL/hr (23 0451)        Vital signs in last 24 hours:  Temp:  [97.5 °F (36.4 °C)-98.2 °F (36.8 °C)] 98.2 °F (36.8 °C)  Heart Rate:  [59-77] 77  Resp:  [16-18] 16  BP: ()/(64-81) 178/67    Intake/Output:  No intake or output data in the 24 hours ending 23 1544    Exam:  /67 (BP Location: Right arm, Patient Position: Sitting)   Pulse 77   Temp 98.2 °F (36.8 °C) (Oral)   Resp 16   Ht 170.2 cm (67\")   Wt 60.7 kg (133 lb 13.1 oz)   SpO2 100%   BMI 20.96 kg/m²     General Appearance:    Alert, cooperative, no distress   Head:    Normocephalic, without obvious abnormality, atraumatic   Eyes:       Throat:   Lips, tongue, gums normal   Neck:   Supple, symmetrical, trachea midline, no JVD   Lungs:     Clear to auscultation bilaterally, respirations unlabored   Chest Wall:    No tenderness or deformity    Heart:    Regular rate and rhythm, S1 and S2 normal, no murmur,no  Rub or gallop   Abdomen:     Soft, nontender, bowel sounds active, no masses, no organomegaly    Extremities:   Extremities normal, atraumatic, no cyanosis or edema "   Pulses:      Skin:   Skin is warm and dry,  no rashes or palpable lesions   Neurologic:   no focal deficits noted      Lab Results (last 72 hours)       Procedure Component Value Units Date/Time    POC Glucose Once [[id number]]  (Abnormal) Collected: 11/18/23 1115    Specimen: Blood Updated: 11/18/23 1116     Glucose 214 mg/dL     Urine Culture - Urine, Urine, Clean Catch [[id number]]  (Abnormal) Collected: 11/17/23 1521    Specimen: Urine, Clean Catch Updated: 11/18/23 1046     Urine Culture >100,000 CFU/mL Gram Negative Bacilli    Narrative:      Colonization of the urinary tract without infection is common. Treatment is discouraged unless the patient is symptomatic, pregnant, or undergoing an invasive urologic procedure.    POC Glucose Once [[id number]]  (Abnormal) Collected: 11/18/23 0723    Specimen: Blood Updated: 11/18/23 0724     Glucose 144 mg/dL     Basic Metabolic Panel [[id number]]  (Abnormal) Collected: 11/18/23 0520    Specimen: Blood Updated: 11/18/23 0644     Glucose 146 mg/dL      BUN 16 mg/dL      Creatinine 1.29 mg/dL      Sodium 139 mmol/L      Potassium 3.9 mmol/L      Chloride 105 mmol/L      CO2 22.6 mmol/L      Calcium 9.2 mg/dL      BUN/Creatinine Ratio 12.4     Anion Gap 11.4 mmol/L      eGFR 37.6 mL/min/1.73     Narrative:      GFR Normal >60  Chronic Kidney Disease <60  Kidney Failure <15    The GFR formula is only valid for adults with stable renal function between ages 18 and 70.    CBC (No Diff) [[id number]]  (Abnormal) Collected: 11/18/23 0520    Specimen: Blood Updated: 11/18/23 0611     WBC 10.83 10*3/mm3      RBC 4.31 10*6/mm3      Hemoglobin 12.2 g/dL      Hematocrit 37.5 %      MCV 87.0 fL      MCH 28.3 pg      MCHC 32.5 g/dL      RDW 12.5 %      RDW-SD 39.9 fl      MPV 11.7 fL      Platelets 167 10*3/mm3     High Sensitivity Troponin T 2Hr [[id number]]  (Abnormal) Collected: 11/17/23 2317    Specimen: Blood Updated: 11/17/23 2357     HS Troponin T 30 ng/L      Troponin T Delta 3  ng/L     Narrative:      High Sensitive Troponin T Reference Range:  <14.0 ng/L- Negative Female for AMI  <22.0 ng/L- Negative Male for AMI  >=14 - Abnormal Female indicating possible myocardial injury.  >=22 - Abnormal Male indicating possible myocardial injury.   Clinicians would have to utilize clinical acumen, EKG, Troponin, and serial changes to determine if it is an Acute Myocardial Infarction or myocardial injury due to an underlying chronic condition.         High Sensitivity Troponin T [[id number]]  (Abnormal) Collected: 11/17/23 2057    Specimen: Blood Updated: 11/17/23 2130     HS Troponin T 27 ng/L     Narrative:      High Sensitive Troponin T Reference Range:  <14.0 ng/L- Negative Female for AMI  <22.0 ng/L- Negative Male for AMI  >=14 - Abnormal Female indicating possible myocardial injury.  >=22 - Abnormal Male indicating possible myocardial injury.   Clinicians would have to utilize clinical acumen, EKG, Troponin, and serial changes to determine if it is an Acute Myocardial Infarction or myocardial injury due to an underlying chronic condition.         POC Glucose Once [[id number]]  (Normal) Collected: 11/17/23 2128    Specimen: Blood Updated: 11/17/23 2129     Glucose 113 mg/dL     Blood Culture - Blood, Arm, Left [[id number]] Collected: 11/17/23 1800    Specimen: Blood from Arm, Left Updated: 11/17/23 1806    Lactic Acid, Plasma [[id number]]  (Normal) Collected: 11/17/23 1654    Specimen: Blood from Arm, Right Updated: 11/17/23 1724     Lactate 1.2 mmol/L     Blood Culture - Blood, Arm, Right [[id number]] Collected: 11/17/23 1654    Specimen: Blood from Arm, Right Updated: 11/17/23 1659    [place] Draw [[id number]] Collected: 11/17/23 1451    Specimen: Blood from Arm, Left Updated: 11/17/23 1600    Narrative:      The following orders were created for panel order [place] Draw.  Procedure                               Abnormality         Status                     ---------                                -----------         ------                     Green Top (Gel)[[id number]]                                  Final result               Lavender Top[[id number]]                                     Final result               Gold Top - SST[[id number]]                                   Final result               Light Blue Top[[id number]]                                   Final result                 Please view results for these tests on the individual orders.    Green Top (Gel) [[id number]] Collected: 11/17/23 1451    Specimen: Blood from Arm, Left Updated: 11/17/23 1600     Extra Tube Hold for add-ons.     Comment: Auto resulted.       Lavender Top [[id number]] Collected: 11/17/23 1451    Specimen: Blood from Arm, Left Updated: 11/17/23 1600     Extra Tube hold for add-on     Comment: Auto resulted       Gold Top - SST [[id number]] Collected: 11/17/23 1451    Specimen: Blood from Arm, Left Updated: 11/17/23 1600     Extra Tube Hold for add-ons.     Comment: Auto resulted.       Light Blue Top [[id number]] Collected: 11/17/23 1451    Specimen: Blood from Arm, Left Updated: 11/17/23 1600     Extra Tube Hold for add-ons.     Comment: Auto resulted       Urinalysis With Microscopic If Indicated (No Culture) - Urine, Clean Catch [[id number]]  (Abnormal) Collected: 11/17/23 1521    Specimen: Urine, Clean Catch Updated: 11/17/23 1536     Color, UA Yellow     Appearance, UA Cloudy     pH, UA 6.5     Specific Gravity, UA 1.017     Glucose,  mg/dL (Trace)     Ketones, UA Trace     Bilirubin, UA Negative     Blood, UA Trace     Protein, UA Trace     Leuk Esterase, UA Moderate (2+)     Nitrite, UA Positive     Urobilinogen, UA 1.0 E.U./dL    Urinalysis, Microscopic Only - Urine, Clean Catch [[id number]]  (Abnormal) Collected: 11/17/23 1521    Specimen: Urine, Clean Catch Updated: 11/17/23 1536     RBC, UA 6-10 /HPF      WBC, UA Too Numerous to Count /HPF      Bacteria, UA 4+ /HPF      Squamous Epithelial Cells, UA 0-2 /HPF       Hyaline Casts, UA 21-30 /LPF      Methodology Automated Microscopy    Comprehensive Metabolic Panel [[id number]]  (Abnormal) Collected: 11/17/23 1451    Specimen: Blood from Arm, Left Updated: 11/17/23 1523     Glucose 172 mg/dL      BUN 20 mg/dL      Creatinine 1.44 mg/dL      Sodium 141 mmol/L      Potassium 4.5 mmol/L      Chloride 104 mmol/L      CO2 27.5 mmol/L      Calcium 9.7 mg/dL      Total Protein 6.8 g/dL      Albumin 4.3 g/dL      ALT (SGPT) 9 U/L      AST (SGOT) 10 U/L      Alkaline Phosphatase 62 U/L      Total Bilirubin 0.4 mg/dL      Globulin 2.5 gm/dL      A/G Ratio 1.7 g/dL      BUN/Creatinine Ratio 13.9     Anion Gap 9.5 mmol/L      eGFR 32.9 mL/min/1.73     Narrative:      GFR Normal >60  Chronic Kidney Disease <60  Kidney Failure <15    The GFR formula is only valid for adults with stable renal function between ages 18 and 70.    Single High Sensitivity Troponin T [[id number]]  (Abnormal) Collected: 11/17/23 1451    Specimen: Blood from Arm, Left Updated: 11/17/23 1523     HS Troponin T 29 ng/L     Narrative:      High Sensitive Troponin T Reference Range:  <14.0 ng/L- Negative Female for AMI  <22.0 ng/L- Negative Male for AMI  >=14 - Abnormal Female indicating possible myocardial injury.  >=22 - Abnormal Male indicating possible myocardial injury.   Clinicians would have to utilize clinical acumen, EKG, Troponin, and serial changes to determine if it is an Acute Myocardial Infarction or myocardial injury due to an underlying chronic condition.         Magnesium [[id number]]  (Normal) Collected: 11/17/23 1451    Specimen: Blood from Arm, Left Updated: 11/17/23 1523     Magnesium 2.2 mg/dL     CBC & Differential [[id number]]  (Normal) Collected: 11/17/23 1451    Specimen: Blood from Arm, Left Updated: 11/17/23 1513    Narrative:      The following orders were created for panel order CBC & Differential.  Procedure                               Abnormality         Status                     ---------                                -----------         ------                     CBC Auto Differential[[id number]]        Normal              Final result                 Please view results for these tests on the individual orders.    CBC Auto Differential [[id number]]  (Normal) Collected: 11/17/23 1451    Specimen: Blood from Arm, Left Updated: 11/17/23 1513     WBC 7.59 10*3/mm3      RBC 4.49 10*6/mm3      Hemoglobin 13.0 g/dL      Hematocrit 39.5 %      MCV 88.0 fL      MCH 29.0 pg      MCHC 32.9 g/dL      RDW 12.9 %      RDW-SD 41.5 fl      MPV 11.9 fL      Platelets 184 10*3/mm3      Neutrophil % 65.0 %      Lymphocyte % 27.8 %      Monocyte % 6.1 %      Eosinophil % 0.5 %      Basophil % 0.3 %      Immature Grans % 0.3 %      Neutrophils, Absolute 4.94 10*3/mm3      Lymphocytes, Absolute 2.11 10*3/mm3      Monocytes, Absolute 0.46 10*3/mm3      Eosinophils, Absolute 0.04 10*3/mm3      Basophils, Absolute 0.02 10*3/mm3      Immature Grans, Absolute 0.02 10*3/mm3      nRBC 0.1 /100 WBC           Data Review:  Results from last 7 days   Lab Units 11/19/23  0657 11/18/23  0520 11/17/23  1451   SODIUM mmol/L 138 139 141   POTASSIUM mmol/L 3.7 3.9 4.5   CHLORIDE mmol/L 104 105 104   CO2 mmol/L 22.4 22.6 27.5   BUN mg/dL 11 16 20   CREATININE mg/dL 1.13* 1.29* 1.44*   GLUCOSE mg/dL 113* 146* 172*   CALCIUM mg/dL 9.0 9.2 9.7*     Results from last 7 days   Lab Units 11/19/23  0657 11/18/23  0520 11/17/23  1451   WBC 10*3/mm3 6.36 10.83* 7.59   HEMOGLOBIN g/dL 11.9* 12.2 13.0   HEMATOCRIT % 36.3 37.5 39.5   PLATELETS 10*3/mm3 173 167 184         Results from last 7 days   Lab Units 11/19/23  0657   HEMOGLOBIN A1C % 10.10*     Lab Results   Lab Value Date/Time    TROPONINT 30 (H) 11/17/2023 2317    TROPONINT 27 (H) 11/17/2023 2057    TROPONINT 29 (H) 11/17/2023 1451     Results from last 7 days   Lab Units 11/19/23  0657   CHOLESTEROL mg/dL 139   TRIGLYCERIDES mg/dL 152*   HDL CHOL mg/dL 37*   LDL CHOL mg/dL 76     Results  from last 7 days   Lab Units 11/17/23  1451   ALK PHOS U/L 62   BILIRUBIN mg/dL 0.4   ALT (SGPT) U/L 9   AST (SGOT) U/L 10         Results from last 7 days   Lab Units 11/19/23  0657   HEMOGLOBIN A1C % 10.10*     Glucose   Date/Time Value Ref Range Status   11/19/2023 1112 168 (H) 70 - 130 mg/dL Final   11/19/2023 0729 114 70 - 130 mg/dL Final   11/18/2023 2131 112 70 - 130 mg/dL Final   11/18/2023 1655 114 70 - 130 mg/dL Final   11/18/2023 1115 214 (H) 70 - 130 mg/dL Final   11/18/2023 0723 144 (H) 70 - 130 mg/dL Final   11/17/2023 2128 113 70 - 130 mg/dL Final           Past Medical History:   Diagnosis Date    Benign essential hypertension     Diabetes mellitus, type 2 12/07/2012    Encounter for diabetic foot exam 03/2015    History of complete eye exam 11/01/2017    Dr Leslie    Hyperlipidemia     Hypothyroidism     Insomnia     Lymphedema 04/14/2010    Osteoarthritis     Osteopenia     Renal insufficiency 03/04/2009    Restless leg syndrome 12/05/2012    Vitamin D deficiency 10/12/2011       Assessment:  Active Hospital Problems    Diagnosis  POA    **Acute metabolic encephalopathy [G93.41]  Yes    Acute cystitis without hematuria [N30.00]  Unknown    Hyperlipidemia [E78.5]  Yes    Benign essential hypertension [I10]  Yes    Hypothyroidism [E03.9]  Yes    Urinary tract infection [N39.0]  Yes    Diabetes mellitus, type 2 [E11.9]  Yes      Resolved Hospital Problems   No resolved problems to display.       Plan:  Continue with antibiotics and IV fluids.  Follow-up lab studies.  Await results of cultures.  Follow-up with physical therapy.  DC planning.  May need a skilled nursing unit for rehab.  Neurology consult noted.  MRI of the brain was suggestive of possible stroke but neurology did not think it was.  We will continue with medical treatment as recommended by neurology.    Glynn Perales MD  11/19/2023  15:44 EST

## 2023-11-19 NOTE — PLAN OF CARE
Problem: Adult Inpatient Plan of Care  Goal: Absence of Hospital-Acquired Illness or Injury  Intervention: Identify and Manage Fall Risk  Recent Flowsheet Documentation  Taken 11/18/2023 1945 by Fabi Hinds RN  Safety Promotion/Fall Prevention:   activity supervised   clutter free environment maintained   assistive device/personal items within reach   fall prevention program maintained   safety round/check completed   nonskid shoes/slippers when out of bed     Problem: Adult Inpatient Plan of Care  Goal: Absence of Hospital-Acquired Illness or Injury  Intervention: Prevent Skin Injury  Recent Flowsheet Documentation  Taken 11/18/2023 2130 by Fabi Hinds, RN  Body Position: position changed independently  Taken 11/18/2023 1945 by Fabi Hinds RN  Body Position: position changed independently     Problem: Confusion Acute  Goal: Optimal Cognitive Function  Outcome: Ongoing, Progressing     Problem: Fall Injury Risk  Goal: Absence of Fall and Fall-Related Injury  Outcome: Ongoing, Progressing  Intervention: Identify and Manage Contributors  Recent Flowsheet Documentation  Taken 11/18/2023 2130 by Fabi Hinds, RN  Medication Review/Management: medications reviewed  Taken 11/18/2023 1945 by Fabi Hinds RN  Medication Review/Management: medications reviewed  Intervention: Promote Injury-Free Environment  Recent Flowsheet Documentation  Taken 11/18/2023 1945 by Fabi Hinds RN  Safety Promotion/Fall Prevention:   activity supervised   clutter free environment maintained   assistive device/personal items within reach   fall prevention program maintained   safety round/check completed   nonskid shoes/slippers when out of bed   Goal Outcome Evaluation:

## 2023-11-19 NOTE — PLAN OF CARE
Goal Outcome Evaluation:  Plan of Care Reviewed With: patient        Progress: improving  Outcome Evaluation: pt evaluated for OT. pt admitted from home where she lives alone. pt admitted w metablolic encephelopathy. pt reports being independent PTA and not using her walker much. pt reports being able to get into her tub on her own. pt this date demo good ROM in UE but decr strength and endurance. pt able to walk and tsf w CGA and walker in room to door and back two times. pt completed grooming w SBA. pt cont to benefit from OT to incr ADL ,strength, balance, and tsf. pt wants to return home and can if family can provide A for safety when up and for ADLs. if DC home rec  OT to evaluate.      Anticipated Discharge Disposition (OT): home with 24/7 care, home with assist, home with home health

## 2023-11-20 ENCOUNTER — HOME HEALTH ADMISSION (OUTPATIENT)
Dept: HOME HEALTH SERVICES | Facility: HOME HEALTHCARE | Age: 88
End: 2023-11-20
Payer: MEDICARE

## 2023-11-20 LAB
ANION GAP SERPL CALCULATED.3IONS-SCNC: 10.7 MMOL/L (ref 5–15)
BASOPHILS # BLD AUTO: 0.03 10*3/MM3 (ref 0–0.2)
BASOPHILS NFR BLD AUTO: 0.4 % (ref 0–1.5)
BUN SERPL-MCNC: 12 MG/DL (ref 8–23)
BUN/CREAT SERPL: 10.3 (ref 7–25)
CALCIUM SPEC-SCNC: 9.6 MG/DL (ref 8.2–9.6)
CHLORIDE SERPL-SCNC: 103 MMOL/L (ref 98–107)
CO2 SERPL-SCNC: 23.3 MMOL/L (ref 22–29)
CREAT SERPL-MCNC: 1.17 MG/DL (ref 0.57–1)
DEPRECATED RDW RBC AUTO: 41.4 FL (ref 37–54)
EGFRCR SERPLBLD CKD-EPI 2021: 42.3 ML/MIN/1.73
EOSINOPHIL # BLD AUTO: 0.07 10*3/MM3 (ref 0–0.4)
EOSINOPHIL NFR BLD AUTO: 1 % (ref 0.3–6.2)
ERYTHROCYTE [DISTWIDTH] IN BLOOD BY AUTOMATED COUNT: 12.8 % (ref 12.3–15.4)
GLUCOSE BLDC GLUCOMTR-MCNC: 127 MG/DL (ref 70–130)
GLUCOSE BLDC GLUCOMTR-MCNC: 175 MG/DL (ref 70–130)
GLUCOSE BLDC GLUCOMTR-MCNC: 240 MG/DL (ref 70–130)
GLUCOSE BLDC GLUCOMTR-MCNC: 80 MG/DL (ref 70–130)
GLUCOSE SERPL-MCNC: 170 MG/DL (ref 65–99)
HCT VFR BLD AUTO: 40.9 % (ref 34–46.6)
HGB BLD-MCNC: 13.3 G/DL (ref 12–15.9)
IMM GRANULOCYTES # BLD AUTO: 0.03 10*3/MM3 (ref 0–0.05)
IMM GRANULOCYTES NFR BLD AUTO: 0.4 % (ref 0–0.5)
LYMPHOCYTES # BLD AUTO: 1.88 10*3/MM3 (ref 0.7–3.1)
LYMPHOCYTES NFR BLD AUTO: 27.8 % (ref 19.6–45.3)
MCH RBC QN AUTO: 28.5 PG (ref 26.6–33)
MCHC RBC AUTO-ENTMCNC: 32.5 G/DL (ref 31.5–35.7)
MCV RBC AUTO: 87.8 FL (ref 79–97)
MONOCYTES # BLD AUTO: 0.56 10*3/MM3 (ref 0.1–0.9)
MONOCYTES NFR BLD AUTO: 8.3 % (ref 5–12)
NEUTROPHILS NFR BLD AUTO: 4.19 10*3/MM3 (ref 1.7–7)
NEUTROPHILS NFR BLD AUTO: 62.1 % (ref 42.7–76)
NRBC BLD AUTO-RTO: 0 /100 WBC (ref 0–0.2)
PLATELET # BLD AUTO: 201 10*3/MM3 (ref 140–450)
PMV BLD AUTO: 11.3 FL (ref 6–12)
POTASSIUM SERPL-SCNC: 4 MMOL/L (ref 3.5–5.2)
RBC # BLD AUTO: 4.66 10*6/MM3 (ref 3.77–5.28)
SODIUM SERPL-SCNC: 137 MMOL/L (ref 136–145)
WBC NRBC COR # BLD AUTO: 6.76 10*3/MM3 (ref 3.4–10.8)

## 2023-11-20 PROCEDURE — 25010000002 CEFTRIAXONE PER 250 MG: Performed by: INTERNAL MEDICINE

## 2023-11-20 PROCEDURE — 97535 SELF CARE MNGMENT TRAINING: CPT

## 2023-11-20 PROCEDURE — 85025 COMPLETE CBC W/AUTO DIFF WBC: CPT | Performed by: HOSPITALIST

## 2023-11-20 PROCEDURE — 63710000001 INSULIN LISPRO (HUMAN) PER 5 UNITS: Performed by: INTERNAL MEDICINE

## 2023-11-20 PROCEDURE — 97530 THERAPEUTIC ACTIVITIES: CPT

## 2023-11-20 PROCEDURE — 80048 BASIC METABOLIC PNL TOTAL CA: CPT | Performed by: HOSPITALIST

## 2023-11-20 PROCEDURE — 82948 REAGENT STRIP/BLOOD GLUCOSE: CPT

## 2023-11-20 RX ADMIN — Medication 10 ML: at 10:29

## 2023-11-20 RX ADMIN — ROSUVASTATIN CALCIUM 10 MG: 10 TABLET, FILM COATED ORAL at 10:28

## 2023-11-20 RX ADMIN — Medication 2000 UNITS: at 10:28

## 2023-11-20 RX ADMIN — SENNOSIDES AND DOCUSATE SODIUM 2 TABLET: 50; 8.6 TABLET ORAL at 10:28

## 2023-11-20 RX ADMIN — Medication 10 ML: at 22:51

## 2023-11-20 RX ADMIN — PRAMIPEXOLE DIHYDROCHLORIDE 1.5 MG: 1.5 TABLET ORAL at 10:28

## 2023-11-20 RX ADMIN — CEFTRIAXONE 2000 MG: 2 INJECTION, POWDER, FOR SOLUTION INTRAMUSCULAR; INTRAVENOUS at 18:05

## 2023-11-20 RX ADMIN — INSULIN LISPRO 2 UNITS: 100 INJECTION, SOLUTION INTRAVENOUS; SUBCUTANEOUS at 10:28

## 2023-11-20 RX ADMIN — METOPROLOL TARTRATE 25 MG: 25 TABLET, FILM COATED ORAL at 22:50

## 2023-11-20 RX ADMIN — CLOPIDOGREL BISULFATE 75 MG: 75 TABLET, FILM COATED ORAL at 10:28

## 2023-11-20 RX ADMIN — AMLODIPINE BESYLATE 5 MG: 5 TABLET ORAL at 10:28

## 2023-11-20 RX ADMIN — LEVOTHYROXINE SODIUM 112 MCG: 112 TABLET ORAL at 10:28

## 2023-11-20 RX ADMIN — SENNOSIDES AND DOCUSATE SODIUM 2 TABLET: 50; 8.6 TABLET ORAL at 22:50

## 2023-11-20 RX ADMIN — INSULIN LISPRO 3 UNITS: 100 INJECTION, SOLUTION INTRAVENOUS; SUBCUTANEOUS at 13:32

## 2023-11-20 RX ADMIN — METOPROLOL TARTRATE 25 MG: 25 TABLET, FILM COATED ORAL at 10:30

## 2023-11-20 NOTE — THERAPY TREATMENT NOTE
Patient Name: Elissa Marrero  : 3/15/1925    MRN: [id number]                              Today's Date: 2023       Admit Date: 2023    Visit Dx:     ICD-10-CM ICD-9-CM   1. Acute encephalopathy  G93.40 348.30   2. Acute cystitis without hematuria  N30.00 595.0     Patient Active Problem List   Diagnosis    Diabetes mellitus, type 2    Hyperlipidemia    Benign essential hypertension    Hypothyroidism    Insomnia    Lymphedema    Osteoarthritis    Osteopenia    Renal insufficiency    Restless leg syndrome    Vitamin D deficiency    Hip pain, right    Acute kidney failure    Chronic kidney disease, stage 4 (severe)    Disorder of bone and cartilage    Edema    Hyperkalemia    Urinary tract infection    TIA (transient ischemic attack)    Acute metabolic encephalopathy    Acute cystitis without hematuria     Past Medical History:   Diagnosis Date    Benign essential hypertension     Diabetes mellitus, type 2 2012    Encounter for diabetic foot exam 2015    History of complete eye exam 2017    Dr Leslie    Hyperlipidemia     Hypothyroidism     Insomnia     Lymphedema 2010    Osteoarthritis     Osteopenia     Renal insufficiency 2009    Restless leg syndrome 2012    Vitamin D deficiency 10/12/2011     Past Surgical History:   Procedure Laterality Date    APPENDECTOMY      CHOLECYSTECTOMY      EYE SURGERY      TONSILLECTOMY        General Information       Row Name 23 1302          Physical Therapy Time and Intention    Document Type therapy note (daily note)  -CB     Mode of Treatment individual therapy;occupational therapy  -CB       Row Name 23 1302          General Information    Existing Precautions/Restrictions fall  -CB       Row Name 23 1302          Cognition    Orientation Status (Cognition) oriented x 3  -CB       Row Name 23 1302          Safety Issues, Functional Mobility    Impairments Affecting Function (Mobility)  balance;endurance/activity tolerance;strength  -CB               User Key  (r) = Recorded By, (t) = Taken By, (c) = Cosigned By      Initials Name Provider Type    CB Karlie Vega, PT Physical Therapist                   Mobility       Row Name 11/20/23 1302          Bed Mobility    Comment, (Bed Mobility) UIC  -CB       Row Name 11/20/23 1302          Sit-Stand Transfer    Sit-Stand [place] (Transfers) verbal cues;contact guard;standby assist  -CB     Assistive Device (Sit-Stand Transfers) walker, front-wheeled  -CB     Comment, (Sit-Stand Transfer) x6 STS reps with max VC for UE placement  -CB       Row Name 11/20/23 1302          Gait/Stairs (Locomotion)    [place] Level (Gait) contact guard;verbal cues  -CB     Assistive Device (Gait) walker, front-wheeled  -CB     Distance in Feet (Gait) 100ft  -CB     Deviations/Abnormal Patterns (Gait) stride length decreased;clyde decreased  -CB     Comment, (Gait/Stairs) no overt LOB; cues for pt to maintain safe distance to rwx  -CB               User Key  (r) = Recorded By, (t) = Taken By, (c) = Cosigned By      Initials Name Provider Type    Karlie Avalos, PT Physical Therapist                   Obj/Interventions       Row Name 11/20/23 1303          Motor Skills    Therapeutic Exercise --  seated AP, marching, LAQ x10 reps  -CB       Row Name 11/20/23 1303          Balance    Balance Assessment standing static balance;standing dynamic balance  -CB     Static Standing Balance contact guard;standby assist  -CB     Dynamic Standing Balance contact guard  -CB     Position/Device Used, Standing Balance supported;walker, front-wheeled  -CB               User Key  (r) = Recorded By, (t) = Taken By, (c) = Cosigned By      Initials Name Provider Type    Karlie Avalos, PT Physical Therapist                   Goals/Plan    No documentation.                  Clinical Impression       Row Name 11/20/23 1304          Pain    Pretreatment Pain Rating 0/10 - no  pain  -CB       Row Name 11/20/23 1304          Plan of Care Review    Plan of Care Reviewed With patient  -CB     Progress improving  -CB     Outcome Evaluation Patient is agreeable to PT and improved overall functional mobility. She increased ambulation distance to 100ft using rwx requiring CGA. VC to maintain safe distance to rwx. She completed multiple STS to rwx requiring SBA-CGA. Pt is up in chair post session and plans to dc to daughters home for her to assist and HHPT. Pt is not safe to dc home alone at this point. Will continue to progress as pt tolerates.  -CB       Row Name 11/20/23 1302          Positioning and Restraints    Pre-Treatment Position sitting in chair/recliner  -CB     Post Treatment Position chair  -CB     In Chair notified nsg;reclined;call light within reach;encouraged to call for assist;exit alarm on;RLE elevated;LLE elevated  -CB               User Key  (r) = Recorded By, (t) = Taken By, (c) = Cosigned By      Initials Name Provider Type    Karlie Avalos, PT Physical Therapist                   Outcome Measures       Row Name 11/20/23 1301          How much help from another person do you currently need...    Turning from your back to your side while in flat bed without using bedrails? 3  -CB     Moving from lying on back to sitting on the side of a flat bed without bedrails? 3  -CB     Moving to and from a bed to a chair (including a wheelchair)? 3  -CB     Standing up from a chair using your arms (e.g., wheelchair, bedside chair)? 3  -CB     Climbing 3-5 steps with a railing? 3  -CB     To walk in hospital room? 3  -CB     AM-PAC 6 Clicks Score (PT) 18  -CB     Highest Level of Mobility Goal 6 --> Walk 10 steps or more  -CB       Row Name 11/20/23 1309          Functional Assessment    Outcome Measure Options AM-PAC 6 Clicks Basic Mobility (PT)  -CB               User Key  (r) = Recorded By, (t) = Taken By, (c) = Cosigned By      Initials Name Provider Type    Karlie Avalos,  PT Physical Therapist                                 Physical Therapy Education       Title: PT OT SLP Therapies (Done)       Topic: Physical Therapy (Done)       Point: Mobility training (Done)       Learning Progress Summary             Patient Acceptance, E,TB, VU,NR by CB at 11/20/2023 1306    Acceptance, E,TB, VU,NR by CB at 11/18/2023 1500                         Point: Home exercise program (Done)       Learning Progress Summary             Patient Acceptance, E,TB, VU,NR by CB at 11/20/2023 1306                         Point: Body mechanics (Done)       Learning Progress Summary             Patient Acceptance, E,TB, VU,NR by CB at 11/20/2023 1306    Acceptance, E,TB, VU,NR by CB at 11/18/2023 1500                         Point: Precautions (Done)       Learning Progress Summary             Patient Acceptance, E,TB, VU,NR by CB at 11/20/2023 1306                                         User Key       Initials Effective Dates Name Provider Type Discipline     10/22/21 -  Karlie Vega, PT Physical Therapist PT                  PT Recommendation and Plan  Planned Therapy Interventions (PT): balance training, gait training, bed mobility training, home exercise program, patient/family education, transfer training, strengthening  Plan of Care Reviewed With: patient  Progress: improving  Outcome Evaluation: Patient is agreeable to PT and improved overall functional mobility. She increased ambulation distance to 100ft using rwx requiring CGA. VC to maintain safe distance to rwx. She completed multiple STS to rwx requiring SBA-CGA. Pt is up in chair post session and plans to dc to daughters home for her to assist and HHPT. Pt is not safe to dc home alone at this point. Will continue to progress as pt tolerates.     Time Calculation:         PT Charges       Row Name 11/20/23 1306             Time Calculation    Start Time 1051  -CB      Stop Time 1103  -CB      Time Calculation (min) 12 min  -CB      PT Received  On 11/20/23  -CB      PT - Next Appointment 11/21/23  -CB         Time Calculation- PT    Total Timed Code Minutes- PT 12 minute(s)  -CB         Timed Charges    01315 - PT Therapeutic Exercise Minutes 4  -CB      42722 - PT Therapeutic Activity Minutes 8  -CB         Total Minutes    Timed Charges Total Minutes 12  -CB       Total Minutes 12  -CB                User Key  (r) = Recorded By, (t) = Taken By, (c) = Cosigned By      Initials Name Provider Type    CB Karlie Vega, PT Physical Therapist                  Therapy Charges for Today       Code Description Service Date Service Provider Modifiers Qty    [id number]  PT THERAPEUTIC ACT EA 15 MIN 11/20/2023 Karlie Vega, PT GP 1            PT G-Codes  Outcome Measure Options: AM-PAC 6 Clicks Basic Mobility (PT)  AM-PAC 6 Clicks Score (PT): 18  AM-PAC 6 Clicks Score (OT): 18  Modified Power Scale: 4 - Moderately severe disability.  Unable to walk without assistance, and unable to attend to own bodily needs without assistance.  PT Discharge Summary  Anticipated Discharge Disposition (PT): home with home health, home with 24/7 care, skilled nursing facility (pending progress. pt is motivated to return home and agreeable to go to daughters home who could assist her at ID.)    Karlie Vega, MAIKOL  11/20/2023

## 2023-11-20 NOTE — PROGRESS NOTES
Discharge Planning Assessment  [facility]     Patient Name: Elissa Marrero  MRN: [id number]  Today's Date: 11/20/2023    Admit Date: 11/17/2023    Plan: Home with daughters and HH, referral pending   Discharge Needs Assessment       Row Name 11/20/23 1250       Living Environment    People in Home alone    Unique Family Situation two daughters to stay with pt at d/c    Current Living Arrangements home    Primary Care Provided by self    Provides Primary Care For no one    Family Caregiver if Needed child(mckayla), adult    Family Caregiver Names Dtrs Michelle Spain [phone] and Meredith Lieberman [phone]    Quality of Family Relationships helpful;involved;supportive    Able to Return to Prior Arrangements yes       Resource/Environmental Concerns    Resource/Environmental Concerns none       Transition Planning    Patient/Family Anticipates Transition to home with family;home with help/services    Patient/Family Anticipated Services at Transition home health care    Transportation Anticipated family or friend will provide       Discharge Needs Assessment    Equipment Currently Used at Home cane, straight;walker, rolling    Concerns to be Addressed discharge planning    Outpatient/Agency/Support Group Needs homecare agency    Discharge Facility/Level of Care Needs home with home health    Provided Post Acute Provider List? Refused    Refused Provider List Comment Declines SNF    Discharge Coordination/Progress HH                   Discharge Plan       Row Name 11/20/23 1255       Plan    Plan Home with daughters and HH, referral pending    Patient/Family in Agreement with Plan yes    Plan Comments CCP spoke with pt's daughter (Michelle Priceins, [phone]) re: d/c planning. Pt resides alone in a single level home, uses cane and has access to a walker, and has no h/o HH/SNF. Pt's daughter states two daughters will alteranate staying with pt upon d/c and prefer HH to SNF (referral pending to [facility], family has no agency  preference). Pt uses [facility]. CCP to follow. Sydney Archibald LCSW                  Continued Care and Services - Admitted Since 11/17/2023       Home Medical Care       Service Provider Request Status Selected Services Address Phone Fax Patient Preferred     [facility] Pending - Request Sent N/A [address] [address] [phone] --                     Demographic Summary       Row Name 11/20/23 1253       General Information    Admission Type inpatient    Arrived From home    Required Notices Provided Important Message from Medicare    Referral Source admission list    Reason for Consult discharge planning    Preferred Language English                   Functional Status       Row Name 11/20/23 1253       Functional Status    Usual Activity Tolerance good    Current Activity Tolerance moderate       Functional Status, IADL    Medications independent    Meal Preparation independent    Housekeeping independent    Laundry independent    Shopping independent       Mental Status Summary    Recent Changes in Mental Status/Cognitive Functioning no changes                   Psychosocial    No documentation.                  Abuse/Neglect    No documentation.                  Legal    No documentation.                  Substance Abuse    No documentation.                  Patient Forms    No documentation.                     Theresa Archibald LCSW

## 2023-11-20 NOTE — DISCHARGE PLACEMENT REQUEST
"Elissa Marrero (89 y.o. Female)       Date of Birth   03/15/1925    Social Security Number       Address   [address] [place] [id number]    Home Phone   [phone]    MRN   [id number]       Rastafari   Other    Marital Status                               Admission Date   11/17/23    Admission Type   Emergency    Admitting Provider   Lexii Hernandez MD    Attending Provider   Glynn Perales MD    Department, Room/Bed   [address], P597/1       Discharge Date       Discharge Disposition       Discharge Destination                                 Attending Provider: Glynn Perales MD    Allergies: Benazepril    Isolation: None   Infection: None   Code Status: CPR    Ht: 170.2 cm (67\")   Wt: 60.7 kg (133 lb 13.1 oz)    Admission Cmt: None   Principal Problem: Acute metabolic encephalopathy [G93.41]                   Active Insurance as of 11/17/2023       Primary Coverage       Payor Plan Insurance Group Employer/Plan Group    MEDICARE MEDICARE A & B        Payor Plan Address Payor Plan Phone Number Payor Plan Fax Number Effective Dates    [address] [phone]  3/1/1990 - None Entered    [facility] [id number]         Subscriber Name Subscriber Birth Date Member ID       ELISSA MARRERO 3/15/1925 [id number]               Secondary Coverage       Payor Plan Insurance Group Employer/Plan Group    [facility] SUPP [id number]       Payor Plan Address Payor Plan Phone Number Payor Plan Fax Number Effective Dates    [address]   7/1/2003 - None Entered    [facility] [id number]         Subscriber Name Subscriber Birth Date Member ID       ELISSA MARRERO 3/15/1925 [id number]                     Emergency Contacts        (Rel.) Home Phone Work Phone Mobile Phone    Michelle Spain (Daughter) [phone] -- [phone]    MioMeredith (Daughter) [phone] -- [phone]                "

## 2023-11-20 NOTE — PROGRESS NOTES
"DAILY PROGRESS NOTE  [facility]    Patient Identification:  Name: Elissa Marrero  Age: 89 y.o.  Sex: female  :  3/15/1925  MRN: [id number]         Primary Care Physician: Onel Mann MD    Subjective:  Interval History: She is feeling better today.  Neurologically about at baseline according to her family.  She is somewhat confused but I think she probably has some underlying dementia.    Objective:    Scheduled Meds:amLODIPine, 5 mg, Oral, Daily  cefTRIAXone, 2,000 mg, Intravenous, Q24H  cholecalciferol, 2,000 Units, Oral, Daily  clopidogrel, 75 mg, Oral, Daily  insulin lispro, 2-7 Units, Subcutaneous, 4x Daily AC & at Bedtime  levothyroxine, 112 mcg, Oral, Daily  metoprolol tartrate, 25 mg, Oral, BID  pramipexole, 1.5 mg, Oral, Daily  rosuvastatin, 10 mg, Oral, Daily  senna-docusate sodium, 2 tablet, Oral, BID  sodium chloride, 10 mL, Intravenous, Q12H      Continuous Infusions:       Vital signs in last 24 hours:  Temp:  [97.9 °F (36.6 °C)-98.6 °F (37 °C)] 97.9 °F (36.6 °C)  Heart Rate:  [55-68] 68  Resp:  [16-18] 18  BP: (132-179)/(58-80) 137/64    Intake/Output:    Intake/Output Summary (Last 24 hours) at 2023 1440  Last data filed at 2023 1300  Gross per 24 hour   Intake 1121 ml   Output --   Net 1121 ml       Exam:  /64 (BP Location: Right arm, Patient Position: Sitting)   Pulse 68   Temp 97.9 °F (36.6 °C) (Oral)   Resp 18   Ht 170.2 cm (67\")   Wt 60.7 kg (133 lb 13.1 oz)   SpO2 93%   BMI 20.96 kg/m²     General Appearance:    Alert, cooperative, no distress   Head:    Normocephalic, without obvious abnormality, atraumatic   Eyes:       Throat:   Lips, tongue, gums normal   Neck:   Supple, symmetrical, trachea midline, no JVD   Lungs:     Clear to auscultation bilaterally, respirations unlabored   Chest Wall:    No tenderness or deformity    Heart:    Regular rate and rhythm, S1 and S2 normal, no murmur,no  Rub or gallop   Abdomen:     Soft, nontender, bowel " sounds active, no masses, no organomegaly    Extremities:   Extremities normal, atraumatic, no cyanosis or edema   Pulses:      Skin:   Skin is warm and dry,  no rashes or palpable lesions   Neurologic:   no focal deficits noted, confused      Lab Results (last 72 hours)       Procedure Component Value Units Date/Time    POC Glucose Once [[id number]]  (Abnormal) Collected: 11/18/23 1115    Specimen: Blood Updated: 11/18/23 1116     Glucose 214 mg/dL     Urine Culture - Urine, Urine, Clean Catch [[id number]]  (Abnormal) Collected: 11/17/23 1521    Specimen: Urine, Clean Catch Updated: 11/18/23 1046     Urine Culture >100,000 CFU/mL Gram Negative Bacilli    Narrative:      Colonization of the urinary tract without infection is common. Treatment is discouraged unless the patient is symptomatic, pregnant, or undergoing an invasive urologic procedure.    POC Glucose Once [[id number]]  (Abnormal) Collected: 11/18/23 0723    Specimen: Blood Updated: 11/18/23 0724     Glucose 144 mg/dL     Basic Metabolic Panel [[id number]]  (Abnormal) Collected: 11/18/23 0520    Specimen: Blood Updated: 11/18/23 0644     Glucose 146 mg/dL      BUN 16 mg/dL      Creatinine 1.29 mg/dL      Sodium 139 mmol/L      Potassium 3.9 mmol/L      Chloride 105 mmol/L      CO2 22.6 mmol/L      Calcium 9.2 mg/dL      BUN/Creatinine Ratio 12.4     Anion Gap 11.4 mmol/L      eGFR 37.6 mL/min/1.73     Narrative:      GFR Normal >60  Chronic Kidney Disease <60  Kidney Failure <15    The GFR formula is only valid for adults with stable renal function between ages 18 and 70.    CBC (No Diff) [[id number]]  (Abnormal) Collected: 11/18/23 0520    Specimen: Blood Updated: 11/18/23 0611     WBC 10.83 10*3/mm3      RBC 4.31 10*6/mm3      Hemoglobin 12.2 g/dL      Hematocrit 37.5 %      MCV 87.0 fL      MCH 28.3 pg      MCHC 32.5 g/dL      RDW 12.5 %      RDW-SD 39.9 fl      MPV 11.7 fL      Platelets 167 10*3/mm3     High Sensitivity Troponin T 2Hr [[id number]]   (Abnormal) Collected: 11/17/23 2317    Specimen: Blood Updated: 11/17/23 2357     HS Troponin T 30 ng/L      Troponin T Delta 3 ng/L     Narrative:      High Sensitive Troponin T Reference Range:  <14.0 ng/L- Negative Female for AMI  <22.0 ng/L- Negative Male for AMI  >=14 - Abnormal Female indicating possible myocardial injury.  >=22 - Abnormal Male indicating possible myocardial injury.   Clinicians would have to utilize clinical acumen, EKG, Troponin, and serial changes to determine if it is an Acute Myocardial Infarction or myocardial injury due to an underlying chronic condition.         High Sensitivity Troponin T [[id number]]  (Abnormal) Collected: 11/17/23 2057    Specimen: Blood Updated: 11/17/23 2130     HS Troponin T 27 ng/L     Narrative:      High Sensitive Troponin T Reference Range:  <14.0 ng/L- Negative Female for AMI  <22.0 ng/L- Negative Male for AMI  >=14 - Abnormal Female indicating possible myocardial injury.  >=22 - Abnormal Male indicating possible myocardial injury.   Clinicians would have to utilize clinical acumen, EKG, Troponin, and serial changes to determine if it is an Acute Myocardial Infarction or myocardial injury due to an underlying chronic condition.         POC Glucose Once [[id number]]  (Normal) Collected: 11/17/23 2128    Specimen: Blood Updated: 11/17/23 2129     Glucose 113 mg/dL     Blood Culture - Blood, Arm, Left [[id number]] Collected: 11/17/23 1800    Specimen: Blood from Arm, Left Updated: 11/17/23 1806    Lactic Acid, Plasma [[id number]]  (Normal) Collected: 11/17/23 1654    Specimen: Blood from Arm, Right Updated: 11/17/23 1724     Lactate 1.2 mmol/L     Blood Culture - Blood, Arm, Right [[id number]] Collected: 11/17/23 1654    Specimen: Blood from Arm, Right Updated: 11/17/23 1659    [place] Draw [[id number]] Collected: 11/17/23 1451    Specimen: Blood from Arm, Left Updated: 11/17/23 1600    Narrative:      The following orders were created for panel order [place]  Draw.  Procedure                               Abnormality         Status                     ---------                               -----------         ------                     Green Top (Gel)[[id number]]                                  Final result               Lavender Top[[id number]]                                     Final result               Gold Top - SST[[id number]]                                   Final result               Light Blue Top[[id number]]                                   Final result                 Please view results for these tests on the individual orders.    Green Top (Gel) [[id number]] Collected: 11/17/23 1451    Specimen: Blood from Arm, Left Updated: 11/17/23 1600     Extra Tube Hold for add-ons.     Comment: Auto resulted.       Lavender Top [[id number]] Collected: 11/17/23 1451    Specimen: Blood from Arm, Left Updated: 11/17/23 1600     Extra Tube hold for add-on     Comment: Auto resulted       Gold Top - SST [[id number]] Collected: 11/17/23 1451    Specimen: Blood from Arm, Left Updated: 11/17/23 1600     Extra Tube Hold for add-ons.     Comment: Auto resulted.       Light Blue Top [[id number]] Collected: 11/17/23 1451    Specimen: Blood from Arm, Left Updated: 11/17/23 1600     Extra Tube Hold for add-ons.     Comment: Auto resulted       Urinalysis With Microscopic If Indicated (No Culture) - Urine, Clean Catch [[id number]]  (Abnormal) Collected: 11/17/23 1521    Specimen: Urine, Clean Catch Updated: 11/17/23 1536     Color, UA Yellow     Appearance, UA Cloudy     pH, UA 6.5     Specific Gravity, UA 1.017     Glucose,  mg/dL (Trace)     Ketones, UA Trace     Bilirubin, UA Negative     Blood, UA Trace     Protein, UA Trace     Leuk Esterase, UA Moderate (2+)     Nitrite, UA Positive     Urobilinogen, UA 1.0 E.U./dL    Urinalysis, Microscopic Only - Urine, Clean Catch [[id number]]  (Abnormal) Collected: 11/17/23 1521    Specimen: Urine, Clean Catch Updated: 11/17/23 1536      RBC, UA 6-10 /HPF      WBC, UA Too Numerous to Count /HPF      Bacteria, UA 4+ /HPF      Squamous Epithelial Cells, UA 0-2 /HPF      Hyaline Casts, UA 21-30 /LPF      Methodology Automated Microscopy    Comprehensive Metabolic Panel [[id number]]  (Abnormal) Collected: 11/17/23 1451    Specimen: Blood from Arm, Left Updated: 11/17/23 1523     Glucose 172 mg/dL      BUN 20 mg/dL      Creatinine 1.44 mg/dL      Sodium 141 mmol/L      Potassium 4.5 mmol/L      Chloride 104 mmol/L      CO2 27.5 mmol/L      Calcium 9.7 mg/dL      Total Protein 6.8 g/dL      Albumin 4.3 g/dL      ALT (SGPT) 9 U/L      AST (SGOT) 10 U/L      Alkaline Phosphatase 62 U/L      Total Bilirubin 0.4 mg/dL      Globulin 2.5 gm/dL      A/G Ratio 1.7 g/dL      BUN/Creatinine Ratio 13.9     Anion Gap 9.5 mmol/L      eGFR 32.9 mL/min/1.73     Narrative:      GFR Normal >60  Chronic Kidney Disease <60  Kidney Failure <15    The GFR formula is only valid for adults with stable renal function between ages 18 and 70.    Single High Sensitivity Troponin T [[id number]]  (Abnormal) Collected: 11/17/23 1451    Specimen: Blood from Arm, Left Updated: 11/17/23 1523     HS Troponin T 29 ng/L     Narrative:      High Sensitive Troponin T Reference Range:  <14.0 ng/L- Negative Female for AMI  <22.0 ng/L- Negative Male for AMI  >=14 - Abnormal Female indicating possible myocardial injury.  >=22 - Abnormal Male indicating possible myocardial injury.   Clinicians would have to utilize clinical acumen, EKG, Troponin, and serial changes to determine if it is an Acute Myocardial Infarction or myocardial injury due to an underlying chronic condition.         Magnesium [[id number]]  (Normal) Collected: 11/17/23 1451    Specimen: Blood from Arm, Left Updated: 11/17/23 1523     Magnesium 2.2 mg/dL     CBC & Differential [[id number]]  (Normal) Collected: 11/17/23 1451    Specimen: Blood from Arm, Left Updated: 11/17/23 1513    Narrative:      The following orders were created  for panel order CBC & Differential.  Procedure                               Abnormality         Status                     ---------                               -----------         ------                     CBC Auto Differential[[id number]]        Normal              Final result                 Please view results for these tests on the individual orders.    CBC Auto Differential [[id number]]  (Normal) Collected: 11/17/23 1451    Specimen: Blood from Arm, Left Updated: 11/17/23 1513     WBC 7.59 10*3/mm3      RBC 4.49 10*6/mm3      Hemoglobin 13.0 g/dL      Hematocrit 39.5 %      MCV 88.0 fL      MCH 29.0 pg      MCHC 32.9 g/dL      RDW 12.9 %      RDW-SD 41.5 fl      MPV 11.9 fL      Platelets 184 10*3/mm3      Neutrophil % 65.0 %      Lymphocyte % 27.8 %      Monocyte % 6.1 %      Eosinophil % 0.5 %      Basophil % 0.3 %      Immature Grans % 0.3 %      Neutrophils, Absolute 4.94 10*3/mm3      Lymphocytes, Absolute 2.11 10*3/mm3      Monocytes, Absolute 0.46 10*3/mm3      Eosinophils, Absolute 0.04 10*3/mm3      Basophils, Absolute 0.02 10*3/mm3      Immature Grans, Absolute 0.02 10*3/mm3      nRBC 0.1 /100 WBC           Data Review:  Results from last 7 days   Lab Units 11/20/23  0709 11/19/23  0657 11/18/23  0520   SODIUM mmol/L 137 138 139   POTASSIUM mmol/L 4.0 3.7 3.9   CHLORIDE mmol/L 103 104 105   CO2 mmol/L 23.3 22.4 22.6   BUN mg/dL 12 11 16   CREATININE mg/dL 1.17* 1.13* 1.29*   GLUCOSE mg/dL 170* 113* 146*   CALCIUM mg/dL 9.6 9.0 9.2     Results from last 7 days   Lab Units 11/20/23  0709 11/19/23  0657 11/18/23  0520   WBC 10*3/mm3 6.76 6.36 10.83*   HEMOGLOBIN g/dL 13.3 11.9* 12.2   HEMATOCRIT % 40.9 36.3 37.5   PLATELETS 10*3/mm3 201 173 167         Results from last 7 days   Lab Units 11/19/23  0657   HEMOGLOBIN A1C % 10.10*     Lab Results   Lab Value Date/Time    TROPONINT 30 (H) 11/17/2023 2317    TROPONINT 27 (H) 11/17/2023 2057    TROPONINT 29 (H) 11/17/2023 1451     Results from last 7 days    Lab Units 11/19/23  0657   CHOLESTEROL mg/dL 139   TRIGLYCERIDES mg/dL 152*   HDL CHOL mg/dL 37*   LDL CHOL mg/dL 76     Results from last 7 days   Lab Units 11/17/23  1451   ALK PHOS U/L 62   BILIRUBIN mg/dL 0.4   ALT (SGPT) U/L 9   AST (SGOT) U/L 10         Results from last 7 days   Lab Units 11/19/23  0657   HEMOGLOBIN A1C % 10.10*     Glucose   Date/Time Value Ref Range Status   11/20/2023 1113 240 (H) 70 - 130 mg/dL Final   11/20/2023 0734 175 (H) 70 - 130 mg/dL Final   11/19/2023 2033 137 (H) 70 - 130 mg/dL Final   11/19/2023 1702 135 (H) 70 - 130 mg/dL Final   11/19/2023 1112 168 (H) 70 - 130 mg/dL Final   11/19/2023 0729 114 70 - 130 mg/dL Final   11/18/2023 2131 112 70 - 130 mg/dL Final   11/18/2023 1655 114 70 - 130 mg/dL Final           Past Medical History:   Diagnosis Date    Benign essential hypertension     Diabetes mellitus, type 2 12/07/2012    Encounter for diabetic foot exam 03/2015    History of complete eye exam 11/01/2017    Dr Leslie    Hyperlipidemia     Hypothyroidism     Insomnia     Lymphedema 04/14/2010    Osteoarthritis     Osteopenia     Renal insufficiency 03/04/2009    Restless leg syndrome 12/05/2012    Vitamin D deficiency 10/12/2011       Assessment:  Active Hospital Problems    Diagnosis  POA    **Acute metabolic encephalopathy [G93.41]  Yes    Acute cystitis without hematuria [N30.00]  Unknown    Hyperlipidemia [E78.5]  Yes    Benign essential hypertension [I10]  Yes    Hypothyroidism [E03.9]  Yes    Urinary tract infection [N39.0]  Yes    Diabetes mellitus, type 2 [E11.9]  Yes      Resolved Hospital Problems   No resolved problems to display.       Plan:  Continue with antibiotics and IV fluids.  Follow-up lab studies.  Await results of cultures.  Follow-up with physical therapy.  DC planning.  May need a skilled nursing unit for rehab versus home with home health and 24-hour caregivers.  Neurology consult noted.  MRI of the brain was suggestive of possible stroke but  neurology did not think it was.  We will continue with medical treatment as recommended by neurology.    Glynn Perales MD  11/20/2023  14:40 EST

## 2023-11-20 NOTE — PLAN OF CARE
Goal Outcome Evaluation:  Plan of Care Reviewed With: patient        Progress: improving  Outcome Evaluation: Pt alert and oriented x4 this am and NIH is 0. Patient appears to be back a baseline. This afternoon she became very forgetful and alert to self. Physician aware. Possible rehab placement. Up to chair for part of the day and tolerated well. Pleasantly confused at times. Easily redirected. Safety precautions remain in place.

## 2023-11-20 NOTE — NURSING NOTE
"Diabetes Education  Assessment/Teaching    Patient Name:  Elissa Marrero  YOB: 1925  MRN: [id number]  Admit Date:  11/17/2023      Assessment Date:  11/20/2023  Flowsheet Row Most Recent Value   General Information     Referral From: Other -stroke order set. Meet with 89 y/o at bedside for initial assessment of DM ed needs.    Height 170.2 cm (67\")   Height Method Stated   Weight 60.7 kg (133 lb 13.1 oz)   Weight Method Bed scale   Diabetes History    What type of diabetes do you have? Type 2   Do you test your blood sugar at home? no   Have you had high blood sugar? (>140mg/dl) Yes- current A1c 10.1%   Education Preferences    Barriers to Learning -- [place]. Poss age-related memory impairment.    Assessment Topics    Taking Medication - Assessment -- Pt reports she may forget some doses of her home DM med (glipizide.)    Healthy Coping - Assessment Competent   DM Goals    Contact Plan Follow-up medical care            Flowsheet Row Most Recent Value   DM Education Needs    Healthy Coping Appropriate   Discharge Plan -- Home w/help vs snf.   Teaching Method Discussion   Patient Response -- Pt gave this RN permission to contact her children over the phone to complete assessment.        Other Comments:    Electronically signed by:  Cleo Trinidad RN  11/20/23 15:22 EST  "

## 2023-11-20 NOTE — PLAN OF CARE
Goal Outcome Evaluation:  Plan of Care Reviewed With: patient        Progress: improving  Outcome Evaluation: Patient is agreeable to PT and improved overall functional mobility. She increased ambulation distance to 100ft using rwx requiring CGA. VC to maintain safe distance to rwx. She completed multiple STS to rwx requiring SBA-CGA. Pt is up in chair post session and plans to dc to daughters home for her to assist and HHPT. Pt is not safe to dc home alone at this point. Will continue to progress as pt tolerates.

## 2023-11-20 NOTE — THERAPY TREATMENT NOTE
Patient Name: Elissa Marrero  : 3/15/1925    MRN: [id number]                              Today's Date: 2023       Admit Date: 2023    Visit Dx:     ICD-10-CM ICD-9-CM   1. Acute encephalopathy  G93.40 348.30   2. Acute cystitis without hematuria  N30.00 595.0     Patient Active Problem List   Diagnosis    Diabetes mellitus, type 2    Hyperlipidemia    Benign essential hypertension    Hypothyroidism    Insomnia    Lymphedema    Osteoarthritis    Osteopenia    Renal insufficiency    Restless leg syndrome    Vitamin D deficiency    Hip pain, right    Acute kidney failure    Chronic kidney disease, stage 4 (severe)    Disorder of bone and cartilage    Edema    Hyperkalemia    Urinary tract infection    TIA (transient ischemic attack)    Acute metabolic encephalopathy    Acute cystitis without hematuria     Past Medical History:   Diagnosis Date    Benign essential hypertension     Diabetes mellitus, type 2 2012    Encounter for diabetic foot exam 2015    History of complete eye exam 2017    Dr Leslie    Hyperlipidemia     Hypothyroidism     Insomnia     Lymphedema 2010    Osteoarthritis     Osteopenia     Renal insufficiency 2009    Restless leg syndrome 2012    Vitamin D deficiency 10/12/2011     Past Surgical History:   Procedure Laterality Date    APPENDECTOMY      CHOLECYSTECTOMY      EYE SURGERY      TONSILLECTOMY        General Information       Row Name 23 1037          OT Time and Intention    Document Type therapy note (daily note)  -CHERELLE     Mode of Treatment individual therapy;occupational therapy  -CHERELLE       Row Name 23 1037          General Information    Existing Precautions/Restrictions fall  -CHERELLE       Row Name 23 1037          Cognition    Orientation Status (Cognition) oriented x 3  -CHERELLE       Row Name 23 1037          Safety Issues, Functional Mobility    Impairments Affecting Function (Mobility) balance;endurance/activity  tolerance;strength  -CHERELLE               User Key  (r) = Recorded By, (t) = Taken By, (c) = Cosigned By      Initials Name Provider Type    Farzaneh Thomas OT Occupational Therapist                     Mobility/ADL's       Row Name 11/20/23 1038          Bed Mobility    Bed Mobility supine-sit  -CHERELLE     Supine-Sit Branch (Bed Mobility) standby assist  -CHERELLE     Assistive Device (Bed Mobility) head of bed elevated  -CHERELLE       Row Name 11/20/23 1038          Transfers    Transfers toilet transfer;sit-stand transfer;stand-sit transfer  -CHERELLE     Comment, (Transfers) Pt ambulated from bed to bathroom CGA and back to chair CGA  -CHERELLE       Row Name 11/20/23 1038          Sit-Stand Transfer    Sit-Stand Branch (Transfers) set up;verbal cues;contact guard  -CHERELLE     Assistive Device (Sit-Stand Transfers) walker, front-wheeled  -CHERELLE       Row Name 11/20/23 1038          Stand-Sit Transfer    Stand-Sit Branch (Transfers) set up;verbal cues;contact guard  -CHERELLE     Assistive Device (Stand-Sit Transfers) walker, front-wheeled  -CHERELLE       Row Name 11/20/23 1038          Toilet Transfer    Type (Toilet Transfer) stand pivot/stand step  -CHERELLE     Branch Level (Toilet Transfer) contact guard;verbal cues  -CHERELLE     Assistive Device (Toilet Transfer) walker, front-wheeled  -CHERELLE       Row Name 11/20/23 1038          Functional Mobility    Functional Mobility- Ind. Level set up required;contact guard assist  -CHERELLE     Functional Mobility- Device walker, front-wheeled  -CHERELLE       Row Name 11/20/23 1038          Grooming Assessment/Training    Branch Level (Grooming) grooming skills;wash face, hands;set up;verbal cues;standby assist  -CHERELLE     Position (Grooming) supported standing  -CHERELLE               User Key  (r) = Recorded By, (t) = Taken By, (c) = Cosigned By      Initials Name Provider Type    Farzaneh Thomas OT Occupational Therapist                   Obj/Interventions       Row Name 11/20/23 1041          Vision  Assessment/Intervention    Visual Impairment/Limitations WFL  -CHERELLE       Row Name 11/20/23 1041          Balance    Balance Assessment sitting static balance;standing dynamic balance;standing static balance  -CHERELLE     Static Sitting Balance standby assist  -CHERELLE     Position, Sitting Balance supported;sitting edge of bed  -CHERELLE     Static Standing Balance contact guard  -CHERELLE     Dynamic Standing Balance contact guard;verbal cues  -CHERELLE     Position/Device Used, Standing Balance walker, front-wheeled  -CHERELLE     Balance Interventions occupation based/functional task;supported;sitting;standing;sit to stand  -CHERELLE               User Key  (r) = Recorded By, (t) = Taken By, (c) = Cosigned By      Initials Name Provider Type    Farzaneh Thomas OT Occupational Therapist                   Goals/Plan    No documentation.                  Clinical Impression       Row Name 11/20/23 1043          Pain Assessment    Pretreatment Pain Rating 0/10 - no pain  -CHERELLE     Posttreatment Pain Rating 0/10 - no pain  -CHERELLE       Row Name 11/20/23 1043          Plan of Care Review    Plan of Care Reviewed With patient  -CHERELLE     Outcome Evaluation Pt was fowlers in bed on OT arrival this AM. Pt SBA to come to sit EOB and prepare for ambulation. Pt ambulated from bed to toilet in BR CGA and completed toileting with MIN A for clothing management. Pt completed hand washing standing sink side CGA and ambulated back to chair CGA. Pt tolerated tx well this AM with no c/o pain or discomfort noted.  -CHERELLE       Row Name 11/20/23 1043          Therapy Plan Review/Discharge Plan (OT)    Anticipated Discharge Disposition (OT) home with 24/7 care;home with assist;home with home health  -CHERELLE       Row Name 11/20/23 1043          Vital Signs    O2 Delivery Pre Treatment room air  -CHERELLE       Row Name 11/20/23 1043          Positioning and Restraints    Pre-Treatment Position in bed  -CHERELLE     Post Treatment Position chair  -CHERELLE     In Chair notified nsg;reclined;sitting;call  light within reach;encouraged to call for assist;exit alarm on;heels elevated  -CHERELLE               User Key  (r) = Recorded By, (t) = Taken By, (c) = Cosigned By      Initials Name Provider Type    Farzaneh Thomas OT Occupational Therapist                   Outcome Measures    No documentation.                   Occupational Therapy Education       Title: PT OT SLP Therapies (In Progress)       Topic: Occupational Therapy (Done)       Point: ADL training (Done)       Description:   Instruct learner(s) on proper safety adaptation and remediation techniques during self care or transfers.   Instruct in proper use of assistive devices.                  Learning Progress Summary             Patient Acceptance, E,TB,D, DU,VU by  at 11/19/2023 1237    Comment: ed pt on role of OT. benefit of therapy, POC w. OT ed on safety w ADL and tsf. pt demo grooming SBA and tsf CGA w walker                         Point: Home exercise program (Done)       Description:   Instruct learner(s) on appropriate technique for monitoring, assisting and/or progressing therapeutic exercises/activities.                  Learning Progress Summary             Patient Acceptance, E,TB,D, DU,VU by  at 11/19/2023 1237    Comment: ed pt on role of OT. benefit of therapy, POC w. OT ed on safety w ADL and tsf. pt demo grooming SBA and tsf CGA w walker                         Point: Precautions (Done)       Description:   Instruct learner(s) on prescribed precautions during self-care and functional transfers.                  Learning Progress Summary             Patient Acceptance, E,TB,D, DU,VU by  at 11/19/2023 1237    Comment: ed pt on role of OT. benefit of therapy, POC w. OT ed on safety w ADL and tsf. pt demo grooming SBA and tsf CGA w walker                         Point: Body mechanics (Done)       Description:   Instruct learner(s) on proper positioning and spine alignment during self-care, functional mobility activities and/or exercises.                   Learning Progress Summary             Patient Acceptance, E,TB,D, DU,VU by  at 11/19/2023 3738    Comment: ed pt on role of OT. benefit of therapy, POC w. OT ed on safety w ADL and tsf. pt demo grooming SBA and tsf CGA w walker                                         User Key       Initials Effective Dates Name Provider Type Discipline     07/11/23 -  Mariah Barron, MOHANR Occupational Therapist OT                  OT Recommendation and Plan     Plan of Care Review  Plan of Care Reviewed With: patient  Outcome Evaluation: Pt was fowlers in bed on OT arrival this AM. Pt SBA to come to sit EOB and prepare for ambulation. Pt ambulated from bed to toilet in BR CGA and completed toileting with MIN A for clothing management. Pt completed hand washing standing sink side CGA and ambulated back to chair CGA. Pt tolerated tx well this AM with no c/o pain or discomfort noted.     Time Calculation:         Time Calculation- OT       Row Name 11/20/23 1047             Time Calculation- OT    OT Start Time 1003  -CHERELLE      OT Stop Time 1014  -CHERELLE      OT Time Calculation (min) 11 min  -CHERELLE      Total Timed Code Minutes- OT 11 minute(s)  -CHERELLE      OT Received On 11/20/23  -CHERELLE      OT - Next Appointment 11/21/23  -CHERELLE                User Key  (r) = Recorded By, (t) = Taken By, (c) = Cosigned By      Initials Name Provider Type    Farzaneh Thomas OT Occupational Therapist                  Therapy Charges for Today       Code Description Service Date Service Provider Modifiers Qty    93480579229 HC OT SELF CARE/MGMT/TRAIN EA 15 MIN 11/20/2023 Farzaneh Purvis OT GO 1                 Farzaneh Purvis OT  11/20/2023

## 2023-11-20 NOTE — PLAN OF CARE
Goal Outcome Evaluation:              Outcome Evaluation: Discussed with pt and RN. Pt passed the nursing swallow screen and is tolerating a regular diet. Pt is back to baseline, all symptoms resolved. ST is not indicated at this time. Will sign off. Please reconsult if needed.

## 2023-11-20 NOTE — PLAN OF CARE
Goal Outcome Evaluation:  Plan of Care Reviewed With: patient           Outcome Evaluation: Pt was fowlers in bed on OT arrival this AM. Pt SBA to come to sit EOB and prepare for ambulation. Pt ambulated from bed to toilet in BR CGA and completed toileting with MIN A for clothing management. Pt completed hand washing standing sink side CGA and ambulated back to chair CGA. Pt tolerated tx well this AM with no c/o pain or discomfort noted.      Anticipated Discharge Disposition (OT): home with 24/7 care, home with assist, home with home health

## 2023-11-20 NOTE — NURSING NOTE
Call to pt's dtr Michelle to attempt assessment. No answer. LM with our office call back. [phone]

## 2023-11-21 ENCOUNTER — READMISSION MANAGEMENT (OUTPATIENT)
Dept: CALL CENTER | Facility: HOSPITAL | Age: 88
End: 2023-11-21
Payer: MEDICARE

## 2023-11-21 VITALS
SYSTOLIC BLOOD PRESSURE: 142 MMHG | HEIGHT: 67 IN | DIASTOLIC BLOOD PRESSURE: 65 MMHG | RESPIRATION RATE: 18 BRPM | OXYGEN SATURATION: 100 % | HEART RATE: 88 BPM | WEIGHT: 133.82 LBS | TEMPERATURE: 98.4 F | BODY MASS INDEX: 21 KG/M2

## 2023-11-21 LAB
ANION GAP SERPL CALCULATED.3IONS-SCNC: 10 MMOL/L (ref 5–15)
BASOPHILS # BLD AUTO: 0.02 10*3/MM3 (ref 0–0.2)
BASOPHILS NFR BLD AUTO: 0.3 % (ref 0–1.5)
BUN SERPL-MCNC: 12 MG/DL (ref 8–23)
BUN/CREAT SERPL: 9 (ref 7–25)
CALCIUM SPEC-SCNC: 9.5 MG/DL (ref 8.2–9.6)
CHLORIDE SERPL-SCNC: 102 MMOL/L (ref 98–107)
CO2 SERPL-SCNC: 26 MMOL/L (ref 22–29)
CREAT SERPL-MCNC: 1.33 MG/DL (ref 0.57–1)
DEPRECATED RDW RBC AUTO: 40.7 FL (ref 37–54)
EGFRCR SERPLBLD CKD-EPI 2021: 36.2 ML/MIN/1.73
EOSINOPHIL # BLD AUTO: 0.07 10*3/MM3 (ref 0–0.4)
EOSINOPHIL NFR BLD AUTO: 1.1 % (ref 0.3–6.2)
ERYTHROCYTE [DISTWIDTH] IN BLOOD BY AUTOMATED COUNT: 13 % (ref 12.3–15.4)
GLUCOSE BLDC GLUCOMTR-MCNC: 120 MG/DL (ref 70–130)
GLUCOSE BLDC GLUCOMTR-MCNC: 177 MG/DL (ref 70–130)
GLUCOSE SERPL-MCNC: 126 MG/DL (ref 65–99)
HCT VFR BLD AUTO: 38.3 % (ref 34–46.6)
HGB BLD-MCNC: 12.6 G/DL (ref 12–15.9)
IMM GRANULOCYTES # BLD AUTO: 0.03 10*3/MM3 (ref 0–0.05)
IMM GRANULOCYTES NFR BLD AUTO: 0.5 % (ref 0–0.5)
LYMPHOCYTES # BLD AUTO: 2.08 10*3/MM3 (ref 0.7–3.1)
LYMPHOCYTES NFR BLD AUTO: 31.3 % (ref 19.6–45.3)
MCH RBC QN AUTO: 28.6 PG (ref 26.6–33)
MCHC RBC AUTO-ENTMCNC: 32.9 G/DL (ref 31.5–35.7)
MCV RBC AUTO: 87 FL (ref 79–97)
MONOCYTES # BLD AUTO: 0.46 10*3/MM3 (ref 0.1–0.9)
MONOCYTES NFR BLD AUTO: 6.9 % (ref 5–12)
NEUTROPHILS NFR BLD AUTO: 3.98 10*3/MM3 (ref 1.7–7)
NEUTROPHILS NFR BLD AUTO: 59.9 % (ref 42.7–76)
NRBC BLD AUTO-RTO: 0 /100 WBC (ref 0–0.2)
PLATELET # BLD AUTO: 200 10*3/MM3 (ref 140–450)
PMV BLD AUTO: 11.3 FL (ref 6–12)
POTASSIUM SERPL-SCNC: 3.8 MMOL/L (ref 3.5–5.2)
RBC # BLD AUTO: 4.4 10*6/MM3 (ref 3.77–5.28)
SODIUM SERPL-SCNC: 138 MMOL/L (ref 136–145)
WBC NRBC COR # BLD AUTO: 6.64 10*3/MM3 (ref 3.4–10.8)

## 2023-11-21 PROCEDURE — 97530 THERAPEUTIC ACTIVITIES: CPT

## 2023-11-21 PROCEDURE — 80048 BASIC METABOLIC PNL TOTAL CA: CPT | Performed by: HOSPITALIST

## 2023-11-21 PROCEDURE — 63710000001 INSULIN LISPRO (HUMAN) PER 5 UNITS: Performed by: INTERNAL MEDICINE

## 2023-11-21 PROCEDURE — 82948 REAGENT STRIP/BLOOD GLUCOSE: CPT

## 2023-11-21 PROCEDURE — 85025 COMPLETE CBC W/AUTO DIFF WBC: CPT | Performed by: HOSPITALIST

## 2023-11-21 RX ORDER — CEPHALEXIN 500 MG/1
500 CAPSULE ORAL 3 TIMES DAILY
Qty: 18 CAPSULE | Refills: 0 | Status: SHIPPED | OUTPATIENT
Start: 2023-11-21 | End: 2023-11-27

## 2023-11-21 RX ADMIN — Medication 10 ML: at 08:41

## 2023-11-21 RX ADMIN — METOPROLOL TARTRATE 25 MG: 25 TABLET, FILM COATED ORAL at 08:40

## 2023-11-21 RX ADMIN — LEVOTHYROXINE SODIUM 112 MCG: 112 TABLET ORAL at 08:40

## 2023-11-21 RX ADMIN — AMLODIPINE BESYLATE 5 MG: 5 TABLET ORAL at 08:39

## 2023-11-21 RX ADMIN — SENNOSIDES AND DOCUSATE SODIUM 2 TABLET: 50; 8.6 TABLET ORAL at 08:41

## 2023-11-21 RX ADMIN — CLOPIDOGREL BISULFATE 75 MG: 75 TABLET, FILM COATED ORAL at 08:40

## 2023-11-21 RX ADMIN — PRAMIPEXOLE DIHYDROCHLORIDE 1.5 MG: 1.5 TABLET ORAL at 08:40

## 2023-11-21 RX ADMIN — ROSUVASTATIN CALCIUM 10 MG: 10 TABLET, FILM COATED ORAL at 08:41

## 2023-11-21 RX ADMIN — INSULIN LISPRO 2 UNITS: 100 INJECTION, SOLUTION INTRAVENOUS; SUBCUTANEOUS at 12:25

## 2023-11-21 RX ADMIN — Medication 2000 UNITS: at 08:40

## 2023-11-21 NOTE — DISCHARGE SUMMARY
PHYSICIAN DISCHARGE SUMMARY                                                                        [facility]    Patient Identification:  Name: Elissa Marrero  Age: 89 y.o.  Sex: female  :  3/15/1925  MRN: [id number]  Primary Care Physician: Onel Mann MD    Admit date: 2023  Discharge date and time:2023  Discharged Condition: good    Discharge Diagnoses:  Active Hospital Problems    Diagnosis  POA    **Acute metabolic encephalopathy [G93.41]  Yes    Acute cystitis without hematuria [N30.00]  Yes    Hyperlipidemia [E78.5]  Yes    Benign essential hypertension [I10]  Yes    Hypothyroidism [E03.9]  Yes    Urinary tract infection [N39.0]  Yes    Diabetes mellitus, type 2 [E11.9]  Yes      Resolved Hospital Problems   No resolved problems to display.          PMHX:   Past Medical History:   Diagnosis Date    Benign essential hypertension     Diabetes mellitus, type 2 2012    Encounter for diabetic foot exam 2015    History of complete eye exam 2017    Dr Leslie    Hyperlipidemia     Hypothyroidism     Insomnia     Lymphedema 2010    Osteoarthritis     Osteopenia     Renal insufficiency 2009    Restless leg syndrome 2012    Vitamin D deficiency 10/12/2011     PSHX:   Past Surgical History:   Procedure Laterality Date    APPENDECTOMY      CHOLECYSTECTOMY      EYE SURGERY      TONSILLECTOMY         Hospital Course: Elissa Marrero   89 year old female who presented to the emergency room with weakness, confusion noted by her daughter when she talked to the patient on the phone earlier; she also had difficulty walking and keeping her balance; she was seen at an urgent care center and sent to the hospital for further evaluation treatment.  The patient was evaluated and found to have urinary tract infection.  The patient is started on broad-spectrum antibiotics for UTI and she was feeling better  after being in the hospital for couple of days.  Her mental status was back to her about her baseline.  She did have MRI brain suggesting possible stroke but neurology felt that it was an over read by radiology and they really did not feel she had a stroke.  At any rate she will continue with medical management with Plavix and a statin.  She looked well enough to go home with home health and 24-hour caregivers.  She will finish course of oral antibiotics and follow-up with her primary care in 1 week for ongoing care.  Consults:     Consults       Date and Time Order Name Status Description    11/18/2023  6:38 PM Inpatient Neurology Consult Stroke Completed     11/17/2023  3:44 PM [facility] (on-call MD unless specified) Details            Results from last 7 days   Lab Units 11/21/23  0638   WBC 10*3/mm3 6.64   HEMOGLOBIN g/dL 12.6   HEMATOCRIT % 38.3   PLATELETS 10*3/mm3 200     Results from last 7 days   Lab Units 11/21/23  0638   SODIUM mmol/L 138   POTASSIUM mmol/L 3.8   CHLORIDE mmol/L 102   CO2 mmol/L 26.0   BUN mg/dL 12   CREATININE mg/dL 1.33*   GLUCOSE mg/dL 126*   CALCIUM mg/dL 9.5     Significant Diagnostic Studies:   WBC   Date Value Ref Range Status   11/21/2023 6.64 3.40 - 10.80 10*3/mm3 Final     Hemoglobin   Date Value Ref Range Status   11/21/2023 12.6 12.0 - 15.9 g/dL Final     Hematocrit   Date Value Ref Range Status   11/21/2023 38.3 34.0 - 46.6 % Final     Platelets   Date Value Ref Range Status   11/21/2023 200 140 - 450 10*3/mm3 Final     Sodium   Date Value Ref Range Status   11/21/2023 138 136 - 145 mmol/L Final     Potassium   Date Value Ref Range Status   11/21/2023 3.8 3.5 - 5.2 mmol/L Final     Chloride   Date Value Ref Range Status   11/21/2023 102 98 - 107 mmol/L Final     CO2   Date Value Ref Range Status   11/21/2023 26.0 22.0 - 29.0 mmol/L Final     BUN   Date Value Ref Range Status   11/21/2023 12 8 - 23 mg/dL Final     Creatinine   Date Value Ref Range Status   11/21/2023 1.33 (H) 0.57 -  "1.00 mg/dL Final     Glucose   Date Value Ref Range Status   11/21/2023 126 (H) 65 - 99 mg/dL Final     Calcium   Date Value Ref Range Status   11/21/2023 9.5 8.2 - 9.6 mg/dL Final     No results found for: \"AST\", \"ALT\", \"ALKPHOS\"  No results found for: \"APTT\", \"INR\"  No results found for: \"COLORU\", \"CLARITYU\", \"SPECGRAV\", \"PHUR\", \"PROTEINUR\", \"GLUCOSEU\", \"KETONESU\", \"BLOODU\", \"NITRITE\", \"LEUKOCYTESUR\", \"BILIRUBINUR\", \"UROBILINOGEN\", \"RBCUA\", \"WBCUA\", \"BACTERIA\", \"UACOMMENT\"  No results found for: \"TROPONINT\", \"TROPONINI\", \"BNP\"  No components found for: \"HGBA1C;2\"  No components found for: \"TSH;2\"  Imaging Results (All)       Procedure Component Value Units Date/Time    MRI Brain Without Contrast [[id number]] Collected: 11/18/23 1609     Updated: 11/18/23 1625    Narrative:      MRI BRAIN WO CONTRAST-     INDICATIONS: Ataxia     TECHNIQUE: Multiplanar multisequence noncontrast magnetic resonance  imaging of the brain.     Comparison: 1/16/2023, correlated with head CT from 11/17/2023     FINDINGS:     The diffusion-weighted images show a focus of increased  diffusion-weighted signal in the left frontal lobe, 7 mm on image 15,  with little apparent corresponding loss of signal on the ADC mapping may  represent subacute to acute infarct. In the right frontal lobe on image  19, a 4 mm focus of increased diffusion-weighted signal shows  corresponding increased signal on the ADC mapping, suggesting T2 shine  through artifact, and a few similar other foci are seen in the right  frontal lobe.        The midline structures appear unremarkable.     The brain parenchyma shows moderate to prominent periventricular white  matter T2 FLAIR signal hyperintensities, compatible with chronic small  vessel ischemic change in a patient this age. Small focus of old infarct  is evident in the medial right cerebellum.     Asymmetric diminished flow void in the right vertebral artery could be  result of diminished or absent flow in the " right vertebral artery; this  appearance is chronic. Flow voids in the major arteries at the base of  the brain otherwise appear unremarkable.     Fluid signal is seen in right more than left mastoid air cells. Mild  paranasal sinus mucosal thickening is present. The paranasal sinuses,  mastoid air cells, and orbits appear otherwise unremarkable.       Impression:         A 7 mm focus of suspected subacute to acute infarct in the anterior left  frontal lobe. Moderate to prominent periventricular white matter chronic  small vessel ischemic change, with small foci of T2 shine through  artifact.     This report was finalized on 11/18/2023 4:21 PM by Dr. Anthony Giles M.D on Workstation: [id number]       CT Head Without Contrast [[id number]] Collected: 11/17/23 1633     Updated: 11/17/23 1638    Narrative:      CT HEAD WO CONTRAST-     INDICATIONS: Mental status change     TECHNIQUE: Radiation dose reduction techniques were utilized, including  automated exposure control and exposure modulation based on body size.  Noncontrast head CT     COMPARISON: None available     FINDINGS:           No acute intracranial hemorrhage, midline shift or mass effect. No acute  territorial infarct is identified.     Moderate periventricular hypodensities suggest chronic small vessel  ischemic change in a patient this age.     Arterial calcifications are seen at the base of the brain.     Ventricles, cisterns, cerebral sulci are unremarkable for patient age.     Minimal paranasal sinus mucosal thickening is present. The visualized  paranasal sinuses, orbits, mastoid air cells are otherwise unremarkable.          Impression:         No acute intracranial hemorrhage or hydrocephalus. Chronic appearing  changes of the brain. If there is further clinical concern, MRI could be  considered for further evaluation.     This report was finalized on 11/17/2023 4:34 PM by Dr. Anthony Giles M.D on Workstation: [id number]       XR Chest 1 View  [[id number]] Collected: 11/17/23 1437     Updated: 11/17/23 1441    Narrative:      ONE-VIEW PORTABLE CHEST     HISTORY: Weakness and dizziness.     FINDINGS: The lungs are well expanded and clear. The heart is borderline  enlarged and no acute abnormality is seen.     This report was finalized on 11/17/2023 2:38 PM by Dr. Davy Marrero M.D on Workstation: [id number]             Lab Results (last 7 days)       Procedure Component Value Units Date/Time    POC Glucose Once [[id number]]  (Abnormal) Collected: 11/21/23 1113    Specimen: Blood Updated: 11/21/23 1114     Glucose 177 mg/dL     POC Glucose Once [[id number]]  (Normal) Collected: 11/21/23 0720    Specimen: Blood Updated: 11/21/23 0722     Glucose 120 mg/dL     Basic Metabolic Panel [[id number]]  (Abnormal) Collected: 11/21/23 0638    Specimen: Blood Updated: 11/21/23 0716     Glucose 126 mg/dL      BUN 12 mg/dL      Creatinine 1.33 mg/dL      Sodium 138 mmol/L      Potassium 3.8 mmol/L      Chloride 102 mmol/L      CO2 26.0 mmol/L      Calcium 9.5 mg/dL      BUN/Creatinine Ratio 9.0     Anion Gap 10.0 mmol/L      eGFR 36.2 mL/min/1.73     Narrative:      GFR Normal >60  Chronic Kidney Disease <60  Kidney Failure <15    The GFR formula is only valid for adults with stable renal function between ages 18 and 70.    CBC & Differential [[id number]]  (Normal) Collected: 11/21/23 0638    Specimen: Blood Updated: 11/21/23 0659    Narrative:      The following orders were created for panel order CBC & Differential.  Procedure                               Abnormality         Status                     ---------                               -----------         ------                     CBC Auto Differential[[id number]]        Normal              Final result                 Please view results for these tests on the individual orders.    CBC Auto Differential [[id number]]  (Normal) Collected: 11/21/23 0638    Specimen: Blood Updated: 11/21/23 0659     WBC 6.64 10*3/mm3       RBC 4.40 10*6/mm3      Hemoglobin 12.6 g/dL      Hematocrit 38.3 %      MCV 87.0 fL      MCH 28.6 pg      MCHC 32.9 g/dL      RDW 13.0 %      RDW-SD 40.7 fl      MPV 11.3 fL      Platelets 200 10*3/mm3      Neutrophil % 59.9 %      Lymphocyte % 31.3 %      Monocyte % 6.9 %      Eosinophil % 1.1 %      Basophil % 0.3 %      Immature Grans % 0.5 %      Neutrophils, Absolute 3.98 10*3/mm3      Lymphocytes, Absolute 2.08 10*3/mm3      Monocytes, Absolute 0.46 10*3/mm3      Eosinophils, Absolute 0.07 10*3/mm3      Basophils, Absolute 0.02 10*3/mm3      Immature Grans, Absolute 0.03 10*3/mm3      nRBC 0.0 /100 WBC     POC Glucose Once [[id number]]  (Normal) Collected: 11/20/23 2013    Specimen: Blood Updated: 11/20/23 2014     Glucose 127 mg/dL     Blood Culture - Blood, Arm, Left [[id number]]  (Normal) Collected: 11/17/23 1800    Specimen: Blood from Arm, Left Updated: 11/20/23 1815     Blood Culture No growth at 3 days    Blood Culture - Blood, Arm, Right [[id number]]  (Normal) Collected: 11/17/23 1654    Specimen: Blood from Arm, Right Updated: 11/20/23 1700     Blood Culture No growth at 3 days    POC Glucose Once [[id number]]  (Normal) Collected: 11/20/23 1647    Specimen: Blood Updated: 11/20/23 1653     Glucose 80 mg/dL     POC Glucose Once [[id number]]  (Abnormal) Collected: 11/20/23 1113    Specimen: Blood Updated: 11/20/23 1115     Glucose 240 mg/dL     Basic Metabolic Panel [[id number]]  (Abnormal) Collected: 11/20/23 0709    Specimen: Blood Updated: 11/20/23 0758     Glucose 170 mg/dL      BUN 12 mg/dL      Creatinine 1.17 mg/dL      Sodium 137 mmol/L      Potassium 4.0 mmol/L      Chloride 103 mmol/L      CO2 23.3 mmol/L      Calcium 9.6 mg/dL      BUN/Creatinine Ratio 10.3     Anion Gap 10.7 mmol/L      eGFR 42.3 mL/min/1.73     Narrative:      GFR Normal >60  Chronic Kidney Disease <60  Kidney Failure <15    The GFR formula is only valid for adults with stable renal function between ages 18 and 70.    CBC &  Differential [[id number]]  (Normal) Collected: 11/20/23 0709    Specimen: Blood Updated: 11/20/23 0737    Narrative:      The following orders were created for panel order CBC & Differential.  Procedure                               Abnormality         Status                     ---------                               -----------         ------                     CBC Auto Differential[[id number]]        Normal              Final result                 Please view results for these tests on the individual orders.    CBC Auto Differential [[id number]]  (Normal) Collected: 11/20/23 0709    Specimen: Blood Updated: 11/20/23 0737     WBC 6.76 10*3/mm3      RBC 4.66 10*6/mm3      Hemoglobin 13.3 g/dL      Hematocrit 40.9 %      MCV 87.8 fL      MCH 28.5 pg      MCHC 32.5 g/dL      RDW 12.8 %      RDW-SD 41.4 fl      MPV 11.3 fL      Platelets 201 10*3/mm3      Neutrophil % 62.1 %      Lymphocyte % 27.8 %      Monocyte % 8.3 %      Eosinophil % 1.0 %      Basophil % 0.4 %      Immature Grans % 0.4 %      Neutrophils, Absolute 4.19 10*3/mm3      Lymphocytes, Absolute 1.88 10*3/mm3      Monocytes, Absolute 0.56 10*3/mm3      Eosinophils, Absolute 0.07 10*3/mm3      Basophils, Absolute 0.03 10*3/mm3      Immature Grans, Absolute 0.03 10*3/mm3      nRBC 0.0 /100 WBC     POC Glucose Once [[id number]]  (Abnormal) Collected: 11/20/23 0734    Specimen: Blood Updated: 11/20/23 0736     Glucose 175 mg/dL     POC Glucose Once [[id number]]  (Abnormal) Collected: 11/19/23 2033    Specimen: Blood Updated: 11/19/23 2034     Glucose 137 mg/dL     POC Glucose Once [[id number]]  (Abnormal) Collected: 11/19/23 1702    Specimen: Blood Updated: 11/19/23 1704     Glucose 135 mg/dL     POC Glucose Once [[id number]]  (Abnormal) Collected: 11/19/23 1112    Specimen: Blood Updated: 11/19/23 1115     Glucose 168 mg/dL     Urine Culture - Urine, Urine, Clean Catch [[id number]]  (Abnormal)  (Susceptibility) Collected: 11/17/23 1521    Specimen: Urine, Clean  Catch Updated: 11/19/23 1109     Urine Culture >100,000 CFU/mL Escherichia coli    Narrative:      Colonization of the urinary tract without infection is common. Treatment is discouraged unless the patient is symptomatic, pregnant, or undergoing an invasive urologic procedure.    Susceptibility        Escherichia coli      DENICE      Ampicillin Resistant      Ampicillin + Sulbactam Intermediate      Cefazolin Susceptible      Cefepime Susceptible      Ceftazidime Susceptible      Ceftriaxone Susceptible      Gentamicin Susceptible      Levofloxacin Susceptible      Nitrofurantoin Susceptible      Piperacillin + Tazobactam Susceptible      Trimethoprim + Sulfamethoxazole Susceptible                           Basic Metabolic Panel [[id number]]  (Abnormal) Collected: 11/19/23 0657    Specimen: Blood Updated: 11/19/23 0843     Glucose 113 mg/dL      BUN 11 mg/dL      Creatinine 1.13 mg/dL      Sodium 138 mmol/L      Potassium 3.7 mmol/L      Chloride 104 mmol/L      CO2 22.4 mmol/L      Calcium 9.0 mg/dL      BUN/Creatinine Ratio 9.7     Anion Gap 11.6 mmol/L      eGFR 44.1 mL/min/1.73     Narrative:      GFR Normal >60  Chronic Kidney Disease <60  Kidney Failure <15    The GFR formula is only valid for adults with stable renal function between ages 18 and 70.    Lipid Panel [[id number]]  (Abnormal) Collected: 11/19/23 0657    Specimen: Blood Updated: 11/19/23 0843     Total Cholesterol 139 mg/dL      Triglycerides 152 mg/dL      HDL Cholesterol 37 mg/dL      LDL Cholesterol  76 mg/dL      VLDL Cholesterol 26 mg/dL      LDL/HDL Ratio 1.94    Narrative:      Cholesterol Reference Ranges  (U.S. Department of Health and Human Services ATP III Classifications)    Desirable          <200 mg/dL  Borderline High    200-239 mg/dL  High Risk          >240 mg/dL      Triglyceride Reference Ranges  (U.S. Department of Health and Human Services ATP III Classifications)    Normal           <150 mg/dL  Borderline High  150-199  mg/dL  High             200-499 mg/dL  Very High        >500 mg/dL    HDL Reference Ranges  (U.S. Department of Health and Human Services ATP III Classifications)    Low     <40 mg/dl (major risk factor for CHD)  High    >60 mg/dl ('negative' risk factor for CHD)        LDL Reference Ranges  (U.S. Department of Health and Human Services ATP III Classifications)    Optimal          <100 mg/dL  Near Optimal     100-129 mg/dL  Borderline High  130-159 mg/dL  High             160-189 mg/dL  Very High        >189 mg/dL    Hemoglobin A1c [[id number]]  (Abnormal) Collected: 11/19/23 0657    Specimen: Blood Updated: 11/19/23 0822     Hemoglobin A1C 10.10 %     Narrative:      Hemoglobin A1C Ranges:    Increased Risk for Diabetes  5.7% to 6.4%  Diabetes                     >= 6.5%  Diabetic Goal                < 7.0%    CBC & Differential [[id number]]  (Abnormal) Collected: 11/19/23 0657    Specimen: Blood Updated: 11/19/23 0800    Narrative:      The following orders were created for panel order CBC & Differential.  Procedure                               Abnormality         Status                     ---------                               -----------         ------                     CBC Auto Differential[[id number]]        Abnormal            Final result                 Please view results for these tests on the individual orders.    CBC Auto Differential [[id number]]  (Abnormal) Collected: 11/19/23 0657    Specimen: Blood Updated: 11/19/23 0800     WBC 6.36 10*3/mm3      RBC 4.19 10*6/mm3      Hemoglobin 11.9 g/dL      Hematocrit 36.3 %      MCV 86.6 fL      MCH 28.4 pg      MCHC 32.8 g/dL      RDW 12.7 %      RDW-SD 39.6 fl      MPV 11.7 fL      Platelets 173 10*3/mm3      Neutrophil % 65.0 %      Lymphocyte % 26.1 %      Monocyte % 7.1 %      Eosinophil % 1.3 %      Basophil % 0.2 %      Immature Grans % 0.3 %      Neutrophils, Absolute 4.14 10*3/mm3      Lymphocytes, Absolute 1.66 10*3/mm3      Monocytes, Absolute 0.45  10*3/mm3      Eosinophils, Absolute 0.08 10*3/mm3      Basophils, Absolute 0.01 10*3/mm3      Immature Grans, Absolute 0.02 10*3/mm3      nRBC 0.0 /100 WBC     POC Glucose Once [[id number]]  (Normal) Collected: 11/19/23 0729    Specimen: Blood Updated: 11/19/23 0733     Glucose 114 mg/dL     POC Glucose Once [[id number]]  (Normal) Collected: 11/18/23 2131    Specimen: Blood Updated: 11/18/23 2132     Glucose 112 mg/dL     POC Glucose Once [[id number]]  (Normal) Collected: 11/18/23 1655    Specimen: Blood Updated: 11/18/23 1656     Glucose 114 mg/dL     POC Glucose Once [[id number]]  (Abnormal) Collected: 11/18/23 1115    Specimen: Blood Updated: 11/18/23 1116     Glucose 214 mg/dL     POC Glucose Once [[id number]]  (Abnormal) Collected: 11/18/23 0723    Specimen: Blood Updated: 11/18/23 0724     Glucose 144 mg/dL     Basic Metabolic Panel [[id number]]  (Abnormal) Collected: 11/18/23 0520    Specimen: Blood Updated: 11/18/23 0644     Glucose 146 mg/dL      BUN 16 mg/dL      Creatinine 1.29 mg/dL      Sodium 139 mmol/L      Potassium 3.9 mmol/L      Chloride 105 mmol/L      CO2 22.6 mmol/L      Calcium 9.2 mg/dL      BUN/Creatinine Ratio 12.4     Anion Gap 11.4 mmol/L      eGFR 37.6 mL/min/1.73     Narrative:      GFR Normal >60  Chronic Kidney Disease <60  Kidney Failure <15    The GFR formula is only valid for adults with stable renal function between ages 18 and 70.    CBC (No Diff) [[id number]]  (Abnormal) Collected: 11/18/23 0520    Specimen: Blood Updated: 11/18/23 0611     WBC 10.83 10*3/mm3      RBC 4.31 10*6/mm3      Hemoglobin 12.2 g/dL      Hematocrit 37.5 %      MCV 87.0 fL      MCH 28.3 pg      MCHC 32.5 g/dL      RDW 12.5 %      RDW-SD 39.9 fl      MPV 11.7 fL      Platelets 167 10*3/mm3     High Sensitivity Troponin T 2Hr [[id number]]  (Abnormal) Collected: 11/17/23 2317    Specimen: Blood Updated: 11/17/23 2357     HS Troponin T 30 ng/L      Troponin T Delta 3 ng/L     Narrative:      High Sensitive Troponin T  Reference Range:  <14.0 ng/L- Negative Female for AMI  <22.0 ng/L- Negative Male for AMI  >=14 - Abnormal Female indicating possible myocardial injury.  >=22 - Abnormal Male indicating possible myocardial injury.   Clinicians would have to utilize clinical acumen, EKG, Troponin, and serial changes to determine if it is an Acute Myocardial Infarction or myocardial injury due to an underlying chronic condition.         High Sensitivity Troponin T [[id number]]  (Abnormal) Collected: 11/17/23 2057    Specimen: Blood Updated: 11/17/23 2130     HS Troponin T 27 ng/L     Narrative:      High Sensitive Troponin T Reference Range:  <14.0 ng/L- Negative Female for AMI  <22.0 ng/L- Negative Male for AMI  >=14 - Abnormal Female indicating possible myocardial injury.  >=22 - Abnormal Male indicating possible myocardial injury.   Clinicians would have to utilize clinical acumen, EKG, Troponin, and serial changes to determine if it is an Acute Myocardial Infarction or myocardial injury due to an underlying chronic condition.         POC Glucose Once [[id number]]  (Normal) Collected: 11/17/23 2128    Specimen: Blood Updated: 11/17/23 2129     Glucose 113 mg/dL     Lactic Acid, Plasma [[id number]]  (Normal) Collected: 11/17/23 1654    Specimen: Blood from Arm, Right Updated: 11/17/23 1724     Lactate 1.2 mmol/L     [place] Draw [[id number]] Collected: 11/17/23 1451    Specimen: Blood from Arm, Left Updated: 11/17/23 1600    Narrative:      The following orders were created for panel order [place] Draw.  Procedure                               Abnormality         Status                     ---------                               -----------         ------                     Green Top (Gel)[[id number]]                                  Final result               Lavender Top[[id number]]                                     Final result               Gold Top - SST[[id number]]                                   Final result               Light  Blue Top[[id number]]                                   Final result                 Please view results for these tests on the individual orders.    Green Top (Gel) [[id number]] Collected: 11/17/23 1451    Specimen: Blood from Arm, Left Updated: 11/17/23 1600     Extra Tube Hold for add-ons.     Comment: Auto resulted.       Lavender Top [[id number]] Collected: 11/17/23 1451    Specimen: Blood from Arm, Left Updated: 11/17/23 1600     Extra Tube hold for add-on     Comment: Auto resulted       Gold Top - SST [[id number]] Collected: 11/17/23 1451    Specimen: Blood from Arm, Left Updated: 11/17/23 1600     Extra Tube Hold for add-ons.     Comment: Auto resulted.       Light Blue Top [[id number]] Collected: 11/17/23 1451    Specimen: Blood from Arm, Left Updated: 11/17/23 1600     Extra Tube Hold for add-ons.     Comment: Auto resulted       Urinalysis With Microscopic If Indicated (No Culture) - Urine, Clean Catch [[id number]]  (Abnormal) Collected: 11/17/23 1521    Specimen: Urine, Clean Catch Updated: 11/17/23 1536     Color, UA Yellow     Appearance, UA Cloudy     pH, UA 6.5     Specific Gravity, UA 1.017     Glucose,  mg/dL (Trace)     Ketones, UA Trace     Bilirubin, UA Negative     Blood, UA Trace     Protein, UA Trace     Leuk Esterase, UA Moderate (2+)     Nitrite, UA Positive     Urobilinogen, UA 1.0 E.U./dL    Urinalysis, Microscopic Only - Urine, Clean Catch [[id number]]  (Abnormal) Collected: 11/17/23 1521    Specimen: Urine, Clean Catch Updated: 11/17/23 1536     RBC, UA 6-10 /HPF      WBC, UA Too Numerous to Count /HPF      Bacteria, UA 4+ /HPF      Squamous Epithelial Cells, UA 0-2 /HPF      Hyaline Casts, UA 21-30 /LPF      Methodology Automated Microscopy    Comprehensive Metabolic Panel [[id number]]  (Abnormal) Collected: 11/17/23 1451    Specimen: Blood from Arm, Left Updated: 11/17/23 1523     Glucose 172 mg/dL      BUN 20 mg/dL      Creatinine 1.44 mg/dL      Sodium 141 mmol/L      Potassium 4.5  mmol/L      Chloride 104 mmol/L      CO2 27.5 mmol/L      Calcium 9.7 mg/dL      Total Protein 6.8 g/dL      Albumin 4.3 g/dL      ALT (SGPT) 9 U/L      AST (SGOT) 10 U/L      Alkaline Phosphatase 62 U/L      Total Bilirubin 0.4 mg/dL      Globulin 2.5 gm/dL      A/G Ratio 1.7 g/dL      BUN/Creatinine Ratio 13.9     Anion Gap 9.5 mmol/L      eGFR 32.9 mL/min/1.73     Narrative:      GFR Normal >60  Chronic Kidney Disease <60  Kidney Failure <15    The GFR formula is only valid for adults with stable renal function between ages 18 and 70.    Single High Sensitivity Troponin T [[id number]]  (Abnormal) Collected: 11/17/23 1451    Specimen: Blood from Arm, Left Updated: 11/17/23 1523     HS Troponin T 29 ng/L     Narrative:      High Sensitive Troponin T Reference Range:  <14.0 ng/L- Negative Female for AMI  <22.0 ng/L- Negative Male for AMI  >=14 - Abnormal Female indicating possible myocardial injury.  >=22 - Abnormal Male indicating possible myocardial injury.   Clinicians would have to utilize clinical acumen, EKG, Troponin, and serial changes to determine if it is an Acute Myocardial Infarction or myocardial injury due to an underlying chronic condition.         Magnesium [[id number]]  (Normal) Collected: 11/17/23 1451    Specimen: Blood from Arm, Left Updated: 11/17/23 1523     Magnesium 2.2 mg/dL     CBC & Differential [[id number]]  (Normal) Collected: 11/17/23 1451    Specimen: Blood from Arm, Left Updated: 11/17/23 1513    Narrative:      The following orders were created for panel order CBC & Differential.  Procedure                               Abnormality         Status                     ---------                               -----------         ------                     CBC Auto Differential[[id number]]        Normal              Final result                 Please view results for these tests on the individual orders.    CBC Auto Differential [[id number]]  (Normal) Collected: 11/17/23 1451    Specimen:  "Blood from Arm, Left Updated: 11/17/23 1513     WBC 7.59 10*3/mm3      RBC 4.49 10*6/mm3      Hemoglobin 13.0 g/dL      Hematocrit 39.5 %      MCV 88.0 fL      MCH 29.0 pg      MCHC 32.9 g/dL      RDW 12.9 %      RDW-SD 41.5 fl      MPV 11.9 fL      Platelets 184 10*3/mm3      Neutrophil % 65.0 %      Lymphocyte % 27.8 %      Monocyte % 6.1 %      Eosinophil % 0.5 %      Basophil % 0.3 %      Immature Grans % 0.3 %      Neutrophils, Absolute 4.94 10*3/mm3      Lymphocytes, Absolute 2.11 10*3/mm3      Monocytes, Absolute 0.46 10*3/mm3      Eosinophils, Absolute 0.04 10*3/mm3      Basophils, Absolute 0.02 10*3/mm3      Immature Grans, Absolute 0.02 10*3/mm3      nRBC 0.1 /100 WBC           /65 (BP Location: Right arm, Patient Position: Sitting)   Pulse 88   Temp 98.4 °F (36.9 °C) (Oral)   Resp 18   Ht 170.2 cm (67\")   Wt 60.7 kg (133 lb 13.1 oz)   SpO2 100%   BMI 20.96 kg/m²     Discharge Exam:  General Appearance:    Alert, cooperative, no distress                          Head:    Normocephalic, without obvious abnormality, atraumatic                          Eyes:                            Throat:   Lips, tongue, gums normal                          Neck:   Supple, symmetrical, trachea midline, no JVD                        Lungs:     Clear to auscultation bilaterally, respirations unlabored                Chest Wall:    No tenderness or deformity                        Heart:    Regular rate and rhythm, S1 and S2 normal, no murmur,no  Rub  or gallop                  Abdomen:     Soft, non-tender, bowel sounds active, no masses, no organomegaly                  Extremities:   Extremities normal, atraumatic, no cyanosis or edema                             Skin:   Skin is warm and dry,  no rashes or palpable lesions                  Neurologic:   no focal deficits noted     Disposition:  Home with home health    Activity as tolerated    Diet as tolerated  Diet Order   Procedures    Diet: Cardiac Diets; " Healthy Heart (2-3 Na+); Texture: Regular Texture (IDDSI 7); Fluid Consistency: Thin (IDDSI 0)       Patient Instructions:      Discharge Medications        New Medications        Instructions Start Date   cephalexin 500 MG capsule  Commonly known as: Keflex   500 mg, Oral, 3 Times Daily             Continue These Medications        Instructions Start Date   amLODIPine 5 MG tablet  Commonly known as: NORVASC   5 mg, Oral, Daily      clopidogrel 75 MG tablet  Commonly known as: PLAVIX   75 mg, Oral, Daily      diclofenac 1 % gel gel  Commonly known as: VOLTAREN   4 g, Topical, 4 Times Daily      glipizide 5 MG tablet  Commonly known as: GLUCOTROL   5 mg, Oral, 3 Times Daily      levothyroxine 112 MCG tablet  Commonly known as: SYNTHROID, LEVOTHROID   112 mcg, Oral, Daily      metoprolol tartrate 25 MG tablet  Commonly known as: LOPRESSOR   25 mg, Oral, 2 Times Daily      pramipexole 1.5 MG tablet  Commonly known as: MIRAPEX   1.5 mg, Oral, Daily      rosuvastatin 10 MG tablet  Commonly known as: Crestor   10 mg, Oral, Daily      VITAMIN D PO   Oral             Future Appointments   Date Time Provider Department Center   1/15/2024  8:50 AM [facility]   1/22/2024  8:45 AM Jossie Mann MD [facility]     Additional Instructions for the Follow-ups that You Need to Schedule       Ambulatory Referral to Home Health (Hospital)   As directed      Face to Face Visit Date: 11/21/2023   Follow-up provider for Plan of Care?: I treated the patient in an acute care facility and will not continue treatment after discharge.   Follow-up provider: JOSSIE MANN [[id number]]   Reason/Clinical Findings: weak   Describe mobility limitations that make leaving home difficult: weakness   Nursing/Therapeutic Services Requested: Skilled Nursing Physical Therapy   Skilled nursing orders: Other   PT orders: Therapeutic exercise   Frequency: 1 Week 1               Follow-up Information       Jossie Mann MD Follow  up in 1 week(s).    Specialty: Family Medicine  Contact information:  [address] [facility] [address]  [facility] [address]  [phone]               Onel Mann MD .    Specialty: Family Medicine  Contact information:  [address] [facility] [address]  [facility] [address]  [phone]                           Discharge Order (From admission, onward)       Start     Ordered    11/21/23 1339  Discharge patient  Once        Expected Discharge Date: 11/21/23   Discharge Disposition: Home-Health Care [facility]   Physician of Record for Attribution - Please select from Treatment Team: GLYNN PERALES [[id number]]   Review needed by CMO to determine Physician of Record: No      Question Answer Comment   Physician of Record for Attribution - Please select from Treatment Team GLYNN PERALES    Review needed by CMO to determine Physician of Record No        11/21/23 1343                    Total time spent discharging patient including evaluation,post hospitalization follow up,  medication and post hospitalization instructions and education total time exceeds 30 minutes.    Signed:  Glynn Perales MD  11/21/2023  13:43 EST

## 2023-11-21 NOTE — CASE MANAGEMENT/SOCIAL WORK
Continued Stay Note  [facility]     Patient Name: Elissa Marrero  MRN: [id number]  Today's Date: 11/21/2023    Admit Date: 11/17/2023    Plan: Home with daughters and Caretenders    Discharge Plan       Row Name 11/21/23 9890       Plan    Plan Home with daughters and Caretenders     Patient/Family in Agreement with Plan yes    Plan Comments CCP notified by RN that patient is discharging home today. Updated Amy/Navneet who confirmed they will provide HH services at [place]. Updated RN. CCP to follow. Michael HUTCHINS RN                   Discharge Codes    No documentation.                 Expected Discharge Date and Time       Expected Discharge Date Expected Discharge Time    Nov 21, 2023               Michael Wilson RN

## 2023-11-21 NOTE — PLAN OF CARE
Problem: Adult Inpatient Plan of Care  Goal: Plan of Care Review  11/21/2023 0658 by Dolores Negro RN  Outcome: Ongoing, Progressing  Flowsheets (Taken 11/21/2023 0658)  Plan of Care Reviewed With: patient  11/21/2023 0657 by Dolores Negro RN  Outcome: Ongoing, Progressing  Flowsheets (Taken 11/21/2023 0657)  Plan of Care Reviewed With: patient  Goal: Patient-Specific Goal (Individualized)  11/21/2023 0658 by Dolores Negro RN  Outcome: Ongoing, Progressing  11/21/2023 0657 by Dolores Negro RN  Outcome: Ongoing, Progressing  Goal: Absence of Hospital-Acquired Illness or Injury  11/21/2023 0658 by Dolores Negro RN  Outcome: Ongoing, Progressing  11/21/2023 0657 by Dolores Negro RN  Outcome: Ongoing, Progressing  Intervention: Identify and Manage Fall Risk  Recent Flowsheet Documentation  Taken 11/21/2023 0619 by Dolores Negro RN  Safety Promotion/Fall Prevention: safety round/check completed  Taken 11/21/2023 0432 by Dolores Negro RN  Safety Promotion/Fall Prevention: safety round/check completed  Taken 11/21/2023 0223 by Dolores Negro RN  Safety Promotion/Fall Prevention: safety round/check completed  Taken 11/21/2023 0016 by Dolores Negro RN  Safety Promotion/Fall Prevention: safety round/check completed  Taken 11/20/2023 2232 by Dolores Negro RN  Safety Promotion/Fall Prevention: safety round/check completed  Taken 11/20/2023 2019 by Dolores Negro RN  Safety Promotion/Fall Prevention: safety round/check completed  Intervention: Prevent Skin Injury  Recent Flowsheet Documentation  Taken 11/21/2023 0223 by Dolores Negro RN  Body Position: position changed independently  Intervention: Prevent Infection  Recent Flowsheet Documentation  Taken 11/21/2023 0619 by Dolores Negro RN  Infection Prevention: hand hygiene promoted  Taken 11/21/2023 0432 by Dolores Negro RN  Infection Prevention: hand hygiene promoted  Taken 11/20/2023 2232 by Dolores Negro  RN  Infection Prevention: hand hygiene promoted  Goal: Optimal Comfort and Wellbeing  11/21/2023 0658 by Dolores Negro RN  Outcome: Ongoing, Progressing  11/21/2023 0657 by Dolores Negro RN  Outcome: Ongoing, Progressing  Goal: Readiness for Transition of Care  11/21/2023 0658 by Dolores Negro RN  Outcome: Ongoing, Progressing  11/21/2023 0657 by oDlores Negro RN  Outcome: Ongoing, Progressing     Problem: Skin Injury Risk Increased  Goal: Skin Health and Integrity  11/21/2023 0658 by Dolores Negro RN  Outcome: Ongoing, Progressing  11/21/2023 0657 by Dolores Negro RN  Outcome: Ongoing, Progressing     Problem: Diabetes Comorbidity  Goal: Blood Glucose Level Within Targeted Range  11/21/2023 0658 by Dolores Negro RN  Outcome: Ongoing, Progressing  11/21/2023 0657 by Dolores Negro RN  Outcome: Ongoing, Progressing     Problem: Hypertension Comorbidity  Goal: Blood Pressure in Desired Range  11/21/2023 0658 by Dolores Negro RN  Outcome: Ongoing, Progressing  11/21/2023 0657 by Dolores Negro RN  Outcome: Ongoing, Progressing  Intervention: Maintain Blood Pressure Management  Recent Flowsheet Documentation  Taken 11/21/2023 0619 by Dolores Negro RN  Medication Review/Management: medications reviewed  Taken 11/21/2023 0432 by Dolores Negro RN  Medication Review/Management: medications reviewed  Taken 11/21/2023 0223 by Dolores Negro RN  Medication Review/Management: medications reviewed  Taken 11/21/2023 0016 by Dolores Negro RN  Medication Review/Management: medications reviewed  Taken 11/20/2023 2232 by Dolores Negro RN  Medication Review/Management: medications reviewed  Taken 11/20/2023 2019 by Dolores Negro, RN  Medication Review/Management: medications reviewed     Problem: UTI (Urinary Tract Infection)  Goal: Improved Infection Symptoms  11/21/2023 0658 by Dolores Negro RN  Outcome: Ongoing, Progressing  11/21/2023 0657 by Dolores Negro  RN  Outcome: Ongoing, Progressing     Problem: Confusion Acute  Goal: Optimal Cognitive Function  11/21/2023 0658 by Dolores Negro RN  Outcome: Ongoing, Progressing  11/21/2023 0657 by Dolores Negro RN  Outcome: Ongoing, Progressing     Problem: Fall Injury Risk  Goal: Absence of Fall and Fall-Related Injury  11/21/2023 0658 by Dolroes Negro RN  Outcome: Ongoing, Progressing  11/21/2023 0657 by Dolores Negro RN  Outcome: Ongoing, Progressing  Intervention: Identify and Manage Contributors  Recent Flowsheet Documentation  Taken 11/21/2023 0619 by Dolores Negro RN  Medication Review/Management: medications reviewed  Taken 11/21/2023 0432 by Dolores Ngero RN  Medication Review/Management: medications reviewed  Taken 11/21/2023 0223 by Dolores Negro RN  Medication Review/Management: medications reviewed  Taken 11/21/2023 0016 by Dolores Negro RN  Medication Review/Management: medications reviewed  Taken 11/20/2023 2232 by Dolores Negro RN  Medication Review/Management: medications reviewed  Taken 11/20/2023 2019 by Dolores Negro RN  Medication Review/Management: medications reviewed  Intervention: Promote Injury-Free Environment  Recent Flowsheet Documentation  Taken 11/21/2023 0619 by Dolores Negro RN  Safety Promotion/Fall Prevention: safety round/check completed  Taken 11/21/2023 0432 by Dolores Negro RN  Safety Promotion/Fall Prevention: safety round/check completed  Taken 11/21/2023 0223 by Dolores Negro RN  Safety Promotion/Fall Prevention: safety round/check completed  Taken 11/21/2023 0016 by Dolores Negro RN  Safety Promotion/Fall Prevention: safety round/check completed  Taken 11/20/2023 2232 by Dolores Negro RN  Safety Promotion/Fall Prevention: safety round/check completed  Taken 11/20/2023 2019 by Dolores Negro RN  Safety Promotion/Fall Prevention: safety round/check completed   Goal Outcome Evaluation:  Plan of Care Reviewed With: patient

## 2023-11-21 NOTE — THERAPY TREATMENT NOTE
Patient Name: Elissa Marrero  : 3/15/1925    MRN: [id number]                              Today's Date: 2023       Admit Date: 2023    Visit Dx:     ICD-10-CM ICD-9-CM   1. Acute encephalopathy  G93.40 348.30   2. Acute cystitis without hematuria  N30.00 595.0     Patient Active Problem List   Diagnosis    Diabetes mellitus, type 2    Hyperlipidemia    Benign essential hypertension    Hypothyroidism    Insomnia    Lymphedema    Osteoarthritis    Osteopenia    Renal insufficiency    Restless leg syndrome    Vitamin D deficiency    Hip pain, right    Acute kidney failure    Chronic kidney disease, stage 4 (severe)    Disorder of bone and cartilage    Edema    Hyperkalemia    Urinary tract infection    TIA (transient ischemic attack)    Acute metabolic encephalopathy    Acute cystitis without hematuria     Past Medical History:   Diagnosis Date    Benign essential hypertension     Diabetes mellitus, type 2 2012    Encounter for diabetic foot exam 2015    History of complete eye exam 2017    Dr Leslie    Hyperlipidemia     Hypothyroidism     Insomnia     Lymphedema 2010    Osteoarthritis     Osteopenia     Renal insufficiency 2009    Restless leg syndrome 2012    Vitamin D deficiency 10/12/2011     Past Surgical History:   Procedure Laterality Date    APPENDECTOMY      CHOLECYSTECTOMY      EYE SURGERY      TONSILLECTOMY        General Information       Row Name 23 1138          Physical Therapy Time and Intention    Document Type therapy note (daily note)  -CS     Mode of Treatment individual therapy;physical therapy  -CS       Row Name 23 1138          General Information    Patient Profile Reviewed yes  -CS     Existing Precautions/Restrictions fall  -CS       Row Name 23 1138          Cognition    Orientation Status (Cognition) oriented x 3  -CS       Row Name 23 1138          Safety Issues, Functional Mobility    Impairments Affecting Function  (Mobility) balance;endurance/activity tolerance;strength  -CS               User Key  (r) = Recorded By, (t) = Taken By, (c) = Cosigned By      Initials Name Provider Type    Belen Lyman, PT Physical Therapist                   Mobility       Row Name 11/21/23 1139          Bed Mobility    Comment, (Bed Mobility) NT; UIC  -CS       Row Name 11/21/23 1139          Sit-Stand Transfer    Sit-Stand [place] (Transfers) standby assist  -CS     Assistive Device (Sit-Stand Transfers) walker, front-wheeled  -CS       Row Name 11/21/23 1139          Gait/Stairs (Locomotion)    [place] Level (Gait) contact guard  -CS     Assistive Device (Gait) walker, front-wheeled  -CS     Distance in Feet (Gait) 150'  -CS     Deviations/Abnormal Patterns (Gait) clyde decreased;gait speed decreased;stride length decreased  -CS     Comment, (Gait/Stairs) slow pace; no overt LOB  -CS               User Key  (r) = Recorded By, (t) = Taken By, (c) = Cosigned By      Initials Name Provider Type    Belen Lyman, PT Physical Therapist                   Obj/Interventions       Row Name 11/21/23 1139          Motor Skills    Therapeutic Exercise other (see comments)  10 reps B LE AP, LAQ, & seated marches  -       Row Name 11/21/23 1139          Balance    Balance Assessment sitting static balance;sitting dynamic balance;standing static balance;standing dynamic balance  -CS     Static Sitting Balance supervision  -CS     Dynamic Sitting Balance supervision  -CS     Position, Sitting Balance supported;sitting in chair  -CS     Static Standing Balance contact guard  -CS     Dynamic Standing Balance contact guard  -CS     Position/Device Used, Standing Balance supported;walker, front-wheeled  -CS               User Key  (r) = Recorded By, (t) = Taken By, (c) = Cosigned By      Initials Name Provider Type    Belen Lyman, PT Physical Therapist                   Goals/Plan    No documentation.                  Clinical  Impression       Row Name 11/21/23 1140          Pain    Pretreatment Pain Rating 0/10 - no pain  -CS     Posttreatment Pain Rating 0/10 - no pain  -CS       Row Name 11/21/23 1140          Plan of Care Review    Plan of Care Reviewed With patient  -CS     Progress improving  -CS     Outcome Evaluation Pt received UIC upon arrival and agreeable to PT. Pt stood and ambulated 150' c RW requiring SBA/CGA. Pt demo's a slow pace, slightly unsteady but no overt LOB. Pt returned to room and completed B LE ther-ex 10x. Pt will continue to benefit from skilled PT to address strength, endurance, and functional mobility.  -CS       Row Name 11/21/23 1140          Therapy Assessment/Plan (PT)    Criteria for Skilled Interventions Met (PT) yes;meets criteria  -CS     Therapy Frequency (PT) 5 times/wk  -CS       Row Name 11/21/23 1140          Positioning and Restraints    Pre-Treatment Position sitting in chair/recliner  -CS     Post Treatment Position chair  -CS     In Chair reclined;call light within reach;encouraged to call for assist;exit alarm on  -CS               User Key  (r) = Recorded By, (t) = Taken By, (c) = Cosigned By      Initials Name Provider Type    CS Belen Mckinley, PT Physical Therapist                   Outcome Measures       Row Name 11/21/23 1145 11/21/23 0619       How much help from another person do you currently need...    Turning from your back to your side while in flat bed without using bedrails? 3  -CS 3  -JW    Moving from lying on back to sitting on the side of a flat bed without bedrails? 3  -CS 3  -JW    Moving to and from a bed to a chair (including a wheelchair)? 3  -CS 3  -JW    Standing up from a chair using your arms (e.g., wheelchair, bedside chair)? 3  -CS 3  -JW    Climbing 3-5 steps with a railing? 3  -CS 2  -JW    To walk in hospital room? 3  -CS 2  -JW    AM-PAC 6 Clicks Score (PT) 18  -CS 16  -JW    Highest Level of Mobility Goal 6 --> Walk 10 steps or more  -CS 5 --> Static  standing  -      Row Name 11/21/23 1145          Functional Assessment    Outcome Measure Options AM-PAC 6 Clicks Basic Mobility (PT)  -               User Key  (r) = Recorded By, (t) = Taken By, (c) = Cosigned By      Initials Name Provider Type    CS Beeln Mckinley, PT Physical Therapist    Dolores Colin, RN Registered Nurse                                 Physical Therapy Education       Title: PT OT SLP Therapies (Done)       Topic: Physical Therapy (Done)       Point: Mobility training (Done)       Learning Progress Summary             Patient Acceptance, E,TB, VU,DU by  at 11/21/2023 1146    Acceptance, E,TB, VU,NR by CB at 11/20/2023 1306    Acceptance, E,TB, VU,NR by CB at 11/18/2023 1500                         Point: Home exercise program (Done)       Learning Progress Summary             Patient Acceptance, E,TB, VU,DU by  at 11/21/2023 1146    Acceptance, E,TB, VU,NR by CB at 11/20/2023 1306                         Point: Body mechanics (Done)       Learning Progress Summary             Patient Acceptance, E,TB, VU,DU by  at 11/21/2023 1146    Acceptance, E,TB, VU,NR by CB at 11/20/2023 1306    Acceptance, E,TB, VU,NR by CB at 11/18/2023 1500                         Point: Precautions (Done)       Learning Progress Summary             Patient Acceptance, E,TB, VU,DU by  at 11/21/2023 1146    Acceptance, E,TB, VU,NR by CB at 11/20/2023 1306                                         User Key       Initials Effective Dates Name Provider Type Discipline    CB 10/22/21 -  Karlie Vega, PT Physical Therapist PT     09/22/22 -  Belen Mckinley PT Physical Therapist PT                  PT Recommendation and Plan     Plan of Care Reviewed With: patient  Progress: improving  Outcome Evaluation: Pt received UIC upon arrival and agreeable to PT. Pt stood and ambulated 150' c RW requiring SBA/CGA. Pt demo's a slow pace, slightly unsteady but no overt LOB. Pt returned to room and completed B LE  ther-ex 10x. Pt will continue to benefit from skilled PT to address strength, endurance, and functional mobility.     Time Calculation:         PT Charges       Row Name 11/21/23 1146             Time Calculation    Start Time 1125  -CS      Stop Time 1134  -CS      Time Calculation (min) 9 min  -CS      PT Received On 11/21/23  -CS      PT - Next Appointment 11/22/23  -CS         Time Calculation- PT    Total Timed Code Minutes- PT 8 minute(s)  -CS         Timed Charges    34897 - PT Therapeutic Activity Minutes 8  -CS         Total Minutes    Timed Charges Total Minutes 8  -CS       Total Minutes 8  -CS                User Key  (r) = Recorded By, (t) = Taken By, (c) = Cosigned By      Initials Name Provider Type    CS Belen Mckinley, PT Physical Therapist                  Therapy Charges for Today       Code Description Service Date Service Provider Modifiers Qty    [id number] HC PT THERAPEUTIC ACT EA 15 MIN 11/21/2023 Belen Mckinley PT GP 1            PT G-Codes  Outcome Measure Options: AM-PAC 6 Clicks Basic Mobility (PT)  AM-PAC 6 Clicks Score (PT): 18  AM-PAC 6 Clicks Score (OT): 18  Modified Parmjit Scale: 4 - Moderately severe disability.  Unable to walk without assistance, and unable to attend to own bodily needs without assistance.  PT Discharge Summary  Anticipated Discharge Disposition (PT): home with home health, home with 24/7 care    Belen Mckinley PT  11/21/2023

## 2023-11-21 NOTE — PLAN OF CARE
Goal Outcome Evaluation:  Plan of Care Reviewed With: patient        Progress: improving  Outcome Evaluation: Pt received UIC upon arrival and agreeable to PT. Pt stood and ambulated 150' c RW requiring SBA/CGA. Pt demo's a slow pace, slightly unsteady but no overt LOB. Pt returned to room and completed B LE ther-ex 10x. Pt will continue to benefit from skilled PT to address strength, endurance, and functional mobility.      Anticipated Discharge Disposition (PT): home with home health, home with 24/7 care

## 2023-11-21 NOTE — PLAN OF CARE
Problem: Adult Inpatient Plan of Care  Goal: Plan of Care Review  Outcome: Ongoing, Progressing  Flowsheets (Taken 11/21/2023 0657)  Plan of Care Reviewed With: patient  Goal: Patient-Specific Goal (Individualized)  Outcome: Ongoing, Progressing  Goal: Absence of Hospital-Acquired Illness or Injury  Outcome: Ongoing, Progressing  Intervention: Identify and Manage Fall Risk  Recent Flowsheet Documentation  Taken 11/21/2023 0619 by Dolores Negro RN  Safety Promotion/Fall Prevention: safety round/check completed  Taken 11/21/2023 0432 by Dolores Negro RN  Safety Promotion/Fall Prevention: safety round/check completed  Taken 11/21/2023 0223 by Dolores Negro RN  Safety Promotion/Fall Prevention: safety round/check completed  Taken 11/21/2023 0016 by Dolores Negro RN  Safety Promotion/Fall Prevention: safety round/check completed  Taken 11/20/2023 2232 by Dolores Negro RN  Safety Promotion/Fall Prevention: safety round/check completed  Taken 11/20/2023 2019 by Dolores Negro RN  Safety Promotion/Fall Prevention: safety round/check completed  Intervention: Prevent Skin Injury  Recent Flowsheet Documentation  Taken 11/21/2023 0223 by Dolores Negro RN  Body Position: position changed independently  Intervention: Prevent Infection  Recent Flowsheet Documentation  Taken 11/21/2023 0619 by Dolores Negro RN  Infection Prevention: hand hygiene promoted  Taken 11/21/2023 0432 by Dolores Negro RN  Infection Prevention: hand hygiene promoted  Taken 11/20/2023 2232 by Dolores Negro RN  Infection Prevention: hand hygiene promoted  Goal: Optimal Comfort and Wellbeing  Outcome: Ongoing, Progressing  Goal: Readiness for Transition of Care  Outcome: Ongoing, Progressing     Problem: Skin Injury Risk Increased  Goal: Skin Health and Integrity  Outcome: Ongoing, Progressing     Problem: Diabetes Comorbidity  Goal: Blood Glucose Level Within Targeted Range  Outcome: Ongoing, Progressing     Problem:  Hypertension Comorbidity  Goal: Blood Pressure in Desired Range  Outcome: Ongoing, Progressing  Intervention: Maintain Blood Pressure Management  Recent Flowsheet Documentation  Taken 11/21/2023 0619 by Dolores Negro RN  Medication Review/Management: medications reviewed  Taken 11/21/2023 0432 by Dolores Negro RN  Medication Review/Management: medications reviewed  Taken 11/21/2023 0223 by Dolores Negro RN  Medication Review/Management: medications reviewed  Taken 11/21/2023 0016 by Dolores Negro RN  Medication Review/Management: medications reviewed  Taken 11/20/2023 2232 by Dolores Negro RN  Medication Review/Management: medications reviewed  Taken 11/20/2023 2019 by Dolores Negro RN  Medication Review/Management: medications reviewed     Problem: UTI (Urinary Tract Infection)  Goal: Improved Infection Symptoms  Outcome: Ongoing, Progressing     Problem: Confusion Acute  Goal: Optimal Cognitive Function  Outcome: Ongoing, Progressing     Problem: Fall Injury Risk  Goal: Absence of Fall and Fall-Related Injury  Outcome: Ongoing, Progressing  Intervention: Identify and Manage Contributors  Recent Flowsheet Documentation  Taken 11/21/2023 0619 by Dolores Negro RN  Medication Review/Management: medications reviewed  Taken 11/21/2023 0432 by Dolores Negro RN  Medication Review/Management: medications reviewed  Taken 11/21/2023 0223 by Dolores Negro RN  Medication Review/Management: medications reviewed  Taken 11/21/2023 0016 by Dolores Negro RN  Medication Review/Management: medications reviewed  Taken 11/20/2023 2232 by Dolores Negro RN  Medication Review/Management: medications reviewed  Taken 11/20/2023 2019 by Dolores Negro RN  Medication Review/Management: medications reviewed  Intervention: Promote Injury-Free Environment  Recent Flowsheet Documentation  Taken 11/21/2023 0619 by Dolores Negro RN  Safety Promotion/Fall Prevention: safety round/check  completed  Taken 11/21/2023 0432 by Dolores Negro RN  Safety Promotion/Fall Prevention: safety round/check completed  Taken 11/21/2023 0223 by Dolores Negro RN  Safety Promotion/Fall Prevention: safety round/check completed  Taken 11/21/2023 0016 by Dolores Negro, RN  Safety Promotion/Fall Prevention: safety round/check completed  Taken 11/20/2023 2232 by Dolores Negro RN  Safety Promotion/Fall Prevention: safety round/check completed  Taken 11/20/2023 2019 by Dolores Negro RN  Safety Promotion/Fall Prevention: safety round/check completed   Goal Outcome Evaluation:  Plan of Care Reviewed With: patient

## 2023-11-21 NOTE — PLAN OF CARE
Goal Outcome Evaluation:  Plan of Care Reviewed With: patient, daughter        Progress: improving     Pt is discharging home with daughter and home health

## 2023-11-22 ENCOUNTER — TRANSITIONAL CARE MANAGEMENT TELEPHONE ENCOUNTER (OUTPATIENT)
Dept: CALL CENTER | Facility: HOSPITAL | Age: 88
End: 2023-11-22
Payer: MEDICARE

## 2023-11-22 LAB
BACTERIA SPEC AEROBE CULT: NORMAL
BACTERIA SPEC AEROBE CULT: NORMAL

## 2023-11-22 NOTE — OUTREACH NOTE
Prep Survey      Flowsheet Row Responses   [facility] patient discharged from? [place]   Is LACE score < 7 ? No   Eligibility [facility]   Date of Admission 11/17/23   Date of Discharge 11/21/23   Discharge Disposition Home-Health Care [facility]   Discharge diagnosis **Acute metabolic encephalopathy   Does the patient have one of the following disease processes/diagnoses(primary or secondary)? Other   Does the patient have Home health ordered? Yes   What is the Home health agency?  [facility]   Is there a DME ordered? No   Prep survey completed? Yes            DILCIA LIND - Registered Nurse

## 2023-11-22 NOTE — PROGRESS NOTES
Case Management Discharge Note      Final Note: Home with daughters and [facility]    Provided Post Acute Provider List?: Refused  Refused Provider List Comment: Declines SNF    Selected Continued Care - Discharged on 11/21/2023 Admission date: 11/17/2023 - Discharge disposition: Home-Health Care Svc      Destination    No services have been selected for the patient.                Durable Medical Equipment    No services have been selected for the patient.                Dialysis/Infusion    No services have been selected for the patient.                Home Medical Care       Service Provider Selected Services Address Phone Fax Patient Preferred    [facility], [address], [place] [address] [phone] --              Therapy    No services have been selected for the patient.                Community Resources    No services have been selected for the patient.                Community & DME    No services have been selected for the patient.                    Transportation Services  Private: Car    Final Discharge Disposition Code: 06 - home with home health care

## 2023-11-22 NOTE — OUTREACH NOTE
Call Center TCM Note      Flowsheet Row Responses   [facility] patient discharged from? [place]   Does the patient have one of the following disease processes/diagnoses(primary or secondary)? Other   TCM attempt successful? No   Unsuccessful attempts Attempt 1            Lucy Belle RN    11/22/2023, 09:34 EST

## 2023-11-22 NOTE — OUTREACH NOTE
Call Center TCM Note      Flowsheet Row Responses   [facility] patient discharged from? [place]   Does the patient have one of the following disease processes/diagnoses(primary or secondary)? Other   TCM attempt successful? Yes   Call start time 1241   Call end time 1245   Discharge diagnosis **Acute metabolic encephalopathy   Person spoke with today (if not patient) and relationship Michelle-Dtr   Meds reviewed with patient/caregiver? Yes   Is the patient having any side effects they believe may be caused by any medication additions or changes? No   Does the patient have all medications ordered at discharge? Yes   Is the patient taking all medications as directed (includes completed medication regime)? Yes   Does the patient have an appointment with their PCP within 7-14 days of discharge? No   Nursing Interventions Patient declined scheduling/rescheduling appointment at this time  [Dtr will call office after looking at her work schedule]   What is the Home health agency?  [facility]   Has home health visited the patient within 72 hours of discharge? Yes   DME comments pt uses a walker for balance   Psychosocial issues? No   Did the patient receive a copy of their discharge instructions? Yes   Nursing interventions Reviewed instructions with patient   What is the patient's perception of their health status since discharge? Improving   Is the patient/caregiver able to teach back the hierarchy of who to call/visit for symptoms/problems? PCP, Specialist, Home health nurse, Urgent Care, ED, 911 Yes   TCM call completed? Yes   Call end time 1245   Would this patient benefit from a Referral to Amb Social Work? No   Is the patient interested in additional calls from an ambulatory ? No            Lucy Belle RN    11/22/2023, 12:45 EST

## 2023-11-27 ENCOUNTER — TELEPHONE (OUTPATIENT)
Dept: FAMILY MEDICINE CLINIC | Facility: CLINIC | Age: 88
End: 2023-11-27

## 2023-11-27 NOTE — TELEPHONE ENCOUNTER
Caller: TASH GRIMES/[facility]    Best call back number: [phone]*    What was the call regarding: REQUESTING VERBAL ORDER FOR SKILLED NURSING ONCE A WEEK FOR 4 WEEKS.

## 2023-11-28 ENCOUNTER — TELEPHONE (OUTPATIENT)
Dept: FAMILY MEDICINE CLINIC | Facility: CLINIC | Age: 88
End: 2023-11-28
Payer: MEDICARE

## 2023-11-28 NOTE — TELEPHONE ENCOUNTER
ANGEL EDDY, PT WITH [facility], CALLED TO REQUEST THE FOLLOWING ORDERS:    PHYSICAL THERAPY - 2 TIMES PER WEEK FOR 4 WEEKS.      ANGEL EDDY ALSO WANTED DR. CARLOS TO REVIEW THE PATIENT'S HEART MEDICATION.    THE PATIENT'S HEART RATE HAS STAYED BETWEEN 54-64. SHE ALSO NOTED THAT THE DIASTOLIC BLOOD PRESSURE IS STAYING BETWEEN 46-65.    PLEASE ADVISE  [phone]

## 2023-11-29 NOTE — TELEPHONE ENCOUNTER
[facility] verbal ok for PT 2 TIMES A WEEK  FOR 4 WEEKS. Per dr bazan   ,Also she was told if pt is asymptomatic  continue to monitor . If symptoms . Pt will need appointment

## 2023-12-07 RX ORDER — NITROFURANTOIN 25; 75 MG/1; MG/1
100 CAPSULE ORAL 2 TIMES DAILY
Qty: 10 CAPSULE | Refills: 0 | Status: SHIPPED | OUTPATIENT
Start: 2023-12-07

## 2023-12-07 NOTE — TELEPHONE ENCOUNTER
Called to give update on pt. S/P admit UTI approx 3 wks ago.  Pt is now starting to hallucinate and increased fatigue. Leigh requested UA/C+S with possible ABX.    Per Dr. Mann;  1 UA/C+S  2 Macrobid 100 mg po BID x5 days  Leigh notified of V/O and this nurse will put in order for pharm    B/P readings  12/3 147/47, 80  12/4 166/58, 91  12/5 118/44, 63  12/7 135/51, 84  Leigh denies pt c/o CP, SOA, palpitations, jaw pain, HA, edema, lower back pain

## 2023-12-07 NOTE — TELEPHONE ENCOUNTER
[facility] WOULD LIKE TO KNOW WHICH PHARMACY DR. JOSSIE CARLOS WILL BE SENDING PATIENT'S PRESCRIPTION.    PLEASE ADVISE.

## 2023-12-12 ENCOUNTER — TELEPHONE (OUTPATIENT)
Dept: FAMILY MEDICINE CLINIC | Facility: CLINIC | Age: 88
End: 2023-12-12
Payer: MEDICARE

## 2023-12-12 RX ORDER — TRAZODONE HYDROCHLORIDE 50 MG/1
25 TABLET ORAL NIGHTLY
Qty: 15 TABLET | Refills: 1 | Status: SHIPPED | OUTPATIENT
Start: 2023-12-12

## 2023-12-12 NOTE — TELEPHONE ENCOUNTER
Advised of PCP dictation for HH, med and obtain UA for review. Advised that supervisor will be contacted to fax lab results again.

## 2023-12-12 NOTE — TELEPHONE ENCOUNTER
Caller: [employer]ION    Relationship to patient: [facility]    Best call back number: [phone]    Patient is needing: [facility] STATES THAT A URINALYSIS WAS RECENTLY PERFORMED ON THE PATIENT. THE RESULTS OF THIS URINALYSIS WAS FAXED TO THE OFFICE. [facility] WILL NEED CLINICAL STAFF TO ADVISE THE PATIENT ON HER RESULTS.     [facility] IS ALSO ASKING FOR VERBAL ORDERS FOR A . THE PATIENT IS NO LONGER ABLE TO STAY HOME ALONE. THE PATIENT'S DAUGHTER IS STAYING HOME FROM WORK UNTIL THE BEGINNING OF THE NEW YEAR TO HELP HER MOTHER OUT UNTIL A SOLUTION IS FOUND.     THE PATIENT IS NOT SLEEPING AT NIGHT AND IS STAYING AWAKE ALL DAY. THE PATIENT WAKES UP AROUND 2 A.M. AND IS UP FOR THE DAY. HOME HEALTH WOULD LIKE TO KNOW IF THERE ARE ANY MEDICATIONS THAT CAN BE PRESCRIBED TO HELP THE PATIENT WITH HER SLEEP. THE PATIENT IS DOZING OFF THROUGHOUT THE DAY, AND HOME HEALTH WORRIES THAT THE PATIENT MAY FALL.

## 2023-12-19 ENCOUNTER — TELEPHONE (OUTPATIENT)
Dept: FAMILY MEDICINE CLINIC | Facility: CLINIC | Age: 88
End: 2023-12-19

## 2023-12-19 NOTE — TELEPHONE ENCOUNTER
Caller: TASH    Relationship to patient: Other    Best call back number:  - CALL TASH TO ADVISE ANY COMMENTS FROM DR. CARLOS.       Patient is needing: TASH FROM [facility] STATES THAT DR. CARLOS HAD ADVISED HER TO MONITOR THE PATIENT'S BLOOD PRESSURES.   TASH STATES THEY ARE A LITTLE BIT LOWER AND SHE IS CALLING TODAY TO REPORT SOME OF THE READINGS:        12/15:     128/56, HEART RATE OF 62  12/16:    128/50 WITH HEART RATE OF 69.  12/17:     133/50 WITH HEART RATE OF 77  12/18:     134/50 WITH HEART RATE OF 67  12/19:      8:00 A.M. 104/44 WITH HEART RATE OF 61 AND JUST NOW, 120/48

## 2023-12-19 NOTE — TELEPHONE ENCOUNTER
LEFT MESSAGE FOR PT. OK FOR THE HUB TO RELAY MESSAGE . PT NOT ON MY CHART -Good blood pressures.  Continue current.

## 2023-12-21 ENCOUNTER — TELEPHONE (OUTPATIENT)
Dept: FAMILY MEDICINE CLINIC | Facility: CLINIC | Age: 88
End: 2023-12-21

## 2023-12-21 NOTE — TELEPHONE ENCOUNTER
Caller: Michelle Spain    Relationship: Emergency Contact    Best call back number: [phone]*    What is the best time to reach you: ANYTIME    Who are you requesting to speak with (clinical staff, provider,  specific staff member): DR. CARLOS'S MEDICAL ASSISTANT    What was the call regarding: MICHELLE REQUEST A CALL BACK FROM DR. CARLOS'S MEDICAL ASSISTANT REGARDING GETTING FMLA PAPERWORK FILLED OUT TO TAKE CARE OF THE PATIENT.    Is it okay if the provider responds through MyChart: NO

## 2023-12-21 NOTE — TELEPHONE ENCOUNTER
Caller: JEAN    Relationship to patient: Other    Best call back number: [phone]      Patient is needing: JEAN WITH [facility] STATES THAT PATIENT IS BEING DISCHARGED FROM PHYSICAL THERAPY TODAY 12/21/23 BECAUSE SHE HAS MET AND EXCEEDED HER GOALS.    PLEASE ADVISE.

## 2023-12-21 NOTE — TELEPHONE ENCOUNTER
PT SCHEDULED APPOINTMENT 12/26/2023 FOR FMLA FORMS TO BE COMPLETED. PT TOLD THERE IS A 20.00 DOLLAR FEE FOR THIS

## 2023-12-24 NOTE — PROGRESS NOTES
Subjective   Elissa Marrero is a 89 y.o. female.     CC: Hospital F/U for Metabolic Encephalopathy    History of Present Illness     Pt returns today after recent hospitalization. That visit was as follows:    Admit date: 11/17/2023  Discharge date and time:11/21/2023  Discharged Condition: good     Discharge Diagnoses:        Active Hospital Problems     Diagnosis   POA    **Acute metabolic encephalopathy [G93.41]   Yes    Acute cystitis without hematuria [N30.00]   Yes    Hyperlipidemia [E78.5]   Yes    Benign essential hypertension [I10]   Yes    Hypothyroidism [E03.9]   Yes    Urinary tract infection [N39.0]   Yes    Diabetes mellitus, type 2 [E11.9]   Yes     Hospital Course: Elissa Marrero   89 year old female who presented to the emergency room with weakness, confusion noted by her daughter when she talked to the patient on the phone earlier; she also had difficulty walking and keeping her balance; she was seen at an urgent care center and sent to the hospital for further evaluation treatment.  The patient was evaluated and found to have urinary tract infection.  The patient is started on broad-spectrum antibiotics for UTI and she was feeling better after being in the hospital for couple of days.  Her mental status was back to her about her baseline.  She did have MRI brain suggesting possible stroke but neurology felt that it was an over read by radiology and they really did not feel she had a stroke.  At any rate she will continue with medical management with Plavix and a statin.  She looked well enough to go home with home health and 24-hour caregivers.  She will finish course of oral antibiotics and follow-up with her primary care in 1 week for ongoing care.    Pt recently completed therapy through [facility].    Patient's daughter reports she is still not sleeping well, even on the 25 mg trazodone, and is getting around-the-clock care with family.      The following portions of the patient's history were  "reviewed and updated as appropriate: allergies, current medications, past family history, past medical history, past social history, past surgical history, and problem list.    Review of Systems   Constitutional:  Negative for activity change, chills and fever.   Respiratory:  Negative for cough.    Cardiovascular:  Negative for chest pain.   Psychiatric/Behavioral:  Negative for dysphoric mood.        /62 (BP Location: Left arm, Patient Position: Sitting, Cuff Size: Adult)   Pulse 88   Ht 170.2 cm (67.01\")   Wt 57.6 kg (127 lb)   SpO2 99%   BMI 19.89 kg/m²     Objective   Physical Exam  Constitutional:       General: She is not in acute distress.     Appearance: She is well-developed.   Cardiovascular:      Rate and Rhythm: Normal rate and regular rhythm.   Pulmonary:      Effort: Pulmonary effort is normal.      Breath sounds: Normal breath sounds.   Neurological:      Mental Status: She is alert and oriented to person, place, and time.   Psychiatric:         Behavior: Behavior normal.         Thought Content: Thought content normal.     Hospital records reviewed with pt confirming HPI.  Diabetes very uncontrolled at the hospital.    Assessment & Plan   Diagnoses and all orders for this visit:    1. Acute metabolic encephalopathy (Primary)    2. Acute cystitis without hematuria    3. Benign essential hypertension    4. Hospital discharge follow-up    5. Type 2 diabetes mellitus with hyperglycemia, without long-term current use of insulin  Comments:  uncontrolled; medication added  Orders:  -     metFORMIN ER (GLUCOPHAGE-XR) 500 MG 24 hr tablet; Take 2 tablets by mouth Daily With Breakfast.  Dispense: 180 tablet; Refill: 1    6. Primary insomnia  Comments:  Not fully controlled; medication adjusted  Orders:  -     traZODone (DESYREL) 50 MG tablet; Take 1 tablet by mouth Every Night.  Dispense: 90 tablet; Refill: 1    Patient's renal function is good to support metformin at this point, and we will add " that to her glipizide and recheck labs in 1 month with a return follow-up at that time.

## 2023-12-26 ENCOUNTER — OFFICE VISIT (OUTPATIENT)
Dept: FAMILY MEDICINE CLINIC | Facility: CLINIC | Age: 88
End: 2023-12-26
Payer: MEDICARE

## 2023-12-26 VITALS
SYSTOLIC BLOOD PRESSURE: 112 MMHG | HEIGHT: 67 IN | OXYGEN SATURATION: 99 % | WEIGHT: 127 LBS | BODY MASS INDEX: 19.93 KG/M2 | HEART RATE: 88 BPM | DIASTOLIC BLOOD PRESSURE: 62 MMHG

## 2023-12-26 DIAGNOSIS — Z09 HOSPITAL DISCHARGE FOLLOW-UP: ICD-10-CM

## 2023-12-26 DIAGNOSIS — I10 BENIGN ESSENTIAL HYPERTENSION: ICD-10-CM

## 2023-12-26 DIAGNOSIS — F51.01 PRIMARY INSOMNIA: ICD-10-CM

## 2023-12-26 DIAGNOSIS — E11.65 TYPE 2 DIABETES MELLITUS WITH HYPERGLYCEMIA, WITHOUT LONG-TERM CURRENT USE OF INSULIN: ICD-10-CM

## 2023-12-26 DIAGNOSIS — G93.41 ACUTE METABOLIC ENCEPHALOPATHY: Primary | ICD-10-CM

## 2023-12-26 DIAGNOSIS — N30.00 ACUTE CYSTITIS WITHOUT HEMATURIA: ICD-10-CM

## 2023-12-26 PROCEDURE — 99214 OFFICE O/P EST MOD 30 MIN: CPT | Performed by: FAMILY MEDICINE

## 2023-12-26 RX ORDER — METFORMIN HYDROCHLORIDE 500 MG/1
1000 TABLET, EXTENDED RELEASE ORAL
Qty: 180 TABLET | Refills: 1 | Status: SHIPPED | OUTPATIENT
Start: 2023-12-26

## 2023-12-26 RX ORDER — TRAZODONE HYDROCHLORIDE 50 MG/1
50 TABLET ORAL NIGHTLY
Qty: 90 TABLET | Refills: 1 | Status: SHIPPED | OUTPATIENT
Start: 2023-12-26

## 2023-12-28 ENCOUNTER — TELEPHONE (OUTPATIENT)
Dept: FAMILY MEDICINE CLINIC | Facility: CLINIC | Age: 88
End: 2023-12-28
Payer: MEDICARE

## 2023-12-28 NOTE — TELEPHONE ENCOUNTER
Caller: Michelle Spain    Relationship: Emergency Contact    Best call back number: [phone]    What is the best time to reach you: ASAP    Who are you requesting to speak with (clinical staff, provider,  specific staff member): CLINICAL STAFF    Do you know the name of the person who called: MICHELLE    What was the call regarding: THE PATIENT BEGAN TAKING METFORMIN YESTERDAY. TODAY, THE PATIENT IS EXPERIENCING NAUSEA, VOMITING, DIARRHEA, AND STOMACH PAIN. THE PATIENT'S FAMILY WOULD LIKE TO KNOW WHAT TO DO FROM HERE FOR THE PATIENT AND IF SHE NEEDS TO CONTINUE THIS MEDICATION.     Is it okay if the provider responds through [employer]: NO, PLEASE CALL

## 2023-12-29 NOTE — TELEPHONE ENCOUNTER
Caller: JAIDEN BURTON    Relationship: Child    Best call back number: [phone]     What was the call regarding: PATIENT'S DAUGHTER STATED THAT SHE IS WAITING TO HEAR BACK. PATIENT'S DAUGHTER STATED SHE IS SUPPOSED TO TAKE THE MEDICATION AFTER BREAKFAST BUT SHE HAS NOT TAKEN IT THIS MORNING. PATIENT'S DAUGHTER IS ASKING IF DR. CARLOS HAS ANY SUGGESTIONS. PLEASE ADVISE.

## 2024-01-17 DIAGNOSIS — E03.9 ACQUIRED HYPOTHYROIDISM: Chronic | ICD-10-CM

## 2024-01-17 RX ORDER — LEVOTHYROXINE SODIUM 88 UG/1
88 TABLET ORAL DAILY
Qty: 30 TABLET | Refills: 0 | Status: SHIPPED | OUTPATIENT
Start: 2024-01-17 | End: 2024-01-22 | Stop reason: SDUPTHER

## 2024-01-20 RX ORDER — TRAZODONE HYDROCHLORIDE 50 MG/1
50 TABLET ORAL NIGHTLY
Qty: 90 TABLET | Refills: 1 | Status: CANCELLED | OUTPATIENT
Start: 2024-01-20

## 2024-01-20 RX ORDER — LEVOTHYROXINE SODIUM 88 UG/1
88 TABLET ORAL DAILY
Qty: 90 TABLET | Refills: 1 | Status: CANCELLED | OUTPATIENT
Start: 2024-01-20

## 2024-01-20 NOTE — PROGRESS NOTES
Chief Complaint:   Chief Complaint   Patient presents with    Diabetes     MED REFILL      Hypertension    Hyperlipidemia    Hypothyroidism    Arthritis    Insomnia           Restless Legs Syndrome    OSTEOPENIA       Elissa Marrero 89 y.o. female who presents today for Medical Management of the below listed issues. She  has a problem list of   Patient Active Problem List   Diagnosis    Diabetes mellitus, type 2    Hyperlipidemia    Benign essential hypertension    Hypothyroidism    Insomnia    Lymphedema    Osteoarthritis    Osteopenia    Renal insufficiency    Restless leg syndrome    Vitamin D deficiency    Hip pain, right    Acute kidney failure    Chronic kidney disease, stage 4 (severe)    Disorder of bone and cartilage    Edema    Hyperkalemia    Urinary tract infection    TIA (transient ischemic attack)    Acute metabolic encephalopathy    Acute cystitis without hematuria   .  Since the last visit, She has overall felt well.  she has been compliant with   Current Outpatient Medications:     amLODIPine (NORVASC) 5 MG tablet, Take 1 tablet by mouth Daily., Disp: 90 tablet, Rfl: 1    clopidogrel (PLAVIX) 75 MG tablet, Take 1 tablet by mouth Daily., Disp: 90 tablet, Rfl: 1    glipizide (GLUCOTROL) 5 MG tablet, Take 1 tablet by mouth 3 (Three) Times a Day., Disp: 270 tablet, Rfl: 1    levothyroxine (SYNTHROID, LEVOTHROID) 88 MCG tablet, Take 1 tablet by mouth Daily., Disp: 90 tablet, Rfl: 1    metFORMIN ER (GLUCOPHAGE-XR) 500 MG 24 hr tablet, Take 1 tablet by mouth Daily With Breakfast., Disp: 90 tablet, Rfl: 1    metoprolol tartrate (LOPRESSOR) 25 MG tablet, Take 1 tablet by mouth 2 (Two) Times a Day., Disp: 180 tablet, Rfl: 1    pramipexole (MIRAPEX) 1.5 MG tablet, Take 1 tablet by mouth Daily., Disp: 90 tablet, Rfl: 1    rosuvastatin (Crestor) 10 MG tablet, Take 1 tablet by mouth Daily., Disp: 90 tablet, Rfl: 1    traZODone (DESYREL) 50 MG tablet, Take 1 tablet by mouth Every Night., Disp: 90 tablet, Rfl: 1    " Cholecalciferol (VITAMIN D PO), Take  by mouth., Disp: , Rfl: .  She denies medication side effects.    All of the other chronic condition(s) listed above are stable w/o issues.    /54   Pulse 80   Temp 97.4 °F (36.3 °C) (Oral)   Resp 16   Ht 170.2 cm (67.01\")   Wt 56.7 kg (125 lb)   BMI 19.57 kg/m²     Results for orders placed or performed in visit on 12/17/23   Comprehensive metabolic panel    Specimen: Blood   Result Value Ref Range    Glucose 64 (L) 65 - 99 mg/dL    BUN 20 8 - 23 mg/dL    Creatinine 1.58 (H) 0.57 - 1.00 mg/dL    EGFR Result 29.5 (L) >60.0 mL/min/1.73    BUN/Creatinine Ratio 12.7 7.0 - 25.0    Sodium 143 136 - 145 mmol/L    Potassium 4.9 3.5 - 5.2 mmol/L    Chloride 106 98 - 107 mmol/L    Total CO2 23.6 22.0 - 29.0 mmol/L    Calcium 9.9 (H) 8.2 - 9.6 mg/dL    Total Protein 6.4 6.0 - 8.5 g/dL    Albumin 4.3 3.5 - 5.2 g/dL    Globulin 2.1 gm/dL    A/G Ratio 2.0 g/dL    Total Bilirubin 0.4 0.0 - 1.2 mg/dL    Alkaline Phosphatase 53 39 - 117 U/L    AST (SGOT) 13 1 - 32 U/L    ALT (SGPT) 6 1 - 33 U/L   Lipid panel    Specimen: Blood   Result Value Ref Range    Total Cholesterol 116 0 - 200 mg/dL    Triglycerides 160 (H) 0 - 150 mg/dL    HDL Cholesterol 52 40 - 60 mg/dL    VLDL Cholesterol Walter 27 5 - 40 mg/dL    LDL Chol Calc (NIH) 37 0 - 100 mg/dL   TSH    Specimen: Blood   Result Value Ref Range    TSH 0.032 (L) 0.270 - 4.200 uIU/mL   Hemoglobin A1c    Specimen: Blood   Result Value Ref Range    Hemoglobin A1C 7.90 (H) 4.80 - 5.60 %   T4, Free    Specimen: Blood   Result Value Ref Range    Free T4 2.36 (H) 0.93 - 1.70 ng/dL   Unable To Void   Result Value Ref Range    Unable to Void Comment    CBC and Differential    Specimen: Blood   Result Value Ref Range    WBC 8.00 3.40 - 10.80 10*3/mm3    RBC 4.29 3.77 - 5.28 10*6/mm3    Hemoglobin 12.0 12.0 - 15.9 g/dL    Hematocrit 37.3 34.0 - 46.6 %    MCV 86.9 79.0 - 97.0 fL    MCH 28.0 26.6 - 33.0 pg    MCHC 32.2 31.5 - 35.7 g/dL    RDW 12.8 " 12.3 - 15.4 %    Platelets 222 140 - 450 10*3/mm3    Neutrophil Rel % 57.9 42.7 - 76.0 %    Lymphocyte Rel % 32.6 19.6 - 45.3 %    Monocyte Rel % 8.1 5.0 - 12.0 %    Eosinophil Rel % 0.8 0.3 - 6.2 %    Basophil Rel % 0.3 0.0 - 1.5 %    Neutrophils Absolute 4.64 1.70 - 7.00 10*3/mm3    Lymphocytes Absolute 2.61 0.70 - 3.10 10*3/mm3    Monocytes Absolute 0.65 0.10 - 0.90 10*3/mm3    Eosinophils Absolute 0.06 0.00 - 0.40 10*3/mm3    Basophils Absolute 0.02 0.00 - 0.20 10*3/mm3    Immature Granulocyte Rel % 0.3 0.0 - 0.5 %    Immature Grans Absolute 0.02 0.00 - 0.05 10*3/mm3    nRBC 0.0 0.0 - 0.2 /100 WBC             The following portions of the patient's history were reviewed and updated as appropriate: allergies, current medications, past family history, past medical history, past social history, past surgical history, and problem list.    Review of Systems   Constitutional:  Negative for activity change, chills and fever.   Respiratory:  Negative for cough.    Cardiovascular:  Negative for chest pain.   Psychiatric/Behavioral:  Negative for dysphoric mood.        Objective     BMI is within normal parameters. No other follow-up for BMI required.       Physical Exam  Constitutional:       General: She is not in acute distress.     Appearance: She is well-developed.   Cardiovascular:      Rate and Rhythm: Normal rate and regular rhythm.   Pulmonary:      Effort: Pulmonary effort is normal.      Breath sounds: Normal breath sounds.   Neurological:      Mental Status: She is alert and oriented to person, place, and time.   Psychiatric:         Behavior: Behavior normal.         Thought Content: Thought content normal.     Labs reviewed with pt today during visit. All questions answered.          Diagnoses and all orders for this visit:    1. Type 2 diabetes mellitus with hyperglycemia, without long-term current use of insulin (Primary)  Comments:  improving  Orders:  -     glipizide (GLUCOTROL) 5 MG tablet; Take 1  tablet by mouth 3 (Three) Times a Day.  Dispense: 270 tablet; Refill: 1  -     MicroAlbumin, Urine, Random - Urine, Clean Catch; Future  -     Hemoglobin A1c; Future  -     Basic Metabolic Panel; Future  -     metFORMIN ER (GLUCOPHAGE-XR) 500 MG 24 hr tablet; Take 1 tablet by mouth Daily With Breakfast.  Dispense: 90 tablet; Refill: 1    2. Acquired hypothyroidism  Comments:  currently overtreaed; doseage adjusted  Orders:  -     T3; Future  -     TSH; Future  -     T3; Future  -     T4, Free; Future  -     TSH; Future  -     levothyroxine (SYNTHROID, LEVOTHROID) 88 MCG tablet; Take 1 tablet by mouth Daily.  Dispense: 90 tablet; Refill: 1  -     T4, Free; Future    3. TIA (transient ischemic attack)  -     clopidogrel (PLAVIX) 75 MG tablet; Take 1 tablet by mouth Daily.  Dispense: 90 tablet; Refill: 1    4. Mixed hyperlipidemia  -     rosuvastatin (Crestor) 10 MG tablet; Take 1 tablet by mouth Daily.  Dispense: 90 tablet; Refill: 1    5. Benign essential hypertension  -     amLODIPine (NORVASC) 5 MG tablet; Take 1 tablet by mouth Daily.  Dispense: 90 tablet; Refill: 1  -     metoprolol tartrate (LOPRESSOR) 25 MG tablet; Take 1 tablet by mouth 2 (Two) Times a Day.  Dispense: 180 tablet; Refill: 1  -     CBC & Differential; Future  -     Basic Metabolic Panel; Future    6. Restless leg syndrome  -     pramipexole (MIRAPEX) 1.5 MG tablet; Take 1 tablet by mouth Daily.  Dispense: 90 tablet; Refill: 1

## 2024-01-22 ENCOUNTER — OFFICE VISIT (OUTPATIENT)
Dept: FAMILY MEDICINE CLINIC | Facility: CLINIC | Age: 89
End: 2024-01-22
Payer: MEDICARE

## 2024-01-22 VITALS
RESPIRATION RATE: 16 BRPM | DIASTOLIC BLOOD PRESSURE: 54 MMHG | WEIGHT: 125 LBS | SYSTOLIC BLOOD PRESSURE: 119 MMHG | TEMPERATURE: 97.4 F | BODY MASS INDEX: 19.62 KG/M2 | HEART RATE: 80 BPM | HEIGHT: 67 IN

## 2024-01-22 DIAGNOSIS — G45.9 TIA (TRANSIENT ISCHEMIC ATTACK): Chronic | ICD-10-CM

## 2024-01-22 DIAGNOSIS — I10 BENIGN ESSENTIAL HYPERTENSION: Chronic | ICD-10-CM

## 2024-01-22 DIAGNOSIS — E11.65 TYPE 2 DIABETES MELLITUS WITH HYPERGLYCEMIA, WITHOUT LONG-TERM CURRENT USE OF INSULIN: Primary | Chronic | ICD-10-CM

## 2024-01-22 DIAGNOSIS — E78.2 MIXED HYPERLIPIDEMIA: Chronic | ICD-10-CM

## 2024-01-22 DIAGNOSIS — G25.81 RESTLESS LEG SYNDROME: Chronic | ICD-10-CM

## 2024-01-22 DIAGNOSIS — E03.9 ACQUIRED HYPOTHYROIDISM: Chronic | ICD-10-CM

## 2024-01-22 PROCEDURE — 1160F RVW MEDS BY RX/DR IN RCRD: CPT | Performed by: FAMILY MEDICINE

## 2024-01-22 PROCEDURE — 99214 OFFICE O/P EST MOD 30 MIN: CPT | Performed by: FAMILY MEDICINE

## 2024-01-22 PROCEDURE — 1159F MED LIST DOCD IN RCRD: CPT | Performed by: FAMILY MEDICINE

## 2024-01-22 RX ORDER — GLIPIZIDE 5 MG/1
5 TABLET ORAL 3 TIMES DAILY
Qty: 270 TABLET | Refills: 1 | Status: SHIPPED | OUTPATIENT
Start: 2024-01-22

## 2024-01-22 RX ORDER — ROSUVASTATIN CALCIUM 10 MG/1
10 TABLET, COATED ORAL DAILY
Qty: 90 TABLET | Refills: 1 | Status: SHIPPED | OUTPATIENT
Start: 2024-01-22

## 2024-01-22 RX ORDER — LEVOTHYROXINE SODIUM 88 UG/1
88 TABLET ORAL DAILY
Qty: 90 TABLET | Refills: 1 | Status: SHIPPED | OUTPATIENT
Start: 2024-01-22

## 2024-01-22 RX ORDER — CLOPIDOGREL BISULFATE 75 MG/1
75 TABLET ORAL DAILY
Qty: 90 TABLET | Refills: 1 | Status: SHIPPED | OUTPATIENT
Start: 2024-01-22

## 2024-01-22 RX ORDER — METFORMIN HYDROCHLORIDE 500 MG/1
500 TABLET, EXTENDED RELEASE ORAL
Qty: 90 TABLET | Refills: 1 | Status: SHIPPED | OUTPATIENT
Start: 2024-01-22

## 2024-01-22 RX ORDER — AMLODIPINE BESYLATE 5 MG/1
5 TABLET ORAL DAILY
Qty: 90 TABLET | Refills: 1 | Status: SHIPPED | OUTPATIENT
Start: 2024-01-22

## 2024-01-22 RX ORDER — PRAMIPEXOLE DIHYDROCHLORIDE 1.5 MG/1
1.5 TABLET ORAL DAILY
Qty: 90 TABLET | Refills: 1 | Status: SHIPPED | OUTPATIENT
Start: 2024-01-22

## 2024-01-23 ENCOUNTER — TELEPHONE (OUTPATIENT)
Dept: FAMILY MEDICINE CLINIC | Facility: CLINIC | Age: 89
End: 2024-01-23

## 2024-01-23 NOTE — TELEPHONE ENCOUNTER
Hub staff attempted to follow warm transfer process and was unsuccessful     Caller: Elissa Marrero    Relationship to patient: Self    Best call back number: [phone]     Patient is needing: PATIENTS DAUGHTER CALLING NEEDING TO RESCHEDULE THE LAB APPOINTMENT ON 02/23/24. SHE IS NEEDING TO MOVE IT TO 02/19-02/20 MORNING APPOINTMENT

## 2024-01-30 ENCOUNTER — TELEPHONE (OUTPATIENT)
Dept: FAMILY MEDICINE CLINIC | Facility: CLINIC | Age: 89
End: 2024-01-30

## 2024-01-30 NOTE — TELEPHONE ENCOUNTER
Caller: Elissa Marrero    Relationship: Self    Best call back number: [phone]     What is the best time to reach you: ANYTIME    Who are you requesting to speak with (clinical staff, provider,  specific staff member): CLINICAL    What was the call regarding: PATIENT IS REQUESTING NEW COPIES OF HER LAB ORDERS.    PATIENT STATED SHE HAS MISPLACED THESE PAPERS AND WOULD LIKE TO  NEW COPIES BEFORE HER APPOINTMENT FOR LABS ON 02-    PLEASE CALL TO INFORM PATIENT WHEN READY FOR .

## 2024-02-20 DIAGNOSIS — E03.9 ACQUIRED HYPOTHYROIDISM: Chronic | ICD-10-CM

## 2024-02-21 LAB
T3 SERPL-MCNC: 93 NG/DL (ref 71–180)
T4 FREE SERPL-MCNC: 1.85 NG/DL (ref 0.82–1.77)
TSH SERPL DL<=0.005 MIU/L-ACNC: 0.04 UIU/ML (ref 0.45–4.5)

## 2024-03-05 DIAGNOSIS — F51.01 PRIMARY INSOMNIA: ICD-10-CM

## 2024-03-05 RX ORDER — TRAZODONE HYDROCHLORIDE 50 MG/1
50 TABLET ORAL NIGHTLY
Qty: 90 TABLET | Refills: 1 | OUTPATIENT
Start: 2024-03-05

## 2024-03-05 NOTE — TELEPHONE ENCOUNTER
Caller: MELANIE    Relationship:     Best call back number: [phone]     Requested Prescriptions:   Requested Prescriptions     Pending Prescriptions Disp Refills    traZODone (DESYREL) 50 MG tablet 90 tablet 1     Sig: Take 1 tablet by mouth Every Night.        Pharmacy where request should be sent: [facility] - [facility] [address] - [phone]  - [phone] FX     Last office visit with prescribing clinician: 1/22/2024   Last telemedicine visit with prescribing clinician: Visit date not found   Next office visit with prescribing clinician: 6/24/2024     Additional details provided by patient: WILL NEED NEW PRESCRIPTION    Does the patient have less than a 3 day supply:  [] Yes  [x] No    Would you like a call back once the refill request has been completed: [] Yes [] No    If the office needs to give you a call back, can they leave a voicemail: [] Yes [] No    Sandor Fung Rep   03/05/24 15:06 EST

## 2024-03-28 ENCOUNTER — PATIENT OUTREACH (OUTPATIENT)
Dept: CASE MANAGEMENT | Facility: OTHER | Age: 89
End: 2024-03-28
Payer: MEDICARE

## 2024-03-28 DIAGNOSIS — E11.65 TYPE 2 DIABETES MELLITUS WITH HYPERGLYCEMIA, WITHOUT LONG-TERM CURRENT USE OF INSULIN: Primary | ICD-10-CM

## 2024-03-28 NOTE — OUTREACH NOTE
AMBULATORY CASE MANAGEMENT NOTE    Name and Relationship of Patient/Support Person: Michelle Spain - Emergency Contact    CCM Interim Update    Call to patient/ daughter.  Verified patient by name and date of birth.    ACM completed thorough chart review prior to outreach.  Introduced self and purpose for call.    Daughter verbalizing that she is currently at work and unable to speak.  Requesting to return call at her convenience.  ACM agreeable.        Education Documentation  No documentation found.        Daphne ZHOU  Ambulatory Case Management    3/28/2024, 13:11 EDT

## 2024-04-03 ENCOUNTER — TELEPHONE (OUTPATIENT)
Dept: CASE MANAGEMENT | Facility: OTHER | Age: 89
End: 2024-04-03
Payer: MEDICARE

## 2024-05-05 ENCOUNTER — HOSPITAL ENCOUNTER (OUTPATIENT)
Facility: HOSPITAL | Age: 89
Discharge: HOME | End: 2024-05-05
Attending: EMERGENCY MEDICINE | Admitting: EMERGENCY MEDICINE
Payer: MEDICARE

## 2024-05-05 ENCOUNTER — APPOINTMENT (OUTPATIENT)
Dept: GENERAL RADIOLOGY | Facility: HOSPITAL | Age: 89
End: 2024-05-05
Payer: MEDICARE

## 2024-05-05 VITALS
WEIGHT: 125 LBS | SYSTOLIC BLOOD PRESSURE: 161 MMHG | HEART RATE: 89 BPM | HEIGHT: 67 IN | TEMPERATURE: 98.9 F | DIASTOLIC BLOOD PRESSURE: 72 MMHG | RESPIRATION RATE: 18 BRPM | OXYGEN SATURATION: 100 % | BODY MASS INDEX: 19.62 KG/M2

## 2024-05-05 DIAGNOSIS — R05.1 ACUTE COUGH: ICD-10-CM

## 2024-05-05 DIAGNOSIS — J40 BRONCHITIS: Primary | ICD-10-CM

## 2024-05-05 LAB
FLUAV SUBTYP SPEC NAA+PROBE: NOT DETECTED
FLUBV RNA ISLT QL NAA+PROBE: NOT DETECTED
RSV RNA NPH QL NAA+NON-PROBE: NOT DETECTED
SARS-COV-2 RNA RESP QL NAA+PROBE: NOT DETECTED

## 2024-05-05 PROCEDURE — 99214 OFFICE O/P EST MOD 30 MIN: CPT | Performed by: NURSE PRACTITIONER

## 2024-05-05 PROCEDURE — 87637 SARSCOV2&INF A&B&RSV AMP PRB: CPT | Performed by: NURSE PRACTITIONER

## 2024-05-05 PROCEDURE — G0463 HOSPITAL OUTPT CLINIC VISIT: HCPCS | Performed by: NURSE PRACTITIONER

## 2024-05-05 PROCEDURE — 71046 X-RAY EXAM CHEST 2 VIEWS: CPT

## 2024-05-05 RX ORDER — DOXYCYCLINE 100 MG/1
100 CAPSULE ORAL 2 TIMES DAILY
Qty: 14 CAPSULE | Refills: 0 | Status: SHIPPED | OUTPATIENT
Start: 2024-05-05 | End: 2024-05-12

## 2024-05-05 RX ORDER — BENZONATATE 200 MG/1
200 CAPSULE ORAL 3 TIMES DAILY PRN
Qty: 21 CAPSULE | Refills: 0 | Status: SHIPPED | OUTPATIENT
Start: 2024-05-05 | End: 2024-05-08 | Stop reason: SDUPTHER

## 2024-05-05 RX ORDER — DOXYCYCLINE 100 MG/1
100 CAPSULE ORAL ONCE
Status: COMPLETED | OUTPATIENT
Start: 2024-05-05 | End: 2024-05-05

## 2024-05-05 RX ORDER — GUAIFENESIN/DEXTROMETHORPHAN 100-10MG/5
10 SYRUP ORAL ONCE
Status: COMPLETED | OUTPATIENT
Start: 2024-05-05 | End: 2024-05-05

## 2024-05-05 RX ADMIN — DOXYCYCLINE 100 MG: 100 CAPSULE ORAL at 20:44

## 2024-05-05 RX ADMIN — GUAIFENESIN SYRUP AND DEXTROMETHORPHAN 10 ML: 100; 10 SYRUP ORAL at 20:44

## 2024-05-08 ENCOUNTER — OFFICE VISIT (OUTPATIENT)
Dept: FAMILY MEDICINE CLINIC | Facility: CLINIC | Age: 89
End: 2024-05-08
Payer: MEDICARE

## 2024-05-08 VITALS
WEIGHT: 125 LBS | BODY MASS INDEX: 19.62 KG/M2 | RESPIRATION RATE: 16 BRPM | HEART RATE: 78 BPM | SYSTOLIC BLOOD PRESSURE: 128 MMHG | DIASTOLIC BLOOD PRESSURE: 63 MMHG | TEMPERATURE: 97.5 F | HEIGHT: 67 IN

## 2024-05-08 DIAGNOSIS — Z09 HOSPITAL DISCHARGE FOLLOW-UP: ICD-10-CM

## 2024-05-08 DIAGNOSIS — J40 BRONCHITIS: Primary | ICD-10-CM

## 2024-05-08 PROCEDURE — 1159F MED LIST DOCD IN RCRD: CPT | Performed by: FAMILY MEDICINE

## 2024-05-08 PROCEDURE — 1160F RVW MEDS BY RX/DR IN RCRD: CPT | Performed by: FAMILY MEDICINE

## 2024-05-08 PROCEDURE — 1111F DSCHRG MED/CURRENT MED MERGE: CPT | Performed by: FAMILY MEDICINE

## 2024-05-08 PROCEDURE — 99213 OFFICE O/P EST LOW 20 MIN: CPT | Performed by: FAMILY MEDICINE

## 2024-05-08 PROCEDURE — 1126F AMNT PAIN NOTED NONE PRSNT: CPT | Performed by: FAMILY MEDICINE

## 2024-05-08 RX ORDER — BENZONATATE 200 MG/1
200 CAPSULE ORAL 3 TIMES DAILY PRN
Qty: 30 CAPSULE | Refills: 2 | Status: SHIPPED | OUTPATIENT
Start: 2024-05-08

## 2024-05-13 NOTE — TELEPHONE ENCOUNTER
Caller: JAIDEN BURTON    Best call back number: [phone]     Requested Prescriptions:   Requested Prescriptions     Pending Prescriptions Disp Refills    doxycycline (MONODOX) 100 MG capsule 14 capsule 0     Sig: Take 1 capsule by mouth 2 (Two) Times a Day for 7 days.        Pharmacy where request should be sent: [facility] #[id number] - [place], [place] - [address] AT [facility] [phone] [facility] [phone] FX     Last office visit with prescribing clinician: 5/8/2024   Last telemedicine visit with prescribing clinician: Visit date not found   Next office visit with prescribing clinician: 6/24/2024     Additional details provided by patient: REQUESTING 3 MORE DAYS  MARI WANTED HER ON FOR 10  DAYS  EMERGENCY ROOM GAVE 7 DAYS    Does the patient have less than a 3 day supply:  [x] Yes  [] No    Sandor Humphries Rep   05/13/24 09:19 EDT

## 2024-05-14 RX ORDER — DOXYCYCLINE 100 MG/1
100 CAPSULE ORAL 2 TIMES DAILY
Qty: 14 CAPSULE | Refills: 0 | OUTPATIENT
Start: 2024-05-14 | End: 2024-05-21

## 2024-05-17 ENCOUNTER — HOSPITAL ENCOUNTER (OUTPATIENT)
Facility: HOSPITAL | Age: 89
Setting detail: OBSERVATION
Discharge: HOME | End: 2024-05-18
Attending: EMERGENCY MEDICINE | Admitting: EMERGENCY MEDICINE
Payer: MEDICARE

## 2024-05-17 DIAGNOSIS — E16.2 HYPOGLYCEMIA: Primary | ICD-10-CM

## 2024-05-17 DIAGNOSIS — E11.649 TYPE 2 DIABETES MELLITUS WITH HYPOGLYCEMIA WITHOUT COMA, WITHOUT LONG-TERM CURRENT USE OF INSULIN: ICD-10-CM

## 2024-05-17 LAB
ALBUMIN SERPL-MCNC: 3.8 G/DL (ref 3.5–5.2)
ALBUMIN/GLOB SERPL: 1.3 G/DL
ALP SERPL-CCNC: 66 U/L (ref 39–117)
ALT SERPL W P-5'-P-CCNC: 9 U/L (ref 1–33)
ANION GAP SERPL CALCULATED.3IONS-SCNC: 15.4 MMOL/L (ref 5–15)
AST SERPL-CCNC: 14 U/L (ref 1–32)
BACTERIA UR QL AUTO: ABNORMAL /HPF
BASOPHILS # BLD AUTO: 0.02 10*3/MM3 (ref 0–0.2)
BASOPHILS NFR BLD AUTO: 0.2 % (ref 0–1.5)
BILIRUB SERPL-MCNC: 0.4 MG/DL (ref 0–1.2)
BILIRUB UR QL STRIP: NEGATIVE
BUN SERPL-MCNC: 24 MG/DL (ref 8–23)
BUN/CREAT SERPL: 14.4 (ref 7–25)
CALCIUM SPEC-SCNC: 9.6 MG/DL (ref 8.2–9.6)
CHLORIDE SERPL-SCNC: 100 MMOL/L (ref 98–107)
CLARITY UR: CLEAR
CO2 SERPL-SCNC: 20.6 MMOL/L (ref 22–29)
COLOR UR: YELLOW
CREAT SERPL-MCNC: 1.67 MG/DL (ref 0.57–1)
DEPRECATED RDW RBC AUTO: 42.1 FL (ref 37–54)
EGFRCR SERPLBLD CKD-EPI 2021: 27.4 ML/MIN/1.73
EOSINOPHIL # BLD AUTO: 0.03 10*3/MM3 (ref 0–0.4)
EOSINOPHIL NFR BLD AUTO: 0.3 % (ref 0.3–6.2)
ERYTHROCYTE [DISTWIDTH] IN BLOOD BY AUTOMATED COUNT: 13.6 % (ref 12.3–15.4)
GEN 5 2HR TROPONIN T REFLEX: 24 NG/L
GLOBULIN UR ELPH-MCNC: 2.9 GM/DL
GLUCOSE BLDC GLUCOMTR-MCNC: 177 MG/DL (ref 70–130)
GLUCOSE BLDC GLUCOMTR-MCNC: 58 MG/DL (ref 70–130)
GLUCOSE SERPL-MCNC: 76 MG/DL (ref 65–99)
GLUCOSE UR STRIP-MCNC: NEGATIVE MG/DL
HCT VFR BLD AUTO: 38.1 % (ref 34–46.6)
HGB BLD-MCNC: 12.3 G/DL (ref 12–15.9)
HGB UR QL STRIP.AUTO: ABNORMAL
HYALINE CASTS UR QL AUTO: ABNORMAL /LPF
IMM GRANULOCYTES # BLD AUTO: 0.04 10*3/MM3 (ref 0–0.05)
IMM GRANULOCYTES NFR BLD AUTO: 0.3 % (ref 0–0.5)
KETONES UR QL STRIP: NEGATIVE
LEUKOCYTE ESTERASE UR QL STRIP.AUTO: NEGATIVE
LYMPHOCYTES # BLD AUTO: 3.31 10*3/MM3 (ref 0.7–3.1)
LYMPHOCYTES NFR BLD AUTO: 27.7 % (ref 19.6–45.3)
MCH RBC QN AUTO: 27.4 PG (ref 26.6–33)
MCHC RBC AUTO-ENTMCNC: 32.3 G/DL (ref 31.5–35.7)
MCV RBC AUTO: 84.9 FL (ref 79–97)
MONOCYTES # BLD AUTO: 0.86 10*3/MM3 (ref 0.1–0.9)
MONOCYTES NFR BLD AUTO: 7.2 % (ref 5–12)
NEUTROPHILS NFR BLD AUTO: 64.3 % (ref 42.7–76)
NEUTROPHILS NFR BLD AUTO: 7.7 10*3/MM3 (ref 1.7–7)
NITRITE UR QL STRIP: NEGATIVE
NRBC BLD AUTO-RTO: 0 /100 WBC (ref 0–0.2)
PH UR STRIP.AUTO: <=5 [PH] (ref 5–8)
PLATELET # BLD AUTO: 212 10*3/MM3 (ref 140–450)
PMV BLD AUTO: 11.1 FL (ref 6–12)
POTASSIUM SERPL-SCNC: 4.6 MMOL/L (ref 3.5–5.2)
PROT SERPL-MCNC: 6.7 G/DL (ref 6–8.5)
PROT UR QL STRIP: NEGATIVE
RBC # BLD AUTO: 4.49 10*6/MM3 (ref 3.77–5.28)
RBC # UR STRIP: ABNORMAL /HPF
REF LAB TEST METHOD: ABNORMAL
SODIUM SERPL-SCNC: 136 MMOL/L (ref 136–145)
SP GR UR STRIP: 1.01 (ref 1–1.03)
SQUAMOUS #/AREA URNS HPF: ABNORMAL /HPF
TROPONIN T DELTA: -2 NG/L
TROPONIN T SERPL HS-MCNC: 26 NG/L
UROBILINOGEN UR QL STRIP: ABNORMAL
WBC # UR STRIP: ABNORMAL /HPF
WBC NRBC COR # BLD AUTO: 11.96 10*3/MM3 (ref 3.4–10.8)

## 2024-05-17 PROCEDURE — 93005 ELECTROCARDIOGRAM TRACING: CPT | Performed by: EMERGENCY MEDICINE

## 2024-05-17 PROCEDURE — 82948 REAGENT STRIP/BLOOD GLUCOSE: CPT

## 2024-05-17 PROCEDURE — 80053 COMPREHEN METABOLIC PANEL: CPT | Performed by: EMERGENCY MEDICINE

## 2024-05-17 PROCEDURE — 84484 ASSAY OF TROPONIN QUANT: CPT | Performed by: EMERGENCY MEDICINE

## 2024-05-17 PROCEDURE — 36415 COLL VENOUS BLD VENIPUNCTURE: CPT

## 2024-05-17 PROCEDURE — G0378 HOSPITAL OBSERVATION PER HR: HCPCS

## 2024-05-17 PROCEDURE — 85025 COMPLETE CBC W/AUTO DIFF WBC: CPT | Performed by: EMERGENCY MEDICINE

## 2024-05-17 PROCEDURE — 99285 EMERGENCY DEPT VISIT HI MDM: CPT

## 2024-05-17 PROCEDURE — 96374 THER/PROPH/DIAG INJ IV PUSH: CPT

## 2024-05-17 PROCEDURE — P9612 CATHETERIZE FOR URINE SPEC: HCPCS

## 2024-05-17 PROCEDURE — 81001 URINALYSIS AUTO W/SCOPE: CPT | Performed by: EMERGENCY MEDICINE

## 2024-05-17 RX ORDER — DEXTROSE AND SODIUM CHLORIDE 5; .9 G/100ML; G/100ML
75 INJECTION, SOLUTION INTRAVENOUS CONTINUOUS
Status: DISCONTINUED | OUTPATIENT
Start: 2024-05-18 | End: 2024-05-18

## 2024-05-17 RX ORDER — DEXTROSE MONOHYDRATE 25 G/50ML
50 INJECTION, SOLUTION INTRAVENOUS
Status: DISCONTINUED | OUTPATIENT
Start: 2024-05-17 | End: 2024-05-18 | Stop reason: HOSPADM

## 2024-05-17 RX ORDER — DEXTROSE MONOHYDRATE 25 G/50ML
25 INJECTION, SOLUTION INTRAVENOUS ONCE
Status: COMPLETED | OUTPATIENT
Start: 2024-05-17 | End: 2024-05-17

## 2024-05-17 RX ORDER — SODIUM CHLORIDE 0.9 % (FLUSH) 0.9 %
10 SYRINGE (ML) INJECTION AS NEEDED
Status: DISCONTINUED | OUTPATIENT
Start: 2024-05-17 | End: 2024-05-18 | Stop reason: HOSPADM

## 2024-05-17 RX ADMIN — DEXTROSE MONOHYDRATE 25 G: 25 INJECTION, SOLUTION INTRAVENOUS at 23:07

## 2024-05-18 ENCOUNTER — READMISSION MANAGEMENT (OUTPATIENT)
Dept: CALL CENTER | Facility: HOSPITAL | Age: 89
End: 2024-05-18
Payer: MEDICARE

## 2024-05-18 VITALS
WEIGHT: 130.51 LBS | BODY MASS INDEX: 20.48 KG/M2 | SYSTOLIC BLOOD PRESSURE: 123 MMHG | HEART RATE: 81 BPM | OXYGEN SATURATION: 96 % | TEMPERATURE: 97.9 F | RESPIRATION RATE: 16 BRPM | HEIGHT: 67 IN | DIASTOLIC BLOOD PRESSURE: 60 MMHG

## 2024-05-18 PROBLEM — E16.2 HYPOGLYCEMIA: Status: RESOLVED | Noted: 2024-05-17 | Resolved: 2024-05-18

## 2024-05-18 PROBLEM — N20.0 KIDNEY STONE: Status: ACTIVE | Noted: 2024-05-18

## 2024-05-18 PROBLEM — N20.0 KIDNEY STONE: Status: RESOLVED | Noted: 2024-05-18 | Resolved: 2024-05-18

## 2024-05-18 LAB
ANION GAP SERPL CALCULATED.3IONS-SCNC: 13 MMOL/L (ref 5–15)
BUN SERPL-MCNC: 24 MG/DL (ref 8–23)
BUN/CREAT SERPL: 16.7 (ref 7–25)
CALCIUM SPEC-SCNC: 9.4 MG/DL (ref 8.2–9.6)
CHLORIDE SERPL-SCNC: 108 MMOL/L (ref 98–107)
CO2 SERPL-SCNC: 20 MMOL/L (ref 22–29)
CREAT SERPL-MCNC: 1.44 MG/DL (ref 0.57–1)
DEPRECATED RDW RBC AUTO: 40.8 FL (ref 37–54)
EGFRCR SERPLBLD CKD-EPI 2021: 32.7 ML/MIN/1.73
ERYTHROCYTE [DISTWIDTH] IN BLOOD BY AUTOMATED COUNT: 13.5 % (ref 12.3–15.4)
GLUCOSE BLDC GLUCOMTR-MCNC: 108 MG/DL (ref 70–130)
GLUCOSE BLDC GLUCOMTR-MCNC: 118 MG/DL (ref 70–130)
GLUCOSE BLDC GLUCOMTR-MCNC: 132 MG/DL (ref 70–130)
GLUCOSE BLDC GLUCOMTR-MCNC: 149 MG/DL (ref 70–130)
GLUCOSE BLDC GLUCOMTR-MCNC: 159 MG/DL (ref 70–130)
GLUCOSE BLDC GLUCOMTR-MCNC: 178 MG/DL (ref 70–130)
GLUCOSE BLDC GLUCOMTR-MCNC: 180 MG/DL (ref 70–130)
GLUCOSE BLDC GLUCOMTR-MCNC: 201 MG/DL (ref 70–130)
GLUCOSE BLDC GLUCOMTR-MCNC: 210 MG/DL (ref 70–130)
GLUCOSE BLDC GLUCOMTR-MCNC: 210 MG/DL (ref 70–130)
GLUCOSE BLDC GLUCOMTR-MCNC: 223 MG/DL (ref 70–130)
GLUCOSE BLDC GLUCOMTR-MCNC: 243 MG/DL (ref 70–130)
GLUCOSE BLDC GLUCOMTR-MCNC: 259 MG/DL (ref 70–130)
GLUCOSE BLDC GLUCOMTR-MCNC: 67 MG/DL (ref 70–130)
GLUCOSE SERPL-MCNC: 64 MG/DL (ref 65–99)
HCT VFR BLD AUTO: 36.9 % (ref 34–46.6)
HGB BLD-MCNC: 12.2 G/DL (ref 12–15.9)
MCH RBC QN AUTO: 27.4 PG (ref 26.6–33)
MCHC RBC AUTO-ENTMCNC: 33.1 G/DL (ref 31.5–35.7)
MCV RBC AUTO: 82.7 FL (ref 79–97)
PLATELET # BLD AUTO: 196 10*3/MM3 (ref 140–450)
PMV BLD AUTO: 11.2 FL (ref 6–12)
POTASSIUM SERPL-SCNC: 4.1 MMOL/L (ref 3.5–5.2)
QT INTERVAL: 387 MS
QTC INTERVAL: 469 MS
RBC # BLD AUTO: 4.46 10*6/MM3 (ref 3.77–5.28)
SODIUM SERPL-SCNC: 141 MMOL/L (ref 136–145)
WBC NRBC COR # BLD AUTO: 10.1 10*3/MM3 (ref 3.4–10.8)

## 2024-05-18 PROCEDURE — 96376 TX/PRO/DX INJ SAME DRUG ADON: CPT

## 2024-05-18 PROCEDURE — G0378 HOSPITAL OBSERVATION PER HR: HCPCS

## 2024-05-18 PROCEDURE — 97162 PT EVAL MOD COMPLEX 30 MIN: CPT

## 2024-05-18 PROCEDURE — 80048 BASIC METABOLIC PNL TOTAL CA: CPT | Performed by: NURSE PRACTITIONER

## 2024-05-18 PROCEDURE — 96361 HYDRATE IV INFUSION ADD-ON: CPT

## 2024-05-18 PROCEDURE — 97110 THERAPEUTIC EXERCISES: CPT

## 2024-05-18 PROCEDURE — 25810000003 DEXTROSE-NACL PER 500 ML: Performed by: NURSE PRACTITIONER

## 2024-05-18 PROCEDURE — 82948 REAGENT STRIP/BLOOD GLUCOSE: CPT

## 2024-05-18 PROCEDURE — 97535 SELF CARE MNGMENT TRAINING: CPT

## 2024-05-18 PROCEDURE — 97165 OT EVAL LOW COMPLEX 30 MIN: CPT

## 2024-05-18 PROCEDURE — 85027 COMPLETE CBC AUTOMATED: CPT | Performed by: NURSE PRACTITIONER

## 2024-05-18 RX ORDER — ONDANSETRON 4 MG/1
4 TABLET, ORALLY DISINTEGRATING ORAL EVERY 6 HOURS PRN
Status: DISCONTINUED | OUTPATIENT
Start: 2024-05-18 | End: 2024-05-18 | Stop reason: HOSPADM

## 2024-05-18 RX ORDER — SODIUM CHLORIDE 0.9 % (FLUSH) 0.9 %
10 SYRINGE (ML) INJECTION EVERY 12 HOURS SCHEDULED
Status: DISCONTINUED | OUTPATIENT
Start: 2024-05-18 | End: 2024-05-18 | Stop reason: HOSPADM

## 2024-05-18 RX ORDER — ROSUVASTATIN CALCIUM 20 MG/1
10 TABLET, COATED ORAL DAILY
Status: DISCONTINUED | OUTPATIENT
Start: 2024-05-18 | End: 2024-05-18 | Stop reason: HOSPADM

## 2024-05-18 RX ORDER — SODIUM CHLORIDE 0.9 % (FLUSH) 0.9 %
10 SYRINGE (ML) INJECTION AS NEEDED
Status: DISCONTINUED | OUTPATIENT
Start: 2024-05-18 | End: 2024-05-18 | Stop reason: HOSPADM

## 2024-05-18 RX ORDER — PRAMIPEXOLE DIHYDROCHLORIDE 1.5 MG/1
1.5 TABLET ORAL DAILY
Status: DISCONTINUED | OUTPATIENT
Start: 2024-05-18 | End: 2024-05-18 | Stop reason: HOSPADM

## 2024-05-18 RX ORDER — AMLODIPINE BESYLATE 5 MG/1
5 TABLET ORAL DAILY
Status: DISCONTINUED | OUTPATIENT
Start: 2024-05-18 | End: 2024-05-18 | Stop reason: HOSPADM

## 2024-05-18 RX ORDER — LEVOTHYROXINE SODIUM 88 UG/1
88 TABLET ORAL
Status: DISCONTINUED | OUTPATIENT
Start: 2024-05-18 | End: 2024-05-18 | Stop reason: HOSPADM

## 2024-05-18 RX ORDER — ONDANSETRON 2 MG/ML
4 INJECTION INTRAMUSCULAR; INTRAVENOUS EVERY 6 HOURS PRN
Status: DISCONTINUED | OUTPATIENT
Start: 2024-05-18 | End: 2024-05-18 | Stop reason: HOSPADM

## 2024-05-18 RX ORDER — TRAZODONE HYDROCHLORIDE 50 MG/1
50 TABLET ORAL NIGHTLY
Status: DISCONTINUED | OUTPATIENT
Start: 2024-05-18 | End: 2024-05-18 | Stop reason: HOSPADM

## 2024-05-18 RX ORDER — CLOPIDOGREL BISULFATE 75 MG/1
75 TABLET ORAL DAILY
Status: DISCONTINUED | OUTPATIENT
Start: 2024-05-18 | End: 2024-05-18 | Stop reason: HOSPADM

## 2024-05-18 RX ORDER — SODIUM CHLORIDE 9 MG/ML
40 INJECTION, SOLUTION INTRAVENOUS AS NEEDED
Status: DISCONTINUED | OUTPATIENT
Start: 2024-05-18 | End: 2024-05-18 | Stop reason: HOSPADM

## 2024-05-18 RX ADMIN — ROSUVASTATIN CALCIUM 10 MG: 20 TABLET, FILM COATED ORAL at 10:18

## 2024-05-18 RX ADMIN — Medication 10 ML: at 10:19

## 2024-05-18 RX ADMIN — LEVOTHYROXINE SODIUM 88 MCG: 88 TABLET ORAL at 05:05

## 2024-05-18 RX ADMIN — METOPROLOL TARTRATE 25 MG: 25 TABLET, FILM COATED ORAL at 10:18

## 2024-05-18 RX ADMIN — PRAMIPEXOLE DIHYDROCHLORIDE 1.5 MG: 1.5 TABLET ORAL at 10:19

## 2024-05-18 RX ADMIN — DEXTROSE AND SODIUM CHLORIDE 75 ML/HR: 5; 900 INJECTION, SOLUTION INTRAVENOUS at 01:08

## 2024-05-18 RX ADMIN — CLOPIDOGREL BISULFATE 75 MG: 75 TABLET, FILM COATED ORAL at 10:18

## 2024-05-18 RX ADMIN — DEXTROSE MONOHYDRATE 50 ML: 25 INJECTION, SOLUTION INTRAVENOUS at 04:33

## 2024-05-18 RX ADMIN — AMLODIPINE BESYLATE 5 MG: 5 TABLET ORAL at 10:18

## 2024-05-18 NOTE — THERAPY EVALUATION
Patient Name: Elissa Marrero  : 3/15/1925    MRN: [id number]                              Today's Date: 2024       Admit Date: 2024    Visit Dx:     ICD-10-CM ICD-9-CM   1. Hypoglycemia  E16.2 251.2   2. Type 2 diabetes mellitus with hypoglycemia without coma, without long-term current use of insulin  E11.649 250.80     251.2     Patient Active Problem List   Diagnosis    Diabetes mellitus, type 2    Hyperlipidemia    Benign essential hypertension    Hypothyroidism    Insomnia    Lymphedema    Osteoarthritis    Osteopenia    Renal insufficiency    Restless leg syndrome    Vitamin D deficiency    Hip pain, right    Acute kidney failure    Chronic kidney disease, stage 4 (severe)    Disorder of bone and cartilage    Edema    Hyperkalemia    Urinary tract infection    TIA (transient ischemic attack)    Acute metabolic encephalopathy    Acute cystitis without hematuria    Hypoglycemia    Kidney stone     Past Medical History:   Diagnosis Date    Benign essential hypertension     Diabetes mellitus, type 2 2012    Encounter for diabetic foot exam 2015    History of complete eye exam 2017    Dr Leslie    Hyperlipidemia     Hypothyroidism     Insomnia     Lymphedema 2010    Osteoarthritis     Osteopenia     Renal insufficiency 2009    Restless leg syndrome 2012    Vitamin D deficiency 10/12/2011     Past Surgical History:   Procedure Laterality Date    APPENDECTOMY      CHOLECYSTECTOMY      EYE SURGERY      TONSILLECTOMY        General Information       Row Name 24 0932          Physical Therapy Time and Intention    Document Type evaluation  -CS     Mode of Treatment physical therapy  -CS       Row Name 24 0932          General Information    Patient Profile Reviewed yes  -CS     Prior Level of Function independent:  -CS     Existing Precautions/Restrictions fall  -CS       Row Name 24 0932          Living Environment    People in Home child(mckayla), adult  -CS        Row Name 05/18/24 0932          Cognition    Orientation Status (Cognition) oriented x 3  -CS       Row Name 05/18/24 0932          Safety Issues, Functional Mobility    Impairments Affecting Function (Mobility) balance;endurance/activity tolerance;shortness of breath  -CS               User Key  (r) = Recorded By, (t) = Taken By, (c) = Cosigned By      Initials Name Provider Type    Stanley Kirby, PT Physical Therapist                   Mobility       Row Name 05/18/24 0932          Bed Mobility    Bed Mobility supine-sit;sit-supine  -CS     Supine-Sit Josephine (Bed Mobility) contact guard;verbal cues;nonverbal cues (demo/gesture)  -CS     Sit-Supine Josephine (Bed Mobility) contact guard;verbal cues;nonverbal cues (demo/gesture)  -CS     Assistive Device (Bed Mobility) bed rails;head of bed elevated  -CS       Row Name 05/18/24 0932          Sit-Stand Transfer    Sit-Stand Josephine (Transfers) minimum assist (75% patient effort);verbal cues;nonverbal cues (demo/gesture)  -[facility] Name 05/18/24 0932          Gait/Stairs (Locomotion)    Josephine Level (Gait) minimum assist (75% patient effort)  -CS     Distance in Feet (Gait) 10  -CS     Deviations/Abnormal Patterns (Gait) gait speed decreased  -CS               User Key  (r) = Recorded By, (t) = Taken By, (c) = Cosigned By      Initials Name Provider Type    Stanley Kirby, PT Physical Therapist                   Obj/Interventions       Row Name 05/18/24 0933          Range of Motion Comprehensive    General Range of Motion no range of motion deficits identified  -CS       Row Name 05/18/24 0933          Strength Comprehensive (MMT)    General Manual Muscle Testing (MMT) Assessment no strength deficits identified  -CS               User Key  (r) = Recorded By, (t) = Taken By, (c) = Cosigned By      Initials Name Provider Type    Stanley Kirby, PT Physical Therapist                   Goals/Plan       Row Name 05/18/24 0934           Bed Mobility Goal 1 (PT)    Activity/Assistive Device (Bed Mobility Goal 1, PT) bed mobility activities, all  -CS     Pep Level/Cues Needed (Bed Mobility Goal 1, PT) modified independence  -CS     Time Frame (Bed Mobility Goal 1, PT) 1 week  -CS       Row Name 05/18/24 0934          Transfer Goal 1 (PT)    Activity/Assistive Device (Transfer Goal 1, PT) bed-to-chair/chair-to-bed;sit-to-stand/stand-to-sit  -CS     Pep Level/Cues Needed (Transfer Goal 1, PT) modified independence  -CS     Time Frame (Transfer Goal 1, PT) 1 week  -CS       Row Name 05/18/24 0934          Gait Training Goal 1 (PT)    Activity/Assistive Device (Gait Training Goal 1, PT) assistive device use  -CS     Pep Level (Gait Training Goal 1, PT) modified independence  -CS     Distance (Gait Training Goal 1, PT) 150  -CS     Time Frame (Gait Training Goal 1, PT) 1 week  -CS       Row Name 05/18/24 0934          Therapy Assessment/Plan (PT)    Planned Therapy Interventions (PT) balance training;bed mobility training;gait training;home exercise program;manual therapy techniques;stretching;neuromuscular re-education;transfer training;ROM (range of motion);stair training;patient/family education  -CS               User Key  (r) = Recorded By, (t) = Taken By, (c) = Cosigned By      Initials Name Provider Type    CS Stanley Lugo, PT Physical Therapist                   Clinical Impression       Row Name 05/18/24 0933          Pain    Pretreatment Pain Rating 0/10 - no pain  -CS     Posttreatment Pain Rating 0/10 - no pain  -CS       Row Name 05/18/24 0933          Therapy Assessment/Plan (PT)    Patient/Family Therapy Goals Statement (PT) home  -CS     Rehab Potential (PT) good, to achieve stated therapy goals  -CS     Criteria for Skilled Interventions Met (PT) yes  -CS     Therapy Frequency (PT) 6 times/wk  -CS       Row Name 05/18/24 0933          Vital Signs    Pre SpO2 (%) 95  -CS     O2 Delivery Pre Treatment  room air  -CS     Intra SpO2 (%) 80  -CS     O2 Delivery Intra Treatment room air  -CS     Post SpO2 (%) 95  -CS     O2 Delivery Post Treatment room air  -CS       Row Name 05/18/24 0933          Positioning and Restraints    Pre-Treatment Position in bed  -CS     Post Treatment Position bed  -CS     In Bed supine;call light within reach;encouraged to call for assist;exit alarm on  -CS               User Key  (r) = Recorded By, (t) = Taken By, (c) = Cosigned By      Initials Name Provider Type    Stanley Kirby, PT Physical Therapist                   Outcome Measures       Row Name 05/18/24 0935 05/18/24 0045       How much help from another person do you currently need...    Turning from your back to your side while in flat bed without using bedrails? 3  -CS 3  -RJ    Moving from lying on back to sitting on the side of a flat bed without bedrails? 3  -CS 3  -RJ    Moving to and from a bed to a chair (including a wheelchair)? 3  -CS 3  -RJ    Standing up from a chair using your arms (e.g., wheelchair, bedside chair)? 3  -CS 3  -RJ    Climbing 3-5 steps with a railing? 2  -CS 2  -RJ    To walk in hospital room? 3  -CS 3  -RJ    AM-PAC 6 Clicks Score (PT) 17  -CS 17  -RJ    Highest Level of Mobility Goal 5 --> Static standing  -CS 5 --> Static standing  -RJ      Row Name 05/18/24 0935          Functional Assessment    Outcome Measure Options AM-PAC 6 Clicks Basic Mobility (PT)  -CS               User Key  (r) = Recorded By, (t) = Taken By, (c) = Cosigned By      Initials Name Provider Type    Stanley Kirby, PT Physical Therapist    Prabhu Sun, RN Registered Nurse                                 Physical Therapy Education       Title: PT OT SLP Therapies (In Progress)       Topic: Physical Therapy (Done)       Point: Mobility training (Done)       Learning Progress Summary             Patient Acceptance, E,TB, VU,NR by  at 5/18/2024 0935                         Point: Home exercise program  (Done)       Learning Progress Summary             Patient Acceptance, E,TB, VU,NR by  at 5/18/2024 0935                         Point: Body mechanics (Done)       Learning Progress Summary             Patient Acceptance, E,TB, VU,NR by  at 5/18/2024 0935                         Point: Precautions (Done)       Learning Progress Summary             Patient Acceptance, E,TB, VU,NR by  at 5/18/2024 0935                                         User Key       Initials Effective Dates Name Provider Type Discipline     07/11/23 -  Stanley Lugo, PT Physical Therapist PT                  PT Recommendation and Plan  Planned Therapy Interventions (PT): balance training, bed mobility training, gait training, home exercise program, manual therapy techniques, stretching, neuromuscular re-education, transfer training, ROM (range of motion), stair training, patient/family education        Time Calculation:         PT Charges       Row Name 05/18/24 0938             Time Calculation    Start Time 0928  -      Stop Time 0939  -      Time Calculation (min) 11 min  -      PT Received On 05/18/24  -      PT - Next Appointment 05/19/24  -      PT Goal Re-Cert Due Date 05/25/24  -                User Key  (r) = Recorded By, (t) = Taken By, (c) = Cosigned By      Initials Name Provider Type     Stanley Lugo, PT Physical Therapist                  Therapy Charges for Today       Code Description Service Date Service Provider Modifiers Qty    35477127429 HC PT EVAL MOD COMPLEXITY 2 5/18/2024 Stanley Lugo, PT GP 1    80984748610 HC PT THER PROC EA 15 MIN 5/18/2024 Stanley Lugo, PT GP 1            PT G-Codes  Outcome Measure Options: AM-PAC 6 Clicks Basic Mobility (PT)  AM-PAC 6 Clicks Score (PT): 17  PT Discharge Summary  Anticipated Discharge Disposition (PT): home with home health, skilled nursing facility    Stanley Lugo PT  5/18/2024

## 2024-05-18 NOTE — PROGRESS NOTES
.ED OBSERVATION PROGRESS/DISCHARGE SUMMARY    Date of Admission: 5/17/2024   LOS: 0 days   PCP: Onel Mann MD      Subjective     Hospital Outcome:   Very pleasant 89-year-old female was seen and examined at bedside for admission to the observation unit due to hypoglycemia.  Initially patient presented to the ED with complaint of limited ability to ambulate and paresthesia of right lower extremity.  Patient was found to be hypoglycemic at the scene with a glucose of 46 that improved to 90s on arrival to the hospital.  The ED patient was found to have a glucose level of 58 therefore received an amp of D50 and was given some food and close improved into the 170s however fluctuated throughout the night subsequently patient was started on D5NS.    D5 NS was discontinued this morning and glucose level has remained > 140 therefore patient will be discharged home and to follow-up with her PCP.    ROS:  General: no fevers, chills  Respiratory: no cough, dyspnea  Cardiovascular: no chest pain, palpitations  Abdomen: No abdominal pain, nausea, vomiting, or diarrhea  Neurologic: No focal weakness    Objective   Physical Exam:  I have reviewed the vital signs.  Temp:  [98 °F (36.7 °C)-98.9 °F (37.2 °C)] 98.9 °F (37.2 °C)  Heart Rate:  [] 81  Resp:  [18-20] 18  BP: (145-156)/(63-94) 147/89  General Appearance:    Alert, cooperative, no distress  Head:    Normocephalic, atraumatic  Eyes:    Sclerae anicteric  Neck:   Supple, no mass  Lungs: Clear to auscultation bilaterally, respirations unlabored  Heart: Regular rate and rhythm, S1 and S2 normal, no murmur, rub or gallop  Abdomen:  Soft, non-tender, bowel sounds active, nondistended  Extremities: No clubbing, cyanosis, or edema to lower extremities  Pulses:  2+ and symmetric in distal lower extremities  Skin: No rashes   Neurologic: Oriented x3, Normal strength to extremities    Results Review:    I have reviewed the labs, radiology results and diagnostic  studies.    Results from last 7 days   Lab Units 05/18/24  0421   WBC 10*3/mm3 10.10   HEMOGLOBIN g/dL 12.2   HEMATOCRIT % 36.9   PLATELETS 10*3/mm3 196     Results from last 7 days   Lab Units 05/18/24  0421 05/17/24  2116   SODIUM mmol/L 141 136   POTASSIUM mmol/L 4.1 4.6   CHLORIDE mmol/L 108* 100   CO2 mmol/L 20.0* 20.6*   BUN mg/dL 24* 24*   CREATININE mg/dL 1.44* 1.67*   CALCIUM mg/dL 9.4 9.6   BILIRUBIN mg/dL  --  0.4   ALK PHOS U/L  --  66   ALT (SGPT) U/L  --  9   AST (SGOT) U/L  --  14   GLUCOSE mg/dL 64* 76     Imaging Results (Last 24 Hours)       ** No results found for the last 24 hours. **            I have reviewed the medications.  ---------------------------------------------------------------------------------------------  Assessment & Plan   Assessment/Problem List    Hypoglycemia    Kidney stone      Plan:    Hypoglycemia:  EMS reported glucose of 46 and 62  Patient glucose 76 and 58 while in ED.  Patient given food tray  Amp D50 given at 2316  Monitor blood glucose Q2Hr until stable, then Q4Hr  Give IVF's D5NS at 100/hr  Hold home medications of Glipizide and Metformin     Acute on chronic kidney disease:  Creatinine 1.67  Give gentle hydration  No nephrotoxic medications  Recheck labs in am     Hypertension  Monitor vital signs Q4H  Continue home medications     Hypothyroidism  Labs 2/20/24: TSH 0.035, Free T4 1.85  Continue Levothyroxine       Disposition: Home    Follow-up after Discharge: PCP    This note will serve as a discharge summary    Alysha Mitchell, TEE 05/18/24 08:07 EDT    I have worn appropriate PPE during this patient encounter, sanitized my hands both with entering and exiting patient's room.      51 minutes has been spent by [facility] providers in the care of this patient while under observation status

## 2024-05-18 NOTE — DISCHARGE INSTRUCTIONS
Your glucose level/sugar has stabilized however you need to continue monitoring your glucose level before meals and at bedtime and as needed to make sure that it is not dropping too low.  Do not take your glipizide and metformin today and tomorrow  Check your sugar level every 6 hours while you are awake  Take your metformin and glipizide if your sugar level remains greater than 200

## 2024-05-18 NOTE — PLAN OF CARE
Goal Outcome Evaluation:  Plan of Care Reviewed With: patient           Outcome Evaluation: Pt admitted to [facility] for hypoglycemia. Pt takes turns staying with her daughters. Requires assistance for showering, otherwise Mod I w/ BADLs and uses rollator for functional mobility at baseline. Performed bed mobility w/ SBA. Performed STS and functional mobility w/ CGA using RW. Unsteady at times, but no LOB. Verbal cueing at times for safety awareness. Performed LBD at EOB w/ setup. Activity tolerance deficits noted w/ activity. Based on todays performance, recommend home w/ increased assistance and HHOT.      Anticipated Discharge Disposition (OT): home with home health, home with assist

## 2024-05-18 NOTE — PLAN OF CARE
Goal Outcome Evaluation:    Blood glucose monitored.vital signs within the normal range. Nil n/v. Mobilizing safely with walker. Discharge order in place. Discharge instruction/plan given to patient/family. Nil concerns voiced.

## 2024-05-18 NOTE — OUTREACH NOTE
Prep Survey      Flowsheet Row Responses   [facility] facility patient discharged from? [facility]   Is LACE score < 7 ? No   Eligibility [facility]   Date of Admission 05/17/24   Date of Discharge 05/18/24   Discharge Disposition Home or Self Care   Discharge diagnosis Hypoglycemia   Does the patient have one of the following disease processes/diagnoses(primary or secondary)? Other   Does the patient have Home health ordered? No   Is there a DME ordered? No   Prep survey completed? Yes            CRUZ GONZALEZ - Registered Nurse

## 2024-05-18 NOTE — H&P
[facility]   HISTORY AND PHYSICAL    Patient Name: Elissa Marrero  : 3/15/1925  MRN: [id number]  Primary Care Physician:  Onel Mann MD  Date of admission: 2024    Subjective   Subjective     Chief Complaint:   Chief Complaint   Patient presents with    Neuro Deficit(s)         HPI:    Elissa Marrero is a hard-of-hearing 89 y.o. female with a past medical history of Diabetes mellitus, Hypertension, Hyperlipidemia, Hypothyroidism, Osteoarthritis, Chronic kidney disease, and Restless leg syndrome who presented to the emergency department with a complaint of right lower extremity paresthesia and hypoglycemia. EMS was called by patient's sister when patient was unable to get out of a chair and walk. EMS reported a glucose of 46 with a repeat of 62. Patient was fed prior to the arrival of EMS and glucose improved to 90 with symptoms of paresthesia resolving.     ED work-up: Glucose of 46 and 62 per EMS, then glucose of 76 and 58 while in ED. Patient was fed while in ED, then one amp of D50 given for persistent hypoglycemia. Creatinine 1.67, which is above patient's baseline.     On admission to ED Observation Unit, patient is awake, alert & oriented to person and place. Denies any complaints of pain or weakness and states she feels alright. Monitor glucose overnight. IVF's with D5NS at 100 ml/hr infusing. Patient did require a second amp of D50 for glucose that continued to decrease overnight. PT to evaluate in am in planning for safe discharge home.    Review of Systems   All systems were reviewed and negative except for: as documented in HPI    Personal History     Past Medical History:   Diagnosis Date    Benign essential hypertension     Diabetes mellitus, type 2 2012    Encounter for diabetic foot exam 2015    History of complete eye exam 2017    Dr Leslie    Hyperlipidemia     Hypothyroidism     Insomnia     Lymphedema 2010    Osteoarthritis     Osteopenia     Renal  insufficiency 03/04/2009    Restless leg syndrome 12/05/2012    Vitamin D deficiency 10/12/2011       Past Surgical History:   Procedure Laterality Date    APPENDECTOMY      CHOLECYSTECTOMY      EYE SURGERY      TONSILLECTOMY         Family History: family history includes Cancer in her son; Diabetes in her brother, daughter, and sister. Otherwise pertinent FHx was reviewed and not pertinent to current issue.    Social History:  reports that she has never smoked. She has never used smokeless tobacco. Drug use questions deferred to the physician. She reports that she does not drink alcohol.    Home Medications:  Vitamin D, amLODIPine, benzonatate, clopidogrel, glipizide, levothyroxine, metFORMIN ER, metoprolol tartrate, pramipexole, rosuvastatin, and traZODone    Allergies:  Allergies   Allergen Reactions    Benazepril        Objective   Objective     Vitals:   Temp:  [98 °F (36.7 °C)-98.2 °F (36.8 °C)] 98.2 °F (36.8 °C)  Heart Rate:  [] 91  Resp:  [18] 18  BP: (145-156)/(63-94) 155/71  Physical Exam    Constitutional: Awake, alert   Eyes: PERRLA, sclerae anicteric, no conjunctival injection   HENT: NCAT, mucous membranes moist   Neck: Supple, no thyromegaly, no lymphadenopathy, trachea midline   Respiratory: Clear to auscultation bilaterally, nonlabored respirations    Cardiovascular: RRR, no murmurs, rubs, or gallops, palpable pedal pulses bilaterally   Gastrointestinal: Soft, nontender, nondistended   Musculoskeletal: No bilateral ankle edema, no clubbing or cyanosis to extremities   Psychiatric: Appropriate affect, cooperative   Neurologic: Oriented x 3, strength symmetric in all extremities, Cranial Nerves grossly intact to confrontation, speech clear   Skin: No rashes     Result Review    Result Review:  I have personally reviewed the results from the time of this admission to 5/18/2024 00:43 EDT and agree with these findings:  [x]  Laboratory list / accordion  []  Microbiology  []  Radiology  []   EKG/Telemetry   []  Cardiology/Vascular   []  Pathology  []  Old records  []  Other:  Most notable findings include: EMS reported glucose of 46 and 62. Glucose 76 and 58 while in ED.      Assessment & Plan   Assessment / Plan     Brief Patient Summary:  Elissa Marrero is a 89 y.o. female who was admitted to the ED Observation Unit for hypoglycemia. Continue to monitor blood glucose closely. Hold home medication of glipizide and metformin.     Active Hospital Problems:  Active Hospital Problems    Diagnosis     **Hypoglycemia      Plan:   Hypoglycemia:  EMS reported glucose of 46 and 62  Patient glucose 76 and 58 while in ED.  Patient given food tray  Amp D50 given at 2316  Monitor blood glucose Q2Hr until stable, then Q4Hr  Give IVF's D5NS at 100/hr  Hold home medications of Glipizide and Metformin    Acute on chronic kidney disease:  Creatinine 1.67  Give gentle hydration  No nephrotoxic medications  Recheck labs in am    Hypertension  Monitor vital signs Q4H  Continue home medications    Hypothyroidism  Labs 2/20/24: TSH 0.035, Free T4 1.85  Continue Levothyroxine    DVT prophylaxis:  No DVT prophylaxis order currently exists.      CODE STATUS:       Admission Status:  I believe this patient meets observation status.    Electronically signed by TEE Suarez, 05/17/24, 11:03 PM EDT.        75 minutes has been spent by [facility] providers in the care of this patient while under observation status      I have worn appropriate PPE during this patient encounter, sanitized my hands both with entering and exiting patient's room.    I have discussed plan of care with patient including advance care plan and/or surrogate decision maker.  Patient advises that their daughter will be their primary surrogate decision maker

## 2024-05-18 NOTE — PLAN OF CARE
Goal Outcome Evaluation:         Pt presents with Right leg tingling with leaning to the left that has resolved. This visit she was able to stand Villa and walk 10' Villa with O2 dropping to 80% with 2 min recovery back to >90 limiting activity. She will benefit from physical therapy services for endurance and strength. Anticipates d/c home with HHPT vs snf depending on progression with mobility.            Anticipated Discharge Disposition (PT): home with home health, skilled nursing facility

## 2024-05-18 NOTE — ED NOTES
Nursing report ED to floor  Elissa Marrero  89 y.o.  female    HPI :  HPI (Adult)  Stated Reason for Visit: Right leg tingling with leaning to the left.  History Obtained From: EMS  Duration (Hours): 4    Chief Complaint  Chief Complaint   Patient presents with    Neuro Deficit(s)       Admitting doctor:   Sally Cunningham MD    Admitting diagnosis:   The primary encounter diagnosis was Hypoglycemia. A diagnosis of Type 2 diabetes mellitus with hypoglycemia without coma, without long-term current use of insulin was also pertinent to this visit.    Code status:   Current Code Status       Date Active Code Status Order ID Comments User Context       Prior            Allergies:   Benazepril    Isolation:   No active isolations    Intake and Output  No intake or output data in the 24 hours ending 05/17/24 2326    Weight:       05/17/24 2113   Weight: 59.2 kg (130 lb 8.2 oz)       Most recent vitals:   Vitals:    05/17/24 2142 05/17/24 2144 05/17/24 2145 05/17/24 2305   BP: 146/72 147/66 145/77    Patient Position: Lying Standing Standing    Pulse: 85 103 108 88   Resp:       Temp:       TempSrc:       SpO2:    92%   Weight:       Height:           Active LDAs/IV Access:   Lines, Drains & Airways       Active LDAs       Name Placement date Placement time Site Days    Peripheral IV 05/17/24 2111 Right Antecubital 05/17/24 2111  Antecubital  less than 1                    Labs (abnormal labs have a star):   Labs Reviewed   COMPREHENSIVE METABOLIC PANEL - Abnormal; Notable for the following components:       Result Value    BUN 24 (*)     Creatinine 1.67 (*)     CO2 20.6 (*)     Anion Gap 15.4 (*)     eGFR 27.4 (*)     All other components within normal limits    Narrative:     GFR Normal >60  Chronic Kidney Disease <60  Kidney Failure <15    The GFR formula is only valid for adults with stable renal function between ages 18 and 70.   URINALYSIS W/ CULTURE IF INDICATED - Abnormal; Notable for the following components:     Blood, UA Small (1+) (*)     All other components within normal limits    Narrative:     In absence of clinical symptoms, the presence of pyuria, bacteria, and/or nitrites on the urinalysis result does not correlate with infection.   TROPONIN - Abnormal; Notable for the following components:    HS Troponin T 26 (*)     All other components within normal limits    Narrative:     High Sensitive Troponin T Reference Range:  <14.0 ng/L- Negative Female for AMI  <22.0 ng/L- Negative Male for AMI  >=14 - Abnormal Female indicating possible myocardial injury.  >=22 - Abnormal Male indicating possible myocardial injury.   Clinicians would have to utilize clinical acumen, EKG, Troponin, and serial changes to determine if it is an Acute Myocardial Infarction or myocardial injury due to an underlying chronic condition.        CBC WITH AUTO DIFFERENTIAL - Abnormal; Notable for the following components:    WBC 11.96 (*)     Neutrophils, Absolute 7.70 (*)     Lymphocytes, Absolute 3.31 (*)     All other components within normal limits   URINALYSIS, MICROSCOPIC ONLY - Abnormal; Notable for the following components:    RBC, UA 6-10 (*)     All other components within normal limits   POCT GLUCOSE FINGERSTICK - Abnormal; Notable for the following components:    Glucose 58 (*)     All other components within normal limits   HIGH SENSITIVITIY TROPONIN T 2HR   POCT GLUCOSE FINGERSTICK   POCT GLUCOSE FINGERSTICK   POCT GLUCOSE FINGERSTICK   POCT GLUCOSE FINGERSTICK   CBC AND DIFFERENTIAL    Narrative:     The following orders were created for panel order CBC & Differential.  Procedure                               Abnormality         Status                     ---------                               -----------         ------                     CBC Auto Differential[[id number]]        Abnormal            Final result                 Please view results for these tests on the individual orders.       EKG:   ECG 12 Lead Electrolyte  Imbalance   Preliminary Result   HEART RATE= 88  bpm   RR Interval= 682  ms   NC Interval= 212  ms   P Horizontal Axis= 28  deg   P Front Axis= 75  deg   QRSD Interval= 100  ms   QT Interval= 387  ms   QTcB= 469  ms   QRS Axis= -20  deg   T Wave Axis= 80  deg   - ABNORMAL ECG -   Sinus rhythm   Borderline prolonged NC interval   Abnormal R-wave progression, early transition   Probable left ventricular hypertrophy   Electronically Signed By:    Date and Time of Study: 2024-05-17 21:23:10          Meds given in ED:   Medications   sodium chloride 0.9 % flush 10 mL (has no administration in time range)   dextrose (D50W) (25 g/50 mL) IV injection 25 g (25 g Intravenous Given 5/17/24 2307)       Imaging results:  No radiology results for the last day    Ambulatory status:   - with assist    Social issues:   Social History     Socioeconomic History    Marital status:    Tobacco Use    Smoking status: Never    Smokeless tobacco: Never   Vaping Use    Vaping status: Never Used   Substance and Sexual Activity    Alcohol use: No    Drug use: Defer    Sexual activity: Defer       Peripheral Neurovascular  Peripheral Neurovascular (Adult)  Peripheral Neurovascular WDL: WDL    Neuro Cognitive  Neuro Cognitive (Adult)  Cognitive/Neuro/Behavioral WDL: WDL  NIH Stroke Scale  Interval: baseline  1a. Level of Consciousness: 0-->Alert, keenly responsive  1b. LOC Questions: 0-->Answers both questions correctly  1c. LOC Commands: 0-->Performs both tasks correctly  2. Best Gaze: 0-->Normal  3. Visual: 0-->No visual loss  4. Facial Palsy: 0-->Normal symmetrical movements  5a. Motor Arm, Left: 0-->No drift, limb holds 90 (or 45) degrees for full 10 secs  5b. Motor Arm, Right: 0-->No drift, limb holds 90 (or 45) degrees for full 10 secs  6a. Motor Leg, Left: 0-->No drift, leg holds 30 degree position for full 5 secs  6b. Motor Leg, Right: 0-->No drift, leg holds 30 degree position for full 5 secs  7. Limb Ataxia: 0-->Absent  8.  Sensory: 0-->Normal, no sensory loss  9. Best Language: 0-->No aphasia, normal  10. Dysarthria: 0-->Normal  11. Extinction and Inattention (formerly Neglect): 0-->No abnormality  Total (NIH Stroke Scale): 0    Learning  Learning Assessment (Adult)  Learning Readiness and Ability: sensory deficit noted    Respiratory  Respiratory WDL  Respiratory WDL: WDL    Abdominal Pain       Pain Assessments  Pain (Adult)  (0-10) Pain Rating: Rest: 0  (0-10) Pain Rating: Activity: 0    NIH Stroke Scale  NIH Stroke Scale  Interval: baseline  1a. Level of Consciousness: 0-->Alert, keenly responsive  1b. LOC Questions: 0-->Answers both questions correctly  1c. LOC Commands: 0-->Performs both tasks correctly  2. Best Gaze: 0-->Normal  3. Visual: 0-->No visual loss  4. Facial Palsy: 0-->Normal symmetrical movements  5a. Motor Arm, Left: 0-->No drift, limb holds 90 (or 45) degrees for full 10 secs  5b. Motor Arm, Right: 0-->No drift, limb holds 90 (or 45) degrees for full 10 secs  6a. Motor Leg, Left: 0-->No drift, leg holds 30 degree position for full 5 secs  6b. Motor Leg, Right: 0-->No drift, leg holds 30 degree position for full 5 secs  7. Limb Ataxia: 0-->Absent  8. Sensory: 0-->Normal, no sensory loss  9. Best Language: 0-->No aphasia, normal  10. Dysarthria: 0-->Normal  11. Extinction and Inattention (formerly Neglect): 0-->No abnormality  Total (NIH Stroke Scale): 0    Michael Dee RN  05/17/24 23:26 EDT

## 2024-05-18 NOTE — ED NOTES
Pt arrives via EMS from home for c/o of right leg tingling, with leaning to the left. Hx of TIAs. Pt is Aox4 at time of evaluation.

## 2024-05-18 NOTE — ED PROVIDER NOTES
EMERGENCY DEPARTMENT ENCOUNTER  Room Number:  19/19  PCP: Onel Mann MD  Independent Historians: Patient and EMS      HPI:  Chief Complaint: Weakness and difficulty walking    A complete HPI/ROS/PMH/PSH/SH/FH are unobtainable due to: None    Chronic or social conditions impacting patient care (Social Determinants of Health): None      Context: Elissa Marrero is a 89 y.o. female with a medical history of type 2 diabetes, hyperlipidemia and hypertension who presents to the ED c/o acute limited ability to ambulate and complaint of right lower extremity paresthesia.  Patient was with her sister and they were headed to get donuts when this occurred.  The patient walks with a walker at baseline could not get himself out of the chair to walk and thus EMS was called.  Upon first responders arrival, their initial glucose was 46 and then repeated at 62.  She was fed prior to EMS arrival.  EMS reports negative stroke screen upon their arrival.  They did try to walk the patient to the stretcher but she required significant assistance.  No slurring of speech or unilateral weakness noted prior to arrival.  Blood glucose in route had improved to 90s and symptoms resolved by arrival in the ED.  Patient reports she has otherwise been well and denies any ongoing headache, chest pain or shortness of breath.  No abdominal pain nausea or vomiting.      Review of prior external notes (non-ED) -and- Review of prior external test results outside of this encounter:  Hospital discharge summary 11/21/2023: Patient admitted for acute metabolic encephalopathy felt secondary to acute cystitis      PAST MEDICAL HISTORY  Active Ambulatory Problems     Diagnosis Date Noted    Diabetes mellitus, type 2 12/07/2012    Hyperlipidemia     Benign essential hypertension     Hypothyroidism     Insomnia     Lymphedema 04/14/2010    Osteoarthritis     Osteopenia     Renal insufficiency 03/04/2009    Restless leg syndrome 12/05/2012    Vitamin D  deficiency 10/12/2011    Hip pain, right 09/17/2018    Acute kidney failure 12/11/2014    Chronic kidney disease, stage 4 (severe) 12/11/2014    Disorder of bone and cartilage 12/11/2014    Edema 08/06/2015    Hyperkalemia 08/08/2019    Urinary tract infection 08/06/2015    TIA (transient ischemic attack) 01/13/2023    Acute metabolic encephalopathy 11/17/2023    Acute cystitis without hematuria 11/18/2023     Resolved Ambulatory Problems     Diagnosis Date Noted    No Resolved Ambulatory Problems     Past Medical History:   Diagnosis Date    Encounter for diabetic foot exam 03/2015    History of complete eye exam 11/01/2017         PAST SURGICAL HISTORY  Past Surgical History:   Procedure Laterality Date    APPENDECTOMY      CHOLECYSTECTOMY      EYE SURGERY      TONSILLECTOMY           FAMILY HISTORY  Family History   Problem Relation Age of Onset    Diabetes Sister     Diabetes Brother     Diabetes Daughter     Cancer Son         pancreatic         SOCIAL HISTORY  Social History     Socioeconomic History    Marital status:    Tobacco Use    Smoking status: Never    Smokeless tobacco: Never   Vaping Use    Vaping status: Never Used   Substance and Sexual Activity    Alcohol use: No    Drug use: Defer    Sexual activity: Defer         ALLERGIES  Benazepril      REVIEW OF SYSTEMS  Review of Systems  Included in HPI  All systems reviewed and negative except for those discussed in HPI.      PHYSICAL EXAM    I have reviewed the triage vital signs and nursing notes.    ED Triage Vitals   Temp Heart Rate Resp BP SpO2   05/17/24 2117 05/17/24 2113 05/17/24 2113 05/17/24 2114 05/17/24 2113   98 °F (36.7 °C) 92 18 148/63 98 %      Temp src Heart Rate Source Patient Position BP Location FiO2 (%)   05/17/24 2117 -- -- -- --   Oral           Physical Exam    Physical Exam   Constitutional: No distress.  Nontoxic.  Spry and very pleasant but somewhat hard of hearing  HENT:  Head: Normocephalic and atraumatic.  Face  symmetric  Oropharynx: Mucous membranes are moist.  Tongue midline  Eyes: . No scleral icterus. No conjunctival pallor.  PERRL, EOMI  Neck: Normal range of motion. Neck supple.  No meningismus  Cardiovascular: Pink warm and well perfused throughout.  Regular  Pulmonary/Chest: No respiratory distress.  No tachypnea or increased work of breathing appreciated.  Clear  Abdominal: Soft. There is no tenderness. There is no rebound and no guarding.  Benign  Musculoskeletal: Moves all extremities equally.    Neurological: Alert and oriented.  No acute focal deficit appreciated.  NIH stroke scale-0.  Patient able to ambulate with minimal assistance by 2 providers with reasonably stable gait and no unilateral motor deficit.  Skin: Skin is pink, warm, and dry.   Psychiatric: Mood and affect normal.   Nursing note and vitals reviewed.      Total (NIH Stroke Scale): 0      LAB RESULTS  Recent Results (from the past 24 hour(s))   Comprehensive Metabolic Panel    Collection Time: 05/17/24  9:16 PM    Specimen: Blood   Result Value Ref Range    Glucose 76 65 - 99 mg/dL    BUN 24 (H) 8 - 23 mg/dL    Creatinine 1.67 (H) 0.57 - 1.00 mg/dL    Sodium 136 136 - 145 mmol/L    Potassium 4.6 3.5 - 5.2 mmol/L    Chloride 100 98 - 107 mmol/L    CO2 20.6 (L) 22.0 - 29.0 mmol/L    Calcium 9.6 8.2 - 9.6 mg/dL    Total Protein 6.7 6.0 - 8.5 g/dL    Albumin 3.8 3.5 - 5.2 g/dL    ALT (SGPT) 9 1 - 33 U/L    AST (SGOT) 14 1 - 32 U/L    Alkaline Phosphatase 66 39 - 117 U/L    Total Bilirubin 0.4 0.0 - 1.2 mg/dL    Globulin 2.9 gm/dL    A/G Ratio 1.3 g/dL    BUN/Creatinine Ratio 14.4 7.0 - 25.0    Anion Gap 15.4 (H) 5.0 - 15.0 mmol/L    eGFR 27.4 (L) >60.0 mL/min/1.73   High Sensitivity Troponin T    Collection Time: 05/17/24  9:16 PM    Specimen: Blood   Result Value Ref Range    HS Troponin T 26 (H) <14 ng/L   CBC Auto Differential    Collection Time: 05/17/24  9:16 PM    Specimen: Blood   Result Value Ref Range    WBC 11.96 (H) 3.40 - 10.80 10*3/mm3     RBC 4.49 3.77 - 5.28 10*6/mm3    Hemoglobin 12.3 12.0 - 15.9 g/dL    Hematocrit 38.1 34.0 - 46.6 %    MCV 84.9 79.0 - 97.0 fL    MCH 27.4 26.6 - 33.0 pg    MCHC 32.3 31.5 - 35.7 g/dL    RDW 13.6 12.3 - 15.4 %    RDW-SD 42.1 37.0 - 54.0 fl    MPV 11.1 6.0 - 12.0 fL    Platelets 212 140 - 450 10*3/mm3    Neutrophil % 64.3 42.7 - 76.0 %    Lymphocyte % 27.7 19.6 - 45.3 %    Monocyte % 7.2 5.0 - 12.0 %    Eosinophil % 0.3 0.3 - 6.2 %    Basophil % 0.2 0.0 - 1.5 %    Immature Grans % 0.3 0.0 - 0.5 %    Neutrophils, Absolute 7.70 (H) 1.70 - 7.00 10*3/mm3    Lymphocytes, Absolute 3.31 (H) 0.70 - 3.10 10*3/mm3    Monocytes, Absolute 0.86 0.10 - 0.90 10*3/mm3    Eosinophils, Absolute 0.03 0.00 - 0.40 10*3/mm3    Basophils, Absolute 0.02 0.00 - 0.20 10*3/mm3    Immature Grans, Absolute 0.04 0.00 - 0.05 10*3/mm3    nRBC 0.0 0.0 - 0.2 /100 WBC   ECG 12 Lead Electrolyte Imbalance    Collection Time: 05/17/24  9:23 PM   Result Value Ref Range    QT Interval 387 ms    QTC Interval 469 ms   Urinalysis With Culture If Indicated - Urine, Catheter    Collection Time: 05/17/24  9:30 PM    Specimen: Urine, Catheter   Result Value Ref Range    Color, UA Yellow Yellow, Straw    Appearance, UA Clear Clear    pH, UA <=5.0 5.0 - 8.0    Specific Gravity, UA 1.014 1.005 - 1.030    Glucose, UA Negative Negative    Ketones, UA Negative Negative    Bilirubin, UA Negative Negative    Blood, UA Small (1+) (A) Negative    Protein, UA Negative Negative    Leuk Esterase, UA Negative Negative    Nitrite, UA Negative Negative    Urobilinogen, UA 0.2 E.U./dL 0.2 - 1.0 E.U./dL   Urinalysis, Microscopic Only - Urine, Catheter    Collection Time: 05/17/24  9:30 PM    Specimen: Urine, Catheter   Result Value Ref Range    RBC, UA 6-10 (A) None Seen, 0-2 /HPF    WBC, UA 0-2 None Seen, 0-2 /HPF    Bacteria, UA None Seen None Seen /HPF    Squamous Epithelial Cells, UA 0-2 None Seen, 0-2 /HPF    Hyaline Casts, UA 7-12 None Seen /LPF    Methodology Automated  Microscopy    POC Glucose Once    Collection Time: 05/17/24 10:56 PM    Specimen: Blood   Result Value Ref Range    Glucose 58 (L) 70 - 130 mg/dL         RADIOLOGY  No Radiology Exams Resulted Within Past 24 Hours      MEDICATIONS GIVEN IN ER  Medications   sodium chloride 0.9 % flush 10 mL (has no administration in time range)   dextrose (D50W) (25 g/50 mL) IV injection 25 g (has no administration in time range)         ORDERS PLACED DURING THIS VISIT:  Orders Placed This Encounter   Procedures    Comprehensive Metabolic Panel    Urinalysis With Culture If Indicated - Urine, Catheter    High Sensitivity Troponin T    CBC Auto Differential    Urinalysis, Microscopic Only - Urine, Clean Catch    High Sensitivity Troponin T 2Hr    Monitor Blood Pressure    Orthostatic Vitals    Continuous Pulse Oximetry    Feed patient please  Misc Nursing Order (Specify)    POC Glucose Q1H    POC Glucose Once    ECG 12 Lead Electrolyte Imbalance    Insert Peripheral IV    Initiate ED Observation Status    CBC & Differential         OUTPATIENT MEDICATION MANAGEMENT:  Current Facility-Administered Medications Ordered in Epic   Medication Dose Route Frequency Provider Last Rate Last Admin    dextrose (D50W) (25 g/50 mL) IV injection 25 g  25 g Intravenous Once Daniel Hancock MD        sodium chloride 0.9 % flush 10 mL  10 mL Intravenous PRN Daniel Hancock MD         Current Outpatient Medications Ordered in Epic   Medication Sig Dispense Refill    amLODIPine (NORVASC) 5 MG tablet Take 1 tablet by mouth Daily. 90 tablet 1    benzonatate (TESSALON) 200 MG capsule Take 1 capsule by mouth 3 (Three) Times a Day As Needed for Cough. 30 capsule 2    Cholecalciferol (VITAMIN D PO) Take  by mouth.      clopidogrel (PLAVIX) 75 MG tablet Take 1 tablet by mouth Daily. 90 tablet 1    glipizide (GLUCOTROL) 5 MG tablet Take 1 tablet by mouth 3 (Three) Times a Day. 270 tablet 1    levothyroxine (SYNTHROID, LEVOTHROID) 88 MCG tablet Take 1 tablet by  mouth Daily. 90 tablet 1    metFORMIN ER (GLUCOPHAGE-XR) 500 MG 24 hr tablet Take 1 tablet by mouth Daily With Breakfast. 90 tablet 1    metoprolol tartrate (LOPRESSOR) 25 MG tablet Take 1 tablet by mouth 2 (Two) Times a Day. 180 tablet 1    pramipexole (MIRAPEX) 1.5 MG tablet Take 1 tablet by mouth Daily. 90 tablet 1    rosuvastatin (Crestor) 10 MG tablet Take 1 tablet by mouth Daily. 90 tablet 1    traZODone (DESYREL) 50 MG tablet Take 1 tablet by mouth Every Night. 90 tablet 1         PROCEDURES  Procedures            PROGRESS, DATA ANALYSIS, CONSULTS, AND MEDICAL DECISION MAKING  All labs have been independently interpreted by me.  All radiology studies have been reviewed by me. All EKG's have been independently viewed and interpreted by me.  Discussion below represents my analysis of pertinent findings related to patient's condition, differential diagnosis, treatment plan and final disposition.    Differential diagnosis:   My differential diagnosis includes but is not limited to generalized weakness, electrolyte abnormality, CVA, TIA, Bell's palsy, acute MI, GI bleed, urinary tract infection, systemic infections including sepsis, alcohol abuse, drug abuse including prescription and street drug.      Clinical Scores:                  ED Course as of 05/17/24 2302   Fri May 17, 2024   2124 CONSULT        Provider: Dr. Gil -stroke neurology    Discussion: Reviewed patient history, ED presentation and evaluation.  Agreeable to continue with metabolic evaluation with no indication at this time if the patient needs emergent imaging.    Agreeable c treatment and planned disposition.         [RS]   2130 EKG           EKG time: 2123  Rhythm/Rate: Sinus, 85  P waves and DE: ALMAZ within normal limits  QRS, axis: Narrow complex  ST and T waves: No STEMI; QTc within normal limits    Interpreted Contemporaneously by me, independently viewed  Comparison: 11/17/2023   [RS]   2137 WBC(!): 11.96 [RS]   2137 Hemoglobin: 12.3  [RS]   2137 Platelets: 212 [RS]   2137 Immature Grans, Absolute: 0.04 [RS]   2212 Leukocytes, UA: Negative [RS]   2212 Nitrite, UA: Negative [RS]   2212 Bacteria, UA: None Seen [RS]   2212 Glucose: 76 [RS]   2212 BUN(!): 24 [RS]   2212 Creatinine(!): 1.67  Stable CKD [RS]   2212 Sodium: 136 [RS]   2212 Potassium: 4.6 [RS]   2212 AST (SGOT): 14 [RS]   2212 Total Bilirubin: 0.4 [RS]   2213 HS Troponin T(!): 26  Plan delta troponin [RS]   2301 Repeat Accu-Chek and dropped to 58.  D50 and more food ordered.  Patient is already immaturities emesis arrival.  Recommend admission overnight for close observation.  Patient and family agreeable. [RS]   2301 CONSULT        Provider: IAIN JAMA    Discussion: Reviewed patient history, ED presentation and evaluation as well as therapies initiated in the ED.  Agreeable to accept patient to the observation unit for further evaluation and treatment.    Agreeable c treatment and planned disposition.         [RS]      ED Course User Index  [RS] Daniel Hancock MD         Prescription drug monitoring program review:     AS OF 23:02 EDT VITALS:    BP - 145/77  HR - 108  TEMP - 98 °F (36.7 °C) (Oral)  O2 SATS - 91%    COMPLEXITY OF CARE  The patient requires admission.      DIAGNOSIS  Final diagnoses:   Hypoglycemia   Type 2 diabetes mellitus with hypoglycemia without coma, without long-term current use of insulin         DISPOSITION  ED Disposition       ED Disposition   Decision to Admit    Condition   --    Comment   --                  ADMISSION    Discussed treatment plan and reason for admission with pt/family and admitting physician.  Pt/family voiced understanding of the plan for admission for further testing/treatment as needed.       Please note that portions of this document were completed with a voice recognition program.    Note Disclaimer: At [facility], we believe that sharing information builds trust and better relationships. You are receiving this note because you  recently visited [facility]. It is possible you will see health information before a provider has talked with you about it. This kind of information can be easy to misunderstand. To help you fully understand what it means for your health, we urge you to discuss this note with your provider.         Daniel Hancock MD  05/17/24 0083

## 2024-05-18 NOTE — THERAPY EVALUATION
Patient Name: Elissa Marrero  : 3/15/1925    MRN: [id number]                              Today's Date: 2024       Admit Date: 2024    Visit Dx:     ICD-10-CM ICD-9-CM   1. Hypoglycemia  E16.2 251.2   2. Type 2 diabetes mellitus with hypoglycemia without coma, without long-term current use of insulin  E11.649 250.80     251.2     Patient Active Problem List   Diagnosis    Diabetes mellitus, type 2    Hyperlipidemia    Benign essential hypertension    Hypothyroidism    Insomnia    Lymphedema    Osteoarthritis    Osteopenia    Renal insufficiency    Restless leg syndrome    Vitamin D deficiency    Hip pain, right    Acute kidney failure    Chronic kidney disease, stage 4 (severe)    Disorder of bone and cartilage    Edema    Hyperkalemia    Urinary tract infection    TIA (transient ischemic attack)    Acute metabolic encephalopathy    Acute cystitis without hematuria    Hypoglycemia    Kidney stone     Past Medical History:   Diagnosis Date    Benign essential hypertension     Diabetes mellitus, type 2 2012    Encounter for diabetic foot exam 2015    History of complete eye exam 2017    Dr Leslie    Hyperlipidemia     Hypothyroidism     Insomnia     Lymphedema 2010    Osteoarthritis     Osteopenia     Renal insufficiency 2009    Restless leg syndrome 2012    Vitamin D deficiency 10/12/2011     Past Surgical History:   Procedure Laterality Date    APPENDECTOMY      CHOLECYSTECTOMY      EYE SURGERY      TONSILLECTOMY        General Information       Row Name 24 1444          OT Time and Intention    Document Type evaluation  -KG     Mode of Treatment individual therapy;occupational therapy  -KG       Row Name 24 1444          General Information    Patient Profile Reviewed yes  -KG     Prior Level of Function --  Requires assistance for showering, otherwise Mod I w/ BADLs and uses rollator for functional mobility  -KG     Existing Precautions/Restrictions fall   -KG     Barriers to Rehab none identified  -KG       Row Name 05/18/24 1444          Living Environment    People in Home child(mckayla), adult  -KG       Row Name 05/18/24 1444          Cognition    Orientation Status (Cognition) oriented x 3  -KG       Row Name 05/18/24 1444          Safety Issues, Functional Mobility    Safety Issues Affecting Function (Mobility) impulsivity;judgment  -KG     Impairments Affecting Function (Mobility) endurance/activity tolerance;strength;balance  -KG               User Key  (r) = Recorded By, (t) = Taken By, (c) = Cosigned By      Initials Name Provider Type    KG Jagdish Enrique OT Occupational Therapist                     Mobility/ADL's       Row Name 05/18/24 1445          Bed Mobility    Bed Mobility supine-sit;sit-supine  -KG     Supine-Sit [place] (Bed Mobility) standby assist  -KG     Sit-Supine [place] (Bed Mobility) standby assist  -KG       Row Name 05/18/24 1445          Transfers    Transfers sit-stand transfer;stand-sit transfer  -KG       Row Name 05/18/24 1445          Sit-Stand Transfer    Sit-Stand [place] (Transfers) contact guard  -KG     Assistive Device (Sit-Stand Transfers) walker, front-wheeled  -KG       Row Name 05/18/24 1445          Stand-Sit Transfer    Stand-Sit [place] (Transfers) contact guard  -KG     Assistive Device (Stand-Sit Transfers) walker, front-wheeled  -KG       Row Name 05/18/24 1445          Functional Mobility    Functional Mobility- Ind. Level --  Performed functional mobility (simulating household distance) w/ CGA using RW. Verbal cueing for safety as pt was impulsive at times.  -KG       Row Name 05/18/24 1445          Activities of Daily Living    BADL Assessment/Intervention lower body dressing  -KG       Row Name 05/18/24 1445          Lower Body Dressing Assessment/Training    [place] Level (Lower Body Dressing) don;socks;doff;set up  -KG     Position (Lower Body Dressing) edge of bed sitting  -KG                User Key  (r) = Recorded By, (t) = Taken By, (c) = Cosigned By      Initials Name Provider Type    LIZ Jagdish Enrique OT Occupational Therapist                   Obj/Interventions       Row Name 05/18/24 1446          Sensory Assessment (Somatosensory)    Sensory Assessment (Somatosensory) sensation intact  -KG       Row Name 05/18/24 1446          Vision Assessment/Intervention    Visual Impairment/Limitations WFL  -KG       Row Name 05/18/24 1446          Range of Motion Comprehensive    General Range of Motion no range of motion deficits identified  -KG       Row Name 05/18/24 1446          Strength Comprehensive (MMT)    Comment, General Manual Muscle Testing (MMT) Assessment BUE strength 4-/5 grossly  -KG       Row Name 05/18/24 1446          Balance    Balance Assessment sitting static balance;sitting dynamic balance;standing static balance;standing dynamic balance  -KG     Static Sitting Balance standby assist  -KG     Dynamic Sitting Balance standby assist  -KG     Position, Sitting Balance sitting edge of bed  -KG     Static Standing Balance contact guard  -KG     Dynamic Standing Balance contact guard  -KG     Position/Device Used, Standing Balance walker, front-wheeled  -KG     Balance Interventions sitting;standing;dynamic;static;occupation based/functional task  -KG               User Key  (r) = Recorded By, (t) = Taken By, (c) = Cosigned By      Initials Name Provider Type    LIZ Jagdish Enrique OT Occupational Therapist                   Goals/Plan       Row Name 05/18/24 1449          Bed Mobility Goal 1 (OT)    Activity/Assistive Device (Bed Mobility Goal 1, OT) bed mobility activities, all  -KG     [place] Level/Cues Needed (Bed Mobility Goal 1, OT) modified independence  -KG     Time Frame (Bed Mobility Goal 1, OT) short term goal (STG);2 weeks  -KG     Progress/Outcomes (Bed Mobility Goal 1, OT) new goal  -KG       Row Name 05/18/24 1449          Transfer Goal 1 (OT)     Activity/Assistive Device (Transfer Goal 1, OT) sit-to-stand/stand-to-sit;bed-to-chair/chair-to-bed;toilet  -KG     [place] Level/Cues Needed (Transfer Goal 1, OT) standby assist  -KG     Time Frame (Transfer Goal 1, OT) short term goal (STG);2 weeks  -KG     Progress/Outcome (Transfer Goal 1, OT) new goal  -KG       Row Name 05/18/24 1449          Bathing Goal 1 (OT)    Activity/Device (Bathing Goal 1, OT) upper body bathing;lower body bathing  -KG     [place] Level/Cues Needed (Bathing Goal 1, OT) minimum assist (75% or more patient effort)  -KG     Time Frame (Bathing Goal 1, OT) short term goal (STG);2 weeks  -KG     Progress/Outcomes (Bathing Goal 1, OT) new goal  -KG       Row Name 05/18/24 1449          Dressing Goal 1 (OT)    Activity/Device (Dressing Goal 1, OT) upper body dressing;lower body dressing  -KG     [place]/Cues Needed (Dressing Goal 1, OT) modified independence  -KG     Time Frame (Dressing Goal 1, OT) short term goal (STG);2 weeks  -KG     Progress/Outcome (Dressing Goal 1, OT) new goal  -KG       Row Name 05/18/24 1440          Toileting Goal 1 (OT)    Activity/Device (Toileting Goal 1, OT) adjust/manage clothing;perform perineal hygiene  -KG     [place] Level/Cues Needed (Toileting Goal 1, OT) standby assist  -KG     Time Frame (Toileting Goal 1, OT) short term goal (STG);2 weeks  -KG     Progress/Outcome (Toileting Goal 1, OT) new goal  -KG       Row Name 05/18/24 1444          Grooming Goal 1 (OT)    Activity/Device (Grooming Goal 1, OT) oral care;wash face, hands  -KG     [place] (Grooming Goal 1, OT) standby assist  -KG     Time Frame (Grooming Goal 1, OT) short term goal (STG);2 weeks  -KG     Progress/Outcome (Grooming Goal 1, OT) new goal  -KG       Row Name 05/18/24 1443          Therapy Assessment/Plan (OT)    Planned Therapy Interventions (OT) activity tolerance training;adaptive equipment training;BADL retraining;functional balance  retraining;occupation/activity based interventions;patient/caregiver education/training;ROM/therapeutic exercise;strengthening exercise;transfer/mobility retraining  -KG               User Key  (r) = Recorded By, (t) = Taken By, (c) = Cosigned By      Initials Name Provider Type    Jagdish Murillo, MOHAN Occupational Therapist                   Clinical Impression       Row Name 05/18/24 1446          Pain Assessment    Pretreatment Pain Rating 0/10 - no pain  -KG     Posttreatment Pain Rating 0/10 - no pain  -KG       Row Name 05/18/24 1446          Plan of Care Review    Plan of Care Reviewed With patient  -KG     Outcome Evaluation Pt admitted to [facility] for hypoglycemia. Pt takes turns staying with her daughters. Requires assistance for showering, otherwise Mod I w/ BADLs and uses rollator for functional mobility at baseline. Performed bed mobility w/ SBA. Performed STS and functional mobility w/ CGA using RW. Unsteady at times, but no LOB. Verbal cueing at times for safety awareness. Performed LBD at EOB w/ setup. Activity tolerance deficits noted w/ activity. Based on todays performance, recommend home w/ increased assistance and HHOT.  -KG       Row Name 05/18/24 1446          Therapy Assessment/Plan (OT)    Rehab Potential (OT) good, to achieve stated therapy goals  -KG     Criteria for Skilled Therapeutic Interventions Met (OT) skilled treatment is necessary  -KG     Therapy Frequency (OT) 5 times/wk  -KG       Row Name 05/18/24 1446          Therapy Plan Review/Discharge Plan (OT)    Anticipated Discharge Disposition (OT) home with home health;home with assist  -KG       Row Name 05/18/24 1446          Vital Signs    Pre Patient Position Supine  -KG     Intra Patient Position Standing  -KG     Post Patient Position Supine  -KG       Row Name 05/18/24 1446          Positioning and Restraints    Pre-Treatment Position in bed  -KG     Post Treatment Position bed  -KG     In Bed notified nsg;supine;call light  within reach;encouraged to call for assist;exit alarm on  -KG               User Key  (r) = Recorded By, (t) = Taken By, (c) = Cosigned By      Initials Name Provider Type    Jagdish Murillo OT Occupational Therapist                   Outcome Measures       Row Name 05/18/24 1450          How much help from another is currently needed...    Putting on and taking off regular lower body clothing? 4  -KG     Bathing (including washing, rinsing, and drying) 3  -KG     Toileting (which includes using toilet bed pan or urinal) 4  -KG     Putting on and taking off regular upper body clothing 4  -KG     Taking care of personal grooming (such as brushing teeth) 4  -KG     Eating meals 4  -KG     AM-PAC 6 Clicks Score (OT) 23  -KG       Row Name 05/18/24 0935 05/18/24 0900       How much help from another person do you currently need...    Turning from your back to your side while in flat bed without using bedrails? 3  -CS 3  -GR    Moving from lying on back to sitting on the side of a flat bed without bedrails? 3  -CS 3  -GR    Moving to and from a bed to a chair (including a wheelchair)? 3  -CS 3  -GR    Standing up from a chair using your arms (e.g., wheelchair, bedside chair)? 3  -CS 3  -GR    Climbing 3-5 steps with a railing? 2  -CS 2  -GR    To walk in hospital room? 3  -CS 3  -GR    AM-PAC 6 Clicks Score (PT) 17  -CS 17  -GR    Highest Level of Mobility Goal 5 --> Static standing  -CS 5 --> Static standing  -GR      Row Name 05/18/24 1450          Modified Parmjit Scale    Modified [place] Scale 3 - Moderate disability.  Requiring some help, but able to walk without assistance.  -KG       Row Name 05/18/24 1450 05/18/24 0935       Functional Assessment    Outcome Measure Options AM-PAC 6 Clicks Daily Activity (OT);Modified [place]  -KG AM-PAC 6 Clicks Basic Mobility (PT)  -CS              User Key  (r) = Recorded By, (t) = Taken By, (c) = Cosigned By      Initials Name Provider Type    Stanley Kirby, PT Physical  Therapist    Sara Pepper RN Registered Nurse    Jagdish Murillo OT Occupational Therapist                    Occupational Therapy Education       Title: PT OT SLP Therapies (In Progress)       Topic: Occupational Therapy (Not Started)       Point: ADL training (Not Started)       Description:   Instruct learner(s) on proper safety adaptation and remediation techniques during self care or transfers.   Instruct in proper use of assistive devices.                  Learner Progress:  Not documented in this visit.              Point: Home exercise program (Not Started)       Description:   Instruct learner(s) on appropriate technique for monitoring, assisting and/or progressing therapeutic exercises/activities.                  Learner Progress:  Not documented in this visit.              Point: Precautions (Not Started)       Description:   Instruct learner(s) on prescribed precautions during self-care and functional transfers.                  Learner Progress:  Not documented in this visit.              Point: Body mechanics (Not Started)       Description:   Instruct learner(s) on proper positioning and spine alignment during self-care, functional mobility activities and/or exercises.                  Learner Progress:  Not documented in this visit.                                  OT Recommendation and Plan  Planned Therapy Interventions (OT): activity tolerance training, adaptive equipment training, BADL retraining, functional balance retraining, occupation/activity based interventions, patient/caregiver education/training, ROM/therapeutic exercise, strengthening exercise, transfer/mobility retraining  Therapy Frequency (OT): 5 times/wk  Plan of Care Review  Plan of Care Reviewed With: patient  Outcome Evaluation: Pt admitted to [facility] for hypoglycemia. Pt takes turns staying with her daughters. Requires assistance for showering, otherwise Mod I w/ BADLs and uses rollator for functional mobility at baseline.  Performed bed mobility w/ SBA. Performed STS and functional mobility w/ CGA using RW. Unsteady at times, but no LOB. Verbal cueing at times for safety awareness. Performed LBD at EOB w/ setup. Activity tolerance deficits noted w/ activity. Based on todays performance, recommend home w/ increased assistance and HHOT.     Time Calculation:   Evaluation Complexity (OT)  Review Occupational Profile/Medical/Therapy History Complexity: brief/low complexity  Assessment, Occupational Performance/Identification of Deficit Complexity: 1-3 performance deficits  Clinical Decision Making Complexity (OT): problem focused assessment/low complexity  Overall Complexity of Evaluation (OT): low complexity     Time Calculation- OT       Row Name 05/18/24 1451             Time Calculation- OT    OT Start Time 1344  -KG      OT Stop Time 1401  -KG      OT Time Calculation (min) 17 min  -KG      Total Timed Code Minutes- OT 10 minute(s)  -KG      OT Non-Billable Time (min) 7 min  -KG      OT Received On 05/18/24  -KG      OT - Next Appointment 05/20/24  -KG      OT Goal Re-Cert Due Date 06/01/24  -KG         Timed Charges    99427 - OT Self Care/Mgmt Minutes 10  -KG         Untimed Charges    OT Eval/Re-eval Minutes 7  -KG         Total Minutes    Timed Charges Total Minutes 10  -KG      Untimed Charges Total Minutes 7  -KG       Total Minutes 17  -KG                User Key  (r) = Recorded By, (t) = Taken By, (c) = Cosigned By      Initials Name Provider Type    KG Jagdish Enrique OT Occupational Therapist                  Therapy Charges for Today       Code Description Service Date Service Provider Modifiers Qty    83483570102 HC OT SELF CARE/MGMT/TRAIN EA 15 MIN 5/18/2024 Jagdish Enrique OT GO 1    41128764803 HC OT EVAL LOW COMPLEXITY 2 5/18/2024 Jagdish Enrique OT GO 1                 Jagdish Enrique OT  5/18/2024

## 2024-05-18 NOTE — PLAN OF CARE
Goal Outcome Evaluation:  Plan of Care Reviewed With: patient           Outcome Evaluation: no c/o pain and consults to PT and OT. Low BS requiring treatment and IV d5NS @75.

## 2024-05-18 NOTE — PROGRESS NOTES
EVITA MASCORRO ATTESTATION NOTE    SHARED VISIT: This visit was performed by BOTH a physician and an APC. The substantive portion of the medical decision making was performed by this attesting physician who made or approved the management plan and takes responsibility for patient management. All studies in the APC note (if performed) were independently interpreted by me.     The TIANA and I have discussed this patient's history, physical exam, and treatment plan.  I have reviewed the documentation and personally had a face to face interaction with the patient. I provided a substantive portion of the care of the patient.  I affirm the documentation and agree with the treatment and plan.  The note below describes my personal findings:         S: 89-year-old female admitted to the observation unit for hypoglycemia at home.  Patient was seen in the ER having had a glucose of 46 with EMS.  Patient ate in the ER and repeat glucose was 76 but then dropped to 58 so patient was given an amp of D50 at that time.  Patient also started on D5 normal saline at 100 cc/h and admitted to observation unit for acute 2-hour Accu-Cheks and close monitoring.  Patient with a recent bronchitis on May 7 and completed 7 days of doxycycline.  This morning patient has no complaints and says she ate a little bit of breakfast.       O:  Exam  General : Cooperative and conversant thin  female of advanced age  HEENT: NCAT, eomi, no scleral icterus  Neck: supple, trachea midline  Resp: Relaxed breathing  Skin: no obvious rashes  Neuro: alert and appropriate, face symmetric, nl speech, moves all 4 exts equally    Diagnostic tests: Patient's blood sugar trended overnight with every 2 hour Accu-Cheks.  After transfer to the observation unit patient's blood sugar measurements were:   177  132   108   64   67   210  259  180 this morning at 8:41 AM     Assessment and Plan: 89-year-old female with a history of diabetes, hypertension, hyperlipidemia,  hypothyroidism, arthritis, insomnia, and history of CVA/TIA here with hypoglycemia and cautiously watched overnight.    Hypoglycemia -with this morning's blood sugars being elevated and patient did tolerate eating breakfast, plan to hold IV fluids and assess for stability of patient's glucose.  Patient's diabetes medications have been held.  Will continue to assess.  DM -patient's oral medications for diabetes have been held overnight while blood sugar was monitored   hypertension -continue patient's home Norvasc and metoprolol, continue to monitor  Hyperlipidemia- continue patient's Crestor  Hypothyroidism -continue patient's Synthroid   Insomnia -continue patient's Mirapex and trazodone  History of CVA/TIA -continue patient's Plavix and statin

## 2024-05-19 NOTE — CASE MANAGEMENT/SOCIAL WORK
Case Management Discharge Note      Final Note: home         Selected Continued Care - Discharged on 5/18/2024 Admission date: 5/17/2024 - Discharge disposition: Home or Self Care      Destination    No services have been selected for the patient.                Durable Medical Equipment    No services have been selected for the patient.                Dialysis/Infusion    No services have been selected for the patient.                Home Medical Care    No services have been selected for the patient.                Therapy    No services have been selected for the patient.                Community Resources    No services have been selected for the patient.                Community & DME    No services have been selected for the patient.                         Final Discharge Disposition Code: 01 - home or self-care

## 2024-05-20 ENCOUNTER — TRANSITIONAL CARE MANAGEMENT TELEPHONE ENCOUNTER (OUTPATIENT)
Dept: CALL CENTER | Facility: HOSPITAL | Age: 89
End: 2024-05-20
Payer: MEDICARE

## 2024-05-20 NOTE — OUTREACH NOTE
Call Center TCM Note      Flowsheet Row Responses   [facility] patient discharged from? [place]   Does the patient have one of the following disease processes/diagnoses(primary or secondary)? Other   TCM attempt successful? Yes   Call start time 0944   Call end time 0949   Discharge diagnosis Hypoglycemia   Is patient permission given to speak with other caregiver? Yes   Person spoke with today (if not patient) and relationship Michelle, daughter   Meds reviewed with patient/caregiver? Yes  [resumed usual home meds.]   Does the patient have all medications ordered at discharge? N/A  [No new meds ordered at discharge.]   Is the patient taking all medications as directed (includes completed medication regime)? Yes   Comments PCP DR Mann.  reports patient can only come to office on a Monday or Tuesday for appt. No availability seen for Dr Mann on those days.  requested Monday or tues appt with any provider in office. Scheduled a HFU with Luz Maria DAVIS per patient (sister) request 5/28  145pm. Contacted office due to PCP wanting to see own patients for HFU. Spoke to staff who will make note and will notify PCP of appt made. Message routed to office.   Does the patient have an appointment with their PCP within 7-14 days of discharge? No   Nursing Interventions Assisted patient with making appointment per protocol, Routed TCM call to PCP office   Has home health visited the patient within 72 hours of discharge? N/A   Psychosocial issues? No   Comments Reports that blood sugar readings have been OK since discharge.   Did the patient receive a copy of their discharge instructions? Yes   Nursing interventions Reviewed instructions with patient  [sister]   What is the patient's perception of their health status since discharge? Improving   Is the patient/caregiver able to teach back signs and symptoms related to disease process for when to call PCP? Yes   Is the patient/caregiver able to teach back signs  and symptoms related to disease process for when to call 911? Yes   Is the patient/caregiver able to teach back the hierarchy of who to call/visit for symptoms/problems? PCP, Specialist, Home health nurse, Urgent Care, ED, 911 Yes   If the patient is a current smoker, are they able to teach back resources for cessation? Not a smoker   TCM call completed? Yes   Call end time 0949   Would this patient benefit from a Referral to [facility] Social Work? No   Is the patient interested in additional calls from an ambulatory ? No            Kary Castillo RN    5/20/2024, 09:58 EDT

## 2024-05-20 NOTE — OUTREACH NOTE
Call Center TCM Note      Flowsheet Row Responses   [facility] patient discharged from? [facility]   Does the patient have one of the following disease processes/diagnoses(primary or secondary)? Other   TCM attempt successful? Yes   Call start time 0944   Call end time 0949   Discharge diagnosis Hypoglycemia   Is patient permission given to speak with other caregiver? Yes   Person spoke with today (if not patient) and relationship tami Martinez   Meds reviewed with patient/caregiver? Yes  [resumed usual home meds.]   Does the patient have all medications ordered at discharge? N/A  [No new meds ordered at discharge.]   Is the patient taking all medications as directed (includes completed medication regime)? Yes   Comments PCP DR Mann. Daughter reports patient can only come to office on a Monday or Tuesday for appt. No availability seen for Dr Mann on those days. Daughter requested Monday or tues appt with any provider in office. Scheduled a HFU with Luz Maria DAVIS per patient (daughter) request 5/28  145pm. Contacted office due to PCP wanting to see own patients for HFU. Spoke to staff who will make note and will notify PCP of appt made. Message routed to office.   Does the patient have an appointment with their PCP within 7-14 days of discharge? No   Nursing Interventions Assisted patient with making appointment per protocol, Routed TCM call to PCP office   Has home health visited the patient within 72 hours of discharge? N/A   Psychosocial issues? No   Comments Reports that blood sugar readings have been OK since discharge.   Did the patient receive a copy of their discharge instructions? Yes   Nursing interventions Reviewed instructions with patient  [sister]   What is the patient's perception of their health status since discharge? Improving   Is the patient/caregiver able to teach back signs and symptoms related to disease process for when to call PCP? Yes   Is the patient/caregiver able to teach back  signs and symptoms related to disease process for when to call 911? Yes   Is the patient/caregiver able to teach back the hierarchy of who to call/visit for symptoms/problems? PCP, Specialist, Home health nurse, Urgent Care, ED, 911 Yes   If the patient is a current smoker, are they able to teach back resources for cessation? Not a smoker   TCM call completed? Yes   Call end time 0949   Would this patient benefit from a Referral to [facility] Social Work? No   Is the patient interested in additional calls from an ambulatory ? No            Kary Castillo RN    5/20/2024, 10:01 EDT

## 2024-05-28 ENCOUNTER — OFFICE VISIT (OUTPATIENT)
Dept: FAMILY MEDICINE CLINIC | Facility: CLINIC | Age: 89
End: 2024-05-28
Payer: MEDICARE

## 2024-05-28 VITALS
DIASTOLIC BLOOD PRESSURE: 50 MMHG | BODY MASS INDEX: 20.4 KG/M2 | HEIGHT: 67 IN | RESPIRATION RATE: 16 BRPM | OXYGEN SATURATION: 96 % | WEIGHT: 130 LBS | HEART RATE: 71 BPM | SYSTOLIC BLOOD PRESSURE: 100 MMHG

## 2024-05-28 DIAGNOSIS — I10 BENIGN ESSENTIAL HYPERTENSION: Chronic | ICD-10-CM

## 2024-05-28 DIAGNOSIS — R82.90 ABNORMAL URINALYSIS: ICD-10-CM

## 2024-05-28 DIAGNOSIS — Z09 HOSPITAL DISCHARGE FOLLOW-UP: Primary | ICD-10-CM

## 2024-05-28 DIAGNOSIS — E11.65 TYPE 2 DIABETES MELLITUS WITH HYPERGLYCEMIA, WITHOUT LONG-TERM CURRENT USE OF INSULIN: ICD-10-CM

## 2024-05-28 LAB
BILIRUB BLD-MCNC: NEGATIVE MG/DL
CLARITY, POC: CLEAR
COLOR UR: YELLOW
EXPIRATION DATE: ABNORMAL
GLUCOSE BLDC GLUCOMTR-MCNC: 211 MG/DL (ref 70–130)
GLUCOSE UR STRIP-MCNC: NEGATIVE MG/DL
KETONES UR QL: NEGATIVE
LEUKOCYTE EST, POC: ABNORMAL
Lab: ABNORMAL
NITRITE UR-MCNC: NEGATIVE MG/ML
PH UR: 5.5 [PH] (ref 5–8)
PROT UR STRIP-MCNC: NEGATIVE MG/DL
RBC # UR STRIP: NEGATIVE /UL
SP GR UR: 1.03 (ref 1–1.03)
UROBILINOGEN UR QL: NORMAL

## 2024-05-28 PROCEDURE — 1111F DSCHRG MED/CURRENT MED MERGE: CPT | Performed by: PHYSICIAN ASSISTANT

## 2024-05-28 PROCEDURE — 1159F MED LIST DOCD IN RCRD: CPT | Performed by: PHYSICIAN ASSISTANT

## 2024-05-28 PROCEDURE — 82948 REAGENT STRIP/BLOOD GLUCOSE: CPT | Performed by: PHYSICIAN ASSISTANT

## 2024-05-28 PROCEDURE — 1160F RVW MEDS BY RX/DR IN RCRD: CPT | Performed by: PHYSICIAN ASSISTANT

## 2024-05-28 PROCEDURE — 1126F AMNT PAIN NOTED NONE PRSNT: CPT | Performed by: PHYSICIAN ASSISTANT

## 2024-05-28 PROCEDURE — 81003 URINALYSIS AUTO W/O SCOPE: CPT | Performed by: PHYSICIAN ASSISTANT

## 2024-05-28 PROCEDURE — 99495 TRANSJ CARE MGMT MOD F2F 14D: CPT | Performed by: PHYSICIAN ASSISTANT

## 2024-05-28 RX ORDER — AMLODIPINE BESYLATE 5 MG/1
2.5 TABLET ORAL DAILY
Start: 2024-05-28

## 2024-05-28 NOTE — PROGRESS NOTES
Subjective   Elissa Marrero is a 89 y.o. female.     History of Present Illness   Elissa Marrero 89 y.o. female presents today for hosptial follow up.  she was treated for unresponsiveness with slurred speech and called 911----sent ER [facility]. 5-18 to 5-18-24.  She was in for 23-hour observation she also had some paresthesia and was unable to get up and walk ... Initial glucose with EMS was 46 and was given glucose also in ER 1 amp of D50 and creatinine was noted to be 1.67 which was above patient's baseline.  She was admitted for observation Dx hypoglycemia --- I reviewed all of the labs and diagnostic testing and noted: Last creatinine at 4:21 AM on 5/18/2024 was 1.44 and glucose was 64.  Also noting hemoglobin 12.2  Most glucose fasting >200  Eats snacks    Lab Results   Component Value Date    GLUCOSE 64 (L) 05/18/2024    CALCIUM 9.4 05/18/2024     05/18/2024    K 4.1 05/18/2024    CO2 20.0 (L) 05/18/2024     (H) 05/18/2024    BUN 24 (H) 05/18/2024    CREATININE 1.44 (H) 05/18/2024    EGFRRESULT 29.5 (L) 01/16/2024    EGFR 32.7 (L) 05/18/2024    BCR 16.7 05/18/2024    ANIONGAP 13.0 05/18/2024   Sees nephrology----CK[facility] Dr LISA Sabillon  Note that she had normal LFTs on 5/17/2024    Was seen by OT and PT---went over ways not to fall.     211, 199, 219  She has been holding the metformin and glipizide if glucose initially fasting in the morning is less than 200    D5 NS was discontinued this morning and glucose level has remained > 140 therefore patient will be discharged home and to follow-up with her PCP.   she had only ate small amount day of hospital stay    The patient's medications were not changed:  Current outpatient and discharge medications have been reconciled for the patient.  Reviewed by: Luz Maria Garcia PA-C    she does not have a follow up appointment with a specialist:    ABNORMAL ECG -  Sinus rhythm  Borderline prolonged SC interval  Abnormal R-wave progression, early transition  Probable left  ventricular hypertrophy  Since prior tracing  EARLY R WAVE PROGRESSION IS NEW  Electronically Signed By: Nikolai Chang ([facility]) 18-May-2024 15:58:24  Date and Time of Study: 2024-05-17 21:23:10  She was seen at [facility] ER on 5/5/2024 to evaluate for cough and was noted to have rhonchi on exam with two-view chest x-ray noting----FINDINGS AND IMPRESSION: Minimal bibasilar pulmonary pacification is present, right greater than left, suggestive atelectasis versus pneumonia and/or pulmonary edema in the appropriate context and correlation with patient history is recommended with follow-up chest CT if clinically indicated. No pneumothorax is seen. Cardiac silhouette is borderline enlarged. The right upper quadrant surgical clips. There is no significant pleural effusion. This report was finalized on 5/5/2024 8:28   I also she she had labs and was given doxycycline for 7 days and Tesalvinon Perles with diagnosis of acute bronchitis  Also had thyroid labs per Dr. Mann 2/20/2024 and continued current dose  Did have chest x-ray 5/5/2024 noting  COMPARISON: Chest radiograph 11/17/2023     IMPRESSION:  FINDINGS AND IMPRESSION:  Minimal bibasilar pulmonary pacification is present, right greater than left, suggestive atelectasis versus pneumonia and/or pulmonary edema in the appropriate context and correlation with patient history is  recommended with follow-up chest CT if clinically indicated.   No pneumothorax is seen. Cardiac silhouette is borderline enlarged. The right upper quadrant surgical clips. There is no significant pleural effusion.  Note no coughing or wheezing, no SOA    Hx TIA 20 yrs ago.     Home glucose 111  Did Glipizide yesterday---glucose >200 and gave all 3 doses  Lab Results   Component Value Date    HGBA1C 7.90 (H) 01/16/2024   Reviewed last notes from Dr. Sabillon noting  Assessment:   Last visit with nephrology was in November see notes below from 11/16/2023  Chronic kidney disease stage 3b,  secondary to HTN, DM.(Baseline 1.6-2.0) The pt's creatinine on recent blood draw was 1.46- below prior baseline. Estimated GFR is 30-35 which is baseline function.   Plan:  Renal function at baseline, euvolemic  Electrolytes at goal  Add OTC Vit D3 2000U/day  Watch K intake  Keep BP log  Continue current medications  Diet: Modest salt and fluid restricition  Avoid use of NSAIDS and IV contrast  Follow up in 6 month(s)     Orders Placed This Encounter   Renal Function Panel (RFP)   Urinalysis w/ Reflex to Microscopic   Vitamin D, 25-Hydroxy, Serum     VERONIQUE PITTS MD  11/16/2023      She had oatmeal for breakfast glucose today was 111 and held glipizide and metformin and for lunch she only wanted ice cream cone and that was around 1:00    Also reviewed she had echocardiogram on 12/8/2022 noting normal left ventricular systolic function Nix 62.8%, left ventricular diastolic dysfunction consistent with a grade 2 pseudonormalization, severe mitral annular calcification present moderate anterior mitral leaflet thickening present, moderate mitral valve regurgitation, mild mitral valve stenosis and her saline test were negative    The following portions of the patient's history were reviewed and updated as appropriate: allergies, current medications, past family history, past medical history, past social history, past surgical history, and problem list.    Review of Systems   Constitutional:  Negative for diaphoresis.   HENT:  Negative for nosebleeds and trouble swallowing.    Eyes:  Negative for blurred vision and visual disturbance.   Respiratory:  Negative for choking.    Gastrointestinal:  Negative for blood in stool.   Allergic/Immunologic: Negative for immunocompromised state.   Neurological:  Negative for facial asymmetry and speech difficulty.   Psychiatric/Behavioral:  Negative for self-injury and suicidal ideas.        Objective   Physical Exam  Vitals and nursing note reviewed.   Constitutional:       General:  She is not in acute distress.     Appearance: She is well-developed. She is not ill-appearing or toxic-appearing.   HENT:      Head: Normocephalic.      Right Ear: External ear normal.      Left Ear: External ear normal.      Nose: Nose normal.      Mouth/Throat:      Pharynx: Oropharynx is clear.   Eyes:      General: No scleral icterus.     Conjunctiva/sclera: Conjunctivae normal.      Pupils: Pupils are equal, round, and reactive to light.   Neck:      Thyroid: No thyromegaly.   Cardiovascular:      Rate and Rhythm: Normal rate and regular rhythm.      Heart sounds: Normal heart sounds. No murmur heard.  Pulmonary:      Effort: Pulmonary effort is normal. No respiratory distress.      Breath sounds: Normal breath sounds. No rales.   Musculoskeletal:         General: No deformity. Normal range of motion.      Cervical back: Normal range of motion and neck supple.      Right lower leg: No edema.      Left lower leg: No edema.   Skin:     General: Skin is warm and dry.      Findings: No rash.   Neurological:      General: No focal deficit present.      Mental Status: She is alert and oriented to person, place, and time. Mental status is at baseline.   Psychiatric:         Mood and Affect: Mood normal.         Behavior: Behavior normal.         Thought Content: Thought content normal.         Judgment: Judgment normal.         Point-of-care glucose is 211 today  Assessment & Plan   Diagnoses and all orders for this visit:    1. Type 2 diabetes mellitus with hyperglycemia, without long-term current use of insulin (Primary)  -     POCT Glucose    2. Benign essential hypertension  -     amLODIPine (NORVASC) 5 MG tablet; Take 0.5 tablets by mouth Daily. For blood pressure changed on 5/28/2024    3. Hospital discharge follow-up    4. Abnormal urinalysis  -     Urine Culture - Urine, Urine, Clean Catch  -     Urinalysis With Microscopic - Urine, Clean Catch  -     POC Urinalysis Dipstick, Automated        EKG at [facility] noted  new early R wave progression  Need f/u on urine---had RBC + 5-17-24  Hx TIA-----on Plavix  Concern about Metformin and CKD3B  Need POCT glucose now  Cannot do continuous glucometer her daughter is caregiver in with her today and does not have smart phone.  BP low today  She is due to see Dr. Mann in June and has follow-up labs already ordered  I spoke personally with Dr. Mann we reviewed her notes from the hospital and labs note that we will stop metformin due to her creatinine staying above 1.4 and because of that we are going to keep the glipizide at 5 mg 3 times a day and she is to eat and then take the glipizide if she does not eat anything do not take the glipizide  Concerned that blood pressure was 100/50 and want that systolic blood pressure at least 115 so plan is to reduce amlodipine down from 5 mg to 2.5 mg monitor readings at home and if blood pressure systolic staying above 150 call us back and we will consider going back up  I do want her to eat small frequent meals especially with the glipizide it can cause hypoglycemia and if she has any other unresponsive episodes check glucose immediately... You can buy glucose tabs gel that you squeeze under the tongue at the pharmacy  Will also get follow-up urine today from the hospital visit she had a little bit of blood in her urine I will order a urine micro and culture   Point-of-care glucose was 211

## 2024-05-31 LAB
APPEARANCE UR: ABNORMAL
BACTERIA #/AREA URNS HPF: ABNORMAL /HPF
BACTERIA UR CULT: NORMAL
BACTERIA UR CULT: NORMAL
BILIRUB UR QL STRIP: NEGATIVE
CASTS URNS MICRO: ABNORMAL
COLOR UR: YELLOW
EPI CELLS #/AREA URNS HPF: ABNORMAL /HPF
GLUCOSE UR QL STRIP: NEGATIVE
HGB UR QL STRIP: NEGATIVE
KETONES UR QL STRIP: ABNORMAL
LEUKOCYTE ESTERASE UR QL STRIP: ABNORMAL
NITRITE UR QL STRIP: NEGATIVE
PH UR STRIP: 5.5 [PH] (ref 5–8)
PROT UR QL STRIP: ABNORMAL
RBC #/AREA URNS HPF: ABNORMAL /HPF
SP GR UR STRIP: 1.02 (ref 1–1.03)
UROBILINOGEN UR STRIP-MCNC: ABNORMAL MG/DL
WBC #/AREA URNS HPF: ABNORMAL /HPF

## 2024-06-12 DIAGNOSIS — E03.9 ACQUIRED HYPOTHYROIDISM: Chronic | ICD-10-CM

## 2024-06-12 RX ORDER — LEVOTHYROXINE SODIUM 88 UG/1
88 TABLET ORAL DAILY
Qty: 90 TABLET | Refills: 0 | Status: SHIPPED | OUTPATIENT
Start: 2024-06-12

## 2024-06-22 NOTE — PROGRESS NOTES
Chief Complaint:   Chief Complaint   Patient presents with    Diabetes     Med refill due   DIABETIC EYE EXAM DUE   Lab results   [facility] pharm / no changes    Hypertension    Hyperlipidemia    Hypothyroidism    Restless Legs Syndrome    Insomnia    TIA       Elissa ALEJANDRE Marrero 89 y.o. female who presents today for Medical Management of the below listed issues. She  has a problem list of   Patient Active Problem List   Diagnosis    Diabetes mellitus, type 2    Hyperlipidemia    Benign essential hypertension    Hypothyroidism    Insomnia    Lymphedema    Osteoarthritis    Osteopenia    Renal insufficiency    Restless leg syndrome    Vitamin D deficiency    Hip pain, right    Acute kidney failure    Chronic kidney disease, stage 4 (severe)    Disorder of bone and cartilage    Edema    Hyperkalemia    Urinary tract infection    TIA (transient ischemic attack)    Acute metabolic encephalopathy    Acute cystitis without hematuria    Kidney stone   .  Since the last visit, She has overall felt well.  she has been compliant with   Current Outpatient Medications:     amLODIPine (NORVASC) 5 MG tablet, Take 0.5 tablets by mouth Daily. For blood pressure changed on 5/28/2024, Disp: 45 tablet, Rfl: 1    clopidogrel (PLAVIX) 75 MG tablet, Take 1 tablet by mouth Daily., Disp: 90 tablet, Rfl: 1    glipizide (GLUCOTROL) 5 MG tablet, Take 1 tablet by mouth 3 (Three) Times a Day., Disp: 270 tablet, Rfl: 1    levothyroxine (SYNTHROID, LEVOTHROID) 88 MCG tablet, Take 1 tablet by mouth Daily., Disp: 90 tablet, Rfl: 1    metoprolol tartrate (LOPRESSOR) 25 MG tablet, Take 1 tablet by mouth 2 (Two) Times a Day., Disp: 180 tablet, Rfl: 1    pramipexole (MIRAPEX) 1.5 MG tablet, Take 1 tablet by mouth Daily., Disp: 90 tablet, Rfl: 1    rosuvastatin (Crestor) 10 MG tablet, Take 1 tablet by mouth Daily., Disp: 90 tablet, Rfl: 1    traZODone (DESYREL) 50 MG tablet, Take 1 tablet by mouth Every Night., Disp: 90 tablet, Rfl: 1    benzonatate  "(TESSALON) 200 MG capsule, Take 1 capsule by mouth 3 (Three) Times a Day As Needed for Cough., Disp: 30 capsule, Rfl: 2    Cholecalciferol (VITAMIN D PO), Take  by mouth., Disp: , Rfl: .  She denies medication side effects.    All of the other chronic condition(s) listed above are stable w/o issues.    /54   Pulse 68   Temp 97.5 °F (36.4 °C) (Oral)   Resp 14   Ht 170.2 cm (67\")   Wt 58.1 kg (128 lb)   SpO2 96%   BMI 20.05 kg/m²     Results for orders placed or performed in visit on 06/20/24   MicroAlbumin, Urine, Random - Urine, Clean Catch    Specimen: Urine, Clean Catch   Result Value Ref Range    Microalbumin, Urine CANCELED ug/mL   T3    Specimen: Blood   Result Value Ref Range    T3, Total 104 71 - 180 ng/dL   T4, Free    Specimen: Blood   Result Value Ref Range    Free T4 1.70 0.82 - 1.77 ng/dL   TSH    Specimen: Blood   Result Value Ref Range    TSH 0.037 (L) 0.450 - 4.500 uIU/mL   Hemoglobin A1c    Specimen: Blood   Result Value Ref Range    Hemoglobin A1C 7.4 (H) 4.8 - 5.6 %   Basic Metabolic Panel    Specimen: Blood   Result Value Ref Range    Glucose 224 (H) 70 - 99 mg/dL    BUN 17 10 - 36 mg/dL    Creatinine 1.56 (H) 0.57 - 1.00 mg/dL    EGFR Result 30 (L) >59 mL/min/1.73    BUN/Creatinine Ratio 11 (L) 12 - 28    Sodium 141 134 - 144 mmol/L    Potassium 4.4 3.5 - 5.2 mmol/L    Chloride 105 96 - 106 mmol/L    Total CO2 24 20 - 29 mmol/L    Calcium 9.8 8.7 - 10.3 mg/dL   Unable To Void   Result Value Ref Range    Unable to Void Comment    Specimen Status Report   Result Value Ref Range    Specimen Status CANCELED    CBC & Differential    Specimen: Blood   Result Value Ref Range    WBC 7.7 3.4 - 10.8 x10E3/uL    RBC 4.07 3.77 - 5.28 x10E6/uL    Hemoglobin 10.9 (L) 11.1 - 15.9 g/dL    Hematocrit 35.4 34.0 - 46.6 %    MCV 87 79 - 97 fL    MCH 26.8 26.6 - 33.0 pg    MCHC 30.8 (L) 31.5 - 35.7 g/dL    RDW 13.5 11.7 - 15.4 %    Platelets 179 150 - 450 x10E3/uL    Neutrophil Rel % 59 Not Estab. %    " Lymphocyte Rel % 32 Not Estab. %    Monocyte Rel % 8 Not Estab. %    Eosinophil Rel % 1 Not Estab. %    Basophil Rel % 0 Not Estab. %    Neutrophils Absolute 4.5 1.4 - 7.0 x10E3/uL    Lymphocytes Absolute 2.5 0.7 - 3.1 x10E3/uL    Monocytes Absolute 0.6 0.1 - 0.9 x10E3/uL    Eosinophils Absolute 0.1 0.0 - 0.4 x10E3/uL    Basophils Absolute 0.0 0.0 - 0.2 x10E3/uL    Immature Granulocyte Rel % 0 Not Estab. %    Immature Grans Absolute 0.0 0.0 - 0.1 x10E3/uL             The following portions of the patient's history were reviewed and updated as appropriate: allergies, current medications, past family history, past medical history, past social history, past surgical history, and problem list.    Review of Systems   Constitutional:  Negative for activity change, chills and fever.   Respiratory:  Negative for cough.    Cardiovascular:  Negative for chest pain.   Psychiatric/Behavioral:  Negative for dysphoric mood.        Objective     BMI is within normal parameters. No other follow-up for BMI required.        Physical Exam  Vitals and nursing note reviewed.   Constitutional:       General: She is not in acute distress.     Appearance: She is well-developed.   Cardiovascular:      Rate and Rhythm: Normal rate and regular rhythm.   Pulmonary:      Effort: Pulmonary effort is normal.      Breath sounds: Normal breath sounds.   Neurological:      Mental Status: She is alert and oriented to person, place, and time.   Psychiatric:         Behavior: Behavior normal.         Thought Content: Thought content normal.     Labs reviewed with pt today during visit. All questions answered.          Diagnoses and all orders for this visit:    1. Type 2 diabetes mellitus with hyperglycemia, without long-term current use of insulin (Primary)  -     glipizide (GLUCOTROL) 5 MG tablet; Take 1 tablet by mouth 3 (Three) Times a Day.  Dispense: 270 tablet; Refill: 1  -     Comprehensive metabolic panel; Future  -     Lipid panel; Future  -      Hemoglobin A1c; Future  -     MicroAlbumin, Urine, Random - Urine, Clean Catch    2. Acquired hypothyroidism  -     levothyroxine (SYNTHROID, LEVOTHROID) 88 MCG tablet; Take 1 tablet by mouth Daily.  Dispense: 90 tablet; Refill: 1  -     TSH; Future  -     T4, Free; Future  -     T3; Future    3. Primary insomnia  -     traZODone (DESYREL) 50 MG tablet; Take 1 tablet by mouth Every Night.  Dispense: 90 tablet; Refill: 1    4. TIA (transient ischemic attack)  -     clopidogrel (PLAVIX) 75 MG tablet; Take 1 tablet by mouth Daily.  Dispense: 90 tablet; Refill: 1    5. Mixed hyperlipidemia  -     rosuvastatin (Crestor) 10 MG tablet; Take 1 tablet by mouth Daily.  Dispense: 90 tablet; Refill: 1  -     Lipid panel; Future    6. Benign essential hypertension  -     metoprolol tartrate (LOPRESSOR) 25 MG tablet; Take 1 tablet by mouth 2 (Two) Times a Day.  Dispense: 180 tablet; Refill: 1  -     Comprehensive metabolic panel; Future  -     Lipid panel; Future  -     CBC and Differential; Future  -     amLODIPine (NORVASC) 5 MG tablet; Take 0.5 tablets by mouth Daily. For blood pressure changed on 5/28/2024  Dispense: 45 tablet; Refill: 1    7. Restless leg syndrome  -     pramipexole (MIRAPEX) 1.5 MG tablet; Take 1 tablet by mouth Daily.  Dispense: 90 tablet; Refill: 1    8. Anemia, unspecified type  -     CBC & Differential  -     Ferritin  -     Iron  -     Vitamin B12  -     Folate

## 2024-06-24 ENCOUNTER — OFFICE VISIT (OUTPATIENT)
Dept: FAMILY MEDICINE CLINIC | Facility: CLINIC | Age: 89
End: 2024-06-24
Payer: MEDICARE

## 2024-06-24 VITALS
HEIGHT: 67 IN | RESPIRATION RATE: 14 BRPM | SYSTOLIC BLOOD PRESSURE: 127 MMHG | OXYGEN SATURATION: 96 % | BODY MASS INDEX: 20.09 KG/M2 | WEIGHT: 128 LBS | DIASTOLIC BLOOD PRESSURE: 54 MMHG | TEMPERATURE: 97.5 F | HEART RATE: 68 BPM

## 2024-06-24 DIAGNOSIS — G25.81 RESTLESS LEG SYNDROME: Chronic | ICD-10-CM

## 2024-06-24 DIAGNOSIS — I10 BENIGN ESSENTIAL HYPERTENSION: Chronic | ICD-10-CM

## 2024-06-24 DIAGNOSIS — F51.01 PRIMARY INSOMNIA: Chronic | ICD-10-CM

## 2024-06-24 DIAGNOSIS — G45.9 TIA (TRANSIENT ISCHEMIC ATTACK): Chronic | ICD-10-CM

## 2024-06-24 DIAGNOSIS — E78.2 MIXED HYPERLIPIDEMIA: Chronic | ICD-10-CM

## 2024-06-24 DIAGNOSIS — D64.9 ANEMIA, UNSPECIFIED TYPE: ICD-10-CM

## 2024-06-24 DIAGNOSIS — E11.65 TYPE 2 DIABETES MELLITUS WITH HYPERGLYCEMIA, WITHOUT LONG-TERM CURRENT USE OF INSULIN: Primary | Chronic | ICD-10-CM

## 2024-06-24 DIAGNOSIS — E03.9 ACQUIRED HYPOTHYROIDISM: Chronic | ICD-10-CM

## 2024-06-24 PROCEDURE — 1160F RVW MEDS BY RX/DR IN RCRD: CPT | Performed by: FAMILY MEDICINE

## 2024-06-24 PROCEDURE — 99214 OFFICE O/P EST MOD 30 MIN: CPT | Performed by: FAMILY MEDICINE

## 2024-06-24 PROCEDURE — 1126F AMNT PAIN NOTED NONE PRSNT: CPT | Performed by: FAMILY MEDICINE

## 2024-06-24 PROCEDURE — 1159F MED LIST DOCD IN RCRD: CPT | Performed by: FAMILY MEDICINE

## 2024-06-24 RX ORDER — AMLODIPINE BESYLATE 5 MG/1
2.5 TABLET ORAL DAILY
Qty: 90 TABLET | Refills: 1 | Status: CANCELLED | OUTPATIENT
Start: 2024-06-24

## 2024-06-24 RX ORDER — PRAMIPEXOLE DIHYDROCHLORIDE 1.5 MG/1
1.5 TABLET ORAL DAILY
Qty: 90 TABLET | Refills: 1 | Status: SHIPPED | OUTPATIENT
Start: 2024-06-24

## 2024-06-24 RX ORDER — AMLODIPINE BESYLATE 5 MG/1
2.5 TABLET ORAL DAILY
Qty: 45 TABLET | Refills: 1 | Status: SHIPPED | OUTPATIENT
Start: 2024-06-24

## 2024-06-24 RX ORDER — TRAZODONE HYDROCHLORIDE 50 MG/1
50 TABLET ORAL NIGHTLY
Qty: 90 TABLET | Refills: 1 | Status: SHIPPED | OUTPATIENT
Start: 2024-06-24

## 2024-06-24 RX ORDER — LEVOTHYROXINE SODIUM 88 UG/1
88 TABLET ORAL DAILY
Qty: 90 TABLET | Refills: 1 | Status: SHIPPED | OUTPATIENT
Start: 2024-06-24

## 2024-06-24 RX ORDER — ROSUVASTATIN CALCIUM 10 MG/1
10 TABLET, COATED ORAL DAILY
Qty: 90 TABLET | Refills: 1 | Status: SHIPPED | OUTPATIENT
Start: 2024-06-24

## 2024-06-24 RX ORDER — GLIPIZIDE 5 MG/1
5 TABLET ORAL 3 TIMES DAILY
Qty: 270 TABLET | Refills: 1 | Status: SHIPPED | OUTPATIENT
Start: 2024-06-24

## 2024-06-24 RX ORDER — CLOPIDOGREL BISULFATE 75 MG/1
75 TABLET ORAL DAILY
Qty: 90 TABLET | Refills: 1 | Status: SHIPPED | OUTPATIENT
Start: 2024-06-24

## 2024-07-02 DIAGNOSIS — E53.8 B12 DEFICIENCY: Primary | ICD-10-CM

## 2024-07-02 LAB
BASOPHILS # BLD AUTO: 0 X10E3/UL (ref 0–0.2)
BASOPHILS NFR BLD AUTO: 0 %
EOSINOPHIL # BLD AUTO: 0.1 X10E3/UL (ref 0–0.4)
EOSINOPHIL NFR BLD AUTO: 1 %
ERYTHROCYTE [DISTWIDTH] IN BLOOD BY AUTOMATED COUNT: 13.9 % (ref 11.7–15.4)
FERRITIN SERPL-MCNC: 156 NG/ML (ref 15–150)
FOLATE SERPL-MCNC: 6.3 NG/ML
HCT VFR BLD AUTO: 35.5 % (ref 34–46.6)
HGB BLD-MCNC: 11.2 G/DL (ref 11.1–15.9)
IMM GRANULOCYTES # BLD AUTO: 0 X10E3/UL (ref 0–0.1)
IMM GRANULOCYTES NFR BLD AUTO: 1 %
IRON SERPL-MCNC: 50 UG/DL (ref 27–139)
LYMPHOCYTES # BLD AUTO: 3.2 X10E3/UL (ref 0.7–3.1)
LYMPHOCYTES NFR BLD AUTO: 40 %
MCH RBC QN AUTO: 27.3 PG (ref 26.6–33)
MCHC RBC AUTO-ENTMCNC: 31.5 G/DL (ref 31.5–35.7)
MCV RBC AUTO: 87 FL (ref 79–97)
MICROALBUMIN UR-MCNC: 29.4 UG/ML
MONOCYTES # BLD AUTO: 0.5 X10E3/UL (ref 0.1–0.9)
MONOCYTES NFR BLD AUTO: 7 %
NEUTROPHILS # BLD AUTO: 4.2 X10E3/UL (ref 1.4–7)
NEUTROPHILS NFR BLD AUTO: 51 %
PLATELET # BLD AUTO: 201 X10E3/UL (ref 150–450)
RBC # BLD AUTO: 4.1 X10E6/UL (ref 3.77–5.28)
VIT B12 SERPL-MCNC: 225 PG/ML (ref 232–1245)
WBC # BLD AUTO: 8.1 X10E3/UL (ref 3.4–10.8)

## 2024-07-02 RX ORDER — CYANOCOBALAMIN 1000 UG/ML
1000 INJECTION, SOLUTION INTRAMUSCULAR; SUBCUTANEOUS
Status: SHIPPED | OUTPATIENT
Start: 2024-07-08

## 2024-07-08 ENCOUNTER — CLINICAL SUPPORT (OUTPATIENT)
Dept: FAMILY MEDICINE CLINIC | Facility: CLINIC | Age: 89
End: 2024-07-08
Payer: MEDICARE

## 2024-07-08 DIAGNOSIS — E53.8 VITAMIN B12 DEFICIENCY: Primary | ICD-10-CM

## 2024-07-08 PROCEDURE — 96372 THER/PROPH/DIAG INJ SC/IM: CPT | Performed by: PHYSICIAN ASSISTANT

## 2024-07-08 RX ADMIN — CYANOCOBALAMIN 1000 MCG: 1000 INJECTION, SOLUTION INTRAMUSCULAR; SUBCUTANEOUS at 10:04

## 2024-07-08 NOTE — PROGRESS NOTES
Injection  Injection performed in left deltoid by Myrna Silverio. Patient tolerated the procedure well without complications.  07/08/24   Myrna Silverio

## 2024-08-01 DIAGNOSIS — F51.01 PRIMARY INSOMNIA: Chronic | ICD-10-CM

## 2024-08-01 RX ORDER — TRAZODONE HYDROCHLORIDE 50 MG/1
50 TABLET ORAL
Qty: 90 TABLET | Refills: 1 | OUTPATIENT
Start: 2024-08-01

## 2024-08-05 RX ORDER — METFORMIN HYDROCHLORIDE 500 MG/1
500 TABLET, EXTENDED RELEASE ORAL
Qty: 30 TABLET | Refills: 0 | OUTPATIENT
Start: 2024-08-05

## 2024-08-12 ENCOUNTER — CLINICAL SUPPORT (OUTPATIENT)
Dept: FAMILY MEDICINE CLINIC | Facility: CLINIC | Age: 89
End: 2024-08-12
Payer: MEDICARE

## 2024-08-12 DIAGNOSIS — E53.8 B12 DEFICIENCY: Primary | ICD-10-CM

## 2024-08-12 PROCEDURE — 96372 THER/PROPH/DIAG INJ SC/IM: CPT | Performed by: FAMILY MEDICINE

## 2024-08-12 RX ADMIN — CYANOCOBALAMIN 1000 MCG: 1000 INJECTION, SOLUTION INTRAMUSCULAR; SUBCUTANEOUS at 08:56

## 2024-09-16 ENCOUNTER — CLINICAL SUPPORT (OUTPATIENT)
Dept: FAMILY MEDICINE CLINIC | Facility: CLINIC | Age: 89
End: 2024-09-16
Payer: MEDICARE

## 2024-09-16 DIAGNOSIS — E53.8 B12 DEFICIENCY: Primary | ICD-10-CM

## 2024-09-16 PROCEDURE — 96372 THER/PROPH/DIAG INJ SC/IM: CPT | Performed by: FAMILY MEDICINE

## 2024-09-16 RX ADMIN — CYANOCOBALAMIN 1000 MCG: 1000 INJECTION, SOLUTION INTRAMUSCULAR; SUBCUTANEOUS at 09:05

## 2024-10-10 ENCOUNTER — FLU SHOT (OUTPATIENT)
Dept: FAMILY MEDICINE CLINIC | Facility: CLINIC | Age: 89
End: 2024-10-10
Payer: MEDICARE

## 2024-10-10 DIAGNOSIS — Z23 IMMUNIZATION DUE: Primary | ICD-10-CM

## 2024-10-10 PROCEDURE — G0008 ADMIN INFLUENZA VIRUS VAC: HCPCS | Performed by: FAMILY MEDICINE

## 2024-10-10 PROCEDURE — 90662 IIV NO PRSV INCREASED AG IM: CPT | Performed by: FAMILY MEDICINE

## 2024-10-14 ENCOUNTER — CLINICAL SUPPORT (OUTPATIENT)
Dept: FAMILY MEDICINE CLINIC | Facility: CLINIC | Age: 89
End: 2024-10-14
Payer: MEDICARE

## 2024-10-14 DIAGNOSIS — E53.8 B12 DEFICIENCY: Primary | ICD-10-CM

## 2024-10-14 PROCEDURE — 96372 THER/PROPH/DIAG INJ SC/IM: CPT | Performed by: FAMILY MEDICINE

## 2024-10-14 RX ADMIN — CYANOCOBALAMIN 1000 MCG: 1000 INJECTION, SOLUTION INTRAMUSCULAR; SUBCUTANEOUS at 09:03

## 2024-11-11 ENCOUNTER — CLINICAL SUPPORT (OUTPATIENT)
Dept: FAMILY MEDICINE CLINIC | Facility: CLINIC | Age: 89
End: 2024-11-11
Payer: MEDICARE

## 2024-11-11 DIAGNOSIS — E53.8 VITAMIN B12 DEFICIENCY: Primary | ICD-10-CM

## 2024-11-11 PROCEDURE — 96372 THER/PROPH/DIAG INJ SC/IM: CPT | Performed by: FAMILY MEDICINE

## 2024-11-11 RX ADMIN — CYANOCOBALAMIN 1000 MCG: 1000 INJECTION, SOLUTION INTRAMUSCULAR; SUBCUTANEOUS at 10:07

## 2024-11-26 ENCOUNTER — HOSPITAL ENCOUNTER (EMERGENCY)
Facility: HOSPITAL | Age: 89
Discharge: HOME | End: 2024-11-26
Attending: EMERGENCY MEDICINE | Admitting: EMERGENCY MEDICINE
Payer: MEDICARE

## 2024-11-26 ENCOUNTER — APPOINTMENT (OUTPATIENT)
Dept: CT IMAGING | Facility: HOSPITAL | Age: 89
End: 2024-11-26
Payer: MEDICARE

## 2024-11-26 VITALS
OXYGEN SATURATION: 100 % | TEMPERATURE: 97.8 F | HEART RATE: 72 BPM | DIASTOLIC BLOOD PRESSURE: 94 MMHG | RESPIRATION RATE: 14 BRPM | SYSTOLIC BLOOD PRESSURE: 165 MMHG
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
## 2024-11-26 DIAGNOSIS — S51.811A SKIN TEAR OF RIGHT FOREARM WITHOUT COMPLICATION, INITIAL ENCOUNTER: ICD-10-CM
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
## 2024-11-26 PROCEDURE — 99284 EMERGENCY DEPT VISIT MOD MDM: CPT

## 2024-11-26 PROCEDURE — 70450 CT HEAD/BRAIN W/O DYE: CPT

## 2024-11-26 NOTE — ED PROVIDER NOTES
EMERGENCY DEPARTMENT ENCOUNTER  Room Number:  16/16  PCP: Onel Mann MD  Independent Historians: Patient and daughter      HPI:  Chief Complaint: had concerns including Fall.     A complete HPI/ROS/PMH/PSH/SH/FH are unobtainable due to: Poor historian    Chronic or social conditions impacting patient care (Social Determinants of Health): None      Context: Elissa Marrero is a 89 y.o. female with a medical history of hyperlipidemia, hypothyroidism, osteoarthritis and type 2 diabetes as well as hypertension who presents to the ED c/o EMS for evaluation of injury sustained from unwitnessed fall.  The patient who lives with her daughter was in her usual state of health this morning.  The patient's daughter heard her fall in the bedroom.  They found her on the floor awake and alert and otherwise appropriate.  The patient had a previous injury to her right forearm and knocked the scab off of that because of bleeding.  There was some concern initially that the left hip may be hurting the patient though she has no complaint at this time.  Unsure if she hit her head.  Patient has no complaints for me and is very pleasant at this time.      Review of prior external notes (non-ED) -and- Review of prior external test results outside of this encounter:  Hospital discharge summary 5/17/2024 reviewed: Patient admitted to the ED observation unit for monitoring of hypoglycemia      PAST MEDICAL HISTORY  Active Ambulatory Problems     Diagnosis Date Noted    Diabetes mellitus, type 2 12/07/2012    Hyperlipidemia     Benign essential hypertension     Hypothyroidism     Insomnia     Lymphedema 04/14/2010    Osteoarthritis     Osteopenia     Renal insufficiency 03/04/2009    Restless leg syndrome 12/05/2012    Vitamin D deficiency 10/12/2011    Hip pain, right 09/17/2018    Acute kidney failure 12/11/2014    Chronic kidney disease, stage 4 (severe) 12/11/2014    Disorder of bone and cartilage 12/11/2014    Edema 08/06/2015     Hyperkalemia 08/08/2019    Urinary tract infection 08/06/2015    TIA (transient ischemic attack) 01/13/2023    Acute metabolic encephalopathy 11/17/2023    Acute cystitis without hematuria 11/18/2023    Kidney stone 05/18/2024     Resolved Ambulatory Problems     Diagnosis Date Noted    Hypoglycemia 05/17/2024     Past Medical History:   Diagnosis Date    Encounter for diabetic foot exam 03/2015    History of complete eye exam 11/01/2017         PAST SURGICAL HISTORY  Past Surgical History:   Procedure Laterality Date    APPENDECTOMY      CHOLECYSTECTOMY      EYE SURGERY      TONSILLECTOMY           FAMILY HISTORY  Family History   Problem Relation Age of Onset    Diabetes Sister     Diabetes Brother     Diabetes Daughter     Cancer Son         pancreatic         SOCIAL HISTORY  Social History     Socioeconomic History    Marital status:    Tobacco Use    Smoking status: Never    Smokeless tobacco: Never   Vaping Use    Vaping status: Never Used   Substance and Sexual Activity    Alcohol use: No    Drug use: Defer    Sexual activity: Defer         ALLERGIES  Benazepril      REVIEW OF SYSTEMS  Review of Systems  Included in HPI  All systems reviewed and negative except for those discussed in HPI.      PHYSICAL EXAM    I have reviewed the triage vital signs and nursing notes.    ED Triage Vitals [11/26/24 1016]   Temp Heart Rate Resp BP SpO2   97.8 °F (36.6 °C) 81 14 165/94 100 %      Temp src Heart Rate Source Patient Position BP Location FiO2 (%)   Oral Monitor -- -- --       Physical Exam    Physical Exam   Constitutional: No distress.  Nontoxic  HENT:  Head: Normocephalic and atraumatic.   Oropharynx: Mucous membranes are moist.   Eyes: . No scleral icterus. No conjunctival pallor.  Neck: Normal range of motion. Neck supple.   Cardiovascular: Pink warm and well perfused throughout.    Pulmonary/Chest: No respiratory distress.  No tachypnea or increased work of breathing appreciated.    Abdominal: Soft.  There is no tenderness. There is no rebound and no guarding.   Musculoskeletal: Moves all extremities equally.  No deformity or limitation of range of motion of any of the 4 extremities.  Small skin tear right proximal forearm.  Neurological: Alert and oriented.  No acute focal deficit appreciated.  Skin: Skin is pink, warm, and dry.   Psychiatric: Mood and affect normal.   Nursing note and vitals reviewed.             LAB RESULTS  No results found for this or any previous visit (from the past 24 hours).      RADIOLOGY  CT Head Without Contrast    Result Date: 11/26/2024  CT HEAD WO CONTRAST-  DATE OF EXAM: 11/26/2024 10:51 AM  INDICATION: Unwitnessed fall; limited historian.  COMPARISON: MRI 11/18/2023 and 1/16/2023. CT 11/17/2023.  TECHNIQUE: Multiple contiguous axial images were acquired through the head without the intravenous administration of contrast. Reformatted coronal and sagittal sequences were also reviewed. Radiation dose reduction techniques were utilized, including automated exposure control and exposure modulation based on body size.  FINDINGS: No acute intracranial hemorrhage or mass/mass effect. The ventricles and sulci are stable and appropriate for age. The basilar cisterns are patent. Patchy periventricular and subcortical white matter low-attenuation, statistically representing age-indeterminate small vessel ischemic changes. Stable tiny low-density lesion in the right basal ganglia, which could represent an old lacunar infarct versus prominent perivascular space. Gray-white matter differentiation is otherwise grossly maintained. Intracranial vascular calcification, consistent with atherosclerotic disease. Evidence of bilateral cataract surgery. Mild to moderate mucosal thickening in the partially imaged bilateral maxillary sinuses with mild mucosal thickening in the ethmoid air cells and mild frothy fluid in the sphenoid sinuses. Mild nonspecific partial opacification of the bilateral mastoid  air cells, possible simple effusion versus simple mastoiditis. No acute osseous abnormality is identified.       1. No acute intracranial hemorrhage or mass/mass effect. 2. Patchy periventricular and subcortical white matter low-attenuation, statistically representing age-indeterminate small vessel ischemic changes. 3. Stable tiny low-density lesion in the right basal ganglia, which could represent an old lacunar infarct versus prominent perivascular space. 4. Mild to moderate paranasal sinus disease. 5. Mild nonspecific partial opacification of the bilateral mastoid air cells, possible simple effusion versus simple mastoiditis.  This report was finalized on 11/26/2024 11:11 AM by Brandyn Garvin MD on Workstation: [id number]         MEDICATIONS GIVEN IN ER  Medications - No data to display      ORDERS PLACED DURING THIS VISIT:  Orders Placed This Encounter   Procedures    CT Head Without Contrast    Irrigate wound    Wound Dressing         OUTPATIENT MEDICATION MANAGEMENT:  Current Facility-Administered Medications Ordered in Epic   Medication Dose Route Frequency Provider Last Rate Last Admin    cyanocobalamin injection 1,000 mcg  1,000 mcg Intramuscular Q28 Days Onel Mann MD   1,000 mcg at 11/11/24 1007     Current Outpatient Medications Ordered in Epic   Medication Sig Dispense Refill    amLODIPine (NORVASC) 5 MG tablet Take 0.5 tablets by mouth Daily. For blood pressure changed on 5/28/2024 45 tablet 1    benzonatate (TESSALON) 200 MG capsule Take 1 capsule by mouth 3 (Three) Times a Day As Needed for Cough. 30 capsule 2    Cholecalciferol (VITAMIN D PO) Take  by mouth.      clopidogrel (PLAVIX) 75 MG tablet Take 1 tablet by mouth Daily. 90 tablet 1    glipizide (GLUCOTROL) 5 MG tablet Take 1 tablet by mouth 3 (Three) Times a Day. 270 tablet 1    levothyroxine (SYNTHROID, LEVOTHROID) 88 MCG tablet Take 1 tablet by mouth Daily. 90 tablet 1    metoprolol tartrate (LOPRESSOR) 25 MG tablet Take 1 tablet by  mouth 2 (Two) Times a Day. 180 tablet 1    pramipexole (MIRAPEX) 1.5 MG tablet Take 1 tablet by mouth Daily. 90 tablet 1    rosuvastatin (Crestor) 10 MG tablet Take 1 tablet by mouth Daily. 90 tablet 1    traZODone (DESYREL) 50 MG tablet Take 1 tablet by mouth Every Night. 90 tablet 1         PROCEDURES  Procedures            PROGRESS, DATA ANALYSIS, CONSULTS, AND MEDICAL DECISION MAKING  All labs have been independently interpreted by me.  All radiology studies have been reviewed by me. All EKG's have been independently viewed and interpreted by me.  Discussion below represents my analysis of pertinent findings related to patient's condition, differential diagnosis, treatment plan and final disposition.    Differential diagnosis:   My differential diagnosis includes but is not limited to cerebral contusion, cervical strain, concussion with LOC, concussion without LOC, contusion, fracture of the skull, orbits or mandible, hematoma, intracranial hemorrhage including subdural, epidural, subarachnoid and intracerebral, laceration and postconcussion syndrome      Clinical Scores:                  ED Course as of 11/26/24 1117   e Nov 26, 2024   1115 Patient ambulated without any difficulty [RS]   1116 RADIOLOGY      Study: Noncontrast CT head  Findings: Atrophy with no large volume intracranial hemorrhage appreciated.  I independently viewed and interpreted these images contemporaneously with treatment.    [RS]   1117 No acute life threats identified.  Patient and daughter agreeable with discharge planning at this time. [RS]      ED Course User Index  [RS] Daniel Hacnock MD         Prescription drug monitoring program review:     AS OF 11:17 EST VITALS:    BP - 165/94  HR - 81  TEMP - 97.8 °F (36.6 °C) (Oral)  O2 SATS - 100%    COMPLEXITY OF CARE  Admission was considered but after careful review of the patient's presentation, physical examination, diagnostic results, and response to treatment the patient may be  safely discharged with outpatient follow-up.      DIAGNOSIS  Final diagnoses:   Fall in home, initial encounter   Skin tear of right forearm without complication, initial encounter         DISPOSITION  ED Disposition       ED Disposition   Discharge    Condition   Stable    Comment   --                  DISCHARGE    Patient discharged in stable condition.    Reviewed implications of results, diagnosis, meds, responsibility to follow up, warning signs and symptoms of possible worsening, potential complications and reasons to return to ER.    Patient/Family voiced understanding of above instructions.    Discussed plan for discharge, as there is no emergent indication for admission. Patient referred to primary care provider for regular health maintenance. Pt/family is agreeable and understands need for follow up and possible repeat testing.  Pt is aware that discharge does not mean that nothing is wrong but it indicates no emergency is present that requires admission and they must continue care with follow-up as given below or physician of their choice.     FOLLOW-UP  Onel Mann MD  [id number] [id number]  [phone]    Schedule an appointment as soon as possible for a visit   As needed         Medication List      No changes were made to your prescriptions during this visit.           Please note that portions of this document were completed with a voice recognition program.    Note Disclaimer: At [facility], we believe that sharing information builds trust and better relationships. You are receiving this note because you recently visited [facility]. It is possible you will see health information before a provider has talked with you about it. This kind of information can be easy to misunderstand. To help you fully understand what it means for your health, we urge you to discuss this note with your provider.         Daniel Hancock MD  11/26/24 [id number]

## 2024-11-26 NOTE — ED NOTES
Patient had unwitnessed fall at home. Unknown if patient hit her head, she is on blood thinners. She wasn't using her walker at time of fall.

## 2024-12-07 DIAGNOSIS — F51.01 PRIMARY INSOMNIA: Chronic | ICD-10-CM

## 2024-12-08 RX ORDER — TRAZODONE HYDROCHLORIDE 50 MG/1
50 TABLET, FILM COATED ORAL NIGHTLY
Qty: 90 TABLET | Refills: 0 | Status: SHIPPED | OUTPATIENT
Start: 2024-12-08

## 2024-12-09 DIAGNOSIS — G45.9 TIA (TRANSIENT ISCHEMIC ATTACK): Chronic | ICD-10-CM

## 2024-12-09 DIAGNOSIS — G25.81 RESTLESS LEG SYNDROME: Chronic | ICD-10-CM

## 2024-12-09 DIAGNOSIS — E78.2 MIXED HYPERLIPIDEMIA: Chronic | ICD-10-CM

## 2024-12-09 DIAGNOSIS — E03.9 ACQUIRED HYPOTHYROIDISM: Chronic | ICD-10-CM

## 2024-12-09 DIAGNOSIS — I10 BENIGN ESSENTIAL HYPERTENSION: Chronic | ICD-10-CM

## 2024-12-09 DIAGNOSIS — E11.65 TYPE 2 DIABETES MELLITUS WITH HYPERGLYCEMIA, WITHOUT LONG-TERM CURRENT USE OF INSULIN: Chronic | ICD-10-CM

## 2024-12-09 RX ORDER — LEVOTHYROXINE SODIUM 88 UG/1
88 TABLET ORAL DAILY
Qty: 90 TABLET | Refills: 3 | OUTPATIENT
Start: 2024-12-09

## 2024-12-09 RX ORDER — ROSUVASTATIN CALCIUM 10 MG/1
10 TABLET, COATED ORAL DAILY
Qty: 90 TABLET | Refills: 3 | OUTPATIENT
Start: 2024-12-09

## 2024-12-09 RX ORDER — CLOPIDOGREL BISULFATE 75 MG/1
75 TABLET ORAL DAILY
Qty: 90 TABLET | Refills: 3 | OUTPATIENT
Start: 2024-12-09

## 2024-12-09 RX ORDER — AMLODIPINE BESYLATE 5 MG/1
TABLET ORAL
Qty: 45 TABLET | Refills: 3 | OUTPATIENT
Start: 2024-12-09

## 2024-12-09 RX ORDER — PRAMIPEXOLE DIHYDROCHLORIDE 1.5 MG/1
1.5 TABLET ORAL DAILY
Qty: 90 TABLET | Refills: 3 | OUTPATIENT
Start: 2024-12-09

## 2024-12-09 RX ORDER — METOPROLOL TARTRATE 25 MG/1
25 TABLET, FILM COATED ORAL 2 TIMES DAILY
Qty: 180 TABLET | Refills: 3 | OUTPATIENT
Start: 2024-12-09

## 2024-12-09 RX ORDER — GLIPIZIDE 5 MG/1
5 TABLET ORAL 3 TIMES DAILY
Qty: 270 TABLET | Refills: 3 | OUTPATIENT
Start: 2024-12-09

## 2024-12-16 ENCOUNTER — CLINICAL SUPPORT (OUTPATIENT)
Dept: FAMILY MEDICINE CLINIC | Facility: CLINIC | Age: 89
End: 2024-12-16
Payer: MEDICARE

## 2024-12-16 DIAGNOSIS — E53.8 VITAMIN B 12 DEFICIENCY: Primary | ICD-10-CM

## 2024-12-16 PROCEDURE — 96372 THER/PROPH/DIAG INJ SC/IM: CPT | Performed by: FAMILY MEDICINE

## 2024-12-16 RX ADMIN — CYANOCOBALAMIN 1000 MCG: 1000 INJECTION, SOLUTION INTRAMUSCULAR; SUBCUTANEOUS at 09:52

## 2024-12-16 NOTE — ASSESSMENT & PLAN NOTE
Diabetes is worsening.   Medication changes per orders.  Diabetes will be reassessed in 6 months    Orders:    glipizide (GLUCOTROL) 5 MG tablet; Take 1 tablet by mouth 3 (Three) Times a Day.    empagliflozin (Jardiance) 10 MG tablet tablet; Take 1 tablet by mouth Daily.    Hemoglobin A1c; Future    Basic Metabolic Panel; Future    Basic Metabolic Panel; Future    Hemoglobin A1c; Future

## 2024-12-16 NOTE — PROGRESS NOTES
Subjective   The ABCs of the Annual Wellness Visit  Medicare Wellness Visit      Elissa Marrero is a 89 y.o. patient who presents for a Medicare Wellness Visit.    The following portions of the patient's history were reviewed and   updated as appropriate: allergies, current medications, past family history, past medical history, past social history, past surgical history, and problem list.    Compared to one year ago, the patient's physical   health is the same.  Compared to one year ago, the patient's mental   health is worse.    Recent Hospitalizations:  This patient has had a [facility] admission record on file within the last 365 days.  Current Medical Providers:  Patient Care Team:  Onel Mann MD as PCP - General  Onel Mann MD as PCP - Family Medicine  SabillonAnthony childs MD as Consulting Physician (Nephrology)  Josy Tovar APRN as Nurse Practitioner (Nephrology)  Julien Gaming MD as Referring Physician (Dermatology)    Outpatient Medications Prior to Visit   Medication Sig Dispense Refill    benzonatate (TESSALON) 200 MG capsule Take 1 capsule by mouth 3 (Three) Times a Day As Needed for Cough. 30 capsule 2    Cholecalciferol (VITAMIN D PO) Take  by mouth.      amLODIPine (NORVASC) 5 MG tablet Take 0.5 tablets by mouth Daily. For blood pressure changed on 5/28/2024 45 tablet 1    clopidogrel (PLAVIX) 75 MG tablet Take 1 tablet by mouth Daily. 90 tablet 1    glipizide (GLUCOTROL) 5 MG tablet Take 1 tablet by mouth 3 (Three) Times a Day. 270 tablet 1    levothyroxine (SYNTHROID, LEVOTHROID) 88 MCG tablet Take 1 tablet by mouth Daily. 90 tablet 1    metoprolol tartrate (LOPRESSOR) 25 MG tablet Take 1 tablet by mouth 2 (Two) Times a Day. 180 tablet 1    pramipexole (MIRAPEX) 1.5 MG tablet Take 1 tablet by mouth Daily. 90 tablet 1    rosuvastatin (Crestor) 10 MG tablet Take 1 tablet by mouth Daily. 90 tablet 1    traZODone (DESYREL) 50 MG tablet TAKE 1 TABLET EVERY NIGHT 90 tablet 0  "    Facility-Administered Medications Prior to Visit   Medication Dose Route Frequency Provider Last Rate Last Admin    cyanocobalamin injection 1,000 mcg  1,000 mcg Intramuscular Q28 Days Onel Mann MD   1,000 mcg at 12/16/24 0952     No opioid medication identified on active medication list. I have reviewed chart for other potential  high risk medication/s and harmful drug interactions in the elderly.      Aspirin is not on active medication list.  Aspirin use is not indicated based on review of current medical condition/s. Risk of harm outweighs potential benefits.  .    Patient Active Problem List   Diagnosis    Diabetes mellitus, type 2    Hyperlipidemia    Benign essential hypertension    Hypothyroidism    Insomnia    Lymphedema    Osteoarthritis    Osteopenia    Renal insufficiency    Restless leg syndrome    Vitamin D deficiency    Hip pain, right    Acute kidney failure    Chronic kidney disease, stage 4 (severe)    Disorder of bone and cartilage    Edema    Hyperkalemia    Urinary tract infection    TIA (transient ischemic attack)    Acute metabolic encephalopathy    Acute cystitis without hematuria    Kidney stone     Advance Care Planning Advance Directive is not on file.  ACP discussion was held with the patient during this visit. Patient has an advance directive (not in EMR), copy requested.            Objective   Vitals:    12/17/24 1449   BP: 128/62   Pulse: 54   Resp: 14   Temp: 97.6 °F (36.4 °C)   TempSrc: Oral   SpO2: 93%   Weight: 64.4 kg (142 lb)   Height: 170.2 cm (67\")   PainSc: 0-No pain       Estimated body mass index is 22.24 kg/m² as calculated from the following:    Height as of this encounter: 170.2 cm (67\").    Weight as of this encounter: 64.4 kg (142 lb).    BMI is within normal parameters. No other follow-up for BMI required.       Does the patient have evidence of cognitive impairment? Yes  Lab Results   Component Value Date    CHLPL 142 12/06/2024    TRIG 212 (H) 12/06/2024    " HDL 54 2024    LDL 54 2024    VLDL 34 2024    HGBA1C 10.0 (H) 2024                                                                                                Health  Risk Assessment    Smoking Status:  Social History     Tobacco Use   Smoking Status Never   Smokeless Tobacco Never     Alcohol Consumption:  Social History     Substance and Sexual Activity   Alcohol Use No       Fall Risk Screen  STEADI Fall Risk Assessment has not been completed.    Depression Screening   The PHQ has not been completed during this encounter.     Health Habits and Functional and Cognitive Screenin/17/2024     2:00 PM   Functional & Cognitive Status   Do you have difficulty preparing food and eating? No   Do you have difficulty bathing yourself, getting dressed or grooming yourself? No   Do you have difficulty using the toilet? No   Do you have difficulty moving around from place to place? No   Do you have trouble with steps or getting out of a bed or a chair? No   Current Diet Well Balanced Diet   Dental Exam Not up to date   Eye Exam Not up to date   Exercise (times per week) 2 times per week   Current Exercises Include Walking   Do you need help using the phone?  No   Are you deaf or do you have serious difficulty hearing?  Yes   Do you need help to go to places out of walking distance? No   Do you need help shopping? No   Do you need help preparing meals?  No   Do you need help with housework?  No   Do you need help with laundry? No   Do you need help taking your medications? No   Do you need help managing money? No   Do you ever drive or ride in a car without wearing a seat belt? No   Have you felt unusual stress, anger or loneliness in the last month? No   Who do you live with? Other   If you need help, do you have trouble finding someone available to you? No   Have you been bothered in the last four weeks by sexual problems? No   Do you have difficulty concentrating, remembering or making  decisions? No           Age-appropriate Screening Schedule:  Refer to the list below for future screening recommendations based on patient's age, sex and/or medical conditions. Orders for these recommended tests are listed in the plan section. The patient has been provided with a written plan.    Health Maintenance List  Health Maintenance   Topic Date Due    Pneumococcal Vaccine 65+ (1 of 2 - PCV) 12/17/2024 (Originally 3/15/1931)    ZOSTER VACCINE (2 of 2) 12/17/2024 (Originally 12/13/2016)    COVID-19 Vaccine (6 - 2024-25 season) 12/19/2024 (Originally 9/1/2024)    DIABETIC FOOT EXAM  01/01/2025 (Originally 10/18/2017)    DIABETIC EYE EXAM  02/04/2025 (Originally 7/1/2021)    RSV Vaccine - Adults (1 - 1-dose 75+ series) 05/28/2025 (Originally 3/15/2000)    HEMOGLOBIN A1C  06/06/2025    URINE MICROALBUMIN  07/01/2025    LIPID PANEL  12/06/2025    ANNUAL WELLNESS VISIT  12/17/2025    INFLUENZA VACCINE  Completed    MAMMOGRAM  Discontinued    DXA SCAN  Discontinued    TDAP/TD VACCINES  Discontinued                                                                                                                                                CMS Preventative Services Quick Reference  Risk Factors Identified During Encounter  None Identified    The above risks/problems have been discussed with the patient.  Pertinent information has been shared with the patient in the After Visit Summary.  An After Visit Summary and PPPS were made available to the patient.    Follow Up:   Next Medicare Wellness visit to be scheduled in 1 year.         Additional E&M Note during same encounter follows:  Patient has additional, significant, and separately identifiable condition(s)/problem(s) that require work above and beyond the Medicare Wellness Visit     Chief Complaint  Diabetes and MEDICARE WELLNESS (DUE )    Subjective   HPI  Elissa is also being seen today for additional medical problem/s.    Review of Systems   Constitutional:   "Negative for chills and fever.   HENT:  Negative for congestion and sinus pressure.    Eyes:  Negative for visual disturbance.   Respiratory:  Negative for cough and shortness of breath.    Cardiovascular:  Negative for chest pain.   Gastrointestinal:  Negative for abdominal pain.   Genitourinary:  Negative for dysuria.   Skin:  Negative for rash.   Hematological:  Negative for adenopathy.   Psychiatric/Behavioral:  Negative for dysphoric mood.               Objective   Vital Signs:  /62   Pulse 54   Temp 97.6 °F (36.4 °C) (Oral)   Resp 14   Ht 170.2 cm (67\")   Wt 64.4 kg (142 lb)   SpO2 93%   BMI 22.24 kg/m²   Physical Exam  Vitals and nursing note reviewed.   Constitutional:       General: She is not in acute distress.     Appearance: She is well-developed.   Cardiovascular:      Rate and Rhythm: Normal rate and regular rhythm.   Pulmonary:      Effort: Pulmonary effort is normal.      Breath sounds: Normal breath sounds.   Neurological:      Mental Status: She is alert and oriented to person, place, and time.   Psychiatric:         Behavior: Behavior normal.         Thought Content: Thought content normal.     Labs reviewed with pt today during visit. All questions answered.                    Assessment and Plan Additional age appropriate preventative wellness advice topics were discussed during today's preventative wellness exam(some topics already addressed during AWV portion of the note above):    Physical Activity: Advised cardiovascular activity 150 minutes per week as tolerated. (example brisk walk for 30 minutes, 5 days a week).     Nutrition: Discussed nutrition plan with patient. Information shared in after visit summary. Goal is for a well balanced diet to enhance overall health.           Encounter for subsequent annual wellness visit (AWV) in Medicare patient         Type 2 diabetes mellitus with hyperglycemia, without long-term current use of insulin  Diabetes is worsening.   Medication " changes per orders.  Diabetes will be reassessed in 6 months    Orders:    glipizide (GLUCOTROL) 5 MG tablet; Take 1 tablet by mouth 3 (Three) Times a Day.    empagliflozin (Jardiance) 10 MG tablet tablet; Take 1 tablet by mouth Daily.    Hemoglobin A1c; Future    Basic Metabolic Panel; Future    Basic Metabolic Panel; Future    Hemoglobin A1c; Future    Chronic kidney disease, stage 4 (severe)  Renal condition is worsening.  Medication changes per orders.  Renal condition will be reassessed in 6 months.    Orders:    empagliflozin (Jardiance) 10 MG tablet tablet; Take 1 tablet by mouth Daily.    Basic Metabolic Panel; Future    Benign essential hypertension  Hypertension is stable and controlled  Continue current treatment regimen.  Blood pressure will be reassessed in 6 months.    Orders:    metoprolol tartrate (LOPRESSOR) 25 MG tablet; Take 1 tablet by mouth 2 (Two) Times a Day.    CBC & Differential; Future    amLODIPine (NORVASC) 2.5 MG tablet; Take 1 tablet by mouth Daily.    TIA (transient ischemic attack)    Orders:    clopidogrel (PLAVIX) 75 MG tablet; Take 1 tablet by mouth Daily.    Acquired hypothyroidism    Orders:    levothyroxine (SYNTHROID, LEVOTHROID) 88 MCG tablet; Take 1 tablet by mouth Daily.    T3; Future    T4, Free; Future    TSH; Future    Restless leg syndrome    Orders:    pramipexole (MIRAPEX) 1.5 MG tablet; Take 1 tablet by mouth Daily.    Mixed hyperlipidemia   Lipid abnormalities are stable    Plan:  Continue same medication/s without change.      Counseled patient on lifestyle modifications to help control hyperlipidemia.     Patient Treatment Goals:   LDL goal is under 100    Followup in 6 months.    Orders:    rosuvastatin (Crestor) 10 MG tablet; Take 1 tablet by mouth Daily.    Primary insomnia    Orders:    traZODone (DESYREL) 50 MG tablet; Take 1 tablet by mouth Every Night.          I spent 15 minutes caring for Elissa on this date of service. This time includes time spent by me  in the following activities:preparing for the visit, reviewing tests, performing a medically appropriate examination and/or evaluation , ordering medications, tests, or procedures, documenting information in the medical record, and independently interpreting results and communicating that information with the patient/family/caregiver  Follow Up   Return in about 6 months (around 6/17/2025) for Recheck.  Patient was given instructions and counseling regarding her condition or for health maintenance advice. Please see specific information pulled into the AVS if appropriate.

## 2024-12-16 NOTE — ASSESSMENT & PLAN NOTE
Renal condition is worsening.  Medication changes per orders.  Renal condition will be reassessed in 6 months.    Orders:    empagliflozin (Jardiance) 10 MG tablet tablet; Take 1 tablet by mouth Daily.    Basic Metabolic Panel; Future

## 2024-12-16 NOTE — PROGRESS NOTES
Injection  B 12 Injection performed in left deltoid   by Emperatriz Aldana MA. Patient tolerated the procedure well without complications.  12/16/24   Emperatriz Aldana MA

## 2024-12-17 ENCOUNTER — OFFICE VISIT (OUTPATIENT)
Dept: FAMILY MEDICINE CLINIC | Facility: CLINIC | Age: 89
End: 2024-12-17
Payer: MEDICARE

## 2024-12-17 VITALS
HEART RATE: 54 BPM | WEIGHT: 142 LBS | OXYGEN SATURATION: 93 % | SYSTOLIC BLOOD PRESSURE: 128 MMHG | DIASTOLIC BLOOD PRESSURE: 62 MMHG | RESPIRATION RATE: 14 BRPM | TEMPERATURE: 97.6 F | BODY MASS INDEX: 22.29 KG/M2 | HEIGHT: 67 IN

## 2024-12-17 DIAGNOSIS — N18.4 CHRONIC KIDNEY DISEASE, STAGE 4 (SEVERE): Chronic | ICD-10-CM

## 2024-12-17 DIAGNOSIS — E78.2 MIXED HYPERLIPIDEMIA: Chronic | ICD-10-CM

## 2024-12-17 DIAGNOSIS — E03.9 ACQUIRED HYPOTHYROIDISM: Chronic | ICD-10-CM

## 2024-12-17 DIAGNOSIS — G25.81 RESTLESS LEG SYNDROME: Chronic | ICD-10-CM

## 2024-12-17 DIAGNOSIS — G45.9 TIA (TRANSIENT ISCHEMIC ATTACK): Chronic | ICD-10-CM

## 2024-12-17 DIAGNOSIS — Z00.00 ENCOUNTER FOR SUBSEQUENT ANNUAL WELLNESS VISIT (AWV) IN MEDICARE PATIENT: Primary | ICD-10-CM

## 2024-12-17 DIAGNOSIS — F51.01 PRIMARY INSOMNIA: Chronic | ICD-10-CM

## 2024-12-17 DIAGNOSIS — I10 BENIGN ESSENTIAL HYPERTENSION: Chronic | ICD-10-CM

## 2024-12-17 DIAGNOSIS — E11.65 TYPE 2 DIABETES MELLITUS WITH HYPERGLYCEMIA, WITHOUT LONG-TERM CURRENT USE OF INSULIN: Chronic | ICD-10-CM

## 2024-12-17 PROCEDURE — 99214 OFFICE O/P EST MOD 30 MIN: CPT | Performed by: FAMILY MEDICINE

## 2024-12-17 PROCEDURE — 1170F FXNL STATUS ASSESSED: CPT | Performed by: FAMILY MEDICINE

## 2024-12-17 PROCEDURE — G0439 PPPS, SUBSEQ VISIT: HCPCS | Performed by: FAMILY MEDICINE

## 2024-12-17 PROCEDURE — 1160F RVW MEDS BY RX/DR IN RCRD: CPT | Performed by: FAMILY MEDICINE

## 2024-12-17 PROCEDURE — 1159F MED LIST DOCD IN RCRD: CPT | Performed by: FAMILY MEDICINE

## 2024-12-17 PROCEDURE — 1126F AMNT PAIN NOTED NONE PRSNT: CPT | Performed by: FAMILY MEDICINE

## 2024-12-17 RX ORDER — PRAMIPEXOLE DIHYDROCHLORIDE 1.5 MG/1
1.5 TABLET ORAL DAILY
Qty: 90 TABLET | Refills: 1 | Status: SHIPPED | OUTPATIENT
Start: 2024-12-17

## 2024-12-17 RX ORDER — AMLODIPINE BESYLATE 5 MG/1
2.5 TABLET ORAL DAILY
Qty: 45 TABLET | Refills: 1 | Status: CANCELLED | OUTPATIENT
Start: 2024-12-17

## 2024-12-17 RX ORDER — GLIPIZIDE 5 MG/1
5 TABLET ORAL 3 TIMES DAILY
Qty: 270 TABLET | Refills: 1 | Status: SHIPPED | OUTPATIENT
Start: 2024-12-17

## 2024-12-17 RX ORDER — AMLODIPINE BESYLATE 2.5 MG/1
2.5 TABLET ORAL DAILY
Qty: 90 TABLET | Refills: 1 | Status: SHIPPED | OUTPATIENT
Start: 2024-12-17

## 2024-12-17 RX ORDER — METOPROLOL TARTRATE 25 MG/1
25 TABLET, FILM COATED ORAL 2 TIMES DAILY
Qty: 180 TABLET | Refills: 1 | Status: SHIPPED | OUTPATIENT
Start: 2024-12-17

## 2024-12-17 RX ORDER — CLOPIDOGREL BISULFATE 75 MG/1
75 TABLET ORAL DAILY
Qty: 90 TABLET | Refills: 1 | Status: SHIPPED | OUTPATIENT
Start: 2024-12-17

## 2024-12-17 RX ORDER — ROSUVASTATIN CALCIUM 10 MG/1
10 TABLET, COATED ORAL DAILY
Qty: 90 TABLET | Refills: 1 | Status: SHIPPED | OUTPATIENT
Start: 2024-12-17

## 2024-12-17 RX ORDER — LEVOTHYROXINE SODIUM 88 UG/1
88 TABLET ORAL DAILY
Qty: 90 TABLET | Refills: 1 | Status: SHIPPED | OUTPATIENT
Start: 2024-12-17

## 2024-12-17 RX ORDER — TRAZODONE HYDROCHLORIDE 50 MG/1
50 TABLET, FILM COATED ORAL NIGHTLY
Qty: 90 TABLET | Refills: 1 | Status: SHIPPED | OUTPATIENT
Start: 2024-12-17

## 2024-12-17 NOTE — PATIENT INSTRUCTIONS
Medicare Wellness  Personal Prevention Plan of Service     Date of Office Visit:    Encounter Provider:  Onel Mann MD  Place of Service:  [facility]  Patient Name: Elissa Marrero  :  3/15/1925    As part of the Medicare Wellness portion of your visit today, we are providing you with this personalized preventive plan of services (PPPS). This plan is based upon recommendations of the [place] Preventive Services Task Force (USPSTF) and the Advisory Committee on Immunization Practices (ACIP).    This lists the preventive care services that should be considered, and provides dates of when you are due. Items listed as completed are up-to-date and do not require any further intervention.    Health Maintenance   Topic Date Due    Pneumococcal Vaccine 65+ (1 of 2 - PCV) 2024 (Originally 3/15/1931)    ZOSTER VACCINE (2 of 2) 2024 (Originally 2016)    COVID-19 Vaccine ( - -25 season) 2024 (Originally 2024)    DIABETIC FOOT EXAM  2025 (Originally 10/18/2017)    DIABETIC EYE EXAM  2025 (Originally 2021)    RSV Vaccine - Adults (1 - 1-dose 75+ series) 2025 (Originally 3/15/2000)    HEMOGLOBIN A1C  2025    URINE MICROALBUMIN  2025    LIPID PANEL  2025    ANNUAL WELLNESS VISIT  2025    INFLUENZA VACCINE  Completed    MAMMOGRAM  Discontinued    DXA SCAN  Discontinued    TDAP/TD VACCINES  Discontinued       Orders Placed This Encounter   Procedures    Hemoglobin A1c     Standing Status:   Future     Standing Expiration Date:   2025     Order Specific Question:   Release to patient     Answer:   Routine Release [[id number]]    Basic Metabolic Panel     Standing Status:   Future     Standing Expiration Date:   2025     Order Specific Question:   Release to patient     Answer:   Routine Release [[id number]]    T3     Standing Status:   Future     Standing Expiration Date:   2025     Order  Specific Question:   Release to patient     Answer:   Routine Release [[id number]]    T4, Free     Standing Status:   Future     Standing Expiration Date:   12/17/2025     Order Specific Question:   Release to patient     Answer:   Routine Release [[id number]]    TSH     Standing Status:   Future     Standing Expiration Date:   12/17/2025     Order Specific Question:   Release to patient     Answer:   Routine Release [[id number]]    CBC & Differential     Standing Status:   Future     Standing Expiration Date:   12/17/2025     Order Specific Question:   Manual Differential     Answer:   No     Order Specific Question:   Release to patient     Answer:   Routine Release [[id number]]       Return in about 6 months (around 6/17/2025) for Recheck.

## 2024-12-17 NOTE — ASSESSMENT & PLAN NOTE
Lipid abnormalities are stable    Plan:  Continue same medication/s without change.      Counseled patient on lifestyle modifications to help control hyperlipidemia.     Patient Treatment Goals:   LDL goal is under 100    Followup in 6 months.    Orders:    rosuvastatin (Crestor) 10 MG tablet; Take 1 tablet by mouth Daily.

## 2024-12-17 NOTE — ASSESSMENT & PLAN NOTE
Orders:    levothyroxine (SYNTHROID, LEVOTHROID) 88 MCG tablet; Take 1 tablet by mouth Daily.    T3; Future    T4, Free; Future    TSH; Future

## 2024-12-17 NOTE — ASSESSMENT & PLAN NOTE
Hypertension is stable and controlled  Continue current treatment regimen.  Blood pressure will be reassessed in 6 months.    Orders:    metoprolol tartrate (LOPRESSOR) 25 MG tablet; Take 1 tablet by mouth 2 (Two) Times a Day.    CBC & Differential; Future    amLODIPine (NORVASC) 2.5 MG tablet; Take 1 tablet by mouth Daily.

## 2024-12-23 ENCOUNTER — TELEPHONE (OUTPATIENT)
Dept: FAMILY MEDICINE CLINIC | Facility: CLINIC | Age: 89
End: 2024-12-23
Payer: MEDICARE

## 2024-12-23 NOTE — TELEPHONE ENCOUNTER
Caller: Michelle Spain    Relationship: Emergency Contact    Best call back number: [phone]     Which medication are you concerned about:     empagliflozin (Jardiance) 10 MG tablet tablet       Who prescribed you this medication: BREANNA        What are your concerns: DAUGHTER STATES THAT THE MEDICATION IS $150 FOR 30 PILLS AND PATIENT CANNOT AFFORD THAT. PLEASE CALL TO ADVISE. SEND ANY NEW MEDICATIONS TO [facility] - [facility] [address] - [phone] [facility] [phone] FX

## 2024-12-23 NOTE — TELEPHONE ENCOUNTER
Spoke with daughter and advised of PCP dictation. Daughter will contact insurance company to find out coverage. Daughter will call back with further details.

## 2024-12-30 ENCOUNTER — TELEPHONE (OUTPATIENT)
Dept: FAMILY MEDICINE CLINIC | Facility: CLINIC | Age: 89
End: 2024-12-30
Payer: MEDICARE

## 2024-12-30 DIAGNOSIS — E11.65 TYPE 2 DIABETES MELLITUS WITH HYPERGLYCEMIA, WITHOUT LONG-TERM CURRENT USE OF INSULIN: Primary | ICD-10-CM

## 2024-12-30 NOTE — TELEPHONE ENCOUNTER
ATTEMPTED TO WARM TRANSFER BUT NO ANSWER    Caller: JAIDEN    Relationship to patient: Child    Best call back number: [phone]    Chief complaint: B12 SHOT    Type of visit: NURSE    Requested date: ON A MONDAY OR TUESDAY H

## 2024-12-30 NOTE — TELEPHONE ENCOUNTER
Daughter called back Avega will be $823.00 every 30 days, Invokana will cost $ 150.00 for 30 days, daughter states she can't afford either medication.

## 2025-01-14 ENCOUNTER — CLINICAL SUPPORT (OUTPATIENT)
Dept: FAMILY MEDICINE CLINIC | Facility: CLINIC | Age: OVER 89
End: 2025-01-14
Payer: MEDICARE

## 2025-01-14 DIAGNOSIS — E53.8 B12 DEFICIENCY: Primary | ICD-10-CM

## 2025-01-14 PROCEDURE — 96372 THER/PROPH/DIAG INJ SC/IM: CPT | Performed by: FAMILY MEDICINE

## 2025-01-14 RX ADMIN — CYANOCOBALAMIN 1000 MCG: 1000 INJECTION, SOLUTION INTRAMUSCULAR; SUBCUTANEOUS at 08:57

## 2025-01-30 DIAGNOSIS — F51.01 PRIMARY INSOMNIA: Chronic | ICD-10-CM

## 2025-01-30 RX ORDER — TRAZODONE HYDROCHLORIDE 50 MG/1
50 TABLET, FILM COATED ORAL NIGHTLY
Qty: 90 TABLET | Refills: 3 | OUTPATIENT
Start: 2025-01-30

## 2025-02-19 ENCOUNTER — TELEPHONE (OUTPATIENT)
Dept: FAMILY MEDICINE CLINIC | Facility: CLINIC | Age: OVER 89
End: 2025-02-19
Payer: MEDICARE

## 2025-02-19 NOTE — TELEPHONE ENCOUNTER
Coral radames's daughter was calling about a prescription, but there is not a verbal release so I told her I could not speak with her on her mom's behalf.

## 2025-03-01 DIAGNOSIS — E11.65 TYPE 2 DIABETES MELLITUS WITH HYPERGLYCEMIA, WITHOUT LONG-TERM CURRENT USE OF INSULIN: Chronic | ICD-10-CM

## 2025-03-01 DIAGNOSIS — E78.2 MIXED HYPERLIPIDEMIA: Chronic | ICD-10-CM

## 2025-03-01 DIAGNOSIS — G25.81 RESTLESS LEG SYNDROME: Chronic | ICD-10-CM

## 2025-03-01 DIAGNOSIS — I10 BENIGN ESSENTIAL HYPERTENSION: Chronic | ICD-10-CM

## 2025-03-01 DIAGNOSIS — G45.9 TIA (TRANSIENT ISCHEMIC ATTACK): Chronic | ICD-10-CM

## 2025-03-03 RX ORDER — METOPROLOL TARTRATE 25 MG/1
25 TABLET, FILM COATED ORAL 2 TIMES DAILY
Qty: 180 TABLET | Refills: 3 | OUTPATIENT
Start: 2025-03-03

## 2025-03-03 RX ORDER — ROSUVASTATIN CALCIUM 10 MG/1
10 TABLET, COATED ORAL DAILY
Qty: 90 TABLET | Refills: 3 | OUTPATIENT
Start: 2025-03-03

## 2025-03-03 RX ORDER — PRAMIPEXOLE DIHYDROCHLORIDE 1.5 MG/1
1.5 TABLET ORAL DAILY
Qty: 90 TABLET | Refills: 3 | OUTPATIENT
Start: 2025-03-03

## 2025-03-03 RX ORDER — GLIPIZIDE 5 MG/1
5 TABLET ORAL 3 TIMES DAILY
Qty: 270 TABLET | Refills: 3 | OUTPATIENT
Start: 2025-03-03

## 2025-03-03 RX ORDER — CLOPIDOGREL BISULFATE 75 MG/1
75 TABLET ORAL DAILY
Qty: 90 TABLET | Refills: 3 | OUTPATIENT
Start: 2025-03-03

## 2025-03-05 ENCOUNTER — TELEPHONE (OUTPATIENT)
Dept: FAMILY MEDICINE CLINIC | Facility: CLINIC | Age: OVER 89
End: 2025-03-05
Payer: MEDICARE

## 2025-03-05 NOTE — TELEPHONE ENCOUNTER
Received call from patient's daughter wanting to do a B12 shot on the same day as her labs.  Patient is scheduled for her B12 shot at 9:00 on 3/17

## 2025-03-17 ENCOUNTER — CLINICAL SUPPORT (OUTPATIENT)
Dept: FAMILY MEDICINE CLINIC | Facility: CLINIC | Age: OVER 89
End: 2025-03-17
Payer: MEDICARE

## 2025-03-17 DIAGNOSIS — E53.8 B12 DEFICIENCY: Primary | ICD-10-CM

## 2025-03-17 PROCEDURE — 96372 THER/PROPH/DIAG INJ SC/IM: CPT | Performed by: FAMILY MEDICINE

## 2025-03-17 RX ADMIN — CYANOCOBALAMIN 1000 MCG: 1000 INJECTION, SOLUTION INTRAMUSCULAR; SUBCUTANEOUS at 09:09

## 2025-03-18 DIAGNOSIS — E11.65 TYPE 2 DIABETES MELLITUS WITH HYPERGLYCEMIA, WITHOUT LONG-TERM CURRENT USE OF INSULIN: Primary | ICD-10-CM

## 2025-03-27 ENCOUNTER — TELEPHONE (OUTPATIENT)
Dept: FAMILY MEDICINE CLINIC | Facility: CLINIC | Age: OVER 89
End: 2025-03-27

## 2025-03-27 NOTE — TELEPHONE ENCOUNTER
I spoke with daughter  - [facility]  has been trying to reach pt and has left several message for pt to return call  Daughter given info to call [facility]

## 2025-03-27 NOTE — TELEPHONE ENCOUNTER
PATIENTS DAUGHTER WANTS DR. CARLOS TO KNOW THEY HAVE NOT HEARD FROM ENDOCRINOLOGY YET. PLEASE ADVISE.

## 2025-04-08 ENCOUNTER — OFFICE VISIT (OUTPATIENT)
Dept: ENDOCRINOLOGY | Age: OVER 89
End: 2025-04-08
Payer: MEDICARE

## 2025-04-08 VITALS
WEIGHT: 138.4 LBS | SYSTOLIC BLOOD PRESSURE: 130 MMHG | TEMPERATURE: 97.8 F | DIASTOLIC BLOOD PRESSURE: 72 MMHG | OXYGEN SATURATION: 65 % | HEART RATE: 58 BPM | BODY MASS INDEX: 21.68 KG/M2

## 2025-04-08 DIAGNOSIS — E11.65 TYPE 2 DIABETES MELLITUS WITH HYPERGLYCEMIA, WITHOUT LONG-TERM CURRENT USE OF INSULIN: Primary | ICD-10-CM

## 2025-04-08 NOTE — PROGRESS NOTES
Chief complaint   Chief Complaint   Patient presents with     Type 2 diabetes mellitus with hyperglycemia, without long-          Subjective     History of Present Illness:          This is a 89 year old female I am seeing for type 2 DM   referred by PCP     other medical history is   1- HTN   2- HLD   3- CKD   4- H/o hypothyroidism   Pt had T2DM many years ago   Current home medication for diabetes     Glipizide 5 mg three times a day   Last time was 1 year , was Metformin and other meds that she does not remember      the A1c was  11.7 in 3-2025  Checks blood sugar at home using finger sick   Checks blood sugar 1-4 times per year  SBMG: no data   pt states that she is having high BG now , trying her best with dietary Compliance, in regards to Exercise, not on a daily basis and his Weight has been the same and there is No Hypoglycemic episodes, there is no AM Headaches /Nightmares, no Polyuria / Polydipsia, and there os no Blurring of Vision  Last Dilated Pupil Exam, in 2023 , no DM in the eye   NoTingling / numbness in feet, No Dizziness / lightheadedness, No Falls  No Nausea, vomiting / Diarrhea  retired.   Latest Reference Range & Units 12/06/24 00:00 03/17/25 11:32   Sodium 136 - 145 mmol/L 141 137   Potassium 3.5 - 5.2 mmol/L 5.0 5.2   Chloride 98 - 107 mmol/L 104 104   CO2 22.0 - 29.0 mmol/L 23 23.4   BUN 8 - 23 mg/dL 19 19   Creatinine 0.57 - 1.00 mg/dL 1.78 (H) 1.77 (H)   BUN/Creatinine Ratio 7.0 - 25.0  11 (L) 10.7   EGFR Result >60.0 mL/min/1.73 25 (L) 25.4 (L)   Glucose 65 - 99 mg/dL 233 (H) 294 (H)   Calcium 8.2 - 9.6 mg/dL 9.6 9.4   Alkaline Phosphatase 44 - 121 IU/L 65    Total Protein 6.0 - 8.5 g/dL 6.4    Albumin 3.6 - 4.6 g/dL 3.8    AST (SGOT) 0 - 40 IU/L 16    ALT (SGPT) 0 - 32 IU/L 9    Total Bilirubin 0.0 - 1.2 mg/dL 0.3    Hemoglobin A1C 4.80 - 5.60 % 10.0 (H) 11.70 (H)   (H): Data is abnormally high  (L): Data is abnormally low  Family History   Problem Relation Age of Onset    Diabetes  Sister     Diabetes Brother     Diabetes Daughter     Cancer Son         pancreatic     Social History     Socioeconomic History    Marital status:    Tobacco Use    Smoking status: Never    Smokeless tobacco: Never   Vaping Use    Vaping status: Never Used   Substance and Sexual Activity    Alcohol use: No    Drug use: Defer    Sexual activity: Defer     Past Medical History:   Diagnosis Date    Benign essential hypertension     Diabetes mellitus, type 2 12/07/2012    Encounter for diabetic foot exam 03/2015    History of complete eye exam 11/01/2017    Dr Leslie    Hyperlipidemia     Hypothyroidism     Insomnia     Lymphedema 04/14/2010    Osteoarthritis     Osteopenia     Renal insufficiency 03/04/2009    Restless leg syndrome 12/05/2012    Vitamin D deficiency 10/12/2011     Past Surgical History:   Procedure Laterality Date    APPENDECTOMY      CHOLECYSTECTOMY      EYE SURGERY      TONSILLECTOMY         Current Outpatient Medications:     amLODIPine (NORVASC) 2.5 MG tablet, Take 1 tablet by mouth Daily., Disp: 90 tablet, Rfl: 1    Cholecalciferol (VITAMIN D PO), Take  by mouth., Disp: , Rfl:     glipizide (GLUCOTROL) 5 MG tablet, Take 1 tablet by mouth 3 (Three) Times a Day., Disp: 270 tablet, Rfl: 1    levothyroxine (SYNTHROID, LEVOTHROID) 88 MCG tablet, Take 1 tablet by mouth Daily., Disp: 90 tablet, Rfl: 1    metoprolol tartrate (LOPRESSOR) 25 MG tablet, Take 1 tablet by mouth 2 (Two) Times a Day., Disp: 180 tablet, Rfl: 1    pramipexole (MIRAPEX) 1.5 MG tablet, Take 1 tablet by mouth Daily., Disp: 90 tablet, Rfl: 1    rosuvastatin (Crestor) 10 MG tablet, Take 1 tablet by mouth Daily., Disp: 90 tablet, Rfl: 1    benzonatate (TESSALON) 200 MG capsule, Take 1 capsule by mouth 3 (Three) Times a Day As Needed for Cough. (Patient not taking: Reported on 4/8/2025), Disp: 30 capsule, Rfl: 2    clopidogrel (PLAVIX) 75 MG tablet, Take 1 tablet by mouth Daily. (Patient not taking: Reported on 4/8/2025), Disp:  90 tablet, Rfl: 1    SITagliptin (Januvia) 25 MG tablet, Take 1 tablet by mouth Daily., Disp: 90 tablet, Rfl: 0    traZODone (DESYREL) 50 MG tablet, Take 1 tablet by mouth Every Night. (Patient not taking: Reported on 4/8/2025), Disp: 90 tablet, Rfl: 1    Current Facility-Administered Medications:     cyanocobalamin injection 1,000 mcg, 1,000 mcg, Intramuscular, Q28 Days, Onel Mann MD, 1,000 mcg at 03/17/25 0909  Benazepril    Objective   Vitals:    04/08/25 1339   BP: 130/72   Pulse: 58   Temp: 97.8 °F (36.6 °C)   SpO2: (!) 65%          Physical Exam  Neurological:      General: No focal deficit present.      Mental Status: She is alert and oriented to person, place, and time.               Diagnoses and all orders for this visit:    1. Type 2 diabetes mellitus with hyperglycemia, without long-term current use of insulin (Primary)  -     SITagliptin (Januvia) 25 MG tablet; Take 1 tablet by mouth Daily.  Dispense: 90 tablet; Refill: 0         Assessment:     1- DM, Type 2  Uncontrolled with high risk and High risk   labs on file   We discussed the medications  Hypoglycemia and its importance was reviewed   Labs where shown to the patient   2. Hypertension, on Amlodipine   3. Hyperlipidemia, on a statin   4. CKD         Plan:    1. Diet, exercise and weight management  2. Consistent food intake to avoid low blood sugars  3. Home medication includes for diabetes     Change to Glipizide 5 mg 2 times a day  Start Januvia 25 mg daily     4. Consistent checking of blood sugars using   5. I reviewed the last progress note  6. Annual eye exam  7. Return in 2 months   8. Will consider SGLT-2 if needed   9. Rx sent to the pharmacy  10. Labs : A1c every 90 days       I discussed with the patient/legal representative the risks and the benefits associated with the medications.  The patient/legal representative has been given the opportunity to ask questions.  Alternatives to the proposed treatment (s) were discussed,  including the likely results of no treatment.  The patient/legal representative wishes to proceed.       Donovan Childers MD   04/08/25  13:58 EDT

## 2025-04-14 DIAGNOSIS — I10 BENIGN ESSENTIAL HYPERTENSION: Chronic | ICD-10-CM

## 2025-04-14 RX ORDER — AMLODIPINE BESYLATE 2.5 MG/1
2.5 TABLET ORAL DAILY
Qty: 90 TABLET | Refills: 3 | OUTPATIENT
Start: 2025-04-14

## 2025-05-12 ENCOUNTER — CLINICAL SUPPORT (OUTPATIENT)
Dept: FAMILY MEDICINE CLINIC | Facility: CLINIC | Age: OVER 89
End: 2025-05-12
Payer: MEDICARE

## 2025-05-12 DIAGNOSIS — E53.8 B12 DEFICIENCY: Primary | ICD-10-CM

## 2025-05-12 PROCEDURE — 96372 THER/PROPH/DIAG INJ SC/IM: CPT | Performed by: FAMILY MEDICINE

## 2025-05-12 RX ADMIN — CYANOCOBALAMIN 1000 MCG: 1000 INJECTION, SOLUTION INTRAMUSCULAR; SUBCUTANEOUS at 09:43

## 2025-05-14 DIAGNOSIS — E78.2 MIXED HYPERLIPIDEMIA: Chronic | ICD-10-CM

## 2025-05-14 DIAGNOSIS — G25.81 RESTLESS LEG SYNDROME: Chronic | ICD-10-CM

## 2025-05-14 DIAGNOSIS — I10 BENIGN ESSENTIAL HYPERTENSION: Chronic | ICD-10-CM

## 2025-05-14 DIAGNOSIS — G45.9 TIA (TRANSIENT ISCHEMIC ATTACK): Chronic | ICD-10-CM

## 2025-05-14 DIAGNOSIS — E11.65 TYPE 2 DIABETES MELLITUS WITH HYPERGLYCEMIA, WITHOUT LONG-TERM CURRENT USE OF INSULIN: Chronic | ICD-10-CM

## 2025-05-14 RX ORDER — ROSUVASTATIN CALCIUM 10 MG/1
10 TABLET, COATED ORAL DAILY
Qty: 90 TABLET | Refills: 3 | OUTPATIENT
Start: 2025-05-14

## 2025-05-14 RX ORDER — CLOPIDOGREL BISULFATE 75 MG/1
75 TABLET ORAL DAILY
Qty: 90 TABLET | Refills: 3 | OUTPATIENT
Start: 2025-05-14

## 2025-05-14 RX ORDER — GLIPIZIDE 5 MG/1
5 TABLET ORAL 3 TIMES DAILY
Qty: 270 TABLET | Refills: 3 | OUTPATIENT
Start: 2025-05-14

## 2025-05-14 RX ORDER — PRAMIPEXOLE DIHYDROCHLORIDE 1.5 MG/1
1.5 TABLET ORAL DAILY
Qty: 90 TABLET | Refills: 3 | OUTPATIENT
Start: 2025-05-14

## 2025-05-14 RX ORDER — METOPROLOL TARTRATE 25 MG/1
25 TABLET, FILM COATED ORAL 2 TIMES DAILY
Qty: 180 TABLET | Refills: 3 | OUTPATIENT
Start: 2025-05-14

## 2025-05-27 ENCOUNTER — TELEPHONE (OUTPATIENT)
Dept: FAMILY MEDICINE CLINIC | Facility: CLINIC | Age: OVER 89
End: 2025-05-27
Payer: MEDICARE

## 2025-05-27 NOTE — TELEPHONE ENCOUNTER
Hub staff attempted to follow warm transfer process and was unsuccessful     Caller: JAIDEN BURTON    Relationship to patient: Child    Best call back number: [phone]     Patient is needing: PATIENT'S DAUGHTER IS CALLING TO RESCHEDULE PATIENT'S APPOINTMENT FOR A B12 INJECTION ON 06/16/25 TO 06/17/25 SINCE SHE IS ALREADY COMING IN FOR A LAB APPOINTMENT ON THE 17TH. PLEASE CALL PATIENT'S DAUGHTER.

## 2025-06-02 DIAGNOSIS — E03.9 ACQUIRED HYPOTHYROIDISM: Chronic | ICD-10-CM

## 2025-06-02 RX ORDER — LEVOTHYROXINE SODIUM 88 UG/1
88 TABLET ORAL DAILY
Qty: 90 TABLET | Refills: 3 | OUTPATIENT
Start: 2025-06-02

## 2025-06-09 ENCOUNTER — OFFICE VISIT (OUTPATIENT)
Dept: ENDOCRINOLOGY | Age: OVER 89
End: 2025-06-09
Payer: MEDICARE

## 2025-06-09 VITALS
OXYGEN SATURATION: 96 % | HEART RATE: 70 BPM | TEMPERATURE: 97.8 F | SYSTOLIC BLOOD PRESSURE: 126 MMHG | DIASTOLIC BLOOD PRESSURE: 70 MMHG | BODY MASS INDEX: 20.92 KG/M2 | WEIGHT: 133.6 LBS

## 2025-06-09 DIAGNOSIS — E11.65 TYPE 2 DIABETES MELLITUS WITH HYPERGLYCEMIA, WITHOUT LONG-TERM CURRENT USE OF INSULIN: Chronic | ICD-10-CM

## 2025-06-09 DIAGNOSIS — E11.65 TYPE 2 DIABETES MELLITUS WITH HYPERGLYCEMIA, WITHOUT LONG-TERM CURRENT USE OF INSULIN: Primary | ICD-10-CM

## 2025-06-09 LAB — HBA1C MFR BLD: 12.7 % (ref 4.8–5.6)

## 2025-06-09 PROCEDURE — 1160F RVW MEDS BY RX/DR IN RCRD: CPT | Performed by: INTERNAL MEDICINE

## 2025-06-09 PROCEDURE — 1159F MED LIST DOCD IN RCRD: CPT | Performed by: INTERNAL MEDICINE

## 2025-06-09 PROCEDURE — 99214 OFFICE O/P EST MOD 30 MIN: CPT | Performed by: INTERNAL MEDICINE

## 2025-06-09 PROCEDURE — G2211 COMPLEX E/M VISIT ADD ON: HCPCS | Performed by: INTERNAL MEDICINE

## 2025-06-09 RX ORDER — GLIPIZIDE 5 MG/1
5 TABLET ORAL
Qty: 270 TABLET | Refills: 0 | Status: SHIPPED | OUTPATIENT
Start: 2025-06-09

## 2025-06-09 RX ORDER — ACYCLOVIR 400 MG/1
1 TABLET ORAL
Qty: 3 EACH | Refills: 2 | Status: SHIPPED | OUTPATIENT
Start: 2025-06-09 | End: 2025-06-10

## 2025-06-09 NOTE — PROGRESS NOTES
Chief complaint   Chief Complaint   Patient presents with    Type 2 diabetes mellitus with hyperglycemia, without long-t          Subjective     History of Present Illness:          This is a 89 year old female I am seeing for type 2 DM   referred by PCP   Last OV was 3 months ago   She is here for a follow up   other medical history is   1- HTN   2- HLD   3- CKD   4- H/o hypothyroidism   Pt had T2DM many years ago   Current home medication for diabetes     Glipizide 5 mg two times a day   Januvia 25 mg daily   Last time was 1 year , was Metformin and other meds that she does not remember      the A1c was  11.7 in 3-2025  Checks blood sugar at home using finger sick   Checks blood sugar 1-4 times per year  SBMG: no data   pt states that she is not checking her BG and trying her best with dietary Compliance, in regards to Exercise, not on a daily basis and his Weight has been the same and there is No Hypoglycemic episodes, there is no AM Headaches /Nightmares, no Polyuria / Polydipsia, and there os no Blurring of Vision  Last Dilated Pupil Exam, in 2023 , no DM in the eye   NoTingling / numbness in feet, No Dizziness / lightheadedness, No Falls  No Nausea, vomiting / Diarrhea  retired.   Latest Reference Range & Units 12/06/24 00:00 03/17/25 11:32   Sodium 136 - 145 mmol/L 141 137   Potassium 3.5 - 5.2 mmol/L 5.0 5.2   Chloride 98 - 107 mmol/L 104 104   CO2 22.0 - 29.0 mmol/L 23 23.4   BUN 8 - 23 mg/dL 19 19   Creatinine 0.57 - 1.00 mg/dL 1.78 (H) 1.77 (H)   BUN/Creatinine Ratio 7.0 - 25.0  11 (L) 10.7   EGFR Result >60.0 mL/min/1.73 25 (L) 25.4 (L)   Glucose 65 - 99 mg/dL 233 (H) 294 (H)   Calcium 8.2 - 9.6 mg/dL 9.6 9.4   Alkaline Phosphatase 44 - 121 IU/L 65    Total Protein 6.0 - 8.5 g/dL 6.4    Albumin 3.6 - 4.6 g/dL 3.8    AST (SGOT) 0 - 40 IU/L 16    ALT (SGPT) 0 - 32 IU/L 9    Total Bilirubin 0.0 - 1.2 mg/dL 0.3    Hemoglobin A1C 4.80 - 5.60 % 10.0 (H) 11.70 (H)   (H): Data is abnormally high  (L): Data is  abnormally low  Family History   Problem Relation Age of Onset    Diabetes Sister     Diabetes Brother     Diabetes Daughter     Cancer Son         pancreatic     Social History     Socioeconomic History    Marital status:    Tobacco Use    Smoking status: Never    Smokeless tobacco: Never   Vaping Use    Vaping status: Never Used   Substance and Sexual Activity    Alcohol use: No    Drug use: Defer    Sexual activity: Defer     Past Medical History:   Diagnosis Date    Benign essential hypertension     Diabetes mellitus, type 2 12/07/2012    Encounter for diabetic foot exam 03/2015    History of complete eye exam 11/01/2017    Dr Leslie    Hyperlipidemia     Hypothyroidism     Insomnia     Lymphedema 04/14/2010    Osteoarthritis     Osteopenia     Renal insufficiency 03/04/2009    Restless leg syndrome 12/05/2012    Vitamin D deficiency 10/12/2011     Past Surgical History:   Procedure Laterality Date    APPENDECTOMY      CHOLECYSTECTOMY      EYE SURGERY      TONSILLECTOMY         Current Outpatient Medications:     amLODIPine (NORVASC) 2.5 MG tablet, Take 1 tablet by mouth Daily., Disp: 90 tablet, Rfl: 1    benzonatate (TESSALON) 200 MG capsule, Take 1 capsule by mouth 3 (Three) Times a Day As Needed for Cough. (Patient not taking: Reported on 4/8/2025), Disp: 30 capsule, Rfl: 2    Cholecalciferol (VITAMIN D PO), Take  by mouth., Disp: , Rfl:     clopidogrel (PLAVIX) 75 MG tablet, Take 1 tablet by mouth Daily. (Patient not taking: Reported on 4/8/2025), Disp: 90 tablet, Rfl: 1    glipizide (GLUCOTROL) 5 MG tablet, Take 1 tablet by mouth 3 (Three) Times a Day., Disp: 270 tablet, Rfl: 1    levothyroxine (SYNTHROID, LEVOTHROID) 88 MCG tablet, Take 1 tablet by mouth Daily., Disp: 90 tablet, Rfl: 1    metoprolol tartrate (LOPRESSOR) 25 MG tablet, Take 1 tablet by mouth 2 (Two) Times a Day., Disp: 180 tablet, Rfl: 1    pramipexole (MIRAPEX) 1.5 MG tablet, Take 1 tablet by mouth Daily., Disp: 90 tablet, Rfl: 1     rosuvastatin (Crestor) 10 MG tablet, Take 1 tablet by mouth Daily., Disp: 90 tablet, Rfl: 1    SITagliptin (Januvia) 25 MG tablet, Take 1 tablet by mouth Daily., Disp: 90 tablet, Rfl: 0    traZODone (DESYREL) 50 MG tablet, Take 1 tablet by mouth Every Night. (Patient not taking: Reported on 4/8/2025), Disp: 90 tablet, Rfl: 1    Current Facility-Administered Medications:     cyanocobalamin injection 1,000 mcg, 1,000 mcg, Intramuscular, Q28 Days, Onel Mann MD, 1,000 mcg at 05/12/25 0943  Benazepril    Objective   There were no vitals filed for this visit.         Physical Exam  Neurological:      General: No focal deficit present.      Mental Status: She is alert and oriented to person, place, and time.               Diagnoses and all orders for this visit:    1. Type 2 diabetes mellitus with hyperglycemia, without long-term current use of insulin (Primary)           Assessment:     1- DM, Type 2  Uncontrolled with high risk and High risk   labs on file   We discussed the medications  Hypoglycemia and its importance was reviewed   Labs where shown to the patient   2. Hypertension, on Amlodipine   3. Hyperlipidemia, on a statin   4. CKD         Plan:    1. Diet, exercise and weight management  2. Consistent food intake to avoid low blood sugars  3. Home medication includes for diabetes     Glipizide 5 mg 2 times a day   Januvia 25 mg daily   Will consider SGLT-2 if needed     4. Consistent checking of blood sugars using finger stick   5. I reviewed the last progress note  6. Annual eye exam  7. Return in 3 months   8. Will consider DEXCOM as she is not checking   9. Rx sent to the pharmacy  10. Labs : A1c every 90 days       I discussed with the patient/legal representative the risks and the benefits associated with the medications.  The patient/legal representative has been given the opportunity to ask questions.  Alternatives to the proposed treatment (s) were discussed, including the likely results of no  treatment.  The patient/legal representative wishes to proceed.       Donovan Childers MD   06/09/25  10:20 EDT

## 2025-06-10 ENCOUNTER — SPECIALTY PHARMACY (OUTPATIENT)
Dept: ENDOCRINOLOGY | Age: OVER 89
End: 2025-06-10
Payer: MEDICARE

## 2025-06-10 DIAGNOSIS — E11.65 TYPE 2 DIABETES MELLITUS WITH HYPERGLYCEMIA, WITHOUT LONG-TERM CURRENT USE OF INSULIN: Primary | ICD-10-CM

## 2025-06-10 RX ORDER — HYDROCHLOROTHIAZIDE 12.5 MG/1
CAPSULE ORAL
Qty: 6 EACH | Refills: 0 | Status: CANCELLED | OUTPATIENT
Start: 2025-06-10

## 2025-06-10 RX ORDER — KETOROLAC TROMETHAMINE 30 MG/ML
1 INJECTION, SOLUTION INTRAMUSCULAR; INTRAVENOUS
Qty: 1 EACH | Refills: 0 | Status: SHIPPED | OUTPATIENT
Start: 2025-06-10

## 2025-06-10 RX ORDER — PEN NEEDLE, DIABETIC 32GX 5/32"
1 NEEDLE, DISPOSABLE MISCELLANEOUS 4 TIMES DAILY
Qty: 100 EACH | Refills: 1 | Status: SHIPPED | OUTPATIENT
Start: 2025-06-10

## 2025-06-10 RX ORDER — INSULIN GLARGINE 100 [IU]/ML
10 INJECTION, SOLUTION SUBCUTANEOUS DAILY
Qty: 15 ML | Refills: 0 | Status: SHIPPED | OUTPATIENT
Start: 2025-06-10 | End: 2025-06-13 | Stop reason: SDUPTHER

## 2025-06-10 RX ORDER — HYDROCHLOROTHIAZIDE 12.5 MG/1
CAPSULE ORAL
Qty: 2 EACH | Refills: 2 | Status: SHIPPED | OUTPATIENT
Start: 2025-06-10

## 2025-06-10 RX ORDER — KETOROLAC TROMETHAMINE 30 MG/ML
1 INJECTION, SOLUTION INTRAMUSCULAR; INTRAVENOUS DAILY
Qty: 1 EACH | Refills: 0 | Status: CANCELLED | OUTPATIENT
Start: 2025-06-10

## 2025-06-10 NOTE — TELEPHONE ENCOUNTER
Specialty Pharmacy Patient Management Program  Prescription Refill Request     Patient currently fills medications at  Pharmacy. Needing refill(s) on the following:      Requested Prescriptions     Pending Prescriptions Disp Refills    Insulin Glargine (Lantus SoloStar) 100 UNIT/ML injection pen 15 mL 0     Sig: Inject 6 Units under the skin into the appropriate area as directed Daily. **Discard open pen 28 days after first use**       Last visit: 06/10/25  Next visit: 09/08/25    Pended for Dr. Childers to review, and approve if appropriate.     Yecenia Stuart, PharmD, BCACP, BC-ADM, CDC  Clinical Specialty Pharmacist, Endocrinology  6/10/2025  14:06 EDT

## 2025-06-13 ENCOUNTER — SPECIALTY PHARMACY (OUTPATIENT)
Dept: ENDOCRINOLOGY | Age: OVER 89
End: 2025-06-13
Payer: MEDICARE

## 2025-06-13 NOTE — PROGRESS NOTES
Specialty Pharmacy Patient Management Program  One-Time Clinical Outreach     Elissa Marrero is a 89 y.o. female seen by an Endocrinology provider for Type 2 Diabetes and enrolled in the Endocrinology Patient Management program offered by [facility].      Counseled patient on injection administration technique for Lantus, including injection sites and dose/frequency. Patient was able to demonstrate proper injection technique with demo pen.       Yecenia Stuart, PharmD, BCACP, BC-ADM, [facility]  Clinical Specialty Pharmacist, Endocrinology  6/13/2025  09:13 EDT

## 2025-06-13 NOTE — PROGRESS NOTES
Specialty Pharmacy Patient Management Program  Endocrinology Initial Assessment     Elissa Marrero was referred by an Endocrinology provider to the Endocrinology Patient Management program offered by [facility] for Type 2 Diabetes on 06/13/25.  An initial outreach was conducted, including assessment of therapy appropriateness and specialty medication education for Insulin Glargine. The patient was introduced to services offered by [facility], including: regular assessments, refill coordination, curbside pick-up or mail order delivery options, prior authorization maintenance, and financial assistance programs as applicable. The patient was also provided with contact information for the pharmacy team.     Insurance Coverage & Financial Support  [employer] Medicare D     Relevant Past Medical History and Comorbidities  Relevant medical history and concomitant health conditions were discussed with the patient. The patient's chart has been reviewed for relevant past medical history and comorbid conditions and updated as necessary.  Past Medical History:   Diagnosis Date    Benign essential hypertension     Diabetes mellitus, type 2 12/07/2012    Encounter for diabetic foot exam 03/2015    History of complete eye exam 11/01/2017    Dr Leslie    Hyperlipidemia     Hypothyroidism     Insomnia     Lymphedema 04/14/2010    Osteoarthritis     Osteopenia     Renal insufficiency 03/04/2009    Restless leg syndrome 12/05/2012    Vitamin D deficiency 10/12/2011     Social History     Socioeconomic History    Marital status:    Tobacco Use    Smoking status: Never    Smokeless tobacco: Never   Vaping Use    Vaping status: Never Used   Substance and Sexual Activity    Alcohol use: No    Drug use: Defer    Sexual activity: Defer       Problem list reviewed by Yecenia Stuart, PharmD on 6/13/2025 at  3:38 PM    Allergies  Known allergies and reactions were discussed with the patient.  The patient's chart has been reviewed for  allergy information and updated as necessary.   Allergies   Allergen Reactions    Benazepril        Allergies reviewed by Yecenia Stuart PharmD on 6/13/2025 at  3:32 PM    Relevant Laboratory Values  Relevant laboratory values were discussed with the patient. The following specialty medication dose adjustment(s) are recommended: A1c not at goal  A1C Last 3 Results          12/6/2024    00:00 3/17/2025    11:32 6/9/2025    10:48   HGBA1C Last 3 Results   Hemoglobin A1C 10.0  11.70  12.70      Lab Results   Component Value Date    HGBA1C 12.70 (H) 06/09/2025     Lab Results   Component Value Date    GLUCOSE 294 (H) 03/17/2025    CALCIUM 9.4 03/17/2025     03/17/2025    K 5.2 03/17/2025    CO2 23.4 03/17/2025     03/17/2025    BUN 19 03/17/2025    CREATININE 1.77 (H) 03/17/2025    EGFRIFAFRI 38 (L) 01/06/2022    EGFRIFNONA 33 (L) 01/06/2022    BCR 10.7 03/17/2025    ANIONGAP 13.0 05/18/2024     Lab Results   Component Value Date    CHOL 139 11/19/2023    CHLPL 142 12/06/2024    TRIG 212 (H) 12/06/2024    HDL 54 12/06/2024    LDL 54 12/06/2024     Microalbumin          7/1/2024    13:14   Microalbumin   Microalbumin, Urine 29.4        Current Medication List  This medication list has been reviewed with the patient and evaluated for any interactions or necessary modifications/recommendations, and updated to include all prescription medications, OTC medications, and supplements the patient is currently taking.  This list reflects what is contained in the patient's profile, which has also been marked as reviewed to communicate to other providers it is the most up to date version of the patient's current medication therapy.     Current Outpatient Medications:     amLODIPine (NORVASC) 2.5 MG tablet, Take 1 tablet by mouth Daily., Disp: 90 tablet, Rfl: 1    Cholecalciferol (VITAMIN D PO), Take 125 mcg by mouth Daily., Disp: , Rfl:     clopidogrel (PLAVIX) 75 MG tablet,  Take 1 tablet by mouth Daily., Disp: 90 tablet, Rfl: 1    glipizide (GLUCOTROL) 5 MG tablet, Take 1 tablet by mouth 2 (Two) Times a Day Before Meals., Disp: 270 tablet, Rfl: 0    levothyroxine (SYNTHROID, LEVOTHROID) 88 MCG tablet, Take 1 tablet by mouth Daily., Disp: 90 tablet, Rfl: 1    metoprolol tartrate (LOPRESSOR) 25 MG tablet, Take 1 tablet by mouth 2 (Two) Times a Day. (Patient taking differently: Take 1 tablet by mouth Daily.), Disp: 180 tablet, Rfl: 1    pramipexole (MIRAPEX) 1.5 MG tablet, Take 1 tablet by mouth Daily., Disp: 90 tablet, Rfl: 1    rosuvastatin (Crestor) 10 MG tablet, Take 1 tablet by mouth Daily., Disp: 90 tablet, Rfl: 1    SITagliptin (Januvia) 25 MG tablet, Take 1 tablet by mouth Daily., Disp: 90 tablet, Rfl: 0    traZODone (DESYREL) 50 MG tablet, Take 1 tablet by mouth Every Night., Disp: 90 tablet, Rfl: 1    benzonatate (TESSALON) 200 MG capsule, Take 1 capsule by mouth 3 (Three) Times a Day As Needed for Cough. (Patient not taking: Reported on 6/9/2025), Disp: 30 capsule, Rfl: 2    Continuous Glucose  (FreeStyle Chris 3 [place]) device, Use 1 Units Every 14 (Fourteen) Days., Disp: 1 each, Rfl: 0    Continuous Glucose Sensor (FreeStyle Chris 3 Plus Sensor), Use Every 14 (Fourteen) Days., Disp: 2 each, Rfl: 2    Insulin Glargine (Lantus SoloStar) 100 UNIT/ML injection pen, Inject 10 Units under the skin Daily. **Discard open pen 28 days after first use**, Disp: 15 mL, Rfl: 0    Insulin Pen Needle (BD Pen Needle Sally U/F) 32G X 4 MM misc, Use 1 Units 4 (Four) Times a Day., Disp: 100 each, Rfl: 1    Current Facility-Administered Medications:     cyanocobalamin injection 1,000 mcg, 1,000 mcg, Intramuscular, Q28 Days, Onel Mann MD, 1,000 mcg at 05/12/25 0943    Medicines reviewed by Yecenia Stuart PharmD on 6/13/2025 at  3:38 PM    Drug Interactions  No Clinically Significant DDIs Were Identified at Present Time Upon Marking Medications Reviewed   Recommended Medications  Assessment  Aspirin: Not Taking Currently  Statin: Currently Taking   ACEi/ARB: Not Taking Currently    Initial Education Provided for Specialty Medication  The patient has been provided with the following education and any applicable administration techniques (i.e. self-injection) have been demonstrated for the therapies indicated. All questions and concerns have been addressed prior to the patient receiving the medication, and the patient has verbalized comprehension of the education and any materials provided. Additional patient education shall be provided and documented upon request by the patient, provider, or payer.    insulin glargine  Medication Expectations    Why am I taking this medication?  You are taking this medication to lower blood sugar and A1c because you have type 1 or type 2 diabetes. Diabetes is not curable but with proper medication and treatment, we can keep your blood sugar within your personalized target range.    What should I expect while on this medication?  You should expect to see your blood sugar and A1c decrease over time.    How does the medication work?  Insulin glargine (Basaglar, Lantus, Semglee, etc.) is a long-acting insulin that releases slowly and continuously in the body. Insulin helps the sugar in your blood get into cells and provide them with energy to fuel your body.     How long will I be on this medication for?  If you have Type 1 diabetes, you will be on this medication or another insulin throughout your lifetime because your body cannot make its own insulin. If you have Type 2 diabetes, the amount of time you will be on this medication will be determined by your doctor based on blood sugar and A1c control. You will most likely be on this medication or another diabetes medication throughout your lifetime because your body does not make enough insulin. Do not abruptly stop this medication without talking to your doctor first.     How do I take this medication?  Take as  directed on your prescription label. Insulin glargine is usually given once or twice daily and can be taken with or without food. Insulin glargine  is injected under the skin (subcutaneously) of your stomach, thigh, buttocks, or upper arm. Use a different injection site in the same body region each day.     What are some possible side effects?  Insulin glargine  may cause low blood sugar (hypoglycemia). Signs and symptoms that may indicate hypoglycemia include dizziness, sweating, anxiety, irritability, confusion, or headache.    What happens if I miss a dose?  If you miss a dose, take it as soon as you remember. If it is close to your next dose, skip it. Never take 2 doses at once.       Medication Safety    What are things I should warn my doctor immediately about?  Tell your doctor if you have kidney or liver problems. Talk to your doctor if you are pregnant, planning to become pregnant, or breastfeeding. Also tell your doctor if you notice any signs/symptoms of an allergic reaction (rash, hives, difficulty breathing, etc.).    What are things that I should be cautious of?  Be cautious of any side effects from this medication. Talk to your doctor if any new ones develop or aren't getting better.    What are some medications that can interact with this one?  Do not take this medication with rosiglitazone or macimorelin. Taking insulin glargine with other medications that lower your blood sugar may increase the risk of hypoglycemia. Your doctor may reduce the dose of these medications when you start insulin glargine  Always tell your doctor or pharmacist immediately if you start taking any new medications, including over-the-counter medications, vitamins, and herbal supplements.        Medication Storage/Handling    How should I handle this medication?  Keep this medication out of reach of pets/children and keep the pen capped when not in use. Do not share your medicine with others.     How does this medication  need to be stored?  Store your new, unused pens in the original carton in the refrigerator. Protect it from light. Do not freeze. Always remove the needle and place the cap on the pen before storing.     How should I dispose of this medication?  Used insulin glargine  pens should be thrown away after 28 days even if insulin remains.?Place your used pen and needle in an approved sharps container?If your doctor decides to stop this medication, take to your local police station for proper disposal. Some pharmacies also have?take-back bins for medication drop-off.??       Resources/Support    How can I remind myself to take this medication?  You can download reminder apps to help you manage your refills or set an alarm on your phone to remind you.     Is financial support available?   Manufacturers of insulin glargine  can provide co-pay cards if you have commercial insurance or patient assistance if you have Medicare or no insurance.     Which vaccines are recommended for me?  Talk to your doctor about these vaccines: Flu, Coronavirus (COVID-19), Pneumococcal (pneumonia), Tdap, Hepatitis B, Zoster (shingles)            Adherence and Self-Administration  Adherence related to the patient's specialty therapy was discussed with the patient. The Adherence segment of this outreach has been reviewed and updated.     Is there a concern with patient's ability to self administer the medication correctly and without issue?: No  Were any potential barriers to adherence identified during the initial assessment or patient education?: No  Are there any concerns regarding the patient's understanding of the importance of medication adherence?: No  Methods for Supporting Patient Adherence and/or Self-Administration: No known barriers at this time     Open Medication Therapy Problems  No medication therapy recommendations to display    Goals of Therapy  Goals related to the patient's specialty therapy were discussed with the patient. The  Patient Goals segment of this outreach has been reviewed and updated.   Goals Addressed Today        Specialty Pharmacy General Goal      Clinical Goal: Achieve  A1 <7%    Baseline Value:  Lab Results   Component Value Date    HGBA1C 12.70 (H) 06/09/2025        Progress:  Date A1c    06/13/2025  New Enrollment - A1c above goal - pt starting insulin today to help improve glycemic control   06/09/2025 12.70 (H)    03/17/2025 11.70 (H)    12/06/2024 10.0 (H)    06/18/2024 7.4 (H)    01/16/2024 7.90 (H)                  Reassessment Plan & Follow-Up  1. Medication Therapy Changes: START Lantus 6 units daily   2. Related Plans, Therapy Recommendations, or Therapy Problems to Be Addressed: Completing PAP application through [employer] for Tresiba and pen needles - will transition if approved   3. Pharmacist to perform regular assessments no more than (6) months from the previous assessment.  4. Care Coordinator to set up future refill outreaches, coordinate prescription delivery, and escalate clinical questions to pharmacist.  5. Welcome information and patient satisfaction survey to be sent by specialty pharmacy team with patient's initial fill.    Attestation  Therapeutic appropriateness: Appropriate   I attest the patient was actively involved in and has agreed to the above plan of care. If the prescribed therapy is at any point deemed not appropriate based on the current or future assessments, a consultation will be initiated with the patient's specialty care provider to determine the best course of action. The revised plan of therapy will be documented along with any required assessments and/or additional patient education provided.     Yecenia Stuart, Sammie, BCACP, BC-ADM, [facility]  Clinical Specialty Pharmacist, Endocrinology  6/13/2025  09:13 EDT    Discussed the aforementioned information with the patient via In-Person.

## 2025-06-13 NOTE — PROGRESS NOTES
Specialty Pharmacy Patient Management Program  Endocrinology Initial Fill Outreach      Elissa is a 89 y.o. female contacted today regarding initial fill of her medication(s).    Specialty medication(s) and dose(s) confirmed: Lantus 6units daily    Delivery Questions      Flowsheet Row Most Recent Value   Delivery method  at Pharmacy   Number of medications in delivery 1   Medication(s) being filled and delivered Insulin Glargine (Lantus SoloStar)   Copay verified? Yes   Copay amount $75.00   Copay form of payment Pay at pickup   Delivery Date Selection 06/13/25   Signature Required No            Follow-Up: 21 days    Yecenia Stuart, PharmD, BCACP, BC-ADM, [facility]  Clinical Specialty Pharmacist, Endocrinology  6/13/2025  15:44 EDT

## 2025-06-16 RX ORDER — ACYCLOVIR 400 MG/1
1 TABLET ORAL ONCE
Qty: 1 EACH | Refills: 0 | COMMUNITY
Start: 2025-06-16 | End: 2025-06-16

## 2025-06-16 RX ORDER — ACYCLOVIR 400 MG/1
1 TABLET ORAL
Qty: 1 EACH | COMMUNITY
Start: 2025-06-16

## 2025-06-16 NOTE — TELEPHONE ENCOUNTER
Rx Refill Note  Requested Prescriptions     Pending Prescriptions Disp Refills    Continuous Glucose  (Dexcom G7 ) device       Sig: Use.    Continuous Glucose Sensor (Dexcom G7 Sensor) misc       Sig: Use.      Last office visit with prescribing clinician: 6/9/2025   Last telemedicine visit with prescribing clinician: Visit date not found   Next office visit with prescribing clinician: 9/8/2025                         Would you like a call back once the refill request has been completed: [] Yes [] No    If the office needs to give you a call back, can they leave a voicemail: [] Yes [] No    Shazia Bonilla MA  06/16/25, 09:16 EDT

## 2025-06-17 ENCOUNTER — TELEPHONE (OUTPATIENT)
Dept: ENDOCRINOLOGY | Age: OVER 89
End: 2025-06-17

## 2025-06-17 ENCOUNTER — SPECIALTY PHARMACY (OUTPATIENT)
Dept: ENDOCRINOLOGY | Age: OVER 89
End: 2025-06-17
Payer: MEDICARE

## 2025-06-17 ENCOUNTER — CLINICAL SUPPORT (OUTPATIENT)
Dept: FAMILY MEDICINE CLINIC | Facility: CLINIC | Age: OVER 89
End: 2025-06-17
Payer: MEDICARE

## 2025-06-17 DIAGNOSIS — E53.8 VITAMIN B 12 DEFICIENCY: Primary | ICD-10-CM

## 2025-06-17 PROCEDURE — 96372 THER/PROPH/DIAG INJ SC/IM: CPT | Performed by: FAMILY MEDICINE

## 2025-06-17 RX ADMIN — CYANOCOBALAMIN 1000 MCG: 1000 INJECTION, SOLUTION INTRAMUSCULAR; SUBCUTANEOUS at 09:09

## 2025-06-17 NOTE — TELEPHONE ENCOUNTER
Hub staff attempted to follow warm transfer process and was unsuccessful     Caller: ARMAAN WOOTEN    Relationship to patient: Other    Best call back number: [phone]    Patient is needing: [facility] RECEIVED THE PAP TIANA BUT THE POA WHO SIGNED FOR THE PT IS NOT NOTARIZED. NOW THE POA CAN BE NOTARIZED AND THE TIANA CAN BE RESUBMITTED OR IF THE PT HAS THE ABALITY TO SIGN FOR HERSELF CAN SIGN THE TIANA WHERE THE PT IS TO SIGN AND TIANA CAN BE RESUBMITTED TO [facility]. PT ID NUMBER FOR TIANA IS [id number]

## 2025-06-17 NOTE — PROGRESS NOTES
Injection  B `12Injection performed in LEFT DELTOID  by Emperatriz Aldana MA. Patient tolerated the procedure well without complications.  06/17/25   Emperatriz Aldana MA

## 2025-06-17 NOTE — TELEPHONE ENCOUNTER
Spoke with patients daughter, Michelle, and informed her we will need a copy of the POA documents once they've been notarized.     All questions answered, patient in agreement with plan, and patient expressed understanding .      Yecenia Stuart, PharmD, BCACP, BC-ADM, [facility]  Clinical Specialty Pharmacist, Endocrinology  6/17/2025  15:20 EDT

## 2025-06-17 NOTE — PROGRESS NOTES
Specialty Pharmacy Patient Management Program  One-Time Clinical Outreach     Elissa Marrero is a 89 y.o. female seen by an Endocrinology provider for Type 2 Diabetes and enrolled in the Endocrinology Patient Management program offered by [facility].      Patient is seen today for training and eduction about the use of Dexcom G7 system and application technique. The purpose of the continuous glucose monitor in disease management explained.     Reviewed warnings/precautions and the following keys to success:   Understanding differences in sensor (plasma) glucose versus blood glucose.   Reviewed use of sensor glucose when making treatment decisions - importance of checking blood glucose when instructed by device, and when the patient thinks reading are not correct or is not feeling well.   Talked through the steps of applying sensor and cleaning back of the upper arm. Reviewed approved insertion sites for sensors.   Discussed the 30 minute period and advised patient to continue the use of fingerstick glucose checks to monitor sugar during this time period.   Explained the importance of leaving the bluetooth on to ensure connectivity   The sensor automatically stops working after 10 and must be replaced. There is a 12 hour licha period on the sensor in case they are unable to change on time.   Discuss how to add a note/event, understanding of trend arrows and using CGM for treatment decisions.   Reviewed steps to changing sensor in 10 days  If patient is not comfortable changing the sensor the first time they should contact the clinic to schedule time to come back for assistance.   Educated the patient about removal of sensor before MRI and CT scans.   Encouraged patient to keep /reader within 20 feet from sensor.     Arms set for Low of 70mg/dL and high of 250mg/dL. Patient will be using /reader.     Application technique reviewed and patient demonstrated without difficulty.        Yecenia Stuart, PharmD, BCACP, BC-ADM, [place]  Clinical Specialty Pharmacist, Endocrinology  6/17/2025  12:09 EDT

## 2025-06-23 NOTE — PROGRESS NOTES
Chief Complaint:   Chief Complaint   Patient presents with    Diabetes     Med refill due     Hypertension    Hyperlipidemia    Hypothyroidism    Restless Legs Syndrome    Insomnia       Elissa O Marrero 89 y.o. female who presents today for Medical Management of the below listed issues. She  has a problem list of   Patient Active Problem List   Diagnosis    Diabetes mellitus, type 2    Hyperlipidemia    Benign essential hypertension    Hypothyroidism    Insomnia    Lymphedema    Osteoarthritis    Osteopenia    Renal insufficiency    Restless leg syndrome    Vitamin D deficiency    Hip pain, right    Acute kidney failure    Chronic kidney disease, stage 4 (severe)    Disorder of bone and cartilage    Edema    Hyperkalemia    Urinary tract infection    TIA (transient ischemic attack)    Acute metabolic encephalopathy    Acute cystitis without hematuria    Kidney stone   .  Since the last visit, She has overall felt well.  she has been compliant with   Current Outpatient Medications:     amLODIPine (NORVASC) 2.5 MG tablet, Take 1 tablet by mouth Daily., Disp: 90 tablet, Rfl: 1    clopidogrel (PLAVIX) 75 MG tablet, Take 1 tablet by mouth Daily., Disp: 90 tablet, Rfl: 1    levothyroxine (SYNTHROID, LEVOTHROID) 88 MCG tablet, Take 1 tablet by mouth Daily., Disp: 90 tablet, Rfl: 1    metoprolol tartrate (LOPRESSOR) 25 MG tablet, Take 1 tablet by mouth 2 (Two) Times a Day., Disp: 180 tablet, Rfl: 1    pramipexole (MIRAPEX) 1.5 MG tablet, Take 1 tablet by mouth Daily., Disp: 90 tablet, Rfl: 1    rosuvastatin (Crestor) 10 MG tablet, Take 1 tablet by mouth Daily., Disp: 90 tablet, Rfl: 1    traZODone (DESYREL) 50 MG tablet, Take 1 tablet by mouth Every Night., Disp: 90 tablet, Rfl: 1    Cholecalciferol (VITAMIN D PO), Take 125 mcg by mouth Daily., Disp: , Rfl:     Continuous Glucose  (FreeStyle Chris 3 [place]) device, Use 1 Units Every 14 (Fourteen) Days., Disp: 1 each, Rfl: 0    Continuous Glucose Sensor (Dexcom G7  "Sensor) misc, Use 1 Units Every 10 (Ten) Days., Disp: 1 each, Rfl: O    Continuous Glucose Sensor (FreeStyle Chris 3 Plus Sensor), Use Every 14 (Fourteen) Days., Disp: 2 each, Rfl: 2    glipizide (GLUCOTROL) 5 MG tablet, Take 1 tablet by mouth 2 (Two) Times a Day Before Meals., Disp: 270 tablet, Rfl: 0    Insulin Glargine (Lantus SoloStar) 100 UNIT/ML injection pen, Inject 10 Units under the skin Daily. **Discard open pen 28 days after first use**, Disp: 15 mL, Rfl: 0    Insulin Pen Needle (BD Pen Needle Sally U/F) 32G X 4 MM misc, Use 1 Units 4 (Four) Times a Day., Disp: 100 each, Rfl: 1    SITagliptin (Januvia) 25 MG tablet, Take 1 tablet by mouth Daily., Disp: 90 tablet, Rfl: 0    Current Facility-Administered Medications:     cyanocobalamin injection 1,000 mcg, 1,000 mcg, Intramuscular, Q28 Days, Onel Mann MD, 1,000 mcg at 06/17/25 0909.  She denies medication side effects.    All of the other chronic condition(s) listed above are stable w/o issues.    /56   Pulse 68   Temp 97.7 °F (36.5 °C) (Temporal)   Resp 14   Ht 170.2 cm (67\")   Wt 62.6 kg (138 lb)   BMI 21.61 kg/m²     Results for orders placed or performed in visit on 06/09/25   Hemoglobin A1c    Collection Time: 06/09/25 10:48 AM    Specimen: Blood   Result Value Ref Range    Hemoglobin A1C 12.70 (H) 4.80 - 5.60 %             The following portions of the patient's history were reviewed and updated as appropriate: allergies, current medications, past family history, past medical history, past social history, past surgical history, and problem list.    Review of Systems   Constitutional:  Negative for activity change, chills and fever.   Respiratory:  Negative for cough.    Cardiovascular:  Negative for chest pain.   Psychiatric/Behavioral:  Negative for dysphoric mood.        Objective     BMI is within normal parameters. No other follow-up for BMI required.        Physical Exam  Vitals and nursing note reviewed.   Constitutional:       " General: She is not in acute distress.     Appearance: She is well-developed.   Cardiovascular:      Rate and Rhythm: Normal rate and regular rhythm.   Pulmonary:      Effort: Pulmonary effort is normal.      Breath sounds: Normal breath sounds.   Neurological:      Mental Status: She is alert and oriented to person, place, and time.   Psychiatric:         Behavior: Behavior normal.         Thought Content: Thought content normal.     Labs reviewed with pt today during visit. All questions answered.          Diagnoses and all orders for this visit:    1. Benign essential hypertension  -     amLODIPine (NORVASC) 2.5 MG tablet; Take 1 tablet by mouth Daily.  Dispense: 90 tablet; Refill: 1  -     metoprolol tartrate (LOPRESSOR) 25 MG tablet; Take 1 tablet by mouth 2 (Two) Times a Day.  Dispense: 180 tablet; Refill: 1    2. TIA (transient ischemic attack)  -     clopidogrel (PLAVIX) 75 MG tablet; Take 1 tablet by mouth Daily.  Dispense: 90 tablet; Refill: 1    3. Acquired hypothyroidism  -     levothyroxine (SYNTHROID, LEVOTHROID) 88 MCG tablet; Take 1 tablet by mouth Daily.  Dispense: 90 tablet; Refill: 1    4. Restless leg syndrome  -     pramipexole (MIRAPEX) 1.5 MG tablet; Take 1 tablet by mouth Daily.  Dispense: 90 tablet; Refill: 1    5. Mixed hyperlipidemia  -     rosuvastatin (Crestor) 10 MG tablet; Take 1 tablet by mouth Daily.  Dispense: 90 tablet; Refill: 1    6. Primary insomnia  -     traZODone (DESYREL) 50 MG tablet; Take 1 tablet by mouth Every Night.  Dispense: 90 tablet; Refill: 1    Patient's diabetes is doing very poorly, and patient and her daughter are to immediately reach out to their endocrinology office for further guidance as they are currently managing her diabetes.

## 2025-06-24 ENCOUNTER — OFFICE VISIT (OUTPATIENT)
Dept: FAMILY MEDICINE CLINIC | Facility: CLINIC | Age: OVER 89
End: 2025-06-24
Payer: MEDICARE

## 2025-06-24 ENCOUNTER — TELEPHONE (OUTPATIENT)
Dept: ENDOCRINOLOGY | Age: OVER 89
End: 2025-06-24
Payer: MEDICARE

## 2025-06-24 VITALS
RESPIRATION RATE: 14 BRPM | WEIGHT: 138 LBS | SYSTOLIC BLOOD PRESSURE: 128 MMHG | DIASTOLIC BLOOD PRESSURE: 56 MMHG | BODY MASS INDEX: 21.66 KG/M2 | TEMPERATURE: 97.7 F | HEART RATE: 68 BPM | HEIGHT: 67 IN

## 2025-06-24 DIAGNOSIS — E03.9 ACQUIRED HYPOTHYROIDISM: Chronic | ICD-10-CM

## 2025-06-24 DIAGNOSIS — G45.9 TIA (TRANSIENT ISCHEMIC ATTACK): Chronic | ICD-10-CM

## 2025-06-24 DIAGNOSIS — I10 BENIGN ESSENTIAL HYPERTENSION: Chronic | ICD-10-CM

## 2025-06-24 DIAGNOSIS — G25.81 RESTLESS LEG SYNDROME: Chronic | ICD-10-CM

## 2025-06-24 DIAGNOSIS — E78.2 MIXED HYPERLIPIDEMIA: Chronic | ICD-10-CM

## 2025-06-24 DIAGNOSIS — F51.01 PRIMARY INSOMNIA: Chronic | ICD-10-CM

## 2025-06-24 PROCEDURE — 1160F RVW MEDS BY RX/DR IN RCRD: CPT | Performed by: FAMILY MEDICINE

## 2025-06-24 PROCEDURE — 99214 OFFICE O/P EST MOD 30 MIN: CPT | Performed by: FAMILY MEDICINE

## 2025-06-24 PROCEDURE — 1125F AMNT PAIN NOTED PAIN PRSNT: CPT | Performed by: FAMILY MEDICINE

## 2025-06-24 PROCEDURE — 1159F MED LIST DOCD IN RCRD: CPT | Performed by: FAMILY MEDICINE

## 2025-06-24 RX ORDER — LEVOTHYROXINE SODIUM 88 UG/1
88 TABLET ORAL DAILY
Qty: 90 TABLET | Refills: 1 | Status: SHIPPED | OUTPATIENT
Start: 2025-06-24

## 2025-06-24 RX ORDER — CLOPIDOGREL BISULFATE 75 MG/1
75 TABLET ORAL DAILY
Qty: 90 TABLET | Refills: 1 | Status: SHIPPED | OUTPATIENT
Start: 2025-06-24

## 2025-06-24 RX ORDER — METOPROLOL TARTRATE 25 MG/1
25 TABLET, FILM COATED ORAL 2 TIMES DAILY
Qty: 180 TABLET | Refills: 1 | Status: SHIPPED | OUTPATIENT
Start: 2025-06-24

## 2025-06-24 RX ORDER — PRAMIPEXOLE DIHYDROCHLORIDE 1.5 MG/1
1.5 TABLET ORAL DAILY
Qty: 90 TABLET | Refills: 1 | Status: SHIPPED | OUTPATIENT
Start: 2025-06-24

## 2025-06-24 RX ORDER — AMLODIPINE BESYLATE 2.5 MG/1
2.5 TABLET ORAL DAILY
Qty: 90 TABLET | Refills: 1 | Status: SHIPPED | OUTPATIENT
Start: 2025-06-24

## 2025-06-24 RX ORDER — TRAZODONE HYDROCHLORIDE 50 MG/1
50 TABLET ORAL NIGHTLY
Qty: 90 TABLET | Refills: 1 | Status: SHIPPED | OUTPATIENT
Start: 2025-06-24

## 2025-06-24 RX ORDER — ROSUVASTATIN CALCIUM 10 MG/1
10 TABLET, COATED ORAL DAILY
Qty: 90 TABLET | Refills: 1 | Status: SHIPPED | OUTPATIENT
Start: 2025-06-24

## 2025-06-24 NOTE — TELEPHONE ENCOUNTER
If patient would like to be seen earlier and there is an available appt. She can schedule an earlier appt.

## 2025-06-26 ENCOUNTER — TELEPHONE (OUTPATIENT)
Dept: ENDOCRINOLOGY | Age: OVER 89
End: 2025-06-26
Payer: MEDICARE

## 2025-06-26 NOTE — TELEPHONE ENCOUNTER
Called her daughter, Michelle Spain to discuss insulin dosage and review the blood sugars.  Did not answer left voicemail.

## 2025-06-27 ENCOUNTER — TELEPHONE (OUTPATIENT)
Dept: FAMILY MEDICINE CLINIC | Facility: CLINIC | Age: OVER 89
End: 2025-06-27
Payer: MEDICARE

## 2025-06-27 DIAGNOSIS — E03.9 ACQUIRED HYPOTHYROIDISM: ICD-10-CM

## 2025-06-27 DIAGNOSIS — I10 BENIGN ESSENTIAL HYPERTENSION: Primary | ICD-10-CM

## 2025-06-27 NOTE — TELEPHONE ENCOUNTER
Patient's daughter Coral Spain, called requesting that lab work be put into her mother's chart so that she can get it done a week before her upcoming appointment 12/23/25

## 2025-06-30 ENCOUNTER — TREATMENT (OUTPATIENT)
Dept: ENDOCRINOLOGY | Age: OVER 89
End: 2025-06-30
Payer: MEDICARE

## 2025-06-30 ENCOUNTER — SPECIALTY PHARMACY (OUTPATIENT)
Dept: ENDOCRINOLOGY | Age: OVER 89
End: 2025-06-30
Payer: MEDICARE

## 2025-06-30 DIAGNOSIS — E11.65 TYPE 2 DIABETES MELLITUS WITH HYPERGLYCEMIA, WITH LONG-TERM CURRENT USE OF INSULIN: Primary | ICD-10-CM

## 2025-06-30 DIAGNOSIS — Z79.4 TYPE 2 DIABETES MELLITUS WITH HYPERGLYCEMIA, WITH LONG-TERM CURRENT USE OF INSULIN: Primary | ICD-10-CM

## 2025-06-30 PROCEDURE — 95251 CONT GLUC MNTR ANALYSIS I&R: CPT | Performed by: STUDENT IN AN ORGANIZED HEALTH CARE EDUCATION/TRAINING PROGRAM

## 2025-06-30 NOTE — PROGRESS NOTES
Specialty Pharmacy Patient Management Program  One-Time Clinical Outreach     Elissa Marrero is a 89 y.o. female seen by an Endocrinology provider for Type 2 Diabetes and enrolled in the Endocrinology Patient Management program offered by [facility].      Open Medication Therapy Problems  Diabetes mellitus, type 2   1 Rationale: Dose too low - Dosage too low - Effectiveness   Recommendation: Increase Dose - Lantus SoloStar 100 UNIT/ML solution pen-injector - inject 10units daily   Identified Date: 6/30/2025   Note: Patient's daughter (Alexander GRECO) presented CGM reader for download and evaluation of data. Michelle started administering insulin to Elissa (Lantus 6units daily) on 6/14/25. Based on CGM data, she is not meeting glycemic goals and would benefit from insulin dose increase to help improve glycemic control. Given patient age and current comorbid conditions, would recommend a more relaxed A1c goal of <8% with primary focus on reducing risk for hypoglycemia. Patient did change from the  she received as a sample, to the new  she received from [facility] on 6/28 but she states that data is similar. Advised patient to increase Lantus to 10units daily as previously discussed with Dr. Childers and Dr. MAYA. Will follow-up with patient on 7/3 to help titrate further if needed.     Plan:   INCREASE Lantus to 10units daily in the morning   Specialty Pharmacy team to follow-up 7/3 discuss further titration      Above Target (>180mg) In Target (70-180mg/dL) Below  Target (<70mg/dL) Glucose Management Indicator (GMI)   Goal  >50% <1% <8%   06/28-06/30 97% (181-250)  3% (>250mg) 0% 0%  11.5%                  Yecenia Stuart, PharmD, BCACP, BC-ADM, CDCES  Clinical Specialty Pharmacist, Endocrinology  6/30/2025  11:17 EDT

## 2025-06-30 NOTE — TELEPHONE ENCOUNTER
No answer  pt is not on my chart   Ok for the hub to relay message if needed -Done. That does NOT include the Diabetic labs, as Endocrine is taking that over.

## 2025-07-01 NOTE — TELEPHONE ENCOUNTER
Updated POA documents sent via [employer] to [facility] on 6/30/25 at 11:47AM - Confirmation received.    Yecenia Stuart, PharmD, BCACP, BC-ADM, [facility]  Clinical Specialty Pharmacist, Endocrinology  7/1/2025  09:44 EDT

## 2025-07-01 NOTE — PROGRESS NOTES
Reviewed Dexcom report  Average glucose 342  High 3%  Very high 97%  Currently takes Lantus 6 units daily, the dose was increased to 10 units daily recently      If pre-breakfast sugar is above 140 on 2 consecutive days, then increase your long-acting insulin/ Lantus  by 2 units.max 20 units daily    If pre-breakfast sugar is below 100 on 2 consecutive days, then decrease your long-acting insulin/ Lantus by 2 units.    If they would like to see a dietitian I can order the referral.

## 2025-07-03 ENCOUNTER — APPOINTMENT (OUTPATIENT)
Dept: CT IMAGING | Facility: HOSPITAL | Age: OVER 89
End: 2025-07-03
Payer: MEDICARE

## 2025-07-03 ENCOUNTER — SPECIALTY PHARMACY (OUTPATIENT)
Dept: ENDOCRINOLOGY | Age: OVER 89
End: 2025-07-03
Payer: MEDICARE

## 2025-07-03 ENCOUNTER — APPOINTMENT (OUTPATIENT)
Dept: GENERAL RADIOLOGY | Facility: HOSPITAL | Age: OVER 89
End: 2025-07-03
Payer: MEDICARE

## 2025-07-03 ENCOUNTER — HOSPITAL ENCOUNTER (INPATIENT)
Facility: HOSPITAL | Age: OVER 89
LOS: 4 days | Discharge: HOME | End: 2025-07-08
Attending: EMERGENCY MEDICINE | Admitting: STUDENT IN AN ORGANIZED HEALTH CARE EDUCATION/TRAINING PROGRAM
Payer: MEDICARE

## 2025-07-03 DIAGNOSIS — N39.0 URINARY TRACT INFECTION WITHOUT HEMATURIA, SITE UNSPECIFIED: ICD-10-CM

## 2025-07-03 DIAGNOSIS — S06.5XAA SUBDURAL HEMATOMA: Primary | ICD-10-CM

## 2025-07-03 DIAGNOSIS — S06.5XAA SDH (SUBDURAL HEMATOMA): ICD-10-CM

## 2025-07-03 PROBLEM — R53.1 WEAKNESS: Status: ACTIVE | Noted: 2025-07-03

## 2025-07-03 PROBLEM — D64.9 ANEMIA: Status: ACTIVE | Noted: 2025-07-03

## 2025-07-03 LAB
ALBUMIN SERPL-MCNC: 3.5 G/DL (ref 3.5–5.2)
ALBUMIN/GLOB SERPL: 1.1 G/DL
ALP SERPL-CCNC: 59 U/L (ref 39–117)
ALT SERPL W P-5'-P-CCNC: 9 U/L (ref 1–33)
ANION GAP SERPL CALCULATED.3IONS-SCNC: 9.3 MMOL/L (ref 5–15)
AST SERPL-CCNC: 15 U/L (ref 1–32)
BACTERIA UR QL AUTO: ABNORMAL /HPF
BASOPHILS # BLD AUTO: 0.02 10*3/MM3 (ref 0–0.2)
BASOPHILS NFR BLD AUTO: 0.3 % (ref 0–1.5)
BILIRUB SERPL-MCNC: 0.3 MG/DL (ref 0–1.2)
BILIRUB UR QL STRIP: NEGATIVE
BUN SERPL-MCNC: 20 MG/DL (ref 8–23)
BUN/CREAT SERPL: 9.8 (ref 7–25)
CALCIUM SPEC-SCNC: 10 MG/DL (ref 8.2–9.6)
CHLORIDE SERPL-SCNC: 103 MMOL/L (ref 98–107)
CLARITY UR: CLEAR
CO2 SERPL-SCNC: 23.7 MMOL/L (ref 22–29)
COLOR UR: YELLOW
CREAT SERPL-MCNC: 2.05 MG/DL (ref 0.57–1)
DEPRECATED RDW RBC AUTO: 42.9 FL (ref 37–54)
EGFRCR SERPLBLD CKD-EPI 2021: 21.3 ML/MIN/1.73
EOSINOPHIL # BLD AUTO: 0.06 10*3/MM3 (ref 0–0.4)
EOSINOPHIL NFR BLD AUTO: 0.8 % (ref 0.3–6.2)
ERYTHROCYTE [DISTWIDTH] IN BLOOD BY AUTOMATED COUNT: 13.8 % (ref 12.3–15.4)
GEN 5 1HR TROPONIN T REFLEX: 37 NG/L
GLOBULIN UR ELPH-MCNC: 3.2 GM/DL
GLUCOSE BLDC GLUCOMTR-MCNC: 82 MG/DL (ref 70–130)
GLUCOSE SERPL-MCNC: 133 MG/DL (ref 65–99)
GLUCOSE UR STRIP-MCNC: NEGATIVE MG/DL
HCT VFR BLD AUTO: 36 % (ref 34–46.6)
HGB BLD-MCNC: 11.1 G/DL (ref 12–15.9)
HGB UR QL STRIP.AUTO: NEGATIVE
HYALINE CASTS UR QL AUTO: ABNORMAL /LPF
IMM GRANULOCYTES # BLD AUTO: 0.03 10*3/MM3 (ref 0–0.05)
IMM GRANULOCYTES NFR BLD AUTO: 0.4 % (ref 0–0.5)
KETONES UR QL STRIP: NEGATIVE
LEUKOCYTE ESTERASE UR QL STRIP.AUTO: ABNORMAL
LYMPHOCYTES # BLD AUTO: 3.25 10*3/MM3 (ref 0.7–3.1)
LYMPHOCYTES NFR BLD AUTO: 41.2 % (ref 19.6–45.3)
MCH RBC QN AUTO: 26.9 PG (ref 26.6–33)
MCHC RBC AUTO-ENTMCNC: 30.8 G/DL (ref 31.5–35.7)
MCV RBC AUTO: 87.2 FL (ref 79–97)
MONOCYTES # BLD AUTO: 0.57 10*3/MM3 (ref 0.1–0.9)
MONOCYTES NFR BLD AUTO: 7.2 % (ref 5–12)
NEUTROPHILS NFR BLD AUTO: 3.96 10*3/MM3 (ref 1.7–7)
NEUTROPHILS NFR BLD AUTO: 50.1 % (ref 42.7–76)
NITRITE UR QL STRIP: NEGATIVE
NRBC BLD AUTO-RTO: 0 /100 WBC (ref 0–0.2)
PH UR STRIP.AUTO: 5.5 [PH] (ref 5–8)
PLATELET # BLD AUTO: 185 10*3/MM3 (ref 140–450)
PMV BLD AUTO: 11.3 FL (ref 6–12)
POTASSIUM SERPL-SCNC: 4.3 MMOL/L (ref 3.5–5.2)
PROT SERPL-MCNC: 6.7 G/DL (ref 6–8.5)
PROT UR QL STRIP: ABNORMAL
RBC # BLD AUTO: 4.13 10*6/MM3 (ref 3.77–5.28)
RBC # UR STRIP: ABNORMAL /HPF
REF LAB TEST METHOD: ABNORMAL
SODIUM SERPL-SCNC: 136 MMOL/L (ref 136–145)
SP GR UR STRIP: 1.01 (ref 1–1.03)
SQUAMOUS #/AREA URNS HPF: ABNORMAL /HPF
TROPONIN T % DELTA: 0
TROPONIN T NUMERIC DELTA: 0 NG/L
TROPONIN T SERPL HS-MCNC: 37 NG/L
TSH SERPL DL<=0.05 MIU/L-ACNC: 0.26 UIU/ML (ref 0.27–4.2)
UROBILINOGEN UR QL STRIP: ABNORMAL
WBC # UR STRIP: ABNORMAL /HPF
WBC NRBC COR # BLD AUTO: 7.89 10*3/MM3 (ref 3.4–10.8)

## 2025-07-03 PROCEDURE — 87088 URINE BACTERIA CULTURE: CPT | Performed by: EMERGENCY MEDICINE

## 2025-07-03 PROCEDURE — 71045 X-RAY EXAM CHEST 1 VIEW: CPT

## 2025-07-03 PROCEDURE — 93005 ELECTROCARDIOGRAM TRACING: CPT | Performed by: EMERGENCY MEDICINE

## 2025-07-03 PROCEDURE — 84484 ASSAY OF TROPONIN QUANT: CPT | Performed by: EMERGENCY MEDICINE

## 2025-07-03 PROCEDURE — G0378 HOSPITAL OBSERVATION PER HR: HCPCS

## 2025-07-03 PROCEDURE — 80053 COMPREHEN METABOLIC PANEL: CPT | Performed by: EMERGENCY MEDICINE

## 2025-07-03 PROCEDURE — 36415 COLL VENOUS BLD VENIPUNCTURE: CPT

## 2025-07-03 PROCEDURE — 99285 EMERGENCY DEPT VISIT HI MDM: CPT

## 2025-07-03 PROCEDURE — 84443 ASSAY THYROID STIM HORMONE: CPT | Performed by: STUDENT IN AN ORGANIZED HEALTH CARE EDUCATION/TRAINING PROGRAM

## 2025-07-03 PROCEDURE — 87086 URINE CULTURE/COLONY COUNT: CPT | Performed by: EMERGENCY MEDICINE

## 2025-07-03 PROCEDURE — 25010000002 CEFTRIAXONE PER 250 MG: Performed by: EMERGENCY MEDICINE

## 2025-07-03 PROCEDURE — 82948 REAGENT STRIP/BLOOD GLUCOSE: CPT

## 2025-07-03 PROCEDURE — 93010 ELECTROCARDIOGRAM REPORT: CPT | Performed by: INTERNAL MEDICINE

## 2025-07-03 PROCEDURE — 70450 CT HEAD/BRAIN W/O DYE: CPT

## 2025-07-03 PROCEDURE — 85025 COMPLETE CBC W/AUTO DIFF WBC: CPT | Performed by: EMERGENCY MEDICINE

## 2025-07-03 PROCEDURE — 87186 SC STD MICRODIL/AGAR DIL: CPT | Performed by: EMERGENCY MEDICINE

## 2025-07-03 PROCEDURE — 84439 ASSAY OF FREE THYROXINE: CPT | Performed by: STUDENT IN AN ORGANIZED HEALTH CARE EDUCATION/TRAINING PROGRAM

## 2025-07-03 PROCEDURE — 81001 URINALYSIS AUTO W/SCOPE: CPT | Performed by: EMERGENCY MEDICINE

## 2025-07-03 PROCEDURE — 84481 FREE ASSAY (FT-3): CPT | Performed by: STUDENT IN AN ORGANIZED HEALTH CARE EDUCATION/TRAINING PROGRAM

## 2025-07-03 RX ORDER — BISACODYL 5 MG/1
5 TABLET, DELAYED RELEASE ORAL DAILY PRN
Status: DISCONTINUED | OUTPATIENT
Start: 2025-07-03 | End: 2025-07-08 | Stop reason: HOSPADM

## 2025-07-03 RX ORDER — ROSUVASTATIN CALCIUM 10 MG/1
10 TABLET, COATED ORAL DAILY
Status: DISCONTINUED | OUTPATIENT
Start: 2025-07-04 | End: 2025-07-08 | Stop reason: HOSPADM

## 2025-07-03 RX ORDER — AMOXICILLIN 250 MG
2 CAPSULE ORAL 2 TIMES DAILY PRN
Status: DISCONTINUED | OUTPATIENT
Start: 2025-07-03 | End: 2025-07-08 | Stop reason: HOSPADM

## 2025-07-03 RX ORDER — SODIUM CHLORIDE 0.9 % (FLUSH) 0.9 %
10 SYRINGE (ML) INJECTION EVERY 12 HOURS SCHEDULED
Status: DISCONTINUED | OUTPATIENT
Start: 2025-07-03 | End: 2025-07-08 | Stop reason: HOSPADM

## 2025-07-03 RX ORDER — BISACODYL 10 MG
10 SUPPOSITORY, RECTAL RECTAL DAILY PRN
Status: DISCONTINUED | OUTPATIENT
Start: 2025-07-03 | End: 2025-07-08 | Stop reason: HOSPADM

## 2025-07-03 RX ORDER — POLYETHYLENE GLYCOL 3350 17 G/17G
17 POWDER, FOR SOLUTION ORAL DAILY PRN
Status: DISCONTINUED | OUTPATIENT
Start: 2025-07-03 | End: 2025-07-08 | Stop reason: HOSPADM

## 2025-07-03 RX ORDER — SODIUM CHLORIDE 9 MG/ML
50 INJECTION, SOLUTION INTRAVENOUS CONTINUOUS
Status: ACTIVE | OUTPATIENT
Start: 2025-07-03 | End: 2025-07-04

## 2025-07-03 RX ORDER — NITROGLYCERIN 0.4 MG/1
0.4 TABLET SUBLINGUAL
Status: DISCONTINUED | OUTPATIENT
Start: 2025-07-03 | End: 2025-07-03 | Stop reason: SDUPTHER

## 2025-07-03 RX ORDER — SODIUM CHLORIDE 0.9 % (FLUSH) 0.9 %
10 SYRINGE (ML) INJECTION AS NEEDED
Status: DISCONTINUED | OUTPATIENT
Start: 2025-07-03 | End: 2025-07-08 | Stop reason: HOSPADM

## 2025-07-03 RX ORDER — SODIUM CHLORIDE 9 MG/ML
40 INJECTION, SOLUTION INTRAVENOUS AS NEEDED
Status: DISCONTINUED | OUTPATIENT
Start: 2025-07-03 | End: 2025-07-08 | Stop reason: HOSPADM

## 2025-07-03 RX ORDER — NITROGLYCERIN 0.4 MG/1
0.4 TABLET SUBLINGUAL
Status: DISCONTINUED | OUTPATIENT
Start: 2025-07-03 | End: 2025-07-08 | Stop reason: HOSPADM

## 2025-07-03 RX ORDER — LEVOTHYROXINE SODIUM 88 UG/1
88 TABLET ORAL
Status: DISCONTINUED | OUTPATIENT
Start: 2025-07-04 | End: 2025-07-08 | Stop reason: HOSPADM

## 2025-07-03 RX ORDER — HYDRALAZINE HYDROCHLORIDE 20 MG/ML
10 INJECTION INTRAMUSCULAR; INTRAVENOUS EVERY 6 HOURS PRN
Status: DISCONTINUED | OUTPATIENT
Start: 2025-07-03 | End: 2025-07-08 | Stop reason: HOSPADM

## 2025-07-03 RX ADMIN — Medication 10 ML: at 23:11

## 2025-07-03 RX ADMIN — SODIUM CHLORIDE 50 ML/HR: 9 INJECTION, SOLUTION INTRAVENOUS at 23:11

## 2025-07-03 RX ADMIN — CEFTRIAXONE SODIUM 1000 MG: 1 INJECTION, POWDER, FOR SOLUTION INTRAMUSCULAR; INTRAVENOUS at 20:54

## 2025-07-03 NOTE — ED TRIAGE NOTES
Pt from home via ems, family called for gen weakness that started this am; normally walks with a rollator, today is too weak to ambulate

## 2025-07-03 NOTE — ED PROVIDER NOTES
EMERGENCY DEPARTMENT ENCOUNTER  Room Number:  31/31  PCP: Onel Mann MD  Independent Historians: Patient, Family, and EMS      HPI:  Chief Complaint: had concerns including Weakness - Generalized.   A complete HPI/ROS/PMH/PSH/SH/FH are unobtainable due to: Poor historian    Context: Elissa Marrero is a 89 y.o. female with a medical history of lymphedema, hypothyroidism, type 2 diabetes who presents to the ED c/o acute generalized weakness.  Family reports that she was normal last night.  They report when she woke up this morning she states that she could not walk.  They report she was complaining of feeling dizzy.  She was falling somewhat to the right.  She has not had any traumatic injury or falls.  She denies any pain.  She denies any dysuria.  Family reports she lives with them and is usually ambulatory with a rollator.  The patient denies any room spinning dizziness or vertiginous symptoms.      Review of prior external notes (non-ED) -and- Review of prior external test results outside of this encounter: Laboratory evaluation 6/17/2025 shows normal CBC    Prescription drug monitoring program review:         PAST MEDICAL HISTORY  Active Ambulatory Problems     Diagnosis Date Noted    Diabetes mellitus, type 2 12/07/2012    Hyperlipidemia     Benign essential hypertension     Hypothyroidism     Insomnia     Lymphedema 04/14/2010    Osteoarthritis     Osteopenia     Renal insufficiency 03/04/2009    Restless leg syndrome 12/05/2012    Vitamin D deficiency 10/12/2011    Hip pain, right 09/17/2018    Acute kidney failure 12/11/2014    Chronic kidney disease, stage 4 (severe) 12/11/2014    Disorder of bone and cartilage 12/11/2014    Edema 08/06/2015    Hyperkalemia 08/08/2019    Urinary tract infection 08/06/2015    TIA (transient ischemic attack) 01/13/2023    Acute metabolic encephalopathy 11/17/2023    Acute cystitis without hematuria 11/18/2023    Kidney stone 05/18/2024     Resolved Ambulatory Problems      Diagnosis Date Noted    Hypoglycemia 05/17/2024     Past Medical History:   Diagnosis Date    Encounter for diabetic foot exam 03/2015    History of complete eye exam 11/01/2017         PAST SURGICAL HISTORY  Past Surgical History:   Procedure Laterality Date    APPENDECTOMY      CHOLECYSTECTOMY      EYE SURGERY      TONSILLECTOMY           FAMILY HISTORY  Family History   Problem Relation Age of Onset    Diabetes Sister     Diabetes Brother     Diabetes Daughter     Cancer Son         pancreatic         SOCIAL HISTORY  Social History     Socioeconomic History    Marital status:    Tobacco Use    Smoking status: Never    Smokeless tobacco: Never   Vaping Use    Vaping status: Never Used   Substance and Sexual Activity    Alcohol use: No    Drug use: Defer    Sexual activity: Defer       Chronic or social conditions impacting patient care (Social Determinants of Health):  Social Drivers of Health     Tobacco Use: Low Risk  (6/24/2025)    Patient History     Smoking Tobacco Use: Never     Smokeless Tobacco Use: Never     Passive Exposure: Not on file   Alcohol Use: Not At Risk (5/18/2024)    AUDIT-C     Frequency of Alcohol Consumption: Never     Average Number of Drinks: Patient does not drink     Frequency of Binge Drinking: Never   Financial Resource Strain: Not on file   Food Insecurity: Not on file   Transportation Needs: Not on file   Physical Activity: Not on file   Stress: Not on file   Social Connections: Not on file   Interpersonal Safety: Not At Risk (11/26/2024)    Abuse Screen     Unsafe at Home or Work/School: no     Feels Threatened by Someone?: no     Does Anyone Keep You from Contacting Others or Doint Things Outside the Home?: no     Physical Sign of Abuse Present: no   Depression: Not at risk (6/24/2025)    PHQ-2     PHQ-2 Score: 0   Housing Stability: Unknown (5/18/2024)    Housing Stability     Current Living Arrangements: home     Potentially Unsafe Housing Conditions: Not on file    Utilities: Not on file   Health Literacy: Unknown (11/20/2023)    Education     Help with school or training?: Not on file     Preferred Language: English   Employment: Not on file   Disabilities: At Risk (5/18/2024)    Disabilities     Concentrating, Remembering, or Making Decisions Difficulty: no     Doing Errands Independently Difficulty: yes       ALLERGIES  Benazepril      REVIEW OF SYSTEMS  Review of Systems  Included in HPI  All systems reviewed and negative except for those discussed in HPI.      PHYSICAL EXAM    I have reviewed the triage vital signs and nursing notes.    ED Triage Vitals [07/03/25 1730]   Temp Heart Rate Resp BP SpO2   99 °F (37.2 °C) 63 18 151/63 98 %      Temp src Heart Rate Source Patient Position BP Location FiO2 (%)   -- -- -- -- --       Physical Exam  GENERAL: Awake, alert, no acute distress  SKIN: Warm, dry  HENT: Normocephalic, atraumatic  EYES: no scleral icterus  CV: regular rhythm, regular rate  RESPIRATORY: normal effort, lungs clear  ABDOMEN: soft, nontender, nondistended  MUSCULOSKELETAL: no deformity  NEURO: alert, moves all extremities, follows commands, disoriented to time.  Equal upper and lower extremity strength and sensation.  Cranial nerves II through XII intact.  Normal  heel shin bilaterally            LAB RESULTS  Recent Results (from the past 24 hours)   Comprehensive Metabolic Panel    Collection Time: 07/03/25  5:51 PM    Specimen: Blood   Result Value Ref Range    Glucose 133 (H) 65 - 99 mg/dL    BUN 20.0 8.0 - 23.0 mg/dL    Creatinine 2.05 (H) 0.57 - 1.00 mg/dL    Sodium 136 136 - 145 mmol/L    Potassium 4.3 3.5 - 5.2 mmol/L    Chloride 103 98 - 107 mmol/L    CO2 23.7 22.0 - 29.0 mmol/L    Calcium 10.0 (H) 8.2 - 9.6 mg/dL    Total Protein 6.7 6.0 - 8.5 g/dL    Albumin 3.5 3.5 - 5.2 g/dL    ALT (SGPT) 9 1 - 33 U/L    AST (SGOT) 15 1 - 32 U/L    Alkaline Phosphatase 59 39 - 117 U/L    Total Bilirubin 0.3 0.0 - 1.2 mg/dL    Globulin 3.2 gm/dL    A/G Ratio 1.1  g/dL    BUN/Creatinine Ratio 9.8 7.0 - 25.0    Anion Gap 9.3 5.0 - 15.0 mmol/L    eGFR 21.3 (L) >60.0 mL/min/1.73   High Sensitivity Troponin T    Collection Time: 07/03/25  5:51 PM    Specimen: Blood   Result Value Ref Range    HS Troponin T 37 (H) <14 ng/L   CBC Auto Differential    Collection Time: 07/03/25  5:51 PM    Specimen: Blood   Result Value Ref Range    WBC 7.89 3.40 - 10.80 10*3/mm3    RBC 4.13 3.77 - 5.28 10*6/mm3    Hemoglobin 11.1 (L) 12.0 - 15.9 g/dL    Hematocrit 36.0 34.0 - 46.6 %    MCV 87.2 79.0 - 97.0 fL    MCH 26.9 26.6 - 33.0 pg    MCHC 30.8 (L) 31.5 - 35.7 g/dL    RDW 13.8 12.3 - 15.4 %    RDW-SD 42.9 37.0 - 54.0 fl    MPV 11.3 6.0 - 12.0 fL    Platelets 185 140 - 450 10*3/mm3    Neutrophil % 50.1 42.7 - 76.0 %    Lymphocyte % 41.2 19.6 - 45.3 %    Monocyte % 7.2 5.0 - 12.0 %    Eosinophil % 0.8 0.3 - 6.2 %    Basophil % 0.3 0.0 - 1.5 %    Immature Grans % 0.4 0.0 - 0.5 %    Neutrophils, Absolute 3.96 1.70 - 7.00 10*3/mm3    Lymphocytes, Absolute 3.25 (H) 0.70 - 3.10 10*3/mm3    Monocytes, Absolute 0.57 0.10 - 0.90 10*3/mm3    Eosinophils, Absolute 0.06 0.00 - 0.40 10*3/mm3    Basophils, Absolute 0.02 0.00 - 0.20 10*3/mm3    Immature Grans, Absolute 0.03 0.00 - 0.05 10*3/mm3    nRBC 0.0 0.0 - 0.2 /100 WBC   ECG 12 Lead Altered Mental Status    Collection Time: 07/03/25  5:59 PM   Result Value Ref Range    QT Interval 438 ms    QTC Interval 449 ms   High Sensitivity Troponin T 1Hr    Collection Time: 07/03/25  7:58 PM    Specimen: Arm, Right; Blood   Result Value Ref Range    HS Troponin T 37 (H) <14 ng/L    Troponin T Numeric Delta 0 ng/L    Troponin T % Delta 0 Abnormal if >/= 20%   Urinalysis With Culture If Indicated - Urine, Clean Catch    Collection Time: 07/03/25  8:03 PM    Specimen: Urine, Clean Catch   Result Value Ref Range    Color, UA Yellow Yellow, Straw    Appearance, UA Clear Clear    pH, UA 5.5 5.0 - 8.0    Specific Gravity, UA 1.013 1.005 - 1.030    Glucose, UA Negative  Negative    Ketones, UA Negative Negative    Bilirubin, UA Negative Negative    Blood, UA Negative Negative    Protein, UA Trace (A) Negative    Leuk Esterase, UA Moderate (2+) (A) Negative    Nitrite, UA Negative Negative    Urobilinogen, UA 1.0 E.U./dL 0.2 - 1.0 E.U./dL   Urinalysis, Microscopic Only - Urine, Clean Catch    Collection Time: 07/03/25  8:03 PM    Specimen: Urine, Clean Catch   Result Value Ref Range    RBC, UA 0-2 None Seen, 0-2 /HPF    WBC, UA 21-50 (A) None Seen, 0-2 /HPF    Bacteria, UA 4+ (A) None Seen /HPF    Squamous Epithelial Cells, UA 0-2 None Seen, 0-2 /HPF    Hyaline Casts, UA None Seen None Seen /LPF    Methodology Automated Microscopy    POC Glucose Once    Collection Time: 07/03/25  8:11 PM    Specimen: Blood   Result Value Ref Range    Glucose 82 70 - 130 mg/dL         RADIOLOGY  CT Head Without Contrast  Result Date: 7/3/2025  HEAD CT WITHOUT CONTRAST  REASON:  Generalized weakness, dizziness  COMPARISON STUDIES: Previous head CT exams, most recent 11/26/2024.  TECHNIQUE:  Axial images were acquired from the skull base to vertex without contrast, including multiplanar reformats, per standard departmental protocol.    Radiation dose reduction techniques were utilized, including automated exposure control, and exposure modulation based on body size.  FINDINGS:  There is an acute subdural hemorrhage along the falx, generally about 3 to 5 mm in thickness, though maximally 9 mm in thickness.  There is underlying cerebral volume loss and mild to moderate nonspecific white matter change, but there is no parenchymal hemorrhage, and overall mass effect is negligible. There is no midline shift.  Skull base, calvarium, and extracranial soft tissues show no acute abnormality.       Underlying chronic senescent change, with no acute false seen subdural hemorrhage, generally 3 to 5 mm in thickness though focally up to 9 mm, with negligible mass effect.        This report was finalized on 7/3/2025  8:28 PM by Dr. Femi Cervantes M.D on Workstation: [id number]      XR Chest 1 View  Result Date: 7/3/2025  CXR ONE VIEW  HISTORY: generalized weakness  COMPARISON: 5/5/2024  TECHNIQUE: single portable AP       Allowing for submaximal inspiratory result, heart size appears within normal limits.  There is a submaximal inspiratory result. Allowing for that, there is no convincing evidence of infiltrate, effusion or pneumothorax.    This report was finalized on 7/3/2025 6:46 PM by Dr. Femi Cervantes M.D on Workstation: [id number]          MEDICATIONS GIVEN IN ER  Medications   sodium chloride 0.9 % flush 10 mL (has no administration in time range)   cefTRIAXone (ROCEPHIN) 1,000 mg in sodium chloride 0.9 % 100 mL MBP (has no administration in time range)   nitroglycerin (NITROSTAT) SL tablet 0.4 mg (has no administration in time range)   nitroglycerin (NITROSTAT) SL tablet 0.4 mg (has no administration in time range)         ORDERS PLACED DURING THIS VISIT:  Orders Placed This Encounter   Procedures    Urine Culture - Urine,    XR Chest 1 View    CT Head Without Contrast    Comprehensive Metabolic Panel    Urinalysis With Culture If Indicated - Urine, Clean Catch    High Sensitivity Troponin T    CBC Auto Differential    High Sensitivity Troponin T 1Hr    Urinalysis, Microscopic Only - Urine, Clean Catch    Maintain IV Access    Telemetry - Place Orders & Notify Provider of Results When Patient Experiences Acute Chest Pain, Dysrhythmia or Respiratory Distress    May Be Off Telemetry for Tests    Maintain IV Access    Telemetry - Place Orders & Notify Provider of Results When Patient Experiences Acute Chest Pain, Dysrhythmia or Respiratory Distress    May Be Off Telemetry for Tests    Neurosurgery (on-call MD unless specified)    [facility] (on-call MD unless specified) Details    POC Glucose Once    ECG 12 Lead Altered Mental Status    Insert Peripheral IV    Initiate Observation Status    CBC & Differential          OUTPATIENT MEDICATION MANAGEMENT:  Current Facility-Administered Medications Ordered in Epic   Medication Dose Route Frequency Provider Last Rate Last Admin    cefTRIAXone (ROCEPHIN) 1,000 mg in sodium chloride 0.9 % 100 mL MBP  1,000 mg Intravenous Once Phan Glynn MD        cyanocobalamin injection 1,000 mcg  1,000 mcg Intramuscular Q28 Days Onel Mann MD   1,000 mcg at 06/17/25 0909    nitroglycerin (NITROSTAT) SL tablet 0.4 mg  0.4 mg Sublingual Q5 Min PRN Kinga Amor,         nitroglycerin (NITROSTAT) SL tablet 0.4 mg  0.4 mg Sublingual Q5 Min PRN Kinga Amor,         sodium chloride 0.9 % flush 10 mL  10 mL Intravenous PRN Phan Glynn MD         Current Outpatient Medications Ordered in Epic   Medication Sig Dispense Refill    amLODIPine (NORVASC) 2.5 MG tablet Take 1 tablet by mouth Daily. 90 tablet 1    Cholecalciferol (VITAMIN D PO) Take 125 mcg by mouth Daily.      clopidogrel (PLAVIX) 75 MG tablet Take 1 tablet by mouth Daily. 90 tablet 1    Continuous Glucose  (FreeStyle Chris 3 [place]) device Use 1 Units Every 14 (Fourteen) Days. 1 each 0    Continuous Glucose Sensor (Dexcom G7 Sensor) misc Use 1 Units Every 10 (Ten) Days. 1 each O    Continuous Glucose Sensor (FreeStyle Chris 3 Plus Sensor) Use Every 14 (Fourteen) Days. 2 each 2    glipizide (GLUCOTROL) 5 MG tablet Take 1 tablet by mouth 2 (Two) Times a Day Before Meals. 270 tablet 0    Insulin Glargine (Lantus SoloStar) 100 UNIT/ML injection pen Inject 10 Units under the skin Daily. **Discard open pen 28 days after first use** 15 mL 0    Insulin Pen Needle (BD Pen Needle Sally U/F) 32G X 4 MM misc Use 1 Units 4 (Four) Times a Day. 100 each 1    levothyroxine (SYNTHROID, LEVOTHROID) 88 MCG tablet Take 1 tablet by mouth Daily. 90 tablet 1    metoprolol tartrate (LOPRESSOR) 25 MG tablet Take 1 tablet by mouth 2 (Two) Times a Day. 180 tablet 1    pramipexole (MIRAPEX) 1.5 MG tablet Take 1 tablet by mouth Daily. 90  tablet 1    rosuvastatin (Crestor) 10 MG tablet Take 1 tablet by mouth Daily. 90 tablet 1    SITagliptin (Januvia) 25 MG tablet Take 1 tablet by mouth Daily. 90 tablet 0    traZODone (DESYREL) 50 MG tablet Take 1 tablet by mouth Every Night. 90 tablet 1         PROCEDURES  Procedures            PROGRESS, DATA ANALYSIS, CONSULTS, AND MEDICAL DECISION MAKING  All labs have been independently interpreted by me.  All radiology studies have been reviewed by me. All EKG's have been independently viewed and interpreted by me.  Discussion below represents my analysis of pertinent findings related to patient's condition, differential diagnosis, treatment plan and final disposition.    Differential diagnosis includes but is not limited to stroke, anemia, renal failure, dehydration, UTI.    Clinical Scores:                                       ED Course as of 07/03/25 2046   Thu Jul 03, 2025   1805 Hemoglobin(!): 11.1  EKG PROCEDURE    EKG time: 1759  Rhythm/Rate: Normal sinus, rate 63  P waves and WI: Normal P, normal WI  QRS, axis: Narrow QRS, left axis  ST and T waves: No acute    Independently Interpreted by me  Not significantly changed compared to prior 5/17/2024   [TR]   1839 Creatinine(!): 2.05 [TR]   1839 HS Troponin T(!): 37 [TR]   1839 XR Chest 1 View  My independent interpretation of the imaging study is no dense consolidation [TR]   2014 Glucose: 82 [TR]   2031 Bacteria, UA(!): 4+ [TR]   2031 WBC, UA(!): 21-50 [TR]   2033 Troponin T Numeric Delta: 0  I updated the patient and family at the bedside.  Answered all questions.  She does have a UTI and will start antibiotics.  She has a subdural hematoma and they report she did fall several weeks ago but landed on her bottom.  Plan admission for further management.  They are agreeable. [TR]   2043 Discussing with Dr Levy with [facility].  She agrees to consult.  She recommends repeat CT in the morning [TR]   2045 Discussing with Dr. Amor with A.  He agrees to admit. [TR]       ED Course User Index  [TR] Phan Glynn MD             AS OF 20:46 EDT VITALS:    BP - 152/89  HR - 65  TEMP - 99 °F (37.2 °C)  O2 SATS - 96%    COMPLEXITY OF CARE  The patient requires admission.      DIAGNOSIS  Final diagnoses:   Subdural hematoma   Urinary tract infection without hematuria, site unspecified         DISPOSITION  ED Disposition       ED Disposition   Decision to Admit    Condition   --    Comment   Level of Care: Telemetry [5]   Diagnosis: Subdural hematoma [418907]   Admitting Physician: DANIELLE BARROS [[id number]]   Attending Physician: DANIELLE BARROS [[id number]]   Is patient appropriate for Inpatient Observation Unit?: No [0]                  Please note that portions of this document were completed with a voice recognition program.    Note Disclaimer: At [facility], we believe that sharing information builds trust and better relationships. You are receiving this note because you recently visited [facility]. It is possible you will see health information before a provider has talked with you about it. This kind of information can be easy to misunderstand. To help you fully understand what it means for your health, we urge you to discuss this note with your provider.         Phan Glynn MD  07/03/25 3844

## 2025-07-03 NOTE — PROGRESS NOTES
Specialty Pharmacy Patient Management Program  One-Time Clinical Outreach     Elissa Marrero is a 89 y.o. female seen by an Endocrinology provider for Type 2 Diabetes and enrolled in the Endocrinology Patient Management program offered by [facility].      Open Medication Therapy Problems  Diabetes mellitus, type 2   1 Rationale: Dose too low - Dosage too low - Effectiveness   Recommendation: Increase Dose - Lantus SoloStar 100 UNIT/ML solution pen-injector - inject 10units daily   Identified Date: 6/30/2025   Note: Patient's daughter (Alexander GRECO) presented CGM reader for download and evaluation of data. Michelle started administering insulin to Elissa (Lantus 6units daily) on 6/14/25. Based on CGM data, she is not meeting glycemic goals and would benefit from insulin dose increase to help improve glycemic control. Given patient age and current comorbid conditions, would recommend a more relaxed A1c goal of <8% with primary focus on reducing risk for hypoglycemia. Patient did change from the  she received as a sample, to the new  she received from [facility] on 6/28 but she states that data is similar. Advised patient to increase Lantus to 10units daily as previously discussed with Dr. Childers and Dr. MAYA. Will follow-up with patient on 7/3 to help titrate further if needed.     Plan:   INCREASE Lantus to 10units daily in the morning   Specialty Pharmacy team to follow-up 7/3 discuss further titration      Above Target (>180mg) In Target (70-180mg/dL) Below  Target (<70mg/dL) Glucose Management Indicator (GMI)   Goal  >50% <1% <8%   06/28-06/30 97% (181-250)  3% (>250mg) 0% 0%  11.5%        _________________________________________________________________________  Phone call- 7/3/25  Spoke with Michelle and she reports fasting blood sugars for the past 3 days of 198, 175, and 169. Patient is currently taking 10 units daily.   Per provider:   Increasing the dose to 10 units daily  sounds reasonable, give it few days then :     If pre-breakfast sugar is above 140 on 2 consecutive days, then increase your long-acting insulin/ Lantus  by 2 units.max 20 units daily     If pre-breakfast sugar is below 100 on 2 consecutive days, then decrease your long-acting insulin/ Lantus by 2 units.        Discussed the recommendation above.   Patient will plan to start 12 units daily, and continue to monitor BG and adjust as needed. Will follow up in about a week to see how patient is doing.              Audrey Braun, PharmD  Clinical Specialty Pharmacist, Endocrinology  7/3/2025  13:56 EDT

## 2025-07-04 ENCOUNTER — TELEPHONE (OUTPATIENT)
Dept: NEUROSURGERY | Facility: CLINIC | Age: OVER 89
End: 2025-07-04
Payer: MEDICARE

## 2025-07-04 ENCOUNTER — APPOINTMENT (OUTPATIENT)
Dept: CT IMAGING | Facility: HOSPITAL | Age: OVER 89
End: 2025-07-04
Payer: MEDICARE

## 2025-07-04 PROBLEM — S06.5XAA SDH (SUBDURAL HEMATOMA): Status: ACTIVE | Noted: 2025-07-04

## 2025-07-04 LAB
ANION GAP SERPL CALCULATED.3IONS-SCNC: 9.4 MMOL/L (ref 5–15)
BUN SERPL-MCNC: 22 MG/DL (ref 8–23)
BUN/CREAT SERPL: 11.8 (ref 7–25)
CALCIUM SPEC-SCNC: 9.5 MG/DL (ref 8.2–9.6)
CHLORIDE SERPL-SCNC: 108 MMOL/L (ref 98–107)
CO2 SERPL-SCNC: 23.6 MMOL/L (ref 22–29)
CREAT SERPL-MCNC: 1.87 MG/DL (ref 0.57–1)
DEPRECATED RDW RBC AUTO: 42.8 FL (ref 37–54)
EGFRCR SERPLBLD CKD-EPI 2021: 23.8 ML/MIN/1.73
ERYTHROCYTE [DISTWIDTH] IN BLOOD BY AUTOMATED COUNT: 13.1 % (ref 12.3–15.4)
GLUCOSE BLDC GLUCOMTR-MCNC: 343 MG/DL (ref 70–130)
GLUCOSE SERPL-MCNC: 201 MG/DL (ref 65–99)
HCT VFR BLD AUTO: 36.2 % (ref 34–46.6)
HGB BLD-MCNC: 11.3 G/DL (ref 12–15.9)
MCH RBC QN AUTO: 27.6 PG (ref 26.6–33)
MCHC RBC AUTO-ENTMCNC: 31.2 G/DL (ref 31.5–35.7)
MCV RBC AUTO: 88.5 FL (ref 79–97)
PLATELET # BLD AUTO: 167 10*3/MM3 (ref 140–450)
PMV BLD AUTO: 11.7 FL (ref 6–12)
POTASSIUM SERPL-SCNC: 4.2 MMOL/L (ref 3.5–5.2)
RBC # BLD AUTO: 4.09 10*6/MM3 (ref 3.77–5.28)
SODIUM SERPL-SCNC: 141 MMOL/L (ref 136–145)
T3FREE SERPL-MCNC: 2.24 PG/ML (ref 2–4.4)
T4 FREE SERPL-MCNC: 1.55 NG/DL (ref 0.92–1.68)
WBC NRBC COR # BLD AUTO: 7.33 10*3/MM3 (ref 3.4–10.8)

## 2025-07-04 PROCEDURE — 97116 GAIT TRAINING THERAPY: CPT

## 2025-07-04 PROCEDURE — 97162 PT EVAL MOD COMPLEX 30 MIN: CPT

## 2025-07-04 PROCEDURE — 82948 REAGENT STRIP/BLOOD GLUCOSE: CPT

## 2025-07-04 PROCEDURE — 85027 COMPLETE CBC AUTOMATED: CPT | Performed by: STUDENT IN AN ORGANIZED HEALTH CARE EDUCATION/TRAINING PROGRAM

## 2025-07-04 PROCEDURE — 80048 BASIC METABOLIC PNL TOTAL CA: CPT | Performed by: STUDENT IN AN ORGANIZED HEALTH CARE EDUCATION/TRAINING PROGRAM

## 2025-07-04 PROCEDURE — 92610 EVALUATE SWALLOWING FUNCTION: CPT

## 2025-07-04 PROCEDURE — 97530 THERAPEUTIC ACTIVITIES: CPT

## 2025-07-04 PROCEDURE — 36415 COLL VENOUS BLD VENIPUNCTURE: CPT | Performed by: STUDENT IN AN ORGANIZED HEALTH CARE EDUCATION/TRAINING PROGRAM

## 2025-07-04 PROCEDURE — 97166 OT EVAL MOD COMPLEX 45 MIN: CPT

## 2025-07-04 PROCEDURE — 25010000002 CEFTRIAXONE PER 250 MG: Performed by: STUDENT IN AN ORGANIZED HEALTH CARE EDUCATION/TRAINING PROGRAM

## 2025-07-04 PROCEDURE — 63710000001 INSULIN LISPRO (HUMAN) PER 5 UNITS: Performed by: INTERNAL MEDICINE

## 2025-07-04 PROCEDURE — 97535 SELF CARE MNGMENT TRAINING: CPT

## 2025-07-04 PROCEDURE — 70450 CT HEAD/BRAIN W/O DYE: CPT

## 2025-07-04 RX ORDER — NICOTINE POLACRILEX 4 MG
15 LOZENGE BUCCAL
Status: DISCONTINUED | OUTPATIENT
Start: 2025-07-04 | End: 2025-07-08 | Stop reason: HOSPADM

## 2025-07-04 RX ORDER — DEXTROSE MONOHYDRATE 25 G/50ML
25 INJECTION, SOLUTION INTRAVENOUS
Status: DISCONTINUED | OUTPATIENT
Start: 2025-07-04 | End: 2025-07-08 | Stop reason: HOSPADM

## 2025-07-04 RX ORDER — IBUPROFEN 600 MG/1
1 TABLET ORAL
Status: DISCONTINUED | OUTPATIENT
Start: 2025-07-04 | End: 2025-07-08 | Stop reason: HOSPADM

## 2025-07-04 RX ORDER — INSULIN LISPRO 100 [IU]/ML
2-7 INJECTION, SOLUTION INTRAVENOUS; SUBCUTANEOUS
Status: DISCONTINUED | OUTPATIENT
Start: 2025-07-04 | End: 2025-07-08 | Stop reason: HOSPADM

## 2025-07-04 RX ADMIN — INSULIN LISPRO 5 UNITS: 100 INJECTION, SOLUTION INTRAVENOUS; SUBCUTANEOUS at 20:47

## 2025-07-04 RX ADMIN — CEFTRIAXONE SODIUM 2000 MG: 2 INJECTION, POWDER, FOR SOLUTION INTRAMUSCULAR; INTRAVENOUS at 09:32

## 2025-07-04 RX ADMIN — Medication 10 ML: at 20:47

## 2025-07-04 RX ADMIN — Medication 10 ML: at 09:33

## 2025-07-04 NOTE — ED NOTES
Nursing report ED to floor  Elissa Marrero  89 y.o.  female    HPI :  HPI  Stated Reason for Visit: weak  History Obtained From: EMS    Chief Complaint  Chief Complaint   Patient presents with    Weakness - Generalized       Admitting doctor:   Kinga Amor DO    Admitting diagnosis:   The primary encounter diagnosis was Subdural hematoma. A diagnosis of Urinary tract infection without hematuria, site unspecified was also pertinent to this visit.    Code status:   Current Code Status       Date Active Code Status Order ID Comments User Context       Prior            Allergies:   Benazepril    Isolation:   No active isolations    Intake and Output  No intake or output data in the 24 hours ending 07/03/25 2125    Weight:   There were no vitals filed for this visit.    Most recent vitals:   Vitals:    07/03/25 2012 07/03/25 2053 07/03/25 2055 07/03/25 2100   BP:    147/73   Pulse: 65 63  64   Resp:   18    Temp:       SpO2: 96% 98%  100%       Active LDAs/IV Access:   Lines, Drains & Airways       Active LDAs       Name Placement date Placement time Site Days    Peripheral IV 07/03/25 1750 20 G Right Antecubital 07/03/25  1750  Antecubital  less than 1                    Labs (abnormal labs have a star):   Labs Reviewed   COMPREHENSIVE METABOLIC PANEL - Abnormal; Notable for the following components:       Result Value    Glucose 133 (*)     Creatinine 2.05 (*)     Calcium 10.0 (*)     eGFR 21.3 (*)     All other components within normal limits    Narrative:     GFR Categories in Chronic Kidney Disease (CKD)              GFR Category          GFR (mL/min/1.73)    Interpretation  G1                    90 or greater        Normal or high (1)  G2                    60-89                Mild decrease (1)  G3a                   45-59                Mild to moderate decrease  G3b                   30-44                Moderate to severe decrease  G4                    15-29                Severe decrease  G5                     14 or less           Kidney failure    (1)In the absence of evidence of kidney disease, neither GFR category G1 or G2 fulfill the criteria for CKD.    eGFR calculation 2021 CKD-EPI creatinine equation, which does not include race as a factor   URINALYSIS W/ CULTURE IF INDICATED - Abnormal; Notable for the following components:    Protein, UA Trace (*)     Leuk Esterase, UA Moderate (2+) (*)     All other components within normal limits    Narrative:     In absence of clinical symptoms, the presence of pyuria, bacteria, and/or nitrites on the urinalysis result does not correlate with infection.   TROPONIN - Abnormal; Notable for the following components:    HS Troponin T 37 (*)     All other components within normal limits    Narrative:     High Sensitive Troponin T Reference Range:  <14.0 ng/L- Negative Female for AMI  <22.0 ng/L- Negative Male for AMI  >=14 - Abnormal Female indicating possible myocardial injury.  >=22 - Abnormal Male indicating possible myocardial injury.   Clinicians would have to utilize clinical acumen, EKG, Troponin, and serial changes to determine if it is an Acute Myocardial Infarction or myocardial injury due to an underlying chronic condition.        CBC WITH AUTO DIFFERENTIAL - Abnormal; Notable for the following components:    Hemoglobin 11.1 (*)     MCHC 30.8 (*)     Lymphocytes, Absolute 3.25 (*)     All other components within normal limits   HIGH SENSITIVITIY TROPONIN T 1HR - Abnormal; Notable for the following components:    HS Troponin T 37 (*)     All other components within normal limits    Narrative:     High Sensitive Troponin T Reference Range:  <14.0 ng/L- Negative Female for AMI  <22.0 ng/L- Negative Male for AMI  >=14 - Abnormal Female indicating possible myocardial injury.  >=22 - Abnormal Male indicating possible myocardial injury.   Clinicians would have to utilize clinical acumen, EKG, Troponin, and serial changes to determine if it is an Acute Myocardial  Infarction or myocardial injury due to an underlying chronic condition.        URINALYSIS, MICROSCOPIC ONLY - Abnormal; Notable for the following components:    WBC, UA 21-50 (*)     Bacteria, UA 4+ (*)     All other components within normal limits   POCT GLUCOSE FINGERSTICK - Normal   URINE CULTURE   CBC AND DIFFERENTIAL    Narrative:     The following orders were created for panel order CBC & Differential.  Procedure                               Abnormality         Status                     ---------                               -----------         ------                     CBC Auto Differential[[id number]]        Abnormal            Final result                 Please view results for these tests on the individual orders.       EKG:   ECG 12 Lead Altered Mental Status   Preliminary Result   HEART RATE=63  bpm   RR Yhmtajyd=755  ms   OR Zybuhsps=499  ms   P Horizontal Axis=-2  deg   P Front Axis=71  deg   QRSD Cidjuubt=858  ms   QT Qjhtccmm=321  ms   JErY=874  ms   QRS Axis=-33  deg   T Wave Axis=63  deg   - ABNORMAL ECG -   Sinus rhythm   Abnormal R-wave progression, early transition   LVH with secondary repolarization abnormality   Anterior Q waves, possibly due to LVH   When compared with ECG of 17-May-2024 21:23:10,   Significant rate decrease   Date and Time of Study:2025-07-03 17:59:08          Meds given in ED:   Medications   sodium chloride 0.9 % flush 10 mL (has no administration in time range)   nitroglycerin (NITROSTAT) SL tablet 0.4 mg (has no administration in time range)   cefTRIAXone (ROCEPHIN) 1,000 mg in sodium chloride 0.9 % 100 mL MBP (1,000 mg Intravenous New Bag 7/3/25 2054)       Imaging results:  CT Head Without Contrast  Result Date: 7/3/2025   Underlying chronic senescent change, with no acute false seen subdural hemorrhage, generally 3 to 5 mm in thickness though focally up to 9 mm, with negligible mass effect.        This report was finalized on 7/3/2025 8:28 PM by Dr. Kahn  SRIDHAR Cervantes on Workstation: [id number]      XR Chest 1 View  Result Date: 7/3/2025   Allowing for submaximal inspiratory result, heart size appears within normal limits.  There is a submaximal inspiratory result. Allowing for that, there is no convincing evidence of infiltrate, effusion or pneumothorax.    This report was finalized on 7/3/2025 6:46 PM by Dr. Femi Cervantes M.D on Workstation: [id number]        Ambulatory status:   - assist x1-2    Social issues:   Social History     Socioeconomic History    Marital status:    Tobacco Use    Smoking status: Never    Smokeless tobacco: Never   Vaping Use    Vaping status: Never Used   Substance and Sexual Activity    Alcohol use: No    Drug use: Defer    Sexual activity: Defer       Peripheral Neurovascular  Peripheral Neurovascular (Adult)  Peripheral Neurovascular WDL: WDL    Neuro Cognitive  Neuro Cognitive (Adult)  Cognitive/Neuro/Behavioral WDL: .WDL except, orientation  Orientation: disoriented to, time (patient's daughters state this is baseline)    Learning  Learning Assessment  Learning Readiness and Ability: sensory deficit noted  Education Provided  Person Taught: patient, family member/friend  Teaching Method: verbal instruction  Teaching Focus: symptom/problem overview, medication administration  Education Outcome Evaluation: verbalizes understanding    Respiratory  Respiratory WDL  Respiratory WDL: WDL (Simultaneous filing. User may be unaware of other data.)    Abdominal Pain       Pain Assessments  Pain (Adult)  (0-10) Pain Rating: Rest: 0    NIH Stroke Scale       Usman Bunn RN  07/03/25 21:25 EDT

## 2025-07-04 NOTE — PROGRESS NOTES
Name: Elissa Marrero ADMIT: 7/3/2025   : 3/15/1925  PCP: Onel Mann MD    MRN: [id number] LOS: 0 days   AGE/SEX: 89 y.o. female  ROOM: [facility]     Subjective   Subjective   Patient seen at bedside, patient is lying in bed.       Objective   Objective   Vital Signs  Temp:  [97.5 °F (36.4 °C)-99 °F (37.2 °C)] 98.2 °F (36.8 °C)  Heart Rate:  [60-74] 66  Resp:  [16-18] 16  BP: (129-160)/(57-94) 160/65  SpO2:  [90 %-100 %] 90 %  on   ;   Device (Oxygen Therapy): room air  Body mass index is 23.82 kg/m².  Physical Exam  Constitutional:       General: She is not in acute distress.     Comments: AAOx2   HENT:      Head: Normocephalic and atraumatic.      Nose: Nose normal. No congestion.      Mouth/Throat:      Pharynx: Oropharynx is clear. No oropharyngeal exudate.   Eyes:      General: No scleral icterus.  Cardiovascular:      Rate and Rhythm: Normal rate and regular rhythm.      Heart sounds: No murmur heard.     No friction rub. No gallop.   Pulmonary:      Effort: No respiratory distress.      Breath sounds: No wheezing, rhonchi or rales.   Abdominal:      General: There is no distension.      Tenderness: There is no abdominal tenderness. There is no guarding.   Musculoskeletal:         General: No swelling, deformity or signs of injury.      Cervical back: Normal range of motion. No rigidity.   Skin:     Coloration: Skin is not jaundiced.      Findings: No bruising or lesion.   Neurological:      General: No focal deficit present.      Mental Status: She is disoriented.      Motor: Weakness present.     Copied text material from yesterday's note has been reviewed for appropriate changes and remains accurate as of 25.      Results Review     I reviewed the patient's new clinical results.  Results from last 7 days   Lab Units 25  0625  1751   WBC 10*3/mm3 7.33 7.89   HEMOGLOBIN g/dL 11.3* 11.1*   PLATELETS 10*3/mm3 167 185     Results from last 7 days   Lab Units 25  07/03/25  1751   SODIUM mmol/L 141 136   POTASSIUM mmol/L 4.2 4.3   CHLORIDE mmol/L 108* 103   CO2 mmol/L 23.6 23.7   BUN mg/dL 22.0 20.0   CREATININE mg/dL 1.87* 2.05*   GLUCOSE mg/dL 201* 133*   EGFR mL/min/1.73 23.8* 21.3*     Results from last 7 days   Lab Units 07/03/25  1751   ALBUMIN g/dL 3.5   BILIRUBIN mg/dL 0.3   ALK PHOS U/L 59   AST (SGOT) U/L 15   ALT (SGPT) U/L 9     Results from last 7 days   Lab Units 07/04/25  0623 07/03/25  1751   CALCIUM mg/dL 9.5 10.0*   ALBUMIN g/dL  --  3.5       Glucose   Date/Time Value Ref Range Status   07/03/2025 2011 82 70 - 130 mg/dL Final       CT Head Without Contrast  Result Date: 7/4/2025  Electronically signed by Abel Ayala MD on 07-04-25 at 0750    CT Head Without Contrast  Result Date: 7/3/2025   Underlying chronic senescent change, with no acute false seen subdural hemorrhage, generally 3 to 5 mm in thickness though focally up to 9 mm, with negligible mass effect.        This report was finalized on 7/3/2025 8:28 PM by Dr. Femi Cervantes M.D on Workstation: [id number]      XR Chest 1 View  Result Date: 7/3/2025   Allowing for submaximal inspiratory result, heart size appears within normal limits.  There is a submaximal inspiratory result. Allowing for that, there is no convincing evidence of infiltrate, effusion or pneumothorax.    This report was finalized on 7/3/2025 6:46 PM by Dr. Femi Cervantes M.D on Workstation: [id number]        I have personally reviewed all medications:  Scheduled Medications  cefTRIAXone, 2,000 mg, Intravenous, Q24H  [Held by provider] levothyroxine, 88 mcg, Oral, Q AM  rosuvastatin, 10 mg, Oral, Daily  sodium chloride, 10 mL, Intravenous, Q12H    Infusions   Diet  Diet: Regular/House; Fluid Consistency: Thin (IDDSI 0)    I have personally reviewed:  [x]  Laboratory   [x]  Microbiology   [x]  Radiology   [x]  EKG/Telemetry  [x]  Cardiology/Vascular   []  Pathology    []  Records       Assessment/Plan     Active Hospital  Problems    Diagnosis  POA    **Subdural hematoma [S06.5XAA]  Yes    SDH (subdural hematoma) [S06.5XAA]  Yes    Weakness [R53.1]  Unknown    Anemia [D64.9]  Unknown    Hypothyroid [E03.9]  Unknown    HTN (hypertension) [I10]  Unknown    HLD (hyperlipidemia) [E78.5]  Unknown    UTI (urinary tract infection) [N39.0]  Unknown    DM (diabetes mellitus) [E11.9]  Unknown    CKD (chronic kidney disease) [N18.9]  Unknown      Resolved Hospital Problems   No resolved problems to display.       89 y.o. female admitted with Subdural hematoma.    Assessment and plan  #Weakness    -Suspect increased weakness due to UTI and subdural hematoma    -PT/OT/SLP    -Treatment as below     #UTI    -UA shows UTI    -Urine culture, follow results    -Rocephin 2 g IV daily     #Subdural Hematoma    -CT head shows acute subdural hematoma along the falx, generally about 3 to 5 mm in thickness, though maximally 9 mm in thickness, there is no midline shift    -Neurosurgery consulted by ER recommends repeat CT head, no further acute changes noted.    -Family states patient is on Plavix, will hold    -Family admits to fall 1 to 2 weeks ago and patient hit her head on wall    -PT/OT     #CKD    -Creatinine 2.05, base around 1.7-1.8    - Monitor     #Anemia    -Hemoglobin 11.1 on admission, base around 12    -Monitor labs     #DM    -ISS     #Hypothyroid    -Resume home Synthroid, check TSH     #Hypertension    -Hydralazine 10 mg IV every 6 hours prerenal parameters     #Hyperlipidemia    - Resume home statin     Discussed with family and she is DNR/DNI  VTE Prophylaxis - SCDs.  Code Status - Limited code (no CPR, no intubation).  If patient decompensates, will consider palliative care evaluation.    Armond Kim MD  [facility]  07/04/25  12:46 EDT

## 2025-07-04 NOTE — PLAN OF CARE
Goal Outcome Evaluation:  Plan of Care Reviewed With: patient, family        Progress: improving  Outcome Evaluation: Neuro exam & VSS. Pt passed bedside swallow, tolerating regular diet well. No aute changes. Awaiting for rehab placement.

## 2025-07-04 NOTE — H&P
Patient Name:  Elissa Marrero  YOB: 1925  MRN:  [id number]  Admit Date:  7/3/2025  Patient Care Team:  Onel Mann MD as PCP - General  Onel Mann MD as PCP - Family Medicine  Anthony Sabillon MD as Consulting Physician (Nephrology)  Josy Tovar APRN as Nurse Practitioner (Nephrology)  Julien Gaming MD as Referring Physician (Dermatology)  Donovan Childers MD as Consulting Physician (Endocrinology)      Subjective   History Present Illness     Chief Complaint   Patient presents with    Weakness - Generalized       Ms. Marrero is a 89 y.o.  with a history of hypertension, hyperlipidemia, hypothyroid, lymphedema, CKD, DM, history of TIA that presents to [facility] complaining of weakness.  Of note, as patient is poor historian HPI supplemented with information from ER providers family at bedside.  Family states the patient woke up this morning feeling dizzy and with difficulty walking where she usually ambulates with a rollator.  Family does state that around a week ago she did have a fall and hit her head on the wall.  She does take Plavix.  Denies chest pain, dyspnea, fever, chills, vomiting, diarrhea.  Presented to [facility] ER for evaluation.    In the ER, vitals 99F, HR 64, RR 18, /73, 100% room air.  Labs notable for HS troponin 37 > 37, creatinine 2.05, glucose 133, hemoglobin 11.1.  UA indicative of UTI.  CT head showed an acute subdural hematoma along the falx that measured about 3 to 5 mm in thickness to maximally 9 mm thickness without midline shift.  Neurosurgery was contacted and recommended monitoring with repeat CT head in the morning.  She is to be admitted for treatment of UTI, PT/OT, neurosurgery evaluation.      History of Present Illness    Review of Systems   12 point ROS reviewed and negative except as mentioned above    Personal History     Past Medical History:   Diagnosis Date    Benign essential hypertension      Diabetes mellitus, type 2 12/07/2012    Encounter for diabetic foot exam 03/2015    History of complete eye exam 11/01/2017    Dr Leslie    Hyperlipidemia     Hypothyroidism     Insomnia     Lymphedema 04/14/2010    Osteoarthritis     Osteopenia     Renal insufficiency 03/04/2009    Restless leg syndrome 12/05/2012    Vitamin D deficiency 10/12/2011     Past Surgical History:   Procedure Laterality Date    APPENDECTOMY      CHOLECYSTECTOMY      EYE SURGERY      TONSILLECTOMY       Family History   Problem Relation Age of Onset    Diabetes Sister     Diabetes Brother     Diabetes Daughter     Cancer Son         pancreatic     Social History     Tobacco Use    Smoking status: Never    Smokeless tobacco: Never   Vaping Use    Vaping status: Never Used   Substance Use Topics    Alcohol use: No    Drug use: Defer     Current Facility-Administered Medications on File Prior to Encounter   Medication Dose Route Frequency Provider Last Rate Last Admin    cyanocobalamin injection 1,000 mcg  1,000 mcg Intramuscular Q28 Days Onel Mann MD   1,000 mcg at 06/17/25 0909     Current Outpatient Medications on File Prior to Encounter   Medication Sig Dispense Refill    amLODIPine (NORVASC) 2.5 MG tablet Take 1 tablet by mouth Daily. 90 tablet 1    Cholecalciferol (VITAMIN D PO) Take 125 mcg by mouth Daily.      clopidogrel (PLAVIX) 75 MG tablet Take 1 tablet by mouth Daily. 90 tablet 1    Continuous Glucose  (FreeStyle Chris 3 Krebs) device Use 1 Units Every 14 (Fourteen) Days. 1 each 0    Continuous Glucose Sensor (Dexcom G7 Sensor) misc Use 1 Units Every 10 (Ten) Days. 1 each O    Continuous Glucose Sensor (FreeStyle Chris 3 Plus Sensor) Use Every 14 (Fourteen) Days. 2 each 2    glipizide (GLUCOTROL) 5 MG tablet Take 1 tablet by mouth 2 (Two) Times a Day Before Meals. 270 tablet 0    Insulin Glargine (Lantus SoloStar) 100 UNIT/ML injection pen Inject 10 Units under the skin Daily. **Discard open pen 28 days after  first use** 15 mL 0    Insulin Pen Needle (BD Pen Needle Sally U/F) 32G X 4 MM misc Use 1 Units 4 (Four) Times a Day. 100 each 1    levothyroxine (SYNTHROID, LEVOTHROID) 88 MCG tablet Take 1 tablet by mouth Daily. 90 tablet 1    metoprolol tartrate (LOPRESSOR) 25 MG tablet Take 1 tablet by mouth 2 (Two) Times a Day. 180 tablet 1    pramipexole (MIRAPEX) 1.5 MG tablet Take 1 tablet by mouth Daily. 90 tablet 1    rosuvastatin (Crestor) 10 MG tablet Take 1 tablet by mouth Daily. 90 tablet 1    SITagliptin (Januvia) 25 MG tablet Take 1 tablet by mouth Daily. 90 tablet 0    traZODone (DESYREL) 50 MG tablet Take 1 tablet by mouth Every Night. 90 tablet 1     Allergies   Allergen Reactions    Benazepril        Objective    Objective     Vital Signs  Temp:  [99 °F (37.2 °C)] 99 °F (37.2 °C)  Heart Rate:  [60-67] 65  Resp:  [18] 18  BP: (130-152)/(59-94) 152/89  SpO2:  [94 %-100 %] 96 %  on   ;   Device (Oxygen Therapy): room air  There is no height or weight on file to calculate BMI.    Physical Exam  Constitutional:       General: She is not in acute distress.     Comments: AAOx2   HENT:      Head: Normocephalic and atraumatic.      Nose: Nose normal. No congestion.      Mouth/Throat:      Pharynx: Oropharynx is clear. No oropharyngeal exudate.   Eyes:      General: No scleral icterus.  Cardiovascular:      Rate and Rhythm: Normal rate and regular rhythm.      Heart sounds: No murmur heard.     No friction rub. No gallop.   Pulmonary:      Effort: No respiratory distress.      Breath sounds: No wheezing, rhonchi or rales.   Abdominal:      General: There is no distension.      Tenderness: There is no abdominal tenderness. There is no guarding.   Musculoskeletal:         General: No swelling, deformity or signs of injury.      Cervical back: Normal range of motion. No rigidity.   Skin:     Coloration: Skin is not jaundiced.      Findings: No bruising or lesion.   Neurological:      General: No focal deficit present.       Mental Status: She is disoriented.      Motor: Weakness present.         Results Review:  I reviewed the patient's new clinical results.  I reviewed the patient's new imaging results and agree with the interpretation.  I reviewed the patient's other test results and agree with the interpretation  I personally viewed and interpreted the patient's EKG/Telemetry data  Discussed with ED provider.    Lab Results (last 24 hours)       Procedure Component Value Units Date/Time    CBC & Differential [[id number]]  (Abnormal) Collected: 07/03/25 1751    Specimen: Blood Updated: 07/03/25 1801    Narrative:      The following orders were created for panel order CBC & Differential.  Procedure                               Abnormality         Status                     ---------                               -----------         ------                     CBC Auto Differential[[id number]]        Abnormal            Final result                 Please view results for these tests on the individual orders.    Comprehensive Metabolic Panel [[id number]]  (Abnormal) Collected: 07/03/25 1751    Specimen: Blood Updated: 07/03/25 1823     Glucose 133 mg/dL      BUN 20.0 mg/dL      Creatinine 2.05 mg/dL      Sodium 136 mmol/L      Potassium 4.3 mmol/L      Chloride 103 mmol/L      CO2 23.7 mmol/L      Calcium 10.0 mg/dL      Total Protein 6.7 g/dL      Albumin 3.5 g/dL      ALT (SGPT) 9 U/L      AST (SGOT) 15 U/L      Alkaline Phosphatase 59 U/L      Total Bilirubin 0.3 mg/dL      Globulin 3.2 gm/dL      A/G Ratio 1.1 g/dL      BUN/Creatinine Ratio 9.8     Anion Gap 9.3 mmol/L      eGFR 21.3 mL/min/1.73     Narrative:      GFR Categories in Chronic Kidney Disease (CKD)              GFR Category          GFR (mL/min/1.73)    Interpretation  G1                    90 or greater        Normal or high (1)  G2                    60-89                Mild decrease (1)  G3a                   45-59                Mild to moderate decrease  G3b                    30-44                Moderate to severe decrease  G4                    15-29                Severe decrease  G5                    14 or less           Kidney failure    (1)In the absence of evidence of kidney disease, neither GFR category G1 or G2 fulfill the criteria for CKD.    eGFR calculation 2021 CKD-EPI creatinine equation, which does not include race as a factor    High Sensitivity Troponin T [[id number]]  (Abnormal) Collected: 07/03/25 1751    Specimen: Blood Updated: 07/03/25 1822     HS Troponin T 37 ng/L     Narrative:      High Sensitive Troponin T Reference Range:  <14.0 ng/L- Negative Female for AMI  <22.0 ng/L- Negative Male for AMI  >=14 - Abnormal Female indicating possible myocardial injury.  >=22 - Abnormal Male indicating possible myocardial injury.   Clinicians would have to utilize clinical acumen, EKG, Troponin, and serial changes to determine if it is an Acute Myocardial Infarction or myocardial injury due to an underlying chronic condition.         CBC Auto Differential [[id number]]  (Abnormal) Collected: 07/03/25 1751    Specimen: Blood Updated: 07/03/25 1801     WBC 7.89 10*3/mm3      RBC 4.13 10*6/mm3      Hemoglobin 11.1 g/dL      Hematocrit 36.0 %      MCV 87.2 fL      MCH 26.9 pg      MCHC 30.8 g/dL      RDW 13.8 %      RDW-SD 42.9 fl      MPV 11.3 fL      Platelets 185 10*3/mm3      Neutrophil % 50.1 %      Lymphocyte % 41.2 %      Monocyte % 7.2 %      Eosinophil % 0.8 %      Basophil % 0.3 %      Immature Grans % 0.4 %      Neutrophils, Absolute 3.96 10*3/mm3      Lymphocytes, Absolute 3.25 10*3/mm3      Monocytes, Absolute 0.57 10*3/mm3      Eosinophils, Absolute 0.06 10*3/mm3      Basophils, Absolute 0.02 10*3/mm3      Immature Grans, Absolute 0.03 10*3/mm3      nRBC 0.0 /100 WBC     High Sensitivity Troponin T 1Hr [[id number]]  (Abnormal) Collected: 07/03/25 1958    Specimen: Blood from Arm, Right Updated: 07/03/25 2033     HS Troponin T 37 ng/L      Troponin T Numeric  Delta 0 ng/L      Troponin T % Delta 0    Narrative:      High Sensitive Troponin T Reference Range:  <14.0 ng/L- Negative Female for AMI  <22.0 ng/L- Negative Male for AMI  >=14 - Abnormal Female indicating possible myocardial injury.  >=22 - Abnormal Male indicating possible myocardial injury.   Clinicians would have to utilize clinical acumen, EKG, Troponin, and serial changes to determine if it is an Acute Myocardial Infarction or myocardial injury due to an underlying chronic condition.         Urinalysis With Culture If Indicated - Urine, Clean Catch [[id number]]  (Abnormal) Collected: 07/03/25 2003    Specimen: Urine, Clean Catch Updated: 07/03/25 2021     Color, UA Yellow     Appearance, UA Clear     pH, UA 5.5     Specific Gravity, UA 1.013     Glucose, UA Negative     Ketones, UA Negative     Bilirubin, UA Negative     Blood, UA Negative     Protein, UA Trace     Leuk Esterase, UA Moderate (2+)     Nitrite, UA Negative     Urobilinogen, UA 1.0 E.U./dL    Narrative:      In absence of clinical symptoms, the presence of pyuria, bacteria, and/or nitrites on the urinalysis result does not correlate with infection.    Urinalysis, Microscopic Only - Urine, Clean Catch [[id number]]  (Abnormal) Collected: 07/03/25 2003    Specimen: Urine, Clean Catch Updated: 07/03/25 2022     RBC, UA 0-2 /HPF      WBC, UA 21-50 /HPF      Bacteria, UA 4+ /HPF      Squamous Epithelial Cells, UA 0-2 /HPF      Hyaline Casts, UA None Seen /LPF      Methodology Automated Microscopy    Urine Culture - Urine, Urine, Clean Catch [[id number]] Collected: 07/03/25 2003    Specimen: Urine, Clean Catch Updated: 07/03/25 2022    POC Glucose Once [[id number]]  (Normal) Collected: 07/03/25 2011    Specimen: Blood Updated: 07/03/25 2013     Glucose 82 mg/dL             Imaging Results (Last 24 Hours)       Procedure Component Value Units Date/Time    CT Head Without Contrast [[id number]] Collected: 07/03/25 2020     Updated: 07/03/25 2031    Narrative:       HEAD CT WITHOUT CONTRAST     REASON:  Generalized weakness, dizziness      COMPARISON STUDIES: Previous head CT exams, most recent 11/26/2024.     TECHNIQUE:  Axial images were acquired from the skull base to vertex  without contrast, including multiplanar reformats, per standard  departmental protocol.    Radiation dose reduction techniques were  utilized, including automated exposure control, and exposure modulation  based on body size.     FINDINGS:     There is an acute subdural hemorrhage along the falx, generally about 3  to 5 mm in thickness, though maximally 9 mm in thickness.     There is underlying cerebral volume loss and mild to moderate  nonspecific white matter change, but there is no parenchymal hemorrhage,  and overall mass effect is negligible. There is no midline shift.     Skull base, calvarium, and extracranial soft tissues show no acute  abnormality.       Impression:         Underlying chronic senescent change, with no acute false seen subdural  hemorrhage, generally 3 to 5 mm in thickness though focally up to 9 mm,  with negligible mass effect.                       This report was finalized on 7/3/2025 8:28 PM by Dr. Femi Cervantes M.D  on Workstation: [id number]       XR Chest 1 View [[id number]] Collected: 07/03/25 1846     Updated: 07/03/25 1849    Narrative:      CXR ONE VIEW      HISTORY: generalized weakness     COMPARISON: 5/5/2024     TECHNIQUE: single portable AP       Impression:         Allowing for submaximal inspiratory result, heart size appears within  normal limits.     There is a submaximal inspiratory result. Allowing for that, there is no  convincing evidence of infiltrate, effusion or pneumothorax.           This report was finalized on 7/3/2025 6:46 PM by Dr. Femi Cervantes M.D  on Workstation: [id number]               Results for orders placed during the hospital encounter of 01/05/23    Adult Transthoracic Echo Complete W/ Cont if Necessary Per Protocol  01/05/2023  2:40 PM    Interpretation Summary    Left ventricular systolic function is normal. Calculated left ventricular EF = 68.2%    Left ventricular diastolic function is consistent with (grade II w/high LAP) pseudonormalization.    There is severe mitral annular calcification present.  There is moderate, anterior mitral leaflet thickening present.Moderate mitral valve regurgitation is present.Mild mitral valve stenosis is present. The mitral valve mean gradient is 4 mmHg.    Saline test results are negative for right to left atrial level shunt.      ECG 12 Lead Altered Mental Status   Preliminary Result   HEART RATE=63  bpm   RR Msqegcji=107  ms   AL Fxsvoxno=357  ms   P Horizontal Axis=-2  deg   P Front Axis=71  deg   QRSD Whocuksj=534  ms   QT Tbscibkj=455  ms   LMnG=528  ms   QRS Axis=-33  deg   T Wave Axis=63  deg   - ABNORMAL ECG -   Sinus rhythm   Abnormal R-wave progression, early transition   LVH with secondary repolarization abnormality   Anterior Q waves, possibly due to LVH   When compared with ECG of 17-May-2024 21:23:10,   Significant rate decrease   Date and Time of Study:2025-07-03 17:59:08           Assessment/Plan   There are no hospital problems to display for this patient.    #Weakness    -Suspect increased weakness due to UTI and subdural hematoma    -PT/OT/SLP    -Treatment as below    #UTI    -UA shows UTI    -Urine culture    -Rocephin 2 g IV daily    #Subdural Hematoma    -CT head shows acute subdural hematoma along the falx, generally about 3 to 5 mm in thickness, though maximally 9 mm in thickness, there is no midline shift    -Neurosurgery consulted by ER recommends repeat CT head in the morning    -Repeat CT head ordered    -Family states patient is on Plavix, will hold    -Family admits to fall 1 to 2 weeks ago and patient hit her head on wall    -PT/OT    #CKD    -Creatinine 2.05, base around 1.7-1.8    - Monitor    #Anemia    -Hemoglobin 11.1 on admission, base around 12     -Monitor labs    #DM    -ISS    #Hypothyroid    -Resume home Synthroid, check TSH    #Hypertension    -Hydralazine 10 mg IV every 6 hours prerenal parameters    #Hyperlipidemia    - Resume home statin    Discussed with family and she is DNR/DNI  VTE Prophylaxis - SCDs.  Code Status - Limited code (no CPR, no intubation).       DO [facility]  07/03/25  20:44 EDT

## 2025-07-04 NOTE — THERAPY EVALUATION
Patient Name: Elissa Marrero  : 3/15/1925    MRN: [id number]                              Today's Date: 2025       Admit Date: 7/3/2025    Visit Dx:     ICD-10-CM ICD-9-CM   1. Subdural hematoma  S06.5XAA 432.1   2. Urinary tract infection without hematuria, site unspecified  N39.0 599.0   3. SDH (subdural hematoma)  S06.5XAA 432.1     Patient Active Problem List   Diagnosis    DM (diabetes mellitus)    HLD (hyperlipidemia)    HTN (hypertension)    Hypothyroid    Insomnia    Lymphedema    Osteoarthritis    Osteopenia    CKD (chronic kidney disease)    Restless leg syndrome    Vitamin D deficiency    Hip pain, right    Acute kidney failure    Chronic kidney disease, stage 4 (severe)    Disorder of bone and cartilage    Edema    Hyperkalemia    UTI (urinary tract infection)    TIA (transient ischemic attack)    Acute metabolic encephalopathy    Acute cystitis without hematuria    Kidney stone    Subdural hematoma    Weakness    Anemia    SDH (subdural hematoma)     Past Medical History:   Diagnosis Date    Benign essential hypertension     Diabetes mellitus, type 2 2012    Encounter for diabetic foot exam 2015    History of complete eye exam 2017    Dr Leslie    Hyperlipidemia     Hypothyroidism     Insomnia     Lymphedema 2010    Osteoarthritis     Osteopenia     Renal insufficiency 2009    Restless leg syndrome 2012    Vitamin D deficiency 10/12/2011     Past Surgical History:   Procedure Laterality Date    APPENDECTOMY      CHOLECYSTECTOMY      EYE SURGERY      TONSILLECTOMY        General Information       Row Name 25 1113          Physical Therapy Time and Intention    Document Type evaluation  -DJ     Mode of Treatment physical therapy;co-treatment;occupational therapy  cotx appropriate due to unknown level of assist for safe mobility as well as limited activity tolerance.  -DJ       Row Name 25 1116          General Information    Patient Profile Reviewed yes   -DJ     Prior Level of Function min assist:;bathing  uses wx, needs help with bathing but states she can dress herself  -DJ     Existing Precautions/Restrictions fall  -DJ     Barriers to Rehab hearing deficit  -DJ       Row Name 07/04/25 1113          Living Environment    Current Living Arrangements home;other (see comments)  with shell  -DJ     People in Home child(mckayla), adult  -DJ       Row Name 07/04/25 1113          Cognition    Orientation Status (Cognition) oriented x 3  -DJ       Row Name 07/04/25 1113          Safety Issues/Impairments Affecting Functional Mobility    Safety Issues Affecting Function (Mobility) judgment;safety precaution awareness  -DJ     Impairments Affecting Function (Mobility) balance;coordination;endurance/activity tolerance;strength  -DJ     Comment, Safety Issues/Impairments (Mobility) gt belt, nonskid socks  -DJ               User Key  (r) = Recorded By, (t) = Taken By, (c) = Cosigned By      Initials Name Provider Type    DJ Kristy Hernandez, PT Physical Therapist                   Mobility       Row Name 07/04/25 1117          Bed Mobility    Bed Mobility supine-sit;sit-supine  -DJ     Supine-Sit [place] (Bed Mobility) contact guard;verbal cues  -DJ     Assistive Device (Bed Mobility) head of bed elevated  -DJ     Comment, (Bed Mobility) Placed in recliner after therapy session  -DJ       Row Name 07/04/25 1117          Transfers    Comment, (Transfers) sit/stand from EOB  -DJ       Row Name 07/04/25 1117          Bed-Chair Transfer    Bed-Chair [place] (Transfers) not tested  -DJ       Row Name 07/04/25 1117          Sit-Stand Transfer    Sit-Stand [place] (Transfers) contact guard;minimum assist (75% patient effort);verbal cues  -DJ     Assistive Device (Sit-Stand Transfers) walker, front-wheeled  -DJ     Comment, (Sit-Stand Transfer) vc to place feet on ground  -DJ       Row Name 07/04/25 1117          Gait/Stairs (Locomotion)    [place] Level (Gait) minimum  assist (75% patient effort);1 person assist;2 person assist;verbal cues  -DJ     Assistive Device (Gait) walker, front-wheeled  -DJ     Distance in Feet (Gait) 50  -DJ     Deviations/Abnormal Patterns (Gait) festinating/shuffling;gait speed decreased;stride length decreased  -DJ     Bilateral Gait Deviations forward flexed posture  -DJ     [place] Level (Stairs) not tested  -DJ     Comment, (Gait/Stairs) Pt amb 50' with r wx and CGA - min A of 1-2 and freq vc to keep body closer to r wx; pt drifts to L and dem flexed hips, mildly unsteady but no overt LOB, poor endurance limites further amb distance  -DJ               User Key  (r) = Recorded By, (t) = Taken By, (c) = Cosigned By      Initials Name Provider Type    Kristy Mcmillan, PT Physical Therapist                   Obj/Interventions       Row Name 07/04/25 1121          Range of Motion Comprehensive    Comment, General Range of Motion grossly WFL for pt's stated age  -DJ       Row Name 07/04/25 1121          Strength Comprehensive (MMT)    Comment, General Manual Muscle Testing (MMT) Assessment fair LE strength  -DJ       Row Name 07/04/25 1121          Motor Skills    Motor Skills functional endurance;coordination  -DJ     Coordination ataxia  -DJ     Functional Endurance poor  -DJ       Row Name 07/04/25 1121          Balance    Balance Assessment standing static balance;standing dynamic balance  -DJ     Static Standing Balance contact guard;2-person assist  -DJ     Dynamic Standing Balance contact guard;minimal assist;2-person assist;verbal cues;1-person assist  -DJ     Position/Device Used, Standing Balance walker, front-wheeled  -DJ     Balance Interventions sitting;standing;sit to stand;supported;weight shifting activity  -DJ     Comment, Balance unsteady, ataxic, no overt LOB  -DJ       Row Name 07/04/25 1121          Sensory Assessment (Somatosensory)    Sensory Assessment (Somatosensory) not tested  -DJ               User Key  (r) = Recorded By,  (t) = Taken By, (c) = Cosigned By      Initials Name Provider Type    DJ Kristy Hernandez, PT Physical Therapist                   Goals/Plan       Row Name 07/04/25 1128          Bed Mobility Goal 1 (PT)    Activity/Assistive Device (Bed Mobility Goal 1, PT) sit to supine;supine to sit  -DJ     [place] Level/Cues Needed (Bed Mobility Goal 1, PT) standby assist;verbal cues required  -DJ     Time Frame (Bed Mobility Goal 1, PT) 10 days  -DJ       Row Name 07/04/25 1128          Transfer Goal 1 (PT)    Activity/Assistive Device (Transfer Goal 1, PT) sit-to-stand/stand-to-sit;walker, rolling  -DJ     [place] Level/Cues Needed (Transfer Goal 1, PT) standby assist  -DJ     Time Frame (Transfer Goal 1, PT) 10 days  -DJ       Row Name 07/04/25 1128          Gait Training Goal 1 (PT)    Activity/Assistive Device (Gait Training Goal 1, PT) gait (walking locomotion);assistive device use;improve balance and speed;increase endurance/gait distance;increase energy conservation;walker, rolling  -DJ     [place] Level (Gait Training Goal 1, PT) contact guard required  -DJ     Distance (Gait Training Goal 1, PT) >150'  -DJ     Time Frame (Gait Training Goal 1, PT) 10 days  -DJ       Row Name 07/04/25 1128          Balance Goal 1 (PT)    Activity/Assistive Device (Balance Goal) standing static balance;standing dynamic balance  -DJ     [place] Level/Cues Needed (Balance Goal 1, PT) supervision required  -DJ     Time Frame (Balance Goal 1, PT) 5-7 days  -DJ       Row Name 07/04/25 1128          Stairs Goal 1 (PT)    Activity/Assistive Device (Stairs Goal 1, PT) ascending stairs;descending stairs  -DJ     [place] Level/Cues Needed (Stairs Goal 1, PT) contact guard required  -DJ     Number of Stairs (Stairs Goal 1, PT) 2-3  -DJ     Time Frame (Stairs Goal 1, PT) 10 days  -DJ       Row Name 07/04/25 1128          Patient Education Goal (PT)    Activity (Patient Education Goal, PT) HEP  -DJ      "[place]/Cues/Accuracy (Memory Goal 2, PT) demonstrates adequately;verbalizes understanding  -DJ     Time Frame (Patient Education Goal, PT) 5 days;short term goal (STG)  -DJ       Row Name 07/04/25 1128          Therapy Assessment/Plan (PT)    Planned Therapy Interventions (PT) balance training;bed mobility training;gait training;home exercise program;transfer training;strengthening;stair training;postural re-education;patient/family education  -DJ               User Key  (r) = Recorded By, (t) = Taken By, (c) = Cosigned By      Initials Name Provider Type    Kristy Mcmillan, PT Physical Therapist                   Clinical Impression       Row Name 07/04/25 1122          Pain    Pretreatment Pain Rating 0/10 - no pain  -DJ       Row Name 07/04/25 1122          Plan of Care Review    Plan of Care Reviewed With patient  -DJ     Outcome Evaluation 89yo pleasant white female admitted 7/3/25 due to dizziness and difficulty walking after sustaining SDH from a fall 1 week ago. PMH includes diabetes, hbp, ckd4, UTI, OA, TIA, kidney stone. PLOF: Pt lives at home with shell adn uses a wx for mobility. She reports her shell helps her bathe but she is able to dress herself. She has \"a few\" DEONNA. She is limited now by generalized weakness and decreased activity tolerance as well as decreased balance. Today, she was found resting in bed in NAD, willing to participate in therapy. She sat EOB with CGA and vc. She req vc to place feet on ground and stood from EOB with CGA - min A using r wx. Pt amb 50' with r wx and CGA - min A of 1-2 and freq vc to keep body closer to r wx; pt drifts to L and dem flexed hips, mildly unsteady but no overt LOB, poor endurance limites further amb distance. She was placed in recliner chair with all needs met. She would certainly beneift from skilled PT services to address functional deficits and prepare for d/c.  -DJ       Row Name 07/04/25 1122          Therapy Assessment/Plan (PT)    Patient/Family " Therapy Goals Statement (PT) home  -DJ     Rehab Potential (PT) fair  -DJ     Criteria for Skilled Interventions Met (PT) yes;meets criteria;skilled treatment is necessary  -DJ     Therapy Frequency (PT) 6 times/wk  -DJ       Row Name 07/04/25 1122          Vital Signs    O2 Delivery Pre Treatment room air  -DJ     O2 Delivery Intra Treatment room air  -DJ     O2 Delivery Post Treatment room air  -DJ     Pre Patient Position Supine  -DJ     Intra Patient Position Standing  -DJ     Post Patient Position Sitting  -DJ       Row Name 07/04/25 1122          Positioning and Restraints    Pre-Treatment Position in bed  -DJ     Post Treatment Position chair  -DJ     In Chair notified nsg;reclined;call light within reach;encouraged to call for assist;exit alarm on  -DJ               User Key  (r) = Recorded By, (t) = Taken By, (c) = Cosigned By      Initials Name Provider Type    Kristy Mcmillan, PT Physical Therapist                   Outcome Measures       Row Name 07/04/25 1130 07/04/25 0930       How much help from another person do you currently need...    Turning from your back to your side while in flat bed without using bedrails? 4  -DJ 3  -SP    Moving from lying on back to sitting on the side of a flat bed without bedrails? 3  -DJ 3  -SP    Moving to and from a bed to a chair (including a wheelchair)? 3  -DJ 3  -SP    Standing up from a chair using your arms (e.g., wheelchair, bedside chair)? 3  -DJ 2  -SP    Climbing 3-5 steps with a railing? 2  -DJ 2  -SP    To walk in hospital room? 2  -DJ 2  -SP    AM-PAC 6 Clicks Score (PT) 17  -DJ 15  -SP    Highest Level of Mobility Goal Stand (1 or More Minutes)-5  -DJ Move to Chair/Commode-4  -SP      Row Name 07/04/25 0011          How much help from another person do you currently need...    Turning from your back to your side while in flat bed without using bedrails? 4  -DF     Moving from lying on back to sitting on the side of a flat bed without bedrails? 4  -DF      "Moving to and from a bed to a chair (including a wheelchair)? 3  -DF     Standing up from a chair using your arms (e.g., wheelchair, bedside chair)? 2  -DF     Climbing 3-5 steps with a railing? 1  -DF     To walk in hospital room? 1  -DF     AM-PAC 6 Clicks Score (PT) 15  -DF     Highest Level of Mobility Goal Move to Chair/Commode-4  -DF       Row Name 07/04/25 1130          Functional Assessment    Outcome Measure Options AM-PAC 6 Clicks Basic Mobility (PT)  -DJ               User Key  (r) = Recorded By, (t) = Taken By, (c) = Cosigned By      Initials Name Provider Type    Kristy Mcmillan, PT Physical Therapist    Nicky Taveras, RN Registered Nurse    Anny Vegas, RN Registered Nurse                                 Physical Therapy Education       Title: PT OT SLP Therapies (In Progress)       Topic: Physical Therapy (In Progress)       Point: Mobility training (Not Started)       Learner Progress:  Not documented in this visit.              Point: Body mechanics (Not Started)       Learner Progress:  Not documented in this visit.              Point: Precautions (Not Started)       Learner Progress:  Not documented in this visit.                                  PT Recommendation and Plan  Planned Therapy Interventions (PT): balance training, bed mobility training, gait training, home exercise program, transfer training, strengthening, stair training, postural re-education, patient/family education  Outcome Evaluation: 89yo pleasant white female admitted 7/3/25 due to dizziness and difficulty walking after sustaining SDH from a fall 1 week ago. PMH includes diabetes, hbp, ckd4, UTI, OA, TIA, kidney stone. PLOF: Pt lives at home with shell adn uses a wx for mobility. She reports her shell helps her bathe but she is able to dress herself. She has \"a few\" DEONNA. She is limited now by generalized weakness and decreased activity tolerance as well as decreased balance. Today, she was found resting in bed in NAD, " willing to participate in therapy. She sat EOB with CGA and vc. She req vc to place feet on ground and stood from EOB with CGA - min A using r wx. Pt amb 50' with r wx and CGA - min A of 1-2 and freq vc to keep body closer to r wx; pt drifts to L and dem flexed hips, mildly unsteady but no overt LOB, poor endurance limites further amb distance. She was placed in recliner chair with all needs met. She would certainly beneift from skilled PT services to address functional deficits and prepare for d/c.     Time Calculation:   PT Evaluation Complexity  History, PT Evaluation Complexity: 3 or more personal factors and/or comorbidities  Examination of Body Systems (PT Eval Complexity): total of 4 or more elements  Clinical Presentation (PT Evaluation Complexity): evolving  Clinical Decision Making (PT Evaluation Complexity): moderate complexity  Overall Complexity (PT Evaluation Complexity): moderate complexity     PT Charges       Row Name 07/04/25 1154             Time Calculation    Start Time 0901  -DJ      Stop Time 0924  -DJ      Time Calculation (min) 23 min  -DJ      PT Non-Billable Time (min) 10 min  -DJ      PT Received On 07/04/25  -DJ      PT - Next Appointment 07/05/25  -DJ      PT Goal Re-Cert Due Date 07/14/25  -DJ                User Key  (r) = Recorded By, (t) = Taken By, (c) = Cosigned By      Initials Name Provider Type    Kristy Mcmillan, MAIKOL Physical Therapist                  Therapy Charges for Today       Code Description Service Date Service Provider Modifiers Qty    04748797310 HC PT EVAL MOD COMPLEXITY 3 7/4/2025 Kristy Hernandez, PT GP 1    96965051507 HC PT THERAPEUTIC ACT EA 15 MIN 7/4/2025 Kristy Hernandez, PT GP 1    31354691303 HC GAIT TRAINING EA 15 MIN 7/4/2025 Kristy Hernandez, PT GP 1            PT G-Codes  Outcome Measure Options: AM-PAC 6 Clicks Basic Mobility (PT)  AM-PAC 6 Clicks Score (PT): 17  PT Discharge Summary  Anticipated Discharge Disposition (PT): home with home health, sub acute care  setting (pending progress)    Kristy Hernandez, PT  7/4/2025

## 2025-07-04 NOTE — THERAPY EVALUATION
Acute Care - Speech Language Pathology   Swallow Initial Evaluation [facility]     Patient Name: Elissa Marrero  : 3/15/1925  MRN: [id number]  Today's Date: 2025               Admit Date: 7/3/2025    Visit Dx:     ICD-10-CM ICD-9-CM   1. Subdural hematoma  S06.5XAA 432.1   2. Urinary tract infection without hematuria, site unspecified  N39.0 599.0   3. SDH (subdural hematoma)  S06.5XAA 432.1     Patient Active Problem List   Diagnosis    DM (diabetes mellitus)    HLD (hyperlipidemia)    HTN (hypertension)    Hypothyroid    Insomnia    Lymphedema    Osteoarthritis    Osteopenia    CKD (chronic kidney disease)    Restless leg syndrome    Vitamin D deficiency    Hip pain, right    Acute kidney failure    Chronic kidney disease, stage 4 (severe)    Disorder of bone and cartilage    Edema    Hyperkalemia    UTI (urinary tract infection)    TIA (transient ischemic attack)    Acute metabolic encephalopathy    Acute cystitis without hematuria    Kidney stone    Subdural hematoma    Weakness    Anemia    SDH (subdural hematoma)     Past Medical History:   Diagnosis Date    Benign essential hypertension     Diabetes mellitus, type 2 2012    Encounter for diabetic foot exam 2015    History of complete eye exam 2017    Dr Leslie    Hyperlipidemia     Hypothyroidism     Insomnia     Lymphedema 2010    Osteoarthritis     Osteopenia     Renal insufficiency 2009    Restless leg syndrome 2012    Vitamin D deficiency 10/12/2011     Past Surgical History:   Procedure Laterality Date    APPENDECTOMY      CHOLECYSTECTOMY      EYE SURGERY      TONSILLECTOMY         SLP Recommendation and Plan  SLP Swallowing Diagnosis: functional oral phase, functional pharyngeal phase (25 1200)  SLP Diet Recommendation: regular textures, thin liquids (25 1200)  Recommended Precautions and Strategies: upright posture during/after eating, small bites of food and sips of liquid (25 1200)  SLP  Rec. for Method of Medication Administration: meds whole, as tolerated (07/04/25 1200)     Monitor for Signs of Aspiration: yes, notify SLP if any concerns (07/04/25 1200)     Swallow Criteria for Skilled Therapeutic Interventions Met: no problems identified which require skilled intervention (07/04/25 1200)  Anticipated Discharge Disposition (SLP): unknown (07/04/25 1200)           Oral Care Recommendations: Oral Care BID/PRN (07/04/25 1200)                                        Outcome Evaluation: Swallow eval completed. Pt took trials of thin (cup/straw), pureed, soft, mixed, and regular solids. No s/s were noted. Mastication was functional. Slight lingual residue after a cracker was cleared with a liquid wash. Recommend a regular diet with thin; meds as tolerated; upright for meals and 30 min after; slow rate; small bites/sips. Pt was oriented and appropriate during the eval. ST to sign off at this time. Please reconsult as needed.      SWALLOW EVALUATION (Last 72 Hours)       SLP Adult Swallow Evaluation       Row Name 07/04/25 1200                   Rehab Evaluation    Document Type evaluation  -AW        Subjective Information no complaints  -AW        Patient Observations alert;cooperative;agree to therapy  -AW        Patient/Family/Caregiver Comments/Observations Pt up in chair, oriented x3.  -AW        Patient Effort good  -AW        Symptoms Noted During/After Treatment none  -AW        Oral Care teeth brushed - regular toothbrush  -AW           General Information    Patient Profile Reviewed yes  -AW        Pertinent History Of Current Problem Pt admitted with weakness, dizziness, and UTI. CT showed a SDH. PMH: TIA, DM, CKD  -AW        Current Method of Nutrition NPO  -AW        Precautions/Limitations, Vision WFL;for purposes of eval  -AW        Precautions/Limitations, Hearing WFL;for purposes of eval  -AW        Prior Level of Function-Communication WFL  -AW        Prior Level of Function-Swallowing  no diet consistency restrictions  -AW        Plans/Goals Discussed with patient;agreed upon  -AW        Barriers to Rehab none identified  -AW        Patient's Goals for Discharge return home  -AW           Pain    Pretreatment Pain Rating 0/10 - no pain  -AW        Posttreatment Pain Rating 0/10 - no pain  -AW           Oral Motor Structure and Function    Dentition Assessment upper dentures/partial in place;lower dentures/partial in place;other (see comments)  upper dentures not able to be removed  -AW        Secretion Management WNL/WFL  -AW        Mucosal Quality moist, healthy  -AW           Oral Musculature and Cranial Nerve Assessment    Oral Motor General Assessment WFL  -AW           General Eating/Swallowing Observations    Respiratory Support Currently in Use room air  -AW        Eating/Swallowing Skills self-fed  -AW        Positioning During Eating upright 90 degree  -AW        Utensils Used spoon;cup;straw  -AW        Consistencies Trialed regular textures;soft to chew textures;mixed consistency;pureed;thin liquids  -AW           Clinical Swallow Eval    Clinical Swallow Evaluation Summary Swallow eval completed. Pt took trials of thin (cup/straw), pureed, soft, mixed, and regular solids. No s/s were noted. Mastication was functional. Slight lingual residue after a cracker was cleared with a liquid wash. Recommend a regular diet with thin; meds as tolerated; upright for meals and 30 min after; slow rate; small bites/sips. Pt was oriented and appropriate during the eval. ST to sign off at this time. Please reconsult as needed.  -AW           SLP Evaluation Clinical Impression    SLP Swallowing Diagnosis functional oral phase;functional pharyngeal phase  -AW        Functional Impact risk of aspiration/pneumonia  -AW        Swallow Criteria for Skilled Therapeutic Interventions Met no problems identified which require skilled intervention  -AW           Recommendations    SLP Diet Recommendation regular  textures;thin liquids  -AW        Recommended Precautions and Strategies upright posture during/after eating;small bites of food and sips of liquid  -AW        Oral Care Recommendations Oral Care BID/PRN  -AW        SLP Rec. for Method of Medication Administration meds whole;as tolerated  -AW        Monitor for Signs of Aspiration yes;notify SLP if any concerns  -AW        Anticipated Discharge Disposition (SLP) unknown  -AW                  User Key  (r) = Recorded By, (t) = Taken By, (c) = Cosigned By      Initials Name Effective Dates    Luz Maria Troy SLP 08/28/23 -                     EDUCATION  The patient has been educated in the following areas:   Dysphagia (Swallowing Impairment) Oral Care/Hydration.                Time Calculation:    Time Calculation- SLP       Row Name 07/04/25 1229             Time Calculation- SLP    SLP Start Time 1030  -AW      SLP Received On 07/04/25  -AW                User Key  (r) = Recorded By, (t) = Taken By, (c) = Cosigned By      Initials Name Provider Type    Luz Maria Troy SLP Speech and Language Pathologist                    Therapy Charges for Today       Code Description Service Date Service Provider Modifiers Qty    91130950718  ST EVAL ORAL PHARYNG SWALLOW 4 7/4/2025 Luz Maria Ospina SLP GN 1                 ERICH Schwarz  7/4/2025

## 2025-07-04 NOTE — PLAN OF CARE
Goal Outcome Evaluation:  Plan of Care Reviewed With: patient      Pt resting in bed alert to self and knows she is in the hospital time and place not aware.  She is pleasant   Iv infusing 50/ml hr NPO at midnight   Both daughters Meredith and triston updated at bedside     Progress: no change

## 2025-07-04 NOTE — THERAPY EVALUATION
Can not adjust meds based on one home reading. Especially when none of the recent readings on the chart are anywhere near that. Schedule urgent visit with next available provider.    Patient Name: Elissa Marrero  : 3/15/1925    MRN: [id number]                              Today's Date: 2025       Admit Date: 7/3/2025    Visit Dx:     ICD-10-CM ICD-9-CM   1. Subdural hematoma  S06.5XAA 432.1   2. Urinary tract infection without hematuria, site unspecified  N39.0 599.0   3. SDH (subdural hematoma)  S06.5XAA 432.1     Patient Active Problem List   Diagnosis    DM (diabetes mellitus)    HLD (hyperlipidemia)    HTN (hypertension)    Hypothyroid    Insomnia    Lymphedema    Osteoarthritis    Osteopenia    CKD (chronic kidney disease)    Restless leg syndrome    Vitamin D deficiency    Hip pain, right    Acute kidney failure    Chronic kidney disease, stage 4 (severe)    Disorder of bone and cartilage    Edema    Hyperkalemia    UTI (urinary tract infection)    TIA (transient ischemic attack)    Acute metabolic encephalopathy    Acute cystitis without hematuria    Kidney stone    Subdural hematoma    Weakness    Anemia    SDH (subdural hematoma)     Past Medical History:   Diagnosis Date    Benign essential hypertension     Diabetes mellitus, type 2 2012    Encounter for diabetic foot exam 2015    History of complete eye exam 2017    Dr Leslie    Hyperlipidemia     Hypothyroidism     Insomnia     Lymphedema 2010    Osteoarthritis     Osteopenia     Renal insufficiency 2009    Restless leg syndrome 2012    Vitamin D deficiency 10/12/2011     Past Surgical History:   Procedure Laterality Date    APPENDECTOMY      CHOLECYSTECTOMY      EYE SURGERY      TONSILLECTOMY        General Information       Row Name 25 1614          OT Time and Intention    Subjective Information no complaints  -RB     Document Type evaluation  -RB     Mode of Treatment co-treatment;physical therapy;occupational therapy  -RB     Patient Effort good  -RB       Row Name 25 [id number]          General Information    Patient Profile Reviewed yes  -RB     Prior Level of Function  independent:;ADL's;transfer  occasional assistance for bathing/dressing from her daughter. her daughter works and the pt reports independence when home alone.  -RB     Existing Precautions/Restrictions fall  -RB     Barriers to Rehab previous functional deficit  -RB       Row Name 07/04/25 1614          Living Environment    Current Living Arrangements home  -RB     People in Home child(mckayla), adult  -RB       Row Name 07/04/25 1614          Cognition    Orientation Status (Cognition) oriented x 3  -RB       Row Name 07/04/25 1614          Safety Issues/Impairments Affecting Functional Mobility    Safety Issues Affecting Function (Mobility) safety precaution awareness;safety precautions follow-through/compliance;insight into deficits/self-awareness;awareness of need for assistance;positioning of assistive device  -RB     Impairments Affecting Function (Mobility) balance;coordination;endurance/activity tolerance;strength  -RB               User Key  (r) = Recorded By, (t) = Taken By, (c) = Cosigned By      Initials Name Provider Type    RB Jerri Watt, OTR/L, CSRS Occupational Therapist                     Mobility/ADL's       Row Name 07/04/25 1615          Bed Mobility    Bed Mobility supine-sit  -RB     Supine-Sit [place] (Bed Mobility) verbal cues;contact guard  -RB     Assistive Device (Bed Mobility) bed rails  -RB       Row Name 07/04/25 1615          Transfers    Transfers sit-stand transfer;stand-sit transfer;bed-chair transfer  -RB       Row Name 07/04/25 1615          Bed-Chair Transfer    Bed-Chair [place] (Transfers) contact guard;1 person assist  -RB     Assistive Device (Bed-Chair Transfers) walker, front-wheeled  -RB       Row Name 07/04/25 1615          Sit-Stand Transfer    Sit-Stand [place] (Transfers) minimum assist (75% patient effort);1 person assist;verbal cues  -RB     Assistive Device (Sit-Stand Transfers) walker, front-wheeled  -RB       Row Name 07/04/25 1615           Stand-Sit Transfer    Stand-Sit [place] (Transfers) minimum assist (75% patient effort);contact guard;verbal cues  -RB     Assistive Device (Stand-Sit Transfers) walker, front-wheeled  -RB       Row Name 07/04/25 1615          Functional Mobility    Functional Mobility- Ind. Level contact guard assist;minimum assist (75% patient effort);1 person;verbal cues required  -RB     Functional Mobility- Device walker, front-wheeled  -RB       Row Name 07/04/25 1615          Activities of Daily Living    BADL Assessment/Intervention lower body dressing;grooming  -RB       Row Name 07/04/25 1615          Lower Body Dressing Assessment/Training    [place] Level (Lower Body Dressing) lower body dressing skills;maximum assist (25% patient effort);socks;don  -RB     Position (Lower Body Dressing) edge of bed sitting  -RB       Row Name 07/04/25 1615          Grooming Assessment/Training    [place] Level (Grooming) grooming skills;wash face, hands;hair care, combing/brushing  -RB     Position (Grooming) sink side  -RB               User Key  (r) = Recorded By, (t) = Taken By, (c) = Cosigned By      Initials Name Provider Type    RB Jerri Watt, OTR/L, CSRS Occupational Therapist                   Obj/Interventions       Row Name 07/04/25 1616          Sensory Assessment (Somatosensory)    Sensory Assessment (Somatosensory) sensation intact  -RB       Row Name 07/04/25 1616          Vision Assessment/Intervention    Visual Impairment/Limitations WFL  -RB       Row Name 07/04/25 1616          Range of Motion Comprehensive    General Range of Motion no range of motion deficits identified  -RB       Row Name 07/04/25 1616          Strength Comprehensive (MMT)    Comment, General Manual Muscle Testing (MMT) Assessment generalized age appropriate weakness. no acute focal strength deficits.  -RB       Row Name 07/04/25 1616          Motor Skills    Motor Skills functional endurance  -RB     Functional Endurance The  pt tolerated ~4 min of standing activity before a seated rest break was required. She demonstrated increased fatigue with standing activity.  -RB       Row Name 07/04/25 1616          Balance    Comment, Balance SBA/CGA sitting balance, CGA/Min A + walker standing balance. No LOB, general unsteadiness  -RB               User Key  (r) = Recorded By, (t) = Taken By, (c) = Cosigned By      Initials Name Provider Type    RB Jerri Watt, OTR/L, CSRS Occupational Therapist                   Goals/Plan       Row Name 07/04/25 1618          Bed Mobility Goal 1 (OT)    [place] Level/Cues Needed (Bed Mobility Goal 1, OT) standby assist  -RB     Time Frame (Bed Mobility Goal 1, OT) short term goal (STG);2 weeks  -RB     Progress/Outcomes (Bed Mobility Goal 1, OT) new goal  -RB       Row Name 07/04/25 1618          Transfer Goal 1 (OT)    Activity/Assistive Device (Transfer Goal 1, OT) transfers, all  -RB     [place] Level/Cues Needed (Transfer Goal 1, OT) standby assist  -RB     Progress/Outcome (Transfer Goal 1, OT) new goal  -RB       Row Name 07/04/25 1618          Bathing Goal 1 (OT)    Activity/Device (Bathing Goal 1, OT) bathing skills, all  -RB     [place] Level/Cues Needed (Bathing Goal 1, OT) standby assist  -RB     Time Frame (Bathing Goal 1, OT) short term goal (STG);2 weeks  -RB     Progress/Outcomes (Bathing Goal 1, OT) new goal  -RB       Row Name 07/04/25 1618          Dressing Goal 1 (OT)    Activity/Device (Dressing Goal 1, OT) dressing skills, all  -RB     [place]/Cues Needed (Dressing Goal 1, OT) standby assist  -RB     Time Frame (Dressing Goal 1, OT) short term goal (STG);2 weeks  -RB     Progress/Outcome (Dressing Goal 1, OT) new goal  -RB       Row Name 07/04/25 1618          Toileting Goal 1 (OT)    Activity/Device (Toileting Goal 1, OT) toileting skills, all  -RB     [place] Level/Cues Needed (Toileting Goal 1, OT) standby assist  -RB     Time Frame (Toileting Goal 1, OT)  2 weeks;short term goal (STG)  -RB     Progress/Outcome (Toileting Goal 1, OT) new goal  -RB       Row Name 07/04/25 1618          Grooming Goal 1 (OT)    Activity/Device (Grooming Goal 1, OT) grooming skills, all  -RB     [place] (Grooming Goal 1, OT) standby assist  -RB     Time Frame (Grooming Goal 1, OT) short term goal (STG);2 weeks  -RB     Progress/Outcome (Grooming Goal 1, OT) new goal  -RB       Row Name 07/04/25 1618          Therapy Assessment/Plan (OT)    Planned Therapy Interventions (OT) activity tolerance training;adaptive equipment training;BADL retraining;occupation/activity based interventions;ROM/therapeutic exercise;strengthening exercise;transfer/mobility retraining;patient/caregiver education/training  -RB               User Key  (r) = Recorded By, (t) = Taken By, (c) = Cosigned By      Initials Name Provider Type    RB Jerri Watt, OTR/L, CSRS Occupational Therapist                   Clinical Impression       Row Name 07/04/25 1618          Pain Assessment    Pretreatment Pain Rating 0/10 - no pain  -RB     Posttreatment Pain Rating 0/10 - no pain  -RB       Row Name 07/04/25 1618          Plan of Care Review    Plan of Care Reviewed With patient  -RB     Progress improving  -RB     Outcome Evaluation The pt was admitted to [facility] 2/2 to dizziness and impaired balance. Dx includes a SDH that occurred x1 week ago following a fall. Pmhx significant for diabetes, hbp, ckd4, UTI, OA, TIA, kidney stone. The pt resides in a single level home where she is independent with ADL's and mobility using a walker. She reports living with her daughter however she works and is not always home. Today, the pt completed bed mobility with CGA. Max A LBD. She stood with CGA/Min A and mobilized ~4 minutes with generalized weakness and cues provided for walker positioning. She stopped at [place] for standing grooming where she completed this task with Min A. The pt did not demonstrate any LOB, however,  safety concerns were present on several occasions. The pt is motivated to return home with HH. OT recommends a d/c to home with family assist and HH OT/PT vs. SNF pending progress.  -RB       Row Name 07/04/25 1618          Therapy Assessment/Plan (OT)    Rehab Potential (OT) good  -RB     Criteria for Skilled Therapeutic Interventions Met (OT) skilled treatment is necessary;yes  -RB     Therapy Frequency (OT) 5 times/wk  -RB       Row Name 07/04/25 1618          Therapy Plan Review/Discharge Plan (OT)    Anticipated Discharge Disposition (OT) home with 24/7 care;home with home health;skilled nursing facility  -RB       Row Name 07/04/25 1618          Positioning and Restraints    Pre-Treatment Position in bed  -RB     Post Treatment Position chair  -RB     In Chair notified nsg;reclined;sitting;call light within reach;encouraged to call for assist;exit alarm on;legs elevated  -RB               User Key  (r) = Recorded By, (t) = Taken By, (c) = Cosigned By      Initials Name Provider Type    RB Jerri Watt, OTR/L, CSRS Occupational Therapist                   Outcome Measures       Row Name 07/04/25 1619          How much help from another is currently needed...    Putting on and taking off regular lower body clothing? 1  -RB     Bathing (including washing, rinsing, and drying) 2  -RB     Toileting (which includes using toilet bed pan or urinal) 1  -RB     Putting on and taking off regular upper body clothing 2  -RB     Taking care of personal grooming (such as brushing teeth) 3  -RB     Eating meals 3  -RB     AM-PAC 6 Clicks Score (OT) 12  -RB       Row Name 07/04/25 1130 07/04/25 0930       How much help from another person do you currently need...    Turning from your back to your side while in flat bed without using bedrails? 4  -DJ 3  -SP    Moving from lying on back to sitting on the side of a flat bed without bedrails? 3  -DJ 3  -SP    Moving to and from a bed to a chair (including a wheelchair)? 3   -DJ 3  -SP    Standing up from a chair using your arms (e.g., wheelchair, bedside chair)? 3  -DJ 2  -SP    Climbing 3-5 steps with a railing? 2  -DJ 2  -SP    To walk in hospital room? 2  -DJ 2  -SP    AM-PAC 6 Clicks Score (PT) 17  -DJ 15  -SP    Highest Level of Mobility Goal Stand (1 or More Minutes)-5  -DJ Move to Chair/Commode-4  -SP      Row Name 07/04/25 1619          Modified [place] Scale    Modified [place] Scale 4 - Moderately severe disability.  Unable to walk without assistance, and unable to attend to own bodily needs without assistance.  -RB       Row Name 07/04/25 1619 07/04/25 1130       Functional Assessment    Outcome Measure Options AM-PAC 6 Clicks Daily Activity (OT);Modified Parmjit  -RB AM-PAC 6 Clicks Basic Mobility (PT)  -DJ              User Key  (r) = Recorded By, (t) = Taken By, (c) = Cosigned By      Initials Name Provider Type    Kristy Mcmillan, PT Physical Therapist    RB Jerri Watt, OTR/L, CSRS Occupational Therapist    Nicky Taveras, RN Registered Nurse                      OT Recommendation and Plan  Planned Therapy Interventions (OT): activity tolerance training, adaptive equipment training, BADL retraining, occupation/activity based interventions, ROM/therapeutic exercise, strengthening exercise, transfer/mobility retraining, patient/caregiver education/training  Therapy Frequency (OT): 5 times/wk  Plan of Care Review  Plan of Care Reviewed With: patient  Progress: improving  Outcome Evaluation: The pt was [facility] to [facility] 2/2 to dizziness and impaired balance. Dx includes a SDH that occurred x1 week ago following a fall. Pmhx significant for diabetes, hbp, ckd4, UTI, OA, TIA, kidney stone. The pt resides in a single level home where she is independent with ADL's and mobility using a walker. She reports living with her daughter however she works and is not always home. Today, the pt completed bed mobility with CGA. Max A LBD. She stood with CGA/Min A and mobilized ~4  minutes with generalized weakness and cues provided for walker positioning. She stopped at [place] for standing grooming where she completed this task with Marciano GONZALEZ. The pt did not demonstrate any LOB, however, safety concerns were present on several occasions. The pt is motivated to return home with HH. OT recommends a d/c to home with family assist and HH OT/PT vs. SNF pending progress.     Time Calculation:   Evaluation Complexity (OT)  Review Occupational Profile/Medical/Therapy History Complexity: expanded/moderate complexity  Assessment, Occupational Performance/Identification of Deficit Complexity: 3-5 performance deficits  Clinical Decision Making Complexity (OT): detailed assessment/moderate complexity  Overall Complexity of Evaluation (OT): moderate complexity     Time Calculation- OT       Row Name 07/04/25 1613             Time Calculation- OT    OT Start Time 0859  -RB      OT Stop Time 0928  -RB      OT Time Calculation (min) 29 min  -RB      Total Timed Code Minutes- OT 19 minute(s)  -RB      OT Received On 07/04/25  -RB      OT - Next Appointment 07/07/25  -RB      OT Goal Re-Cert Due Date 07/18/25  -RB         Timed Charges    11999 - OT Self Care/Mgmt Minutes 19  -RB         Untimed Charges    OT Eval/Re-eval Minutes 10  -RB         Total Minutes    Timed Charges Total Minutes 19  -RB      Untimed Charges Total Minutes 10  -RB       Total Minutes 29  -RB                User Key  (r) = Recorded By, (t) = Taken By, (c) = Cosigned By      Initials Name Provider Type    RB Jerri Watt OTR/L, CSRS Occupational Therapist                  Therapy Charges for Today       Code Description Service Date Service Provider Modifiers Qty    70106181058 HC OT SELF CARE/MGMT/TRAIN EA 15 MIN 7/4/2025 Jerri Watt OTR/L, CSRS GO 1    86892973930 HC OT EVAL MOD COMPLEXITY 3 7/4/2025 Jerri Watt OTR/L, CSRS GO 1                 ANNMARIE Corrigan/L, CSRS  7/4/2025

## 2025-07-04 NOTE — PLAN OF CARE
Goal Outcome Evaluation:              Outcome Evaluation: Swallow eval completed. Pt took trials of thin (cup/straw), pureed, soft, mixed, and regular solids. No s/s were noted. Mastication was functional. Slight lingual residue after a cracker was cleared with a liquid wash. Recommend a regular diet with thin; meds as tolerated; upright for meals and 30 min after; slow rate; small bites/sips. Pt was oriented and appropriate during the eval. ST to sign off at this time. Please reconsult as needed.    Anticipated Discharge Disposition (SLP): unknown          SLP Swallowing Diagnosis: functional oral phase, functional pharyngeal phase (07/04/25 1200)

## 2025-07-04 NOTE — NURSING NOTE
Nursing report ED to floor  Elissa Marrero  89 y.o.  female    HPI :  HPI  Stated Reason for Visit: weak  History Obtained From: EMS    Chief Complaint  Chief Complaint   Patient presents with    Weakness - Generalized       Admitting doctor:   Kinga Amor DO    Admitting diagnosis:   The primary encounter diagnosis was Subdural hematoma. A diagnosis of Urinary tract infection without hematuria, site unspecified was also pertinent to this visit.    Code status:   Current Code Status       Date Active Code Status Order ID Comments User Context       Prior            Allergies:   Benazepril    Isolation:   No active isolations    Intake and Output  No intake or output data in the 24 hours ending 07/03/25 2100    Weight:   There were no vitals filed for this visit.    Most recent vitals:   Vitals:    07/03/25 2000 07/03/25 2012 07/03/25 2053 07/03/25 2055   BP: 152/89      Pulse: 64 65 63    Resp:    18   Temp:       SpO2: 100% 96% 98%        Active LDAs/IV Access:   Lines, Drains & Airways       Active LDAs       Name Placement date Placement time Site Days    Peripheral IV 07/03/25 1750 20 G Right Antecubital 07/03/25  1750  Antecubital  less than 1                    Labs (abnormal labs have a star):   Labs Reviewed   COMPREHENSIVE METABOLIC PANEL - Abnormal; Notable for the following components:       Result Value    Glucose 133 (*)     Creatinine 2.05 (*)     Calcium 10.0 (*)     eGFR 21.3 (*)     All other components within normal limits    Narrative:     GFR Categories in Chronic Kidney Disease (CKD)              GFR Category          GFR (mL/min/1.73)    Interpretation  G1                    90 or greater        Normal or high (1)  G2                    60-89                Mild decrease (1)  G3a                   45-59                Mild to moderate decrease  G3b                   30-44                Moderate to severe decrease  G4                    15-29                Severe decrease  G5                     14 or less           Kidney failure    (1)In the absence of evidence of kidney disease, neither GFR category G1 or G2 fulfill the criteria for CKD.    eGFR calculation 2021 CKD-EPI creatinine equation, which does not include race as a factor   URINALYSIS W/ CULTURE IF INDICATED - Abnormal; Notable for the following components:    Protein, UA Trace (*)     Leuk Esterase, UA Moderate (2+) (*)     All other components within normal limits    Narrative:     In absence of clinical symptoms, the presence of pyuria, bacteria, and/or nitrites on the urinalysis result does not correlate with infection.   TROPONIN - Abnormal; Notable for the following components:    HS Troponin T 37 (*)     All other components within normal limits    Narrative:     High Sensitive Troponin T Reference Range:  <14.0 ng/L- Negative Female for AMI  <22.0 ng/L- Negative Male for AMI  >=14 - Abnormal Female indicating possible myocardial injury.  >=22 - Abnormal Male indicating possible myocardial injury.   Clinicians would have to utilize clinical acumen, EKG, Troponin, and serial changes to determine if it is an Acute Myocardial Infarction or myocardial injury due to an underlying chronic condition.        CBC WITH AUTO DIFFERENTIAL - Abnormal; Notable for the following components:    Hemoglobin 11.1 (*)     MCHC 30.8 (*)     Lymphocytes, Absolute 3.25 (*)     All other components within normal limits   HIGH SENSITIVITIY TROPONIN T 1HR - Abnormal; Notable for the following components:    HS Troponin T 37 (*)     All other components within normal limits    Narrative:     High Sensitive Troponin T Reference Range:  <14.0 ng/L- Negative Female for AMI  <22.0 ng/L- Negative Male for AMI  >=14 - Abnormal Female indicating possible myocardial injury.  >=22 - Abnormal Male indicating possible myocardial injury.   Clinicians would have to utilize clinical acumen, EKG, Troponin, and serial changes to determine if it is an Acute Myocardial  Infarction or myocardial injury due to an underlying chronic condition.        URINALYSIS, MICROSCOPIC ONLY - Abnormal; Notable for the following components:    WBC, UA 21-50 (*)     Bacteria, UA 4+ (*)     All other components within normal limits   POCT GLUCOSE FINGERSTICK - Normal   URINE CULTURE   CBC AND DIFFERENTIAL    Narrative:     The following orders were created for panel order CBC & Differential.  Procedure                               Abnormality         Status                     ---------                               -----------         ------                     CBC Auto Differential[[id number]]        Abnormal            Final result                 Please view results for these tests on the individual orders.       EKG:   ECG 12 Lead Altered Mental Status   Preliminary Result   HEART RATE=63  bpm   RR Spbegaul=824  ms   WY Sdegdvsy=028  ms   P Horizontal Axis=-2  deg   P Front Axis=71  deg   QRSD Blagdvkp=360  ms   QT Xzktrkvq=027  ms   LAzU=854  ms   QRS Axis=-33  deg   T Wave Axis=63  deg   - ABNORMAL ECG -   Sinus rhythm   Abnormal R-wave progression, early transition   LVH with secondary repolarization abnormality   Anterior Q waves, possibly due to LVH   When compared with ECG of 17-May-2024 21:23:10,   Significant rate decrease   Date and Time of Study:2025-07-03 17:59:08          Meds given in ED:   Medications   sodium chloride 0.9 % flush 10 mL (has no administration in time range)   cefTRIAXone (ROCEPHIN) 1,000 mg in sodium chloride 0.9 % 100 mL MBP (1,000 mg Intravenous New Bag 7/3/25 2054)   nitroglycerin (NITROSTAT) SL tablet 0.4 mg (has no administration in time range)       Imaging results:  CT Head Without Contrast  Result Date: 7/3/2025   Underlying chronic senescent change, with no acute false seen subdural hemorrhage, generally 3 to 5 mm in thickness though focally up to 9 mm, with negligible mass effect.        This report was finalized on 7/3/2025 8:28 PM by Dr. Kahn  SRIDHAR Cervantes on Workstation: [id number]      XR Chest 1 View  Result Date: 7/3/2025   Allowing for submaximal inspiratory result, heart size appears within normal limits.  There is a submaximal inspiratory result. Allowing for that, there is no convincing evidence of infiltrate, effusion or pneumothorax.    This report was finalized on 7/3/2025 6:46 PM by Dr. Femi Cervantes M.D on Workstation: [id number]        Ambulatory status:   - up assist x1    Social issues:   Social History     Socioeconomic History    Marital status:    Tobacco Use    Smoking status: Never    Smokeless tobacco: Never   Vaping Use    Vaping status: Never Used   Substance and Sexual Activity    Alcohol use: No    Drug use: Defer    Sexual activity: Defer       Peripheral Neurovascular  Peripheral Neurovascular (Adult)  Peripheral Neurovascular WDL: WDL    Neuro Cognitive  Neuro Cognitive (Adult)  Cognitive/Neuro/Behavioral WDL: .WDL except, orientation  Orientation: disoriented to, time (patient's daughters state this is baseline)    Learning  Learning Assessment  Learning Readiness and Ability: sensory deficit noted  Education Provided  Person Taught: patient, family member/friend  Teaching Method: verbal instruction  Teaching Focus: symptom/problem overview, medication administration  Education Outcome Evaluation: verbalizes understanding    Respiratory  Respiratory WDL  Respiratory WDL: WDL (Simultaneous filing. User may be unaware of other data.)    Abdominal Pain       Pain Assessments  Pain (Adult)  (0-10) Pain Rating: Rest: 0    NIH Stroke Scale       Reji Bello RN  07/03/25 21:00 EDT

## 2025-07-04 NOTE — CONSULTS
[facility] NEUROSURGERY CONSULT NOTE    Patient name: Elissa Marrero  Referring Provider: Phan Glynn MD   Reason for Consultation: Admission    Patient Care Team:  Onel Mann MD as PCP - General  Onel Mann MD as PCP - Family Medicine  Anthony Sabillon MD as Consulting Physician (Nephrology)  Josy Tovar APRN as Nurse Practitioner (Nephrology)  Julien Gaming MD as Referring Physician (Dermatology)  Donovan Childers MD as Consulting Physician (Endocrinology)    Chief complaint: Weakness    Subjective .     History of present illness:    Patient is a 89 y.o. pleasant female with a prior medical history of type 2 diabetes, hypothyroidism and osteopenia who presents to [facility] for generalized weakness.  Patient states that she has been feeling weak since November.  Patient states that she typically uses a walker to get around when at home.  She states that over the last few weeks she feels that she has gotten weaker in both her upper and lower extremities, more so in her lower extremities.  Patient states that since she has been in the hospital she has not been walking as much.  At this time she endorses numbness in her lower extremity since she has been in the hospital, tingling and weakness in her lower extremities.  She denies any low back pain, pain, bowel or bladder incontinence or saddle anesthesia at this time.    We were consulted for generalized weakness however patient had CT head yesterday showing an acute subdural hemorrhage along the falx.  There is no mass effect or midline shift present.  Patient had a repeat CT head this morning that shows stable subdural hematoma.       Review of Systems  Review of Systems   Constitutional:  Positive for activity change.   Musculoskeletal:  Positive for gait problem.   Neurological:  Positive for weakness and numbness.       History  PAST MEDICAL HISTORY  Past Medical History:   Diagnosis Date    Benign essential hypertension      Diabetes mellitus, type 2 12/07/2012    Encounter for diabetic foot exam 03/2015    History of complete eye exam 11/01/2017    Dr Leslie    Hyperlipidemia     Hypothyroidism     Insomnia     Lymphedema 04/14/2010    Osteoarthritis     Osteopenia     Renal insufficiency 03/04/2009    Restless leg syndrome 12/05/2012    Vitamin D deficiency 10/12/2011       PAST SURGICAL HISTORY  Past Surgical History:   Procedure Laterality Date    APPENDECTOMY      CHOLECYSTECTOMY      EYE SURGERY      TONSILLECTOMY         FAMILY HISTORY  Family History   Problem Relation Age of Onset    Diabetes Sister     Diabetes Brother     Diabetes Daughter     Cancer Son         pancreatic       SOCIAL HISTORY  Social History     Tobacco Use    Smoking status: Never    Smokeless tobacco: Never   Vaping Use    Vaping status: Never Used   Substance Use Topics    Alcohol use: No    Drug use: Defer       Allergies:  Benazepril    MEDICATIONS:  Facility-Administered Medications Prior to Admission   Medication Dose Route Frequency Provider Last Rate Last Admin    cyanocobalamin injection 1,000 mcg  1,000 mcg Intramuscular Q28 Days Onel Mann MD   1,000 mcg at 06/17/25 0909     Medications Prior to Admission   Medication Sig Dispense Refill Last Dose/Taking    amLODIPine (NORVASC) 2.5 MG tablet Take 1 tablet by mouth Daily. 90 tablet 1 7/3/2025 Morning    Cholecalciferol (VITAMIN D PO) Take 125 mcg by mouth Daily.   7/3/2025 Morning    clopidogrel (PLAVIX) 75 MG tablet Take 1 tablet by mouth Daily. 90 tablet 1 7/3/2025 Morning    Continuous Glucose  (FreeStyle Chris 3 Tripp) device Use 1 Units Every 14 (Fourteen) Days. 1 each 0 7/3/2025    Continuous Glucose Sensor (Dexcom G7 Sensor) misc Use 1 Units Every 10 (Ten) Days. 1 each O 7/3/2025    Continuous Glucose Sensor (FreeStyle Chris 3 Plus Sensor) Use Every 14 (Fourteen) Days. 2 each 2 7/3/2025    glipizide (GLUCOTROL) 5 MG tablet Take 1 tablet by mouth 2 (Two) Times a Day Before  Meals. 270 tablet 0 7/3/2025 Morning    levothyroxine (SYNTHROID, LEVOTHROID) 88 MCG tablet Take 1 tablet by mouth Daily. 90 tablet 1 7/3/2025 Morning    metoprolol tartrate (LOPRESSOR) 25 MG tablet Take 1 tablet by mouth 2 (Two) Times a Day. 180 tablet 1 7/3/2025 Morning    rosuvastatin (Crestor) 10 MG tablet Take 1 tablet by mouth Daily. 90 tablet 1 Past Week    SITagliptin (Januvia) 25 MG tablet Take 1 tablet by mouth Daily. 90 tablet 0 7/3/2025    traZODone (DESYREL) 50 MG tablet Take 1 tablet by mouth Every Night. 90 tablet 1 Past Week    Insulin Glargine (Lantus SoloStar) 100 UNIT/ML injection pen Inject 10 Units under the skin Daily. **Discard open pen 28 days after first use** 15 mL 0 Unknown    Insulin Pen Needle (BD Pen Needle Sally U/F) 32G X 4 MM misc Use 1 Units 4 (Four) Times a Day. 100 each 1 Unknown    pramipexole (MIRAPEX) 1.5 MG tablet Take 1 tablet by mouth Daily. 90 tablet 1 Unknown         Current Facility-Administered Medications:     sennosides-docusate (PERICOLACE) 8.6-50 MG per tablet 2 tablet, 2 tablet, Oral, BID PRN **AND** polyethylene glycol (MIRALAX) packet 17 g, 17 g, Oral, Daily PRN **AND** bisacodyl (DULCOLAX) EC tablet 5 mg, 5 mg, Oral, Daily PRN **AND** bisacodyl (DULCOLAX) suppository 10 mg, 10 mg, Rectal, Daily PRN, Kinga Amor DO    Calcium Replacement - Follow Nurse / BPA Driven Protocol, , Not Applicable, PRN, Kinga Amor DO    cefTRIAXone (ROCEPHIN) 2,000 mg in sodium chloride 0.9 % 100 mL MBP, 2,000 mg, Intravenous, Q24H, Kinga Amor DO, Last Rate: 200 mL/hr at 07/04/25 0932, 2,000 mg at 07/04/25 0932    hydrALAZINE (APRESOLINE) injection 10 mg, 10 mg, Intravenous, Q6H PRN, Kinga Amor DO    [Held by provider] levothyroxine (SYNTHROID, LEVOTHROID) tablet 88 mcg, 88 mcg, Oral, Q AM, Kinga Amor DO    Magnesium Standard Dose Replacement - Follow Nurse / BPA Driven Protocol, , Not Applicable, PRN, Kinga Amor DO    nitroglycerin (NITROSTAT) SL  tablet 0.4 mg, 0.4 mg, Sublingual, Q5 Min PRN, Kinga Amor DO    Phosphorus Replacement - Follow Nurse / BPA Driven Protocol, , Not Applicable, PRNZuleyma Waitman,     Potassium Replacement - Follow Nurse / BPA Driven Protocol, , Not Applicable, PRNZuleyma Waitman,     rosuvastatin (CRESTOR) tablet 10 mg, 10 mg, Oral, Daily, Kinga Amor DO    [COMPLETED] Insert Peripheral IV, , , Once **AND** sodium chloride 0.9 % flush 10 mL, 10 mL, Intravenous, PRN, Kinga Amor,     sodium chloride 0.9 % flush 10 mL, 10 mL, Intravenous, Q12H, Kinga Amor DO, 10 mL at 07/04/25 0933    sodium chloride 0.9 % flush 10 mL, 10 mL, Intravenous, PRN, Kinga Amor,     sodium chloride 0.9 % infusion 40 mL, 40 mL, Intravenous, PRZULMA, Kinga Amor DO      Objective     Results Review:  LABS:  Results from last 7 days   Lab Units 07/04/25  0623 07/03/25  1751   WBC 10*3/mm3 7.33 7.89   HEMOGLOBIN g/dL 11.3* 11.1*   HEMATOCRIT % 36.2 36.0   PLATELETS 10*3/mm3 167 185     Results from last 7 days   Lab Units 07/04/25  0623 07/03/25  1751   SODIUM mmol/L 141 136   POTASSIUM mmol/L 4.2 4.3   CHLORIDE mmol/L 108* 103   CO2 mmol/L 23.6 23.7   BUN mg/dL 22.0 20.0   CREATININE mg/dL 1.87* 2.05*   CALCIUM mg/dL 9.5 10.0*   BILIRUBIN mg/dL  --  0.3   ALK PHOS U/L  --  59   ALT (SGPT) U/L  --  9   AST (SGOT) U/L  --  15   GLUCOSE mg/dL 201* 133*         DIAGNOSTICS:  EXAM:    CT Head Without Intravenous Contrast     CLINICAL HISTORY:     Reason for exam: Evaluate status of subdural hematoma at 0600 on 7 25.     TECHNIQUE:    Axial computed tomography images of the head brain without intravenous   contrast.  CTDI is 47.6 mGy and DLP is 787.7 mGy-cm.  This CT exam was   performed according to the principle of ALARA (As Low As Reasonably   Achievable) by using one or more of the following dose reduction   techniques: automated exposure control, adjustment of the mA and or kV   according to patient size, and or use of  iterative reconstruction   technique.     COMPARISON:    7 3 2025.     FINDINGS:    Brain:  Stable subdural hematoma along the falx, 5-9 mm in thickness.    Chronic microvascular ischemic changes.  Atrophy.    Ventricles:  No ventriculomegaly.    Bones joints:  Calvarium is intact.    Soft tissues:  Unremarkable.    Sinuses:  No acute sinusitis.    Mastoid air cells:  No mastoid effusion.     IMPRESSION:           Stable subdural hematoma along the falx, 5-9 mm in thickness.    Results Review:   I reviewed the patient's new clinical results.  I personally viewed patient's chart and imaging      Vital Signs   Temp:  [97.5 °F (36.4 °C)-99 °F (37.2 °C)] 98.2 °F (36.8 °C)  Heart Rate:  [60-74] 66  Resp:  [16-18] 16  BP: (129-160)/(57-94) 160/65    Physical Exam:  Physical Exam  Vitals reviewed.   Musculoskeletal:         General: Normal range of motion.      Cervical back: Normal range of motion.   Skin:     General: Skin is warm and dry.   Neurological:      General: No focal deficit present.   Psychiatric:         Mood and Affect: Mood normal.         Speech: Speech normal.       Neurological Exam  Mental Status  Awake, alert and oriented to person, place and time. Oriented to person, place and time. Speech is normal. Language is fluent with no aphasia.    Motor    Patient has very good strength in her lower extremities, 4+/5.  I also spoke with physical therapy who states that she has been doing very well has been walking around the halls and doing very well..    Sensory  Patient does not report any sensory deficit in her lower extremities.    Reflexes    Right pathological reflexes: Ankle clonus absent.  Left pathological reflexes: Ankle clonus absent.      Assessment & Plan       Subdural hematoma    DM (diabetes mellitus)    HLD (hyperlipidemia)    HTN (hypertension)    Hypothyroid    CKD (chronic kidney disease)    UTI (urinary tract infection)    Weakness    Anemia    SDH (subdural hematoma)      Problem List  Items Addressed This Visit       UTI (urinary tract infection)    Relevant Medications    cefTRIAXone (ROCEPHIN) 1,000 mg in sodium chloride 0.9 % 100 mL MBP (Completed)    cefTRIAXone (ROCEPHIN) 2,000 mg in sodium chloride 0.9 % 100 mL MBP    * (Principal) Subdural hematoma - Primary        COMORBID CONDITIONS:  Hypothyroidism, hypertension, osteopenia    Patient is 89-year-old female who presents to [facility] for generalized weakness.  Patient states that she has been feeling weak since November.  She states that she feels weaker in her lower extremities compared to her upper extremities.    Patient did have a CT head when she first came in showing a small hematoma of the falx.  This is stable.  Patient is also alert and oriented and is able to follow all my commands and answer my questions without difficulty.    On physical examination patient has good strength in her lower extremities, 4+/5.  She does not report any sensory deficit in her lower extremities at this time and I do not appreciate clonus.    Given that the patient does not report any back pain, only some numbness in her lower extremities and she has been in the hospital and given her age there is no surgical intervention indicated.  Patient also has a UTI, which could be contributing to her current weakness.    Given that she does have a small subdural hematoma we will go ahead and have her follow-up in clinic in 2 weeks with an updated CT head.  Patient is on Plavix at home we will go ahead and hold that until cleared by neurosurgery when seen in office.    Nothing further needed from a neurosurgical standpoint.  Should patient have any new or worsening symptoms please get a stat CT and notify neurosurgery ASAP.    Please reach out with any questions or concerns.      PLAN:   Generalized weakness  Subdural hematoma  - No urgent or emergent neurosurgical intervention indicated at this time  - Hold any anticoagulation/antiplatelet therapy  -  "Medical management per primary  - Recommend continue working with physical therapy  - Patient to follow-up in clinic in 2 weeks with updated CT head  - Stat CT head and notify neurosurgery for any acute neurologic deficit    I discussed the patient's findings and my recommendations with patient, nursing staff, and     During patient visit, I utilized appropriate personal protective equipment including gloves and mask.  Mask used was standard procedure mask. Appropriate PPE was worn during the entire visit.  Hand hygiene was completed before and after.     Ina James PA-C  07/04/25  11:29 EDT    \"Dictated utilizing Dragon dictation\".    "

## 2025-07-04 NOTE — PLAN OF CARE
The pt was admitted to [facility] 2/2 to dizziness and impaired balance. Dx includes a SDH that occurred x1 week ago following a fall. Pmhx significant for diabetes, hbp, ckd4, UTI, OA, TIA, kidney stone. The pt resides in a single level home where she is independent with ADL's and mobility using a walker. She reports living with her daughter however she works and is not always home. Today, the pt completed bed mobility with CGA. Max A LBD. She stood with CGA/Min A and mobilized ~4 minutes with generalized weakness and cues provided for walker positioning. She stopped at sinkside for standing grooming where she completed this task with Min A. The pt did not demonstrate any LOB, however, safety concerns were present on several occasions. The pt is motivated to return home with HH. OT recommends a d/c to home with family assist and HH OT/PT vs. SNF pending progress.

## 2025-07-04 NOTE — PLAN OF CARE
"Goal Outcome Evaluation:  Plan of Care Reviewed With: patient           Outcome Evaluation: 89yo pleasant white female admitted 7/3/25 due to dizziness and difficulty walking after sustaining SDH from a fall 1 week ago. PMH includes diabetes, hbp, ckd4, UTI, OA, TIA, kidney stone. PLOF: Pt lives at home with shell adn uses a wx for mobility. She reports her shell helps her bathe but she is able to dress herself. She has \"a few\" DEONNA. She is limited now by generalized weakness and decreased activity tolerance as well as decreased balance. Today, she was found resting in bed in NAD, willing to participate in therapy. She sat EOB with CGA and vc. She req vc to place feet on ground and stood from EOB with CGA - min A using r wx. Pt amb 50' with r wx and CGA - min A of 1-2 and freq vc to keep body closer to r wx; pt drifts to L and dem flexed hips, mildly unsteady but no overt LOB, poor endurance limites further amb distance. She was placed in recliner chair with all needs met. She would certainly beneift from skilled PT services to address functional deficits and prepare for d/c.    Anticipated Discharge Disposition (PT): home with home health, sub acute care setting (pending progress)                        "

## 2025-07-05 LAB
ANION GAP SERPL CALCULATED.3IONS-SCNC: 11 MMOL/L (ref 5–15)
BUN SERPL-MCNC: 17 MG/DL (ref 8–23)
BUN/CREAT SERPL: 9.8 (ref 7–25)
CALCIUM SPEC-SCNC: 9.5 MG/DL (ref 8.2–9.6)
CHLORIDE SERPL-SCNC: 106 MMOL/L (ref 98–107)
CO2 SERPL-SCNC: 22 MMOL/L (ref 22–29)
CREAT SERPL-MCNC: 1.73 MG/DL (ref 0.57–1)
DEPRECATED RDW RBC AUTO: 40.8 FL (ref 37–54)
EGFRCR SERPLBLD CKD-EPI 2021: 26.1 ML/MIN/1.73
ERYTHROCYTE [DISTWIDTH] IN BLOOD BY AUTOMATED COUNT: 13.2 % (ref 12.3–15.4)
GLUCOSE BLDC GLUCOMTR-MCNC: 153 MG/DL (ref 70–130)
GLUCOSE BLDC GLUCOMTR-MCNC: 236 MG/DL (ref 70–130)
GLUCOSE BLDC GLUCOMTR-MCNC: 257 MG/DL (ref 70–130)
GLUCOSE BLDC GLUCOMTR-MCNC: 260 MG/DL (ref 70–130)
GLUCOSE SERPL-MCNC: 123 MG/DL (ref 65–99)
HCT VFR BLD AUTO: 35.2 % (ref 34–46.6)
HGB BLD-MCNC: 11.5 G/DL (ref 12–15.9)
MCH RBC QN AUTO: 27.6 PG (ref 26.6–33)
MCHC RBC AUTO-ENTMCNC: 32.7 G/DL (ref 31.5–35.7)
MCV RBC AUTO: 84.4 FL (ref 79–97)
PLATELET # BLD AUTO: 167 10*3/MM3 (ref 140–450)
PMV BLD AUTO: 11.8 FL (ref 6–12)
POTASSIUM SERPL-SCNC: 3.7 MMOL/L (ref 3.5–5.2)
QT INTERVAL: 438 MS
QTC INTERVAL: 449 MS
RBC # BLD AUTO: 4.17 10*6/MM3 (ref 3.77–5.28)
SODIUM SERPL-SCNC: 139 MMOL/L (ref 136–145)
WBC NRBC COR # BLD AUTO: 7.28 10*3/MM3 (ref 3.4–10.8)

## 2025-07-05 PROCEDURE — 36415 COLL VENOUS BLD VENIPUNCTURE: CPT | Performed by: STUDENT IN AN ORGANIZED HEALTH CARE EDUCATION/TRAINING PROGRAM

## 2025-07-05 PROCEDURE — 85027 COMPLETE CBC AUTOMATED: CPT | Performed by: STUDENT IN AN ORGANIZED HEALTH CARE EDUCATION/TRAINING PROGRAM

## 2025-07-05 PROCEDURE — 82948 REAGENT STRIP/BLOOD GLUCOSE: CPT

## 2025-07-05 PROCEDURE — 97530 THERAPEUTIC ACTIVITIES: CPT

## 2025-07-05 PROCEDURE — 80048 BASIC METABOLIC PNL TOTAL CA: CPT | Performed by: STUDENT IN AN ORGANIZED HEALTH CARE EDUCATION/TRAINING PROGRAM

## 2025-07-05 PROCEDURE — 63710000001 INSULIN LISPRO (HUMAN) PER 5 UNITS: Performed by: INTERNAL MEDICINE

## 2025-07-05 PROCEDURE — 25010000002 CEFTRIAXONE PER 250 MG: Performed by: STUDENT IN AN ORGANIZED HEALTH CARE EDUCATION/TRAINING PROGRAM

## 2025-07-05 RX ADMIN — Medication 10 ML: at 08:28

## 2025-07-05 RX ADMIN — INSULIN LISPRO 3 UNITS: 100 INJECTION, SOLUTION INTRAVENOUS; SUBCUTANEOUS at 12:12

## 2025-07-05 RX ADMIN — INSULIN LISPRO 4 UNITS: 100 INJECTION, SOLUTION INTRAVENOUS; SUBCUTANEOUS at 20:14

## 2025-07-05 RX ADMIN — CEFTRIAXONE SODIUM 2000 MG: 2 INJECTION, POWDER, FOR SOLUTION INTRAMUSCULAR; INTRAVENOUS at 08:28

## 2025-07-05 RX ADMIN — INSULIN LISPRO 2 UNITS: 100 INJECTION, SOLUTION INTRAVENOUS; SUBCUTANEOUS at 08:28

## 2025-07-05 RX ADMIN — ROSUVASTATIN CALCIUM 10 MG: 10 TABLET, FILM COATED ORAL at 08:29

## 2025-07-05 RX ADMIN — INSULIN LISPRO 4 UNITS: 100 INJECTION, SOLUTION INTRAVENOUS; SUBCUTANEOUS at 17:23

## 2025-07-05 NOTE — PLAN OF CARE
Problem: Adult Inpatient Plan of Care  Goal: Plan of Care Review  7/5/2025 0059 by Anny Mcdonald RN  Outcome: Progressing  7/5/2025 0059 by Anny Mcdonald RN  Outcome: Progressing  Flowsheets (Taken 7/5/2025 0059)  Progress: no change  Plan of Care Reviewed With: patient  Goal: Patient-Specific Goal (Individualized)  7/5/2025 0059 by Anny Mcdonald RN  Outcome: Progressing  7/5/2025 0059 by Anny Mcdonald RN  Outcome: Progressing  Goal: Absence of Hospital-Acquired Illness or Injury  7/5/2025 0059 by Anny Mcdonald RN  Outcome: Progressing  7/5/2025 0059 by Anny Mcdonald RN  Outcome: Progressing  Intervention: Identify and Manage Fall Risk  Recent Flowsheet Documentation  Taken 7/5/2025 0004 by Anny Mcdonald RN  Safety Promotion/Fall Prevention:   safety round/check completed   room organization consistent  Taken 7/4/2025 2303 by Anny Mcdonald RN  Safety Promotion/Fall Prevention:   safety round/check completed   room organization consistent  Taken 7/4/2025 2200 by Anny Mcdonald RN  Safety Promotion/Fall Prevention:   safety round/check completed   room organization consistent  Taken 7/4/2025 2101 by Anny Mcdonald RN  Safety Promotion/Fall Prevention:   safety round/check completed   room organization consistent  Taken 7/4/2025 2018 by Anny Mcdonald RN  Safety Promotion/Fall Prevention:   safety round/check completed   room organization consistent  Taken 7/4/2025 1910 by Anny Mcdonald RN  Safety Promotion/Fall Prevention:   safety round/check completed   room organization consistent  Intervention: Prevent Skin Injury  Recent Flowsheet Documentation  Taken 7/4/2025 2018 by Anny Mcdonald RN  Body Position: weight shifting  Goal: Optimal Comfort and Wellbeing  7/5/2025 0059 by Anny Mcdonald RN  Outcome: Progressing  7/5/2025 0059 by Anny Mcdonald RN  Outcome: Progressing  Intervention: Provide Person-Centered Care  Recent Flowsheet Documentation  Taken 7/4/2025 2018 by Anny Mcdonald RN  Trust  Relationship/Rapport: care explained  Goal: Readiness for Transition of Care  7/5/2025 0059 by Anny Mcdonald RN  Outcome: Progressing  7/5/2025 0059 by Anny Mcdonald RN  Outcome: Progressing     Problem: Fall Injury Risk  Goal: Absence of Fall and Fall-Related Injury  7/5/2025 0059 by Anny Mcdonald RN  Outcome: Progressing  7/5/2025 0059 by Anny Mcdonald RN  Outcome: Progressing  Intervention: Promote Injury-Free Environment  Recent Flowsheet Documentation  Taken 7/5/2025 0004 by Anny Mcdonald RN  Safety Promotion/Fall Prevention:   safety round/check completed   room organization consistent  Taken 7/4/2025 2303 by Anny Mcdonald RN  Safety Promotion/Fall Prevention:   safety round/check completed   room organization consistent  Taken 7/4/2025 2200 by Anny Mcdonald RN  Safety Promotion/Fall Prevention:   safety round/check completed   room organization consistent  Taken 7/4/2025 2101 by Anny Mcdonald RN  Safety Promotion/Fall Prevention:   safety round/check completed   room organization consistent  Taken 7/4/2025 2018 by Anny Mcdonald RN  Safety Promotion/Fall Prevention:   safety round/check completed   room organization consistent  Taken 7/4/2025 1910 by Anny Mcdonald RN  Safety Promotion/Fall Prevention:   safety round/check completed   room organization consistent     Problem: Skin Injury Risk Increased  Goal: Skin Health and Integrity  7/5/2025 0059 by Anny Mcdonald RN  Outcome: Progressing  7/5/2025 0059 by Anny Mcdonald RN  Outcome: Progressing  Intervention: Optimize Skin Protection  Recent Flowsheet Documentation  Taken 7/4/2025 2018 by Anny Mcdonald RN  Activity Management: activity encouraged     Problem: Infection  Goal: Absence of Infection Signs and Symptoms  7/5/2025 0059 by Anny Mcdonald RN  Outcome: Progressing  7/5/2025 0059 by Anny Mcdonald RN  Outcome: Progressing     Problem: Mobility Impairment  Goal: Optimal Mobility  7/5/2025 0059 by Anny Mcdonald RN  Outcome:  Progressing  7/5/2025 0059 by Anny Mcdonald, RN  Outcome: Progressing  Intervention: Optimize Mobility  Recent Flowsheet Documentation  Taken 7/4/2025 2018 by Anny Mcdonald, RN  Activity Management: activity encouraged   Goal Outcome Evaluation:  Plan of Care Reviewed With: patient        Progress: no change

## 2025-07-05 NOTE — PLAN OF CARE
Goal Outcome Evaluation:  Plan of Care Reviewed With: patient, family        Progress: no change  Outcome Evaluation: Neuro exam & VSS. Pt ambulating well. Awaiting discharge plan.

## 2025-07-05 NOTE — THERAPY TREATMENT NOTE
Patient Name: Elissa Marrero  : 3/15/1925    MRN: [id number]                              Today's Date: 2025       Admit Date: 7/3/2025    Visit Dx:     ICD-10-CM ICD-9-CM   1. Subdural hematoma  S06.5XAA 432.1   2. Urinary tract infection without hematuria, site unspecified  N39.0 599.0   3. SDH (subdural hematoma)  S06.5XAA 432.1     Patient Active Problem List   Diagnosis    DM (diabetes mellitus)    HLD (hyperlipidemia)    HTN (hypertension)    Hypothyroid    Insomnia    Lymphedema    Osteoarthritis    Osteopenia    CKD (chronic kidney disease)    Restless leg syndrome    Vitamin D deficiency    Hip pain, right    Acute kidney failure    Chronic kidney disease, stage 4 (severe)    Disorder of bone and cartilage    Edema    Hyperkalemia    UTI (urinary tract infection)    TIA (transient ischemic attack)    Acute metabolic encephalopathy    Acute cystitis without hematuria    Kidney stone    Subdural hematoma    Weakness    Anemia    SDH (subdural hematoma)     Past Medical History:   Diagnosis Date    Benign essential hypertension     Diabetes mellitus, type 2 2012    Encounter for diabetic foot exam 2015    History of complete eye exam 2017    Dr Leslie    Hyperlipidemia     Hypothyroidism     Insomnia     Lymphedema 2010    Osteoarthritis     Osteopenia     Renal insufficiency 2009    Restless leg syndrome 2012    Vitamin D deficiency 10/12/2011     Past Surgical History:   Procedure Laterality Date    APPENDECTOMY      CHOLECYSTECTOMY      EYE SURGERY      TONSILLECTOMY        General Information       Row Name 25 1300          Physical Therapy Time and Intention    Document Type therapy note (daily note)  -DJ     Mode of Treatment individual therapy;physical therapy  -DJ       Row Name 25 1300          General Information    Patient Profile Reviewed yes  -DJ     Existing Precautions/Restrictions fall  -DJ       Row Name 25 1300          Cognition     Orientation Status (Cognition) oriented x 3  -DJ       Row Name 07/05/25 1300          Safety Issues/Impairments Affecting Functional Mobility    Comment, Safety Issues/Impairments (Mobility) gt belt, nonskid socks  -DJ               User Key  (r) = Recorded By, (t) = Taken By, (c) = Cosigned By      Initials Name Provider Type    Kristy Mcmillan, PT Physical Therapist                   Mobility       Row Name 07/05/25 1301          Bed Mobility    Supine-Sit [place] (Bed Mobility) not tested  -DJ     Sit-Supine [place] (Bed Mobility) not tested  -DJ     Comment, (Bed Mobility) UIC  -DJ       Row Name 07/05/25 1301          Transfers    Comment, (Transfers) sit/stand from recliner  -DJ       Row Name 07/05/25 1301          Bed-Chair Transfer    Bed-Chair [place] (Transfers) not tested  -DJ       Row Name 07/05/25 1301          Sit-Stand Transfer    Sit-Stand [place] (Transfers) minimum assist (75% patient effort);verbal cues  -DJ     Assistive Device (Sit-Stand Transfers) walker, front-wheeled  -DJ     Comment, (Sit-Stand Transfer) vc for hand placement  -DJ       Row Name 07/05/25 1301          Gait/Stairs (Locomotion)    [place] Level (Gait) minimum assist (75% patient effort);verbal cues  -DJ     Assistive Device (Gait) walker, front-wheeled  -DJ     Distance in Feet (Gait) 110  -DJ     Deviations/Abnormal Patterns (Gait) festinating/shuffling;gait speed decreased;stride length decreased  -DJ     Bilateral Gait Deviations forward flexed posture  -DJ     [place] Level (Stairs) not tested  -DJ     Comment, (Gait/Stairs) Pt amb 110' with r wx and CGA - min A; she dem flexed posture, LOB x 1 that req min A to recover, slow pace. She req freq vc to stay closer to r wx and to stand upright. She freq reaches for other objects including a second wx in room as well as the recliner chair  -DJ               User Key  (r) = Recorded By, (t) = Taken By, (c) = Cosigned By      Initials Name  Provider Type    Kristy Mcmillan PT Physical Therapist                   Obj/Interventions       Row Name 07/05/25 1304          Motor Skills    Motor Skills functional endurance  -DJ     Functional Endurance improving  -DJ     Therapeutic Exercise other (see comments)  seated hip flex, LAQ, AP  -DJ       Row Name 07/05/25 1304          Balance    Balance Assessment standing static balance;standing dynamic balance  -DJ     Static Standing Balance contact guard;verbal cues  -DJ     Dynamic Standing Balance minimal assist;verbal cues  -DJ     Position/Device Used, Standing Balance walker, front-wheeled;supported  -DJ     Balance Interventions sitting;standing;sit to stand;supported;weight shifting activity  -DJ     Comment, Balance LOB with turning and when she lets go of wx to reach for other objects  -DJ               User Key  (r) = Recorded By, (t) = Taken By, (c) = Cosigned By      Initials Name Provider Type    Kristy Mcmillan, PT Physical Therapist                   Goals/Plan    No documentation.                  Clinical Impression       Row Name 07/05/25 1309          Pain    Pretreatment Pain Rating 0/10 - no pain  -DJ       Row Name 07/05/25 1309          Plan of Care Review    Plan of Care Reviewed With patient  -DJ     Progress improving  -DJ     Outcome Evaluation Pt UIC in NAD, pleasant and cooperative. She was instructed in seated LE ther ex. She stood from recliner chair with min A and vc for hand placement. Pt amb 110' with r wx and CGA - min A; she dem flexed posture, LOB x 1 that req min A to recover, slow pace. She req freq vc to stay closer to r wx and to stand upright. She freq reaches for other objects including a second wx in room as well as the recliner chair. She is unsteady with turning and when she is not using r wx correctly. She was place din recliner chair with all needs met and nurse present. Good progress with amb distance today. Cont PT to address functional deficits and prepare  for d/c as appropriate  -DJ       Row Name 07/05/25 1309          Therapy Assessment/Plan (PT)    Criteria for Skilled Interventions Met (PT) skilled treatment is necessary  -DJ     Therapy Frequency (PT) 5 times/wk  -DJ       Row Name 07/05/25 1309          Vital Signs    O2 Delivery Pre Treatment room air  -DJ     O2 Delivery Intra Treatment room air  -DJ     O2 Delivery Post Treatment room air  -DJ     Pre Patient Position Sitting  -DJ     Intra Patient Position Standing  -DJ     Post Patient Position Sitting  -DJ       Row Name 07/05/25 1309          Positioning and Restraints    Pre-Treatment Position sitting in chair/recliner  -DJ     Post Treatment Position chair  -DJ     In Chair notified nsg;reclined;encouraged to call for assist;exit alarm on;call light within reach  -DJ               User Key  (r) = Recorded By, (t) = Taken By, (c) = Cosigned By      Initials Name Provider Type    Kristy Mcmillan, PT Physical Therapist                   Outcome Measures       Row Name 07/05/25 1319 07/05/25 0709       How much help from another person do you currently need...    Turning from your back to your side while in flat bed without using bedrails? 4  -DJ 4  -SP    Moving from lying on back to sitting on the side of a flat bed without bedrails? 3  -DJ 3  -SP    Moving to and from a bed to a chair (including a wheelchair)? 3  -DJ 3  -SP    Standing up from a chair using your arms (e.g., wheelchair, bedside chair)? 3  -DJ 3  -SP    Climbing 3-5 steps with a railing? 2  -DJ 2  -SP    To walk in hospital room? 3  -DJ 3  -SP    AM-PAC 6 Clicks Score (PT) 18  -DJ 18  -SP    Highest Level of Mobility Goal Walk 10 Steps or More-6  -DJ Walk 10 Steps or More-6  -SP      Row Name 07/05/25 1319          Functional Assessment    Outcome Measure Options AM-PAC 6 Clicks Basic Mobility (PT)  -DJ               User Key  (r) = Recorded By, (t) = Taken By, (c) = Cosigned By      Initials Name Provider Type    Kristy Mcmillan, PT  Physical Therapist    Nicky Taveras, RN Registered Nurse                                 Physical Therapy Education       Title: PT OT SLP Therapies (In Progress)       Topic: Physical Therapy (Done)       Point: Mobility training (Done)       Learning Progress Summary            Patient Acceptance, E, VU,NR by MAINOR at 7/5/2025 1320                      Point: Body mechanics (Done)       Learning Progress Summary            Patient Acceptance, E, VU,NR by MAINOR at 7/5/2025 1320                      Point: Precautions (Done)       Learning Progress Summary            Patient Acceptance, E, VU,NR by DJ at 7/5/2025 1320                                      User Key       Initials Effective Dates Name Provider Type Discipline    MAINOR 10/25/19 -  Kristy Hernandez, PT Physical Therapist PT                  PT Recommendation and Plan  Planned Therapy Interventions (PT): balance training, bed mobility training, gait training, home exercise program, transfer training, strengthening, stair training, postural re-education, patient/family education  Progress: improving  Outcome Evaluation: Pt UIC in NAD, pleasant and cooperative. She was instructed in seated LE ther ex. She stood from recliner chair with min A and vc for hand placement. Pt amb 110' with r wx and CGA - min A; she dem flexed posture, LOB x 1 that req min A to recover, slow pace. She req freq vc to stay closer to r wx and to stand upright. She freq reaches for other objects including a second wx in room as well as the recliner chair. She is unsteady with turning and when she is not using r wx correctly. She was place din recliner chair with all needs met and nurse present. Good progress with amb distance today. Cont PT to address functional deficits and prepare for d/c as appropriate     Time Calculation:   PT Evaluation Complexity  History, PT Evaluation Complexity: 3 or more personal factors and/or comorbidities  Examination of Body Systems (PT Eval Complexity): total  of 4 or more elements  Clinical Presentation (PT Evaluation Complexity): evolving  Clinical Decision Making (PT Evaluation Complexity): moderate complexity  Overall Complexity (PT Evaluation Complexity): moderate complexity     PT Charges       Row Name 07/05/25 1321             Time Calculation    Start Time 1105  -DJ      Stop Time 1122  -DJ      Time Calculation (min) 17 min  -DJ      PT Non-Billable Time (min) 10 min  -DJ      PT Received On 07/05/25  -DJ      PT - Next Appointment 07/07/25  -DJ                User Key  (r) = Recorded By, (t) = Taken By, (c) = Cosigned By      Initials Name Provider Type    Kristy Mcmillan, PT Physical Therapist                  Therapy Charges for Today       Code Description Service Date Service Provider Modifiers Qty    93492399046 HC PT EVAL MOD COMPLEXITY 3 7/4/2025 Kristy Hernandez, PT GP 1    72316496514 HC PT THERAPEUTIC ACT EA 15 MIN 7/4/2025 Kristy Hernandez, PT GP 1    88288129399 HC GAIT TRAINING EA 15 MIN 7/4/2025 Kristy Hernandez, PT GP 1    08419187597 HC PT THERAPEUTIC ACT EA 15 MIN 7/5/2025 Kristy Hernandez, PT GP 1            PT G-Codes  Outcome Measure Options: AM-PAC 6 Clicks Basic Mobility (PT)  AM-PAC 6 Clicks Score (PT): 18  AM-PAC 6 Clicks Score (OT): 12  Modified [place] Scale: 4 - Moderately severe disability.  Unable to walk without assistance, and unable to attend to own bodily needs without assistance.  PT Discharge Summary  Anticipated Discharge Disposition (PT): home with home health, sub acute care setting (pending progress)    Kristy Hernandez PT  7/5/2025

## 2025-07-05 NOTE — PROGRESS NOTES
Name: Elissa Marrero ADMIT: 7/3/2025   : 3/15/1925  PCP: Onel Mann MD    MRN: [id number] LOS: 1 days   AGE/SEX: 89 y.o. female  ROOM: [facility]     Subjective   Subjective   Patient is seen at bedside, no new complaints.       Objective   Objective   Vital Signs  Temp:  [98.1 °F (36.7 °C)-98.8 °F (37.1 °C)] 98.8 °F (37.1 °C)  Heart Rate:  [63-78] 74  Resp:  [16] 16  BP: (140-160)/(63-80) 149/63  SpO2:  [98 %-100 %] 98 %  on   ;   Device (Oxygen Therapy): room air  Body mass index is 22.96 kg/m².  Physical Exam  Constitutional:       General: She is not in acute distress.     Comments: AAOx2   HENT:      Head: Normocephalic and atraumatic.      Nose: Nose normal. No congestion.      Mouth/Throat:      Pharynx: Oropharynx is clear. No oropharyngeal exudate.   Eyes:      General: No scleral icterus.  Cardiovascular:      Rate and Rhythm: Normal rate and regular rhythm.      Heart sounds: No murmur heard.     No friction rub. No gallop.   Pulmonary:      Effort: No respiratory distress.      Breath sounds: No wheezing, rhonchi or rales.   Abdominal:      General: There is no distension.      Tenderness: There is no abdominal tenderness. There is no guarding.   Musculoskeletal:         General: No swelling, deformity or signs of injury.      Cervical back: Normal range of motion. No rigidity.   Skin:     Coloration: Skin is not jaundiced.      Findings: No bruising or lesion.   Neurological:      General: No focal deficit present.      Mental Status: She is disoriented.      Motor: Weakness present.      Copied text material from yesterday's note has been reviewed for appropriate changes and remains accurate as of 25.    Results Review     I reviewed the patient's new clinical results.  Results from last 7 days   Lab Units 25  0316 25  0623 25  1751   WBC 10*3/mm3 7.28 7.33 7.89   HEMOGLOBIN g/dL 11.5* 11.3* 11.1*   PLATELETS 10*3/mm3 167 167 185     Results from last 7 days   Lab Units  07/05/25 0316 07/04/25 0623 07/03/25  1751   SODIUM mmol/L 139 141 136   POTASSIUM mmol/L 3.7 4.2 4.3   CHLORIDE mmol/L 106 108* 103   CO2 mmol/L 22.0 23.6 23.7   BUN mg/dL 17.0 22.0 20.0   CREATININE mg/dL 1.73* 1.87* 2.05*   GLUCOSE mg/dL 123* 201* 133*   EGFR mL/min/1.73 26.1* 23.8* 21.3*     Results from last 7 days   Lab Units 07/03/25  1751   ALBUMIN g/dL 3.5   BILIRUBIN mg/dL 0.3   ALK PHOS U/L 59   AST (SGOT) U/L 15   ALT (SGPT) U/L 9     Results from last 7 days   Lab Units 07/05/25 0316 07/04/25 0623 07/03/25  1751   CALCIUM mg/dL 9.5 9.5 10.0*   ALBUMIN g/dL  --   --  3.5       Glucose   Date/Time Value Ref Range Status   07/05/2025 0715 153 (H) 70 - 130 mg/dL Final   07/04/2025 2001 343 (H) 70 - 130 mg/dL Final   07/03/2025 2011 82 70 - 130 mg/dL Final       CT Head Without Contrast  Result Date: 7/4/2025  Electronically signed by Abel Ayala MD on 07-04-25 at 0750    CT Head Without Contrast  Result Date: 7/3/2025   Underlying chronic senescent change, with no acute false seen subdural hemorrhage, generally 3 to 5 mm in thickness though focally up to 9 mm, with negligible mass effect.        This report was finalized on 7/3/2025 8:28 PM by Dr. Femi Cervantes M.D on Workstation: [id number]      XR Chest 1 View  Result Date: 7/3/2025   Allowing for submaximal inspiratory result, heart size appears within normal limits.  There is a submaximal inspiratory result. Allowing for that, there is no convincing evidence of infiltrate, effusion or pneumothorax.    This report was finalized on 7/3/2025 6:46 PM by Dr. Femi Cervantes M.D on Workstation: [id number]        I have personally reviewed all medications:  Scheduled Medications  cefTRIAXone, 2,000 mg, Intravenous, Q24H  insulin lispro, 2-7 Units, Subcutaneous, 4x Daily AC & at Bedtime  [Held by provider] levothyroxine, 88 mcg, Oral, Q AM  rosuvastatin, 10 mg, Oral, Daily  sodium chloride, 10 mL, Intravenous, Q12H    Infusions   Diet  Diet:  Regular/House; Fluid Consistency: Thin (IDDSI 0)    I have personally reviewed:  [x]  Laboratory   [x]  Microbiology   [x]  Radiology   [x]  EKG/Telemetry  [x]  Cardiology/Vascular   []  Pathology    []  Records       Assessment/Plan     Active Hospital Problems    Diagnosis  POA    **Subdural hematoma [S06.5XAA]  Yes    SDH (subdural hematoma) [S06.5XAA]  Yes    Weakness [R53.1]  Unknown    Anemia [D64.9]  Unknown    Hypothyroid [E03.9]  Unknown    HTN (hypertension) [I10]  Unknown    HLD (hyperlipidemia) [E78.5]  Unknown    UTI (urinary tract infection) [N39.0]  Unknown    DM (diabetes mellitus) [E11.9]  Unknown    CKD (chronic kidney disease) [N18.9]  Unknown      Resolved Hospital Problems   No resolved problems to display.       89 y.o. female admitted with Subdural hematoma.    Assessment and plan  #Weakness    -Suspect increased weakness due to UTI and subdural hematoma    -PT/OT/SLP    -Treatment as below     #UTI    -UA shows UTI    -Urine culture, follow results, showing E. Coli.     -Rocephin 2 g IV daily     #Subdural Hematoma    -CT head shows acute subdural hematoma along the falx, generally about 3 to 5 mm in thickness, though maximally 9 mm in thickness, there is no midline shift    -Neurosurgery consulted by ER recommends repeat CT head, no further acute changes noted.    -Family states patient is on Plavix, will hold    -Family admits to fall 1 to 2 weeks ago and patient hit her head on wall    -PT/OT     #CKD    -Creatinine 2.05, base around 1.7-1.8    - Monitor     #Anemia    -Hemoglobin 11.1 on admission, base around 12    -Monitor labs     #DM    -ISS     #Hypothyroid    -Resume home Synthroid, check TSH     #Hypertension    -Hydralazine 10 mg IV every 6 hours prerenal parameters     #Hyperlipidemia    - Resume home statin     Discussed with family and she is DNR/DNI  VTE Prophylaxis - SCDs.  Code Status - Limited code (no CPR, no intubation).  If patient decompensates, will consider  palliative care evaluation.      Armond Kim MD  [facility]  07/05/25  11:08 EDT

## 2025-07-05 NOTE — PLAN OF CARE
Goal Outcome Evaluation:  Plan of Care Reviewed With: patient        Progress: improving  Outcome Evaluation: Pt UIC in NAD, pleasant and cooperative. She was instructed in seated LE ther ex. She stood from recliner chair with min A and vc for hand placement. Pt amb 110' with r wx and CGA - min A; she dem flexed posture, LOB x 1 that req min A to recover, slow pace. She req freq vc to stay closer to r wx and to stand upright. She freq reaches for other objects including a second wx in room as well as the recliner chair. She is unsteady with turning and when she is not using r wx correctly. She was place din recliner chair with all needs met and nurse present. Good progress with amb distance today. Cont PT to address functional deficits and prepare for d/c as appropriate    Anticipated Discharge Disposition (PT): home with home health, sub acute care setting (pending progress)

## 2025-07-05 NOTE — PLAN OF CARE
Problem: Adult Inpatient Plan of Care  Goal: Plan of Care Review  Outcome: Progressing  Flowsheets (Taken 7/5/2025 0059)  Progress: no change  Plan of Care Reviewed With: patient  Goal: Patient-Specific Goal (Individualized)  Outcome: Progressing  Goal: Absence of Hospital-Acquired Illness or Injury  Outcome: Progressing  Intervention: Identify and Manage Fall Risk  Recent Flowsheet Documentation  Taken 7/5/2025 0004 by Anny Mcdonald RN  Safety Promotion/Fall Prevention:   safety round/check completed   room organization consistent  Taken 7/4/2025 2303 by Anny Mcdonald RN  Safety Promotion/Fall Prevention:   safety round/check completed   room organization consistent  Taken 7/4/2025 2200 by Anny Mcdonald RN  Safety Promotion/Fall Prevention:   safety round/check completed   room organization consistent  Taken 7/4/2025 2101 by Anny Mcdonald RN  Safety Promotion/Fall Prevention:   safety round/check completed   room organization consistent  Taken 7/4/2025 2018 by Anny Mcdonald RN  Safety Promotion/Fall Prevention:   safety round/check completed   room organization consistent  Taken 7/4/2025 1910 by Anny Mcdonald RN  Safety Promotion/Fall Prevention:   safety round/check completed   room organization consistent  Intervention: Prevent Skin Injury  Recent Flowsheet Documentation  Taken 7/4/2025 2018 by Anny Mcdonald RN  Body Position: weight shifting  Goal: Optimal Comfort and Wellbeing  Outcome: Progressing  Intervention: Provide Person-Centered Care  Recent Flowsheet Documentation  Taken 7/4/2025 2018 by Anny Mcdonald RN  Trust Relationship/Rapport: care explained  Goal: Readiness for Transition of Care  Outcome: Progressing     Problem: Fall Injury Risk  Goal: Absence of Fall and Fall-Related Injury  Outcome: Progressing  Intervention: Promote Injury-Free Environment  Recent Flowsheet Documentation  Taken 7/5/2025 0004 by Anny Mcdonadl RN  Safety Promotion/Fall Prevention:   safety round/check completed    room organization consistent  Taken 7/4/2025 2303 by Anny Mcdonald RN  Safety Promotion/Fall Prevention:   safety round/check completed   room organization consistent  Taken 7/4/2025 2200 by Anny Mcdonald RN  Safety Promotion/Fall Prevention:   safety round/check completed   room organization consistent  Taken 7/4/2025 2101 by Anny Mcdonald RN  Safety Promotion/Fall Prevention:   safety round/check completed   room organization consistent  Taken 7/4/2025 2018 by Anny Mcdonald RN  Safety Promotion/Fall Prevention:   safety round/check completed   room organization consistent  Taken 7/4/2025 1910 by Anny Mcdonald RN  Safety Promotion/Fall Prevention:   safety round/check completed   room organization consistent     Problem: Skin Injury Risk Increased  Goal: Skin Health and Integrity  Outcome: Progressing  Intervention: Optimize Skin Protection  Recent Flowsheet Documentation  Taken 7/4/2025 2018 by Anny Mcdonald RN  Activity Management: activity encouraged     Problem: Infection  Goal: Absence of Infection Signs and Symptoms  Outcome: Progressing     Problem: Mobility Impairment  Goal: Optimal Mobility  Outcome: Progressing  Intervention: Optimize Mobility  Recent Flowsheet Documentation  Taken 7/4/2025 2018 by Anny Mcdonald RN  Activity Management: activity encouraged   Goal Outcome Evaluation:  Plan of Care Reviewed With: patient        Progress: no change

## 2025-07-06 LAB
ANION GAP SERPL CALCULATED.3IONS-SCNC: 12 MMOL/L (ref 5–15)
BACTERIA SPEC AEROBE CULT: ABNORMAL
BUN SERPL-MCNC: 15 MG/DL (ref 8–23)
BUN/CREAT SERPL: 8.8 (ref 7–25)
CALCIUM SPEC-SCNC: 9.6 MG/DL (ref 8.2–9.6)
CHLORIDE SERPL-SCNC: 105 MMOL/L (ref 98–107)
CO2 SERPL-SCNC: 23 MMOL/L (ref 22–29)
CREAT SERPL-MCNC: 1.7 MG/DL (ref 0.57–1)
DEPRECATED RDW RBC AUTO: 42.4 FL (ref 37–54)
EGFRCR SERPLBLD CKD-EPI 2021: 26.7 ML/MIN/1.73
ERYTHROCYTE [DISTWIDTH] IN BLOOD BY AUTOMATED COUNT: 13.1 % (ref 12.3–15.4)
GLUCOSE BLDC GLUCOMTR-MCNC: 195 MG/DL (ref 70–130)
GLUCOSE BLDC GLUCOMTR-MCNC: 226 MG/DL (ref 70–130)
GLUCOSE BLDC GLUCOMTR-MCNC: 273 MG/DL (ref 70–130)
GLUCOSE BLDC GLUCOMTR-MCNC: 294 MG/DL (ref 70–130)
GLUCOSE SERPL-MCNC: 196 MG/DL (ref 65–99)
HCT VFR BLD AUTO: 37.1 % (ref 34–46.6)
HGB BLD-MCNC: 11.5 G/DL (ref 12–15.9)
MCH RBC QN AUTO: 27.3 PG (ref 26.6–33)
MCHC RBC AUTO-ENTMCNC: 31 G/DL (ref 31.5–35.7)
MCV RBC AUTO: 87.9 FL (ref 79–97)
PLATELET # BLD AUTO: 169 10*3/MM3 (ref 140–450)
PMV BLD AUTO: 11.3 FL (ref 6–12)
POTASSIUM SERPL-SCNC: 3.8 MMOL/L (ref 3.5–5.2)
RBC # BLD AUTO: 4.22 10*6/MM3 (ref 3.77–5.28)
SODIUM SERPL-SCNC: 140 MMOL/L (ref 136–145)
WBC NRBC COR # BLD AUTO: 7.49 10*3/MM3 (ref 3.4–10.8)

## 2025-07-06 PROCEDURE — 82948 REAGENT STRIP/BLOOD GLUCOSE: CPT

## 2025-07-06 PROCEDURE — 85027 COMPLETE CBC AUTOMATED: CPT | Performed by: STUDENT IN AN ORGANIZED HEALTH CARE EDUCATION/TRAINING PROGRAM

## 2025-07-06 PROCEDURE — 80048 BASIC METABOLIC PNL TOTAL CA: CPT | Performed by: STUDENT IN AN ORGANIZED HEALTH CARE EDUCATION/TRAINING PROGRAM

## 2025-07-06 PROCEDURE — 25010000002 CEFTRIAXONE PER 250 MG: Performed by: STUDENT IN AN ORGANIZED HEALTH CARE EDUCATION/TRAINING PROGRAM

## 2025-07-06 PROCEDURE — 63710000001 INSULIN LISPRO (HUMAN) PER 5 UNITS: Performed by: INTERNAL MEDICINE

## 2025-07-06 PROCEDURE — 36415 COLL VENOUS BLD VENIPUNCTURE: CPT | Performed by: STUDENT IN AN ORGANIZED HEALTH CARE EDUCATION/TRAINING PROGRAM

## 2025-07-06 RX ADMIN — ROSUVASTATIN CALCIUM 10 MG: 10 TABLET, FILM COATED ORAL at 10:03

## 2025-07-06 RX ADMIN — CEFTRIAXONE SODIUM 2000 MG: 2 INJECTION, POWDER, FOR SOLUTION INTRAMUSCULAR; INTRAVENOUS at 12:09

## 2025-07-06 RX ADMIN — INSULIN LISPRO 4 UNITS: 100 INJECTION, SOLUTION INTRAVENOUS; SUBCUTANEOUS at 12:18

## 2025-07-06 RX ADMIN — Medication 10 ML: at 20:28

## 2025-07-06 RX ADMIN — INSULIN LISPRO 4 UNITS: 100 INJECTION, SOLUTION INTRAVENOUS; SUBCUTANEOUS at 20:23

## 2025-07-06 RX ADMIN — INSULIN LISPRO 3 UNITS: 100 INJECTION, SOLUTION INTRAVENOUS; SUBCUTANEOUS at 10:03

## 2025-07-06 RX ADMIN — INSULIN LISPRO 2 UNITS: 100 INJECTION, SOLUTION INTRAVENOUS; SUBCUTANEOUS at 18:16

## 2025-07-06 NOTE — PROGRESS NOTES
Name: Elissa Marrero ADMIT: 7/3/2025   : 3/15/1925  PCP: Onel Mann MD    MRN: [id number] LOS: 2 days   AGE/SEX: 89 y.o. female  ROOM: [facility]     Subjective   Subjective   Patient seen at bedside, lying in bed.       Objective   Objective   Vital Signs  Temp:  [97.9 °F (36.6 °C)-98.2 °F (36.8 °C)] 98.2 °F (36.8 °C)  Heart Rate:  [73-96] 75  Resp:  [14-18] 18  BP: (146-166)/(75-88) 166/77  SpO2:  [98 %-100 %] 100 %  on   ;   Device (Oxygen Therapy): room air  Body mass index is 20.99 kg/m².  Physical Exam  Constitutional:       General: She is not in acute distress.     Comments: AAOx2   HENT:      Head: Normocephalic and atraumatic.      Nose: Nose normal. No congestion.      Mouth/Throat:      Pharynx: Oropharynx is clear. No oropharyngeal exudate.   Eyes:      General: No scleral icterus.  Cardiovascular:      Rate and Rhythm: Normal rate and regular rhythm.      Heart sounds: No murmur heard.     No friction rub. No gallop.   Pulmonary:      Effort: No respiratory distress.      Breath sounds: No wheezing, rhonchi or rales.   Abdominal:      General: There is no distension.      Tenderness: There is no abdominal tenderness. There is no guarding.   Musculoskeletal:         General: No swelling, deformity or signs of injury.      Cervical back: Normal range of motion. No rigidity.   Skin:     Coloration: Skin is not jaundiced.      Findings: No bruising or lesion.   Neurological:      General: No focal deficit present.      Mental Status: She is disoriented.      Motor: Weakness present.      Copied text material from yesterday's note has been reviewed for appropriate changes and remains accurate as of 25.           Results Review     I reviewed the patient's new clinical results.  Results from last 7 days   Lab Units 25  0619 25  0316 25  0623 25  1751   WBC 10*3/mm3 7.49 7.28 7.33 7.89   HEMOGLOBIN g/dL 11.5* 11.5* 11.3* 11.1*   PLATELETS 10*3/mm3 169 167 167 185      Results from last 7 days   Lab Units 07/06/25  0619 07/05/25 0316 07/04/25 0623 07/03/25  1751   SODIUM mmol/L 140 139 141 136   POTASSIUM mmol/L 3.8 3.7 4.2 4.3   CHLORIDE mmol/L 105 106 108* 103   CO2 mmol/L 23.0 22.0 23.6 23.7   BUN mg/dL 15.0 17.0 22.0 20.0   CREATININE mg/dL 1.70* 1.73* 1.87* 2.05*   GLUCOSE mg/dL 196* 123* 201* 133*   EGFR mL/min/1.73 26.7* 26.1* 23.8* 21.3*     Results from last 7 days   Lab Units 07/03/25  1751   ALBUMIN g/dL 3.5   BILIRUBIN mg/dL 0.3   ALK PHOS U/L 59   AST (SGOT) U/L 15   ALT (SGPT) U/L 9     Results from last 7 days   Lab Units 07/06/25  0619 07/05/25 0316 07/04/25 0623 07/03/25  1751   CALCIUM mg/dL 9.6 9.5 9.5 10.0*   ALBUMIN g/dL  --   --   --  3.5       Glucose   Date/Time Value Ref Range Status   07/06/2025 1614 195 (H) 70 - 130 mg/dL Final   07/06/2025 1125 294 (H) 70 - 130 mg/dL Final   07/06/2025 0727 226 (H) 70 - 130 mg/dL Final   07/05/2025 1925 257 (H) 70 - 130 mg/dL Final   07/05/2025 1621 260 (H) 70 - 130 mg/dL Final   07/05/2025 1132 236 (H) 70 - 130 mg/dL Final   07/05/2025 0715 153 (H) 70 - 130 mg/dL Final       No radiology results for the last day    I have personally reviewed all medications:  Scheduled Medications  cefTRIAXone, 2,000 mg, Intravenous, Q24H  insulin lispro, 2-7 Units, Subcutaneous, 4x Daily AC & at Bedtime  [Held by provider] levothyroxine, 88 mcg, Oral, Q AM  rosuvastatin, 10 mg, Oral, Daily  sodium chloride, 10 mL, Intravenous, Q12H    Infusions   Diet  Diet: Regular/House; Fluid Consistency: Thin (IDDSI 0)    I have personally reviewed:  [x]  Laboratory   [x]  Microbiology   [x]  Radiology   [x]  EKG/Telemetry  [x]  Cardiology/Vascular   []  Pathology    []  Records       Assessment/Plan     Active Hospital Problems    Diagnosis  POA    **Subdural hematoma [S06.5XAA]  Yes    SDH (subdural hematoma) [S06.5XAA]  Yes    Weakness [R53.1]  Unknown    Anemia [D64.9]  Unknown    Hypothyroid [E03.9]  Unknown    HTN (hypertension)  [I10]  Unknown    HLD (hyperlipidemia) [E78.5]  Unknown    UTI (urinary tract infection) [N39.0]  Unknown    DM (diabetes mellitus) [E11.9]  Unknown    CKD (chronic kidney disease) [N18.9]  Unknown      Resolved Hospital Problems   No resolved problems to display.       89 y.o. female admitted with Subdural hematoma.    Assessment and plan  #Weakness    -Suspect increased weakness due to UTI and subdural hematoma    -PT/OT/SLP    -Treatment as below     #UTI    -UA shows UTI    -Urine culture, follow results, showing E. Coli.     -Rocephin 2 g IV daily     #Subdural Hematoma    -CT head shows acute subdural hematoma along the falx, generally about 3 to 5 mm in thickness, though maximally 9 mm in thickness, there is no midline shift    -Neurosurgery consulted by ER recommends repeat CT head, no further acute changes noted.    -Family states patient is on Plavix, will hold    -Family admits to fall 1 to 2 weeks ago and patient hit her head on wall    -PT/OT     #CKD    -Creatinine 2.05, base around 1.7-1.8    - Monitor     #Anemia    -Hemoglobin 11.1 on admission, base around 12    -Monitor labs     #DM    -ISS     #Hypothyroid    -Resume home Synthroid, check TSH     #Hypertension    -Hydralazine 10 mg IV every 6 hours prerenal parameters     #Hyperlipidemia    - Resume home statin     Discussed with family and she is DNR/DNI  VTE Prophylaxis - SCDs.  Code Status - Limited code (no CPR, no intubation).  If patient decompensates, will consider palliative care evaluation.      Armond Kim MD  [facility]  07/06/25  16:38 EDT

## 2025-07-06 NOTE — PLAN OF CARE
Problem: Adult Inpatient Plan of Care  Goal: Plan of Care Review  Outcome: Progressing  Flowsheets (Taken 7/6/2025 1735)  Plan of Care Reviewed With:   patient   family  Goal: Patient-Specific Goal (Individualized)  Outcome: Progressing  Goal: Absence of Hospital-Acquired Illness or Injury  Outcome: Progressing  Intervention: Identify and Manage Fall Risk  Recent Flowsheet Documentation  Taken 7/6/2025 1600 by Pedro Soares RN  Safety Promotion/Fall Prevention:   activity supervised   assistive device/personal items within reach   clutter free environment maintained   fall prevention program maintained   gait belt   lighting adjusted   mobility aid in reach   muscle strengthening facilitated   nonskid shoes/slippers when out of bed   room organization consistent   safety round/check completed  Taken 7/6/2025 1400 by Pedro Soares RN  Safety Promotion/Fall Prevention:   activity supervised   assistive device/personal items within reach   clutter free environment maintained   fall prevention program maintained   gait belt   lighting adjusted   mobility aid in reach   muscle strengthening facilitated   nonskid shoes/slippers when out of bed   room organization consistent   safety round/check completed  Taken 7/6/2025 1200 by Pedro Soares RN  Safety Promotion/Fall Prevention:   activity supervised   assistive device/personal items within reach   clutter free environment maintained   fall prevention program maintained   gait belt   lighting adjusted   mobility aid in reach   muscle strengthening facilitated   nonskid shoes/slippers when out of bed   room organization consistent   safety round/check completed  Taken 7/6/2025 1000 by Pedro Soares RN  Safety Promotion/Fall Prevention:   activity supervised   assistive device/personal items within reach   clutter free environment maintained   fall prevention program maintained   elopement precautions   gait belt   lighting adjusted   mobility aid in  reach   muscle strengthening facilitated   nonskid shoes/slippers when out of bed   room organization consistent   safety round/check completed  Taken 7/6/2025 0815 by Pedro Soares RN  Safety Promotion/Fall Prevention:   activity supervised   assistive device/personal items within reach   clutter free environment maintained   fall prevention program maintained   gait belt   lighting adjusted   mobility aid in reach   muscle strengthening facilitated   nonskid shoes/slippers when out of bed   room organization consistent   safety round/check completed  Intervention: Prevent Skin Injury  Recent Flowsheet Documentation  Taken 7/6/2025 1600 by Pedro Soares RN  Body Position: legs elevated  Taken 7/6/2025 1400 by Pedro Soares RN  Body Position: legs elevated  Taken 7/6/2025 0815 by Pedro Soares RN  Body Position:   weight shifting   supine  Goal: Optimal Comfort and Wellbeing  Outcome: Progressing  Intervention: Provide Person-Centered Care  Recent Flowsheet Documentation  Taken 7/6/2025 0815 by Pedro Soares RN  Trust Relationship/Rapport:   care explained   choices provided   questions answered   questions encouraged  Goal: Readiness for Transition of Care  Outcome: Progressing     Problem: Fall Injury Risk  Goal: Absence of Fall and Fall-Related Injury  Outcome: Progressing  Intervention: Identify and Manage Contributors  Recent Flowsheet Documentation  Taken 7/6/2025 1600 by Pedro Soares RN  Medication Review/Management: medications reviewed  Taken 7/6/2025 1400 by Pedro Soares RN  Medication Review/Management: medications reviewed  Self-Care Promotion:   independence encouraged   BADL personal objects within reach  Taken 7/6/2025 1200 by Pedro Soares RN  Medication Review/Management: medications reviewed  Taken 7/6/2025 0815 by Pedro Soares RN  Medication Review/Management: medications reviewed  Intervention: Promote Injury-Free Environment  Recent Flowsheet  Documentation  Taken 7/6/2025 1600 by Pedro Soares RN  Safety Promotion/Fall Prevention:   activity supervised   assistive device/personal items within reach   clutter free environment maintained   fall prevention program maintained   gait belt   lighting adjusted   mobility aid in reach   muscle strengthening facilitated   nonskid shoes/slippers when out of bed   room organization consistent   safety round/check completed  Taken 7/6/2025 1400 by Pedro Soares RN  Safety Promotion/Fall Prevention:   activity supervised   assistive device/personal items within reach   clutter free environment maintained   fall prevention program maintained   gait belt   lighting adjusted   mobility aid in reach   muscle strengthening facilitated   nonskid shoes/slippers when out of bed   room organization consistent   safety round/check completed  Taken 7/6/2025 1200 by Pedro Soares RN  Safety Promotion/Fall Prevention:   activity supervised   assistive device/personal items within reach   clutter free environment maintained   fall prevention program maintained   gait belt   lighting adjusted   mobility aid in reach   muscle strengthening facilitated   nonskid shoes/slippers when out of bed   room organization consistent   safety round/check completed  Taken 7/6/2025 1000 by Pedro Soares RN  Safety Promotion/Fall Prevention:   activity supervised   assistive device/personal items within reach   clutter free environment maintained   fall prevention program maintained   elopement precautions   gait belt   lighting adjusted   mobility aid in reach   muscle strengthening facilitated   nonskid shoes/slippers when out of bed   room organization consistent   safety round/check completed  Taken 7/6/2025 0815 by Pedro Soares RN  Safety Promotion/Fall Prevention:   activity supervised   assistive device/personal items within reach   clutter free environment maintained   fall prevention program maintained   gait belt    lighting adjusted   mobility aid in reach   muscle strengthening facilitated   nonskid shoes/slippers when out of bed   room organization consistent   safety round/check completed     Problem: Skin Injury Risk Increased  Goal: Skin Health and Integrity  Outcome: Progressing  Intervention: Optimize Skin Protection  Recent Flowsheet Documentation  Taken 7/6/2025 0815 by Pedro Soares, RN  Pressure Reduction Techniques:   frequent weight shift encouraged   heels elevated off bed  Head of Bed (HOB) Positioning: HOB elevated  Pressure Reduction Devices: pressure-redistributing mattress utilized     Problem: Infection  Goal: Absence of Infection Signs and Symptoms  Outcome: Progressing     Problem: Mobility Impairment  Goal: Optimal Mobility  Outcome: Progressing  Intervention: Optimize Mobility  Recent Flowsheet Documentation  Taken 7/6/2025 0815 by Pedro Soares, RN  Assistive Device Utilized: walker   Goal Outcome Evaluation:  Plan of Care Reviewed With: patient, family

## 2025-07-06 NOTE — PLAN OF CARE
Problem: Adult Inpatient Plan of Care  Goal: Plan of Care Review  Outcome: Progressing  Flowsheets  Taken 7/6/2025 0504 by Anny Mcdonald RN  Plan of Care Reviewed With: patient  Taken 7/5/2025 1834 by Nicky Orr RN  Progress: no change  Goal: Patient-Specific Goal (Individualized)  Outcome: Progressing  Goal: Absence of Hospital-Acquired Illness or Injury  Outcome: Progressing  Intervention: Identify and Manage Fall Risk  Recent Flowsheet Documentation  Taken 7/6/2025 0400 by Anny Mcdonald RN  Safety Promotion/Fall Prevention:   safety round/check completed   room organization consistent  Taken 7/6/2025 0310 by Anny Mcdonald RN  Safety Promotion/Fall Prevention:   safety round/check completed   room organization consistent  Taken 7/6/2025 0200 by Anny Mcdonald RN  Safety Promotion/Fall Prevention:   safety round/check completed   room organization consistent  Taken 7/6/2025 0110 by Anny Mcdonald RN  Safety Promotion/Fall Prevention:   safety round/check completed   room organization consistent  Taken 7/6/2025 0010 by Anny Mcdonald RN  Safety Promotion/Fall Prevention:   safety round/check completed   room organization consistent  Taken 7/5/2025 2200 by Anny Mcdonald RN  Safety Promotion/Fall Prevention:   safety round/check completed   room organization consistent  Taken 7/5/2025 2125 by Anny Mcdonald RN  Safety Promotion/Fall Prevention:   safety round/check completed   room organization consistent  Taken 7/5/2025 2022 by Anny Mcdonald RN  Safety Promotion/Fall Prevention:   safety round/check completed   room organization consistent  Taken 7/5/2025 1920 by Anny Mcdonald RN  Safety Promotion/Fall Prevention:   safety round/check completed   room organization consistent  Intervention: Prevent and Manage VTE (Venous Thromboembolism) Risk  Recent Flowsheet Documentation  Taken 7/5/2025 1920 by Anny Mcdonald RN  VTE Prevention/Management:   right   left   SCDs (sequential compression devices)  on  Goal: Optimal Comfort and Wellbeing  Outcome: Progressing  Intervention: Provide Person-Centered Care  Recent Flowsheet Documentation  Taken 7/5/2025 1920 by Anny Mcdonald RN  Trust Relationship/Rapport: care explained  Goal: Readiness for Transition of Care  Outcome: Progressing     Problem: Fall Injury Risk  Goal: Absence of Fall and Fall-Related Injury  Outcome: Progressing  Intervention: Identify and Manage Contributors  Recent Flowsheet Documentation  Taken 7/5/2025 1920 by Anny Mcdonald RN  Medication Review/Management: medications reviewed  Intervention: Promote Injury-Free Environment  Recent Flowsheet Documentation  Taken 7/6/2025 0400 by Anny Mcdonald RN  Safety Promotion/Fall Prevention:   safety round/check completed   room organization consistent  Taken 7/6/2025 0310 by Anny Mcdonald RN  Safety Promotion/Fall Prevention:   safety round/check completed   room organization consistent  Taken 7/6/2025 0200 by Anny Mcdonald RN  Safety Promotion/Fall Prevention:   safety round/check completed   room organization consistent  Taken 7/6/2025 0110 by Anny Mcdonald RN  Safety Promotion/Fall Prevention:   safety round/check completed   room organization consistent  Taken 7/6/2025 0010 by Anny Mcdonald RN  Safety Promotion/Fall Prevention:   safety round/check completed   room organization consistent  Taken 7/5/2025 2200 by Anny Mcdonald RN  Safety Promotion/Fall Prevention:   safety round/check completed   room organization consistent  Taken 7/5/2025 2125 by Anny Mcdonald RN  Safety Promotion/Fall Prevention:   safety round/check completed   room organization consistent  Taken 7/5/2025 2022 by Anny Mcdonald RN  Safety Promotion/Fall Prevention:   safety round/check completed   room organization consistent  Taken 7/5/2025 1920 by Anny Mcdonald, RN  Safety Promotion/Fall Prevention:   safety round/check completed   room organization consistent     Problem: Skin Injury Risk Increased  Goal: Skin Health  and Integrity  Outcome: Progressing  Intervention: Optimize Skin Protection  Recent Flowsheet Documentation  Taken 7/5/2025 1920 by Anny Mcdonald, RN  Activity Management: activity encouraged     Problem: Infection  Goal: Absence of Infection Signs and Symptoms  Outcome: Progressing     Problem: Mobility Impairment  Goal: Optimal Mobility  Outcome: Progressing  Intervention: Optimize Mobility  Recent Flowsheet Documentation  Taken 7/5/2025 1920 by Anny Mcdonald, RN  Activity Management: activity encouraged   Goal Outcome Evaluation:  Plan of Care Reviewed With: patient        Progress: no change

## 2025-07-07 LAB
ANION GAP SERPL CALCULATED.3IONS-SCNC: 17 MMOL/L (ref 5–15)
BUN SERPL-MCNC: 13 MG/DL (ref 8–23)
BUN/CREAT SERPL: 7.1 (ref 7–25)
CALCIUM SPEC-SCNC: 9.9 MG/DL (ref 8.2–9.6)
CHLORIDE SERPL-SCNC: 104 MMOL/L (ref 98–107)
CO2 SERPL-SCNC: 19 MMOL/L (ref 22–29)
CREAT SERPL-MCNC: 1.83 MG/DL (ref 0.57–1)
DEPRECATED RDW RBC AUTO: 40.6 FL (ref 37–54)
EGFRCR SERPLBLD CKD-EPI 2021: 24.4 ML/MIN/1.73
ERYTHROCYTE [DISTWIDTH] IN BLOOD BY AUTOMATED COUNT: 13 % (ref 12.3–15.4)
GLUCOSE BLDC GLUCOMTR-MCNC: 251 MG/DL (ref 70–130)
GLUCOSE BLDC GLUCOMTR-MCNC: 295 MG/DL (ref 70–130)
GLUCOSE BLDC GLUCOMTR-MCNC: 304 MG/DL (ref 70–130)
GLUCOSE BLDC GLUCOMTR-MCNC: 319 MG/DL (ref 70–130)
GLUCOSE SERPL-MCNC: 223 MG/DL (ref 65–99)
HCT VFR BLD AUTO: 39.5 % (ref 34–46.6)
HGB BLD-MCNC: 12.6 G/DL (ref 12–15.9)
MCH RBC QN AUTO: 27.6 PG (ref 26.6–33)
MCHC RBC AUTO-ENTMCNC: 31.9 G/DL (ref 31.5–35.7)
MCV RBC AUTO: 86.4 FL (ref 79–97)
PLATELET # BLD AUTO: 202 10*3/MM3 (ref 140–450)
PMV BLD AUTO: 11.8 FL (ref 6–12)
POTASSIUM SERPL-SCNC: 3.5 MMOL/L (ref 3.5–5.2)
POTASSIUM SERPL-SCNC: 3.9 MMOL/L (ref 3.5–5.2)
RBC # BLD AUTO: 4.57 10*6/MM3 (ref 3.77–5.28)
SODIUM SERPL-SCNC: 140 MMOL/L (ref 136–145)
WBC NRBC COR # BLD AUTO: 9.28 10*3/MM3 (ref 3.4–10.8)

## 2025-07-07 PROCEDURE — 97535 SELF CARE MNGMENT TRAINING: CPT

## 2025-07-07 PROCEDURE — 85027 COMPLETE CBC AUTOMATED: CPT | Performed by: STUDENT IN AN ORGANIZED HEALTH CARE EDUCATION/TRAINING PROGRAM

## 2025-07-07 PROCEDURE — 80048 BASIC METABOLIC PNL TOTAL CA: CPT | Performed by: STUDENT IN AN ORGANIZED HEALTH CARE EDUCATION/TRAINING PROGRAM

## 2025-07-07 PROCEDURE — 25010000002 HYDRALAZINE PER 20 MG: Performed by: STUDENT IN AN ORGANIZED HEALTH CARE EDUCATION/TRAINING PROGRAM

## 2025-07-07 PROCEDURE — 25010000002 CEFTRIAXONE PER 250 MG: Performed by: STUDENT IN AN ORGANIZED HEALTH CARE EDUCATION/TRAINING PROGRAM

## 2025-07-07 PROCEDURE — 82948 REAGENT STRIP/BLOOD GLUCOSE: CPT

## 2025-07-07 PROCEDURE — 84132 ASSAY OF SERUM POTASSIUM: CPT | Performed by: INTERNAL MEDICINE

## 2025-07-07 PROCEDURE — 63710000001 INSULIN LISPRO (HUMAN) PER 5 UNITS: Performed by: INTERNAL MEDICINE

## 2025-07-07 RX ORDER — POTASSIUM CHLORIDE 1500 MG/1
20 TABLET, EXTENDED RELEASE ORAL ONCE
Status: COMPLETED | OUTPATIENT
Start: 2025-07-07 | End: 2025-07-07

## 2025-07-07 RX ADMIN — Medication 10 ML: at 09:01

## 2025-07-07 RX ADMIN — CEFTRIAXONE SODIUM 2000 MG: 2 INJECTION, POWDER, FOR SOLUTION INTRAMUSCULAR; INTRAVENOUS at 09:00

## 2025-07-07 RX ADMIN — Medication 10 ML: at 20:43

## 2025-07-07 RX ADMIN — HYDRALAZINE HYDROCHLORIDE 10 MG: 20 INJECTION INTRAMUSCULAR; INTRAVENOUS at 20:41

## 2025-07-07 RX ADMIN — ROSUVASTATIN CALCIUM 10 MG: 10 TABLET, FILM COATED ORAL at 09:00

## 2025-07-07 RX ADMIN — HYDRALAZINE HYDROCHLORIDE 10 MG: 20 INJECTION INTRAMUSCULAR; INTRAVENOUS at 10:40

## 2025-07-07 RX ADMIN — INSULIN LISPRO 4 UNITS: 100 INJECTION, SOLUTION INTRAVENOUS; SUBCUTANEOUS at 09:01

## 2025-07-07 RX ADMIN — POTASSIUM CHLORIDE 20 MEQ: 1500 TABLET, EXTENDED RELEASE ORAL at 10:43

## 2025-07-07 RX ADMIN — INSULIN LISPRO 5 UNITS: 100 INJECTION, SOLUTION INTRAVENOUS; SUBCUTANEOUS at 20:41

## 2025-07-07 RX ADMIN — INSULIN LISPRO 4 UNITS: 100 INJECTION, SOLUTION INTRAVENOUS; SUBCUTANEOUS at 18:13

## 2025-07-07 RX ADMIN — HYDRALAZINE HYDROCHLORIDE 10 MG: 20 INJECTION INTRAMUSCULAR; INTRAVENOUS at 03:29

## 2025-07-07 RX ADMIN — INSULIN LISPRO 5 UNITS: 100 INJECTION, SOLUTION INTRAVENOUS; SUBCUTANEOUS at 12:18

## 2025-07-07 NOTE — PROGRESS NOTES
Discharge Planning Assessment  [facility]     Patient Name: Elissa Marrero  MRN: [id number]  Today's Date: 7/7/2025    Admit Date: 7/3/2025    Plan: Return home with daughter.  Referral to [facility] pending   Discharge Needs Assessment       Row Name 07/07/25 1439       Living Environment    People in Home child(mckayla), adult    Name(s) of People in Home Switches between daughter Michelle and daughter Meredith    Current Living Arrangements home    Potentially Unsafe Housing Conditions none    Primary Care Provided by child(mckayla)    Provides Primary Care For no one, unable/limited ability to care for self    Family Caregiver if Needed child(mckayla), adult    Family Caregiver Names Daughter Michelle Spain [phone] or tami Lieberman [phone]    Quality of Family Relationships helpful    Able to Return to Prior Arrangements yes       Resource/Environmental Concerns    Resource/Environmental Concerns none    Transportation Concerns none       Transition Planning    Patient/Family Anticipates Transition to home with family    Patient/Family Anticipated Services at Transition home health care    Transportation Anticipated family or friend will provide       Discharge Needs Assessment    Readmission Within the Last 30 Days no previous admission in last 30 days    Equipment Currently Used at Home walker, rolling;grab bar;shower chair;wheelchair;commode    Concerns to be Addressed denies needs/concerns at this time    Anticipated Changes Related to Illness none    Equipment Needed After Discharge none    Provided Post Acute Provider List? N/A    N/A Provider List Comment Has used [facility] in the past                   Discharge Plan       Row Name 07/07/25 1442       Plan    Plan Return home with daughter.  Referral to [facility] pending    Patient/Family in Agreement with Plan yes    Plan Comments Spoke with patient and daughter Michelle Spain [phone] at bedside.  Daughter answered questions.  Patient  lives with daughter Friday-Tuesday and then other daughter Meredith Lieberman [phone] Tuesday to Friday.  She has a walker.  Both houses have 3STE, BSC, grab bars, shower chair.  She has used [facility] in the past and they would like referral there - email to Amy.  She has never been to SNF.  PCP is Dr. Onel Ardon and pharmacy is [facility] in [facility].  Daughter Michelle drives for appointments.  Plan is to return home with daughters and [facility] following -pending acceptance.  CCP following.  Ron ISIDRO                  Continued Care and Services - Admitted Since 7/3/2025       Home Medical Care       Service Provider Request Status Services Address Phone Fax Patient Preferred    [facility] Pending - Request Sent -- [address], [address], [facility] [address] [phone] --                  Selected Continued Care - Episodes Includes continued care and service providers with selected services from the active episodes listed below      [facility] Episode start date: 6/10/2025   There are no active outsourced providers for this episode.                 Expected Discharge Date and Time       Expected Discharge Date Expected Discharge Time    Jul 8, 2025            Demographic Summary       Row Name 07/07/25 1438       General Information    Admission Type inpatient    Arrived From home    Referral Source admission list    Reason for Consult discharge planning    Preferred Language English                   Functional Status       Row Name 07/07/25 1439       Functional Status    Usual Activity Tolerance moderate    Current Activity Tolerance moderate       Functional Status, IADL    Medications completely dependent    Meal Preparation completely dependent    Housekeeping completely dependent    Laundry completely dependent    Shopping completely dependent       Mental Status    General Appearance WDL WDL       Mental Status Summary    Recent Changes in  Mental Status/Cognitive Functioning unable to assess                               Becky S. [place], RN

## 2025-07-07 NOTE — PROGRESS NOTES
Name: Elissa Marrero ADMIT: 7/3/2025   : 3/15/1925  PCP: Onel Mann MD    MRN: [id number] LOS: 3 days   AGE/SEX: 89 y.o. female  ROOM: [facility]     Subjective   Subjective   Patient seen at bedside today.       Objective   Objective   Vital Signs  Temp:  [97.6 °F (36.4 °C)-98.6 °F (37 °C)] 97.6 °F (36.4 °C)  Heart Rate:  [] 112  Resp:  [16-18] 16  BP: (124-198)/(67-91) 124/73  SpO2:  [83 %-100 %] 100 %  on   ;   Device (Oxygen Therapy): room air  Body mass index is 21.41 kg/m².  Physical Exam  Constitutional:       General: She is not in acute distress.     Comments: AAOx2   HENT:      Head: Normocephalic and atraumatic.      Nose: Nose normal. No congestion.      Mouth/Throat:      Pharynx: Oropharynx is clear. No oropharyngeal exudate.   Eyes:      General: No scleral icterus.  Cardiovascular:      Rate and Rhythm: Normal rate and regular rhythm.      Heart sounds: No murmur heard.     No friction rub. No gallop.   Pulmonary:      Effort: No respiratory distress.      Breath sounds: No wheezing, rhonchi or rales.   Abdominal:      General: There is no distension.      Tenderness: There is no abdominal tenderness. There is no guarding.   Musculoskeletal:         General: No swelling, deformity or signs of injury.      Cervical back: Normal range of motion. No rigidity.   Skin:     Coloration: Skin is not jaundiced.      Findings: No bruising or lesion.   Neurological:      General: No focal deficit present.      Mental Status: She is disoriented.      Motor: Weakness present.      Copied text material from yesterday's note has been reviewed for appropriate changes and remains accurate as of 25.      Results Review     I reviewed the patient's new clinical results.  Results from last 7 days   Lab Units 25  0522 25  0619 25  0316 25  0623   WBC 10*3/mm3 9.28 7.49 7.28 7.33   HEMOGLOBIN g/dL 12.6 11.5* 11.5* 11.3*   PLATELETS 10*3/mm3 202 169 167 167     Results from last 7  days   Lab Units 07/07/25  1554 07/07/25  0522 07/06/25  0619 07/05/25 0316 07/04/25  0623   SODIUM mmol/L  --  140 140 139 141   POTASSIUM mmol/L 3.9 3.5 3.8 3.7 4.2   CHLORIDE mmol/L  --  104 105 106 108*   CO2 mmol/L  --  19.0* 23.0 22.0 23.6   BUN mg/dL  --  13.0 15.0 17.0 22.0   CREATININE mg/dL  --  1.83* 1.70* 1.73* 1.87*   GLUCOSE mg/dL  --  223* 196* 123* 201*   EGFR mL/min/1.73  --  24.4* 26.7* 26.1* 23.8*     Results from last 7 days   Lab Units 07/03/25  1751   ALBUMIN g/dL 3.5   BILIRUBIN mg/dL 0.3   ALK PHOS U/L 59   AST (SGOT) U/L 15   ALT (SGPT) U/L 9     Results from last 7 days   Lab Units 07/07/25  0522 07/06/25  0619 07/05/25 0316 07/04/25  0623 07/03/25  1751   CALCIUM mg/dL 9.9* 9.6 9.5 9.5 10.0*   ALBUMIN g/dL  --   --   --   --  3.5       Glucose   Date/Time Value Ref Range Status   07/07/2025 1557 295 (H) 70 - 130 mg/dL Final   07/07/2025 1052 304 (H) 70 - 130 mg/dL Final   07/07/2025 0726 251 (H) 70 - 130 mg/dL Final   07/06/2025 1934 273 (H) 70 - 130 mg/dL Final   07/06/2025 1614 195 (H) 70 - 130 mg/dL Final   07/06/2025 1125 294 (H) 70 - 130 mg/dL Final   07/06/2025 0727 226 (H) 70 - 130 mg/dL Final       No radiology results for the last day    I have personally reviewed all medications:  Scheduled Medications  insulin lispro, 2-7 Units, Subcutaneous, 4x Daily AC & at Bedtime  [Held by provider] levothyroxine, 88 mcg, Oral, Q AM  rosuvastatin, 10 mg, Oral, Daily  sodium chloride, 10 mL, Intravenous, Q12H    Infusions   Diet  Diet: Regular/House; Fluid Consistency: Thin (IDDSI 0)    I have personally reviewed:  [x]  Laboratory   [x]  Microbiology   [x]  Radiology   [x]  EKG/Telemetry  [x]  Cardiology/Vascular   []  Pathology    []  Records       Assessment/Plan     Active Hospital Problems    Diagnosis  POA    **Subdural hematoma [S06.5XAA]  Yes    SDH (subdural hematoma) [S06.5XAA]  Yes    Weakness [R53.1]  Unknown    Anemia [D64.9]  Unknown    Hypothyroid [E03.9]  Unknown    HTN  (hypertension) [I10]  Unknown    HLD (hyperlipidemia) [E78.5]  Unknown    UTI (urinary tract infection) [N39.0]  Unknown    DM (diabetes mellitus) [E11.9]  Unknown    CKD (chronic kidney disease) [N18.9]  Unknown      Resolved Hospital Problems   No resolved problems to display.       89 y.o. female admitted with Subdural hematoma.    Assessment and plan  #Weakness    -Suspect increased weakness due to UTI and subdural hematoma    -PT/OT/SLP    -Treatment as below     #UTI    -UA shows UTI    -Urine culture, follow results, showing E. Coli.     - She has completed Rocephin course.     #Subdural Hematoma    -CT head shows acute subdural hematoma along the falx, generally about 3 to 5 mm in thickness, though maximally 9 mm in thickness, there is no midline shift    -Neurosurgery consulted by ER recommends repeat CT head, no further acute changes noted.    -Family states patient is on Plavix, will hold    -Family admits to fall 1 to 2 weeks ago and patient hit her head on wall    -PT/OT     #CKD    -Creatinine 2.05, base around 1.7-1.8    - Monitor     #Anemia    -Hemoglobin 11.1 on admission, base around 12    -Monitor labs     #DM    -ISS     #Hypothyroid    -Resume home Synthroid, check TSH     #Hypertension    -Hydralazine 10 mg IV every 6 hours prerenal parameters     #Hyperlipidemia    - Resume home statin     Discussed with family and she is DNR/DNI  VTE Prophylaxis - SCDs.  Code Status - Limited code (no CPR, no intubation).  If patient decompensates, will consider palliative care evaluation.     Expected Discharge Date: 7/8/2025; Expected Discharge Time:       Armond Kim MD  [facility]  07/07/25  19:21 EDT

## 2025-07-07 NOTE — PLAN OF CARE
Goal Outcome Evaluation:  VSS this shift; X; BP & tachycardia. PRN hydralazine given once for SBP > 160; effective. A/Ox2; disoriented to time & situation; no neuro changes. No pain reported. PT slept well throughout shift. No other changes/issues noted/reported. Bed alarm set, personal items & call light w/in reach.       Plan of Care Reviewed With: patient     Progress: no change

## 2025-07-07 NOTE — PLAN OF CARE
The pt participated in OT this morning. Min A was provided for bed mobility. Max A required for donning LB socks. She stood with Min A x1 + walker and mobilized into the bathroom for toileting. Max A required due to difficulty managing her saturated brief and performing hygiene. Min A to transfer off the commode and complete sinkside grooming. The pt reported that she felt very weak today and also appeared more confused than the OT session prior (speaking about  family members, unsure the accuracy). The pt was visibly exhausted after completing OOB activity and her HR read in the 130's. No family was at bedside today to discuss discharge planning. Anticipate the need for SNF vs. Home with 24/7 assistance and HH OT pending her family's availability.

## 2025-07-07 NOTE — PLAN OF CARE
Problem: Adult Inpatient Plan of Care  Goal: Plan of Care Review  Outcome: Progressing  Flowsheets (Taken 7/7/2025 1848)  Plan of Care Reviewed With:   patient   family  Goal: Patient-Specific Goal (Individualized)  Outcome: Progressing  Goal: Absence of Hospital-Acquired Illness or Injury  Outcome: Progressing  Intervention: Identify and Manage Fall Risk  Recent Flowsheet Documentation  Taken 7/7/2025 1800 by Pedro Soares, RN  Safety Promotion/Fall Prevention:   activity supervised   assistive device/personal items within reach   clutter free environment maintained   fall prevention program maintained   gait belt   lighting adjusted   mobility aid in reach   muscle strengthening facilitated   nonskid shoes/slippers when out of bed   room organization consistent   safety round/check completed  Taken 7/7/2025 1400 by Pedro Soares RN  Safety Promotion/Fall Prevention:   activity supervised   assistive device/personal items within reach   clutter free environment maintained   fall prevention program maintained   gait belt   elopement precautions   lighting adjusted   mobility aid in reach   muscle strengthening facilitated   nonskid shoes/slippers when out of bed   room organization consistent   safety round/check completed  Taken 7/7/2025 1200 by Pedro Soares, RN  Safety Promotion/Fall Prevention:   activity supervised   assistive device/personal items within reach   clutter free environment maintained   fall prevention program maintained   gait belt   lighting adjusted   mobility aid in reach   muscle strengthening facilitated   elopement precautions   nonskid shoes/slippers when out of bed   room organization consistent   safety round/check completed  Taken 7/7/2025 1000 by Pedro Soares, RN  Safety Promotion/Fall Prevention:   activity supervised   assistive device/personal items within reach   clutter free environment maintained   fall prevention program maintained   gait belt   elopement  precautions   lighting adjusted   mobility aid in reach   muscle strengthening facilitated   nonskid shoes/slippers when out of bed   room organization consistent   safety round/check completed  Taken 7/7/2025 0800 by Pedro Soares RN  Safety Promotion/Fall Prevention:   activity supervised   assistive device/personal items within reach   clutter free environment maintained   gait belt   fall prevention program maintained   lighting adjusted   mobility aid in reach   muscle strengthening facilitated   nonskid shoes/slippers when out of bed   room organization consistent   safety round/check completed  Intervention: Prevent Skin Injury  Recent Flowsheet Documentation  Taken 7/7/2025 1800 by Pedro Soares RN  Body Position: weight shifting  Taken 7/7/2025 1600 by Pedro Soares RN  Body Position:   weight shifting   legs elevated  Taken 7/7/2025 1000 by Pedro Soares RN  Body Position:   weight shifting   supine  Taken 7/7/2025 0800 by Pedro Soares RN  Body Position:   weight shifting   supine  Skin Protection: incontinence pads utilized  Goal: Optimal Comfort and Wellbeing  Outcome: Progressing  Intervention: Provide Person-Centered Care  Recent Flowsheet Documentation  Taken 7/7/2025 0800 by Pedro Soares RN  Trust Relationship/Rapport:   care explained   choices provided   questions answered   questions encouraged  Goal: Readiness for Transition of Care  Outcome: Progressing     Problem: Skin Injury Risk Increased  Goal: Skin Health and Integrity  Outcome: Progressing  Intervention: Optimize Skin Protection  Recent Flowsheet Documentation  Taken 7/7/2025 1800 by Pedro Soares RN  Activity Management: up in chair  Taken 7/7/2025 1600 by Pedro Soares RN  Activity Management: up in chair  Taken 7/7/2025 1400 by Pedro Soares RN  Activity Management: up in chair  Taken 7/7/2025 1200 by Pedro Soares RN  Activity Management: up in chair  Taken 7/7/2025 1000 by Pedro Soares  RN  Head of Bed (HOB) Positioning: HOB elevated  Taken 7/7/2025 0800 by Pedro Soares RN  Pressure Reduction Techniques:   frequent weight shift encouraged   heels elevated off bed  Head of Bed (HOB) Positioning: HOB elevated  Pressure Reduction Devices: pressure-redistributing mattress utilized  Skin Protection: incontinence pads utilized     Problem: Infection  Goal: Absence of Infection Signs and Symptoms  Outcome: Progressing     Problem: Mobility Impairment  Goal: Optimal Mobility  Outcome: Progressing  Intervention: Optimize Mobility  Recent Flowsheet Documentation  Taken 7/7/2025 1800 by Pedro Soares RN  Activity Management: up in chair  Taken 7/7/2025 1600 by Pedro Soares RN  Activity Management: up in chair  Taken 7/7/2025 1400 by Pedro Soares RN  Activity Management: up in chair  Taken 7/7/2025 1200 by Pedro Soares RN  Activity Management: up in chair     Problem: Fall Injury Risk  Goal: Absence of Fall and Fall-Related Injury  Outcome: Progressing  Intervention: Identify and Manage Contributors  Recent Flowsheet Documentation  Taken 7/7/2025 1400 by Pedro Soares RN  Medication Review/Management: medications reviewed  Taken 7/7/2025 1200 by Pedro Soares RN  Medication Review/Management: medications reviewed  Taken 7/7/2025 1000 by Pedro Soares RN  Medication Review/Management: medications reviewed  Taken 7/7/2025 0800 by Pedro Soares RN  Self-Care Promotion: independence encouraged  Intervention: Promote Injury-Free Environment  Recent Flowsheet Documentation  Taken 7/7/2025 1800 by Pedro Soares RN  Safety Promotion/Fall Prevention:   activity supervised   assistive device/personal items within reach   clutter free environment maintained   fall prevention program maintained   gait belt   lighting adjusted   mobility aid in reach   muscle strengthening facilitated   nonskid shoes/slippers when out of bed   room organization consistent   safety round/check  completed  Taken 7/7/2025 1400 by Pedro Soares RN  Safety Promotion/Fall Prevention:   activity supervised   assistive device/personal items within reach   clutter free environment maintained   fall prevention program maintained   gait belt   elopement precautions   lighting adjusted   mobility aid in reach   muscle strengthening facilitated   nonskid shoes/slippers when out of bed   room organization consistent   safety round/check completed  Taken 7/7/2025 1200 by Pedro Soares RN  Safety Promotion/Fall Prevention:   activity supervised   assistive device/personal items within reach   clutter free environment maintained   fall prevention program maintained   gait belt   lighting adjusted   mobility aid in reach   muscle strengthening facilitated   elopement precautions   nonskid shoes/slippers when out of bed   room organization consistent   safety round/check completed  Taken 7/7/2025 1000 by Pedro Soares RN  Safety Promotion/Fall Prevention:   activity supervised   assistive device/personal items within reach   clutter free environment maintained   fall prevention program maintained   gait belt   elopement precautions   lighting adjusted   mobility aid in reach   muscle strengthening facilitated   nonskid shoes/slippers when out of bed   room organization consistent   safety round/check completed  Taken 7/7/2025 0800 by Pedro Soares RN  Safety Promotion/Fall Prevention:   activity supervised   assistive device/personal items within reach   clutter free environment maintained   gait belt   fall prevention program maintained   lighting adjusted   mobility aid in reach   muscle strengthening facilitated   nonskid shoes/slippers when out of bed   room organization consistent   safety round/check completed   Goal Outcome Evaluation:  Plan of Care Reviewed With: patient, family

## 2025-07-07 NOTE — THERAPY TREATMENT NOTE
Patient Name: Elissa Marrero  : 3/15/1925    MRN: [id number]                              Today's Date: 2025       Admit Date: 7/3/2025    Visit Dx:     ICD-10-CM ICD-9-CM   1. Subdural hematoma  S06.5XAA 432.1   2. Urinary tract infection without hematuria, site unspecified  N39.0 599.0   3. SDH (subdural hematoma)  S06.5XAA 432.1     Patient Active Problem List   Diagnosis    DM (diabetes mellitus)    HLD (hyperlipidemia)    HTN (hypertension)    Hypothyroid    Insomnia    Lymphedema    Osteoarthritis    Osteopenia    CKD (chronic kidney disease)    Restless leg syndrome    Vitamin D deficiency    Hip pain, right    Acute kidney failure    Chronic kidney disease, stage 4 (severe)    Disorder of bone and cartilage    Edema    Hyperkalemia    UTI (urinary tract infection)    TIA (transient ischemic attack)    Acute metabolic encephalopathy    Acute cystitis without hematuria    Kidney stone    Subdural hematoma    Weakness    Anemia    SDH (subdural hematoma)     Past Medical History:   Diagnosis Date    Benign essential hypertension     Diabetes mellitus, type 2 2012    Encounter for diabetic foot exam 2015    History of complete eye exam 2017    Dr Leslie    Hyperlipidemia     Hypothyroidism     Insomnia     Lymphedema 2010    Osteoarthritis     Osteopenia     Renal insufficiency 2009    Restless leg syndrome 2012    Vitamin D deficiency 10/12/2011     Past Surgical History:   Procedure Laterality Date    APPENDECTOMY      CHOLECYSTECTOMY      EYE SURGERY      TONSILLECTOMY        General Information       Row Name 25 1322          OT Time and Intention    Subjective Information no complaints  -RB     Document Type therapy note (daily note)  -RB     Mode of Treatment individual therapy;occupational therapy  -RB     Patient Effort good  -RB       Row Name 25 1322          General Information    Patient Profile Reviewed yes  -RB     Existing  Precautions/Restrictions fall  -RB       Row Name 07/07/25 1322          Cognition    Orientation Status (Cognition) oriented x 3  -RB       Row Name 07/07/25 1322          Safety Issues/Impairments Affecting Functional Mobility    Safety Issues Affecting Function (Mobility) safety precaution awareness;safety precautions follow-through/compliance;awareness of need for assistance;insight into deficits/self-awareness;positioning of assistive device  -               User Key  (r) = Recorded By, (t) = Taken By, (c) = Cosigned By      Initials Name Provider Type    RB Jerri Watt OTR/L, CSRS Occupational Therapist                     Mobility/ADL's       Row Name 07/07/25 1252          Bed Mobility    Bed Mobility supine-sit  -RB     Supine-Sit Reynolds (Bed Mobility) minimum assist (75% patient effort);verbal cues  -RB     Assistive Device (Bed Mobility) bed rails  -RB       Row Name 07/07/25 1252          Transfers    Transfers sit-stand transfer;stand-sit transfer;toilet transfer;bed-chair transfer  -       Row Name 07/07/25 1252          Bed-Chair Transfer    Bed-Chair Reynolds (Transfers) minimum assist (75% patient effort);1 person assist;verbal cues  -RB     Assistive Device (Bed-Chair Transfers) walker, front-wheeled  -RB       Row Name 07/07/25 1252          Sit-Stand Transfer    Sit-Stand Reynolds (Transfers) minimum assist (75% patient effort);1 person assist;verbal cues  -RB     Assistive Device (Sit-Stand Transfers) walker, front-wheeled  -RB       Row Name 07/07/25 1252          Stand-Sit Transfer    Stand-Sit Reynolds (Transfers) minimum assist (75% patient effort);1 person assist;verbal cues  -RB     Assistive Device (Stand-Sit Transfers) walker, front-wheeled  -RB       Row Name 07/07/25 1252          Toilet Transfer    Type (Toilet Transfer) sit-stand;stand-sit  -RB     Reynolds Level (Toilet Transfer) minimum assist (75% patient effort);1 person assist;verbal cues  -RB      Assistive Device (Toilet Transfer) walker, front-wheeled  -RB       Row Name 07/07/25 1252          Functional Mobility    Functional Mobility- Ind. Level 1 person;minimum assist (75% patient effort);verbal cues required  -RB     Functional Mobility- Device walker, front-wheeled  -RB     Functional Mobility- Safety Issues balance decreased during turns;sequencing ability decreased  -RB       Row Name 07/07/25 1252          Activities of Daily Living    BADL Assessment/Intervention lower body dressing;grooming;toileting  -RB       Row Name 07/07/25 1252          Lower Body Dressing Assessment/Training    [place] Level (Lower Body Dressing) maximum assist (25% patient effort)  -RB     Position (Lower Body Dressing) edge of bed sitting  -RB       Row Name 07/07/25 1252          Grooming Assessment/Training    [place] Level (Grooming) grooming skills;minimum assist (75% patient effort)  -RB     Position (Grooming) sink side;supported standing  -RB       Row Name 07/07/25 1252          Toileting Assessment/Training    [place] Level (Toileting) toileting skills;maximum assist (25% patient effort);change pad/brief;perform perineal hygiene  -RB     Position (Toileting) supported sitting;supported standing  -RB     Comment, (Toileting) Difficulty with doffing her soiled brief and wiping  -RB               User Key  (r) = Recorded By, (t) = Taken By, (c) = Cosigned By      Initials Name Provider Type    RB Jerri Watt, MOHANR/L, CSRS Occupational Therapist                   Obj/Interventions       Row Name 07/07/25 1251          Motor Skills    Motor Skills functional endurance  -RB     Functional Endurance The pt engaged in ~20 min of seated/standing ADL participation in her hospital room - high fatigue was reported.  -RB       Row Name 07/07/25 1251          Balance    Comment, Balance SBA/CGA sitting balance, Min A + walker for standing balance. generally unsteady.  -RB               User Key  (r) =  Recorded By, (t) = Taken By, (c) = Cosigned By      Initials Name Provider Type    RB Jerri Watt, OTR/L, CSRS Occupational Therapist                   Goals/Plan    No documentation.                  Clinical Impression       Row Name 25 1249          Pain Assessment    Pretreatment Pain Rating 0/10 - no pain  -RB     Posttreatment Pain Rating 0/10 - no pain  -RB       Row Name 25 1249          Plan of Care Review    Plan of Care Reviewed With patient  -RB     Progress improving  -RB     Outcome Evaluation The pt participated in OT this morning. Min A was provided for bed mobility. Max A required for donning LB socks. She stood with Min A x1 + walker and mobilized into the bathroom for toileting. Max A required due to difficulty managing her saturated brief and performing hygiene. Min A to transfer off the commode and complete sinkside grooming. The pt reported that she felt very weak today and also appeared more confused than the OT session prior (speaking about  family members, unsure the accuracy). No family at bedside today to discuss discharge planning. Anticipate the need for SNF vs. Home with 24/7 assistance and HH OT pending her family's availability.  -RB       Row Name 25 1249          Therapy Assessment/Plan (OT)    Criteria for Skilled Therapeutic Interventions Met (OT) yes;skilled treatment is necessary  -RB     Therapy Frequency (OT) 5 times/wk  -RB       Row Name 25 1249          Therapy Plan Review/Discharge Plan (OT)    Anticipated Discharge Disposition (OT) home with 24/7 care;home with home health;skilled nursing facility  -RB       Row Name 25 1249          Vital Signs    Pretreatment Heart Rate (beats/min) 108  -RB     Posttreatment Heart Rate (beats/min) 130  -RB     O2 Delivery Pre Treatment room air  -RB     O2 Delivery Intra Treatment room air  -RB     O2 Delivery Post Treatment room air  -RB     Pre Patient Position Supine  -RB     Intra Patient  Position Standing  -RB     Post Patient Position Sitting  -RB       Row Name 07/07/25 1249          Positioning and Restraints    Pre-Treatment Position in bed  -RB     Post Treatment Position chair  -RB     In Chair notified nsg;reclined;sitting;call light within reach;encouraged to call for assist;exit alarm on;legs elevated  -RB               User Key  (r) = Recorded By, (t) = Taken By, (c) = Cosigned By      Initials Name Provider Type    RB Jerri Watt, OTR/L, CSRS Occupational Therapist                   Outcome Measures       Row Name 07/07/25 0800          How much help from another person do you currently need...    Turning from your back to your side while in flat bed without using bedrails? 4  -TS     Moving from lying on back to sitting on the side of a flat bed without bedrails? 4  -TS     Moving to and from a bed to a chair (including a wheelchair)? 3  -TS     Standing up from a chair using your arms (e.g., wheelchair, bedside chair)? 3  -TS     Climbing 3-5 steps with a railing? 3  -TS     To walk in hospital room? 3  -TS     AM-PAC 6 Clicks Score (PT) 20  -TS     Highest Level of Mobility Goal Walk 10 Steps or More-6  -TS               User Key  (r) = Recorded By, (t) = Taken By, (c) = Cosigned By      Initials Name Provider Type    TS Pedro Soares, RN Registered Nurse                      OT Recommendation and Plan  Planned Therapy Interventions (OT): activity tolerance training, adaptive equipment training, BADL retraining, occupation/activity based interventions, ROM/therapeutic exercise, strengthening exercise, transfer/mobility retraining, patient/caregiver education/training  Therapy Frequency (OT): 5 times/wk  Plan of Care Review  Plan of Care Reviewed With: patient  Progress: improving  Outcome Evaluation: The pt participated in OT this morning. Min A was provided for bed mobility. Max A required for donning LB socks. She stood with Min A x1 + walker and mobilized into the bathroom  for toileting. Max A required due to difficulty managing her saturated brief and performing hygiene. Min A to transfer off the commode and complete sinkside grooming. The pt reported that she felt very weak today and also appeared more confused than the OT session prior (speaking about  family members, unsure the accuracy). No family at bedside today to discuss discharge planning. Anticipate the need for SNF vs. Home with 24/7 assistance and HH OT pending her family's availability.     Time Calculation:   Evaluation Complexity (OT)  Review Occupational Profile/Medical/Therapy History Complexity: expanded/moderate complexity  Assessment, Occupational Performance/Identification of Deficit Complexity: 3-5 performance deficits  Clinical Decision Making Complexity (OT): detailed assessment/moderate complexity  Overall Complexity of Evaluation (OT): moderate complexity     Time Calculation- OT       Row Name 25 1249             Time Calculation- OT    OT Start Time 08  -RB      OT Stop Time 925  -RB      OT Time Calculation (min) 28 min  -RB      Total Timed Code Minutes- OT 28 minute(s)  -RB      OT Received On 25  -RB      OT - Next Appointment 25  -RB         Timed Charges    47802 - OT Self Care/Mgmt Minutes 28  -RB         Total Minutes    Timed Charges Total Minutes 28  -RB       Total Minutes 28  -RB                User Key  (r) = Recorded By, (t) = Taken By, (c) = Cosigned By      Initials Name Provider Type    RB Jerri Watt OTR/L, CSRS Occupational Therapist                  Therapy Charges for Today       Code Description Service Date Service Provider Modifiers Qty    45024609219 HC OT SELF CARE/MGMT/TRAIN EA 15 MIN 2025 Jerri Watt OTR/L, JOLYNN GO 2                 Jerri Shukri, OTR/L, CSRS  2025

## 2025-07-08 ENCOUNTER — READMISSION MANAGEMENT (OUTPATIENT)
Dept: CALL CENTER | Facility: HOSPITAL | Age: OVER 89
End: 2025-07-08
Payer: MEDICARE

## 2025-07-08 VITALS
SYSTOLIC BLOOD PRESSURE: 138 MMHG | BODY MASS INDEX: 20.72 KG/M2 | OXYGEN SATURATION: 98 % | TEMPERATURE: 97.5 F | DIASTOLIC BLOOD PRESSURE: 85 MMHG | WEIGHT: 132.28 LBS | HEART RATE: 108 BPM | RESPIRATION RATE: 18 BRPM

## 2025-07-08 LAB
ANION GAP SERPL CALCULATED.3IONS-SCNC: 17 MMOL/L (ref 5–15)
BUN SERPL-MCNC: 18 MG/DL (ref 8–23)
BUN/CREAT SERPL: 9.3 (ref 7–25)
CALCIUM SPEC-SCNC: 9.6 MG/DL (ref 8.2–9.6)
CHLORIDE SERPL-SCNC: 108 MMOL/L (ref 98–107)
CO2 SERPL-SCNC: 19 MMOL/L (ref 22–29)
CREAT SERPL-MCNC: 1.94 MG/DL (ref 0.57–1)
DEPRECATED RDW RBC AUTO: 41.9 FL (ref 37–54)
EGFRCR SERPLBLD CKD-EPI 2021: 22.8 ML/MIN/1.73
ERYTHROCYTE [DISTWIDTH] IN BLOOD BY AUTOMATED COUNT: 13 % (ref 12.3–15.4)
GLUCOSE BLDC GLUCOMTR-MCNC: 203 MG/DL (ref 70–130)
GLUCOSE BLDC GLUCOMTR-MCNC: 218 MG/DL (ref 70–130)
GLUCOSE BLDC GLUCOMTR-MCNC: 359 MG/DL (ref 70–130)
GLUCOSE BLDC GLUCOMTR-MCNC: 373 MG/DL (ref 70–130)
GLUCOSE SERPL-MCNC: 197 MG/DL (ref 65–99)
HCT VFR BLD AUTO: 38.5 % (ref 34–46.6)
HGB BLD-MCNC: 11.9 G/DL (ref 12–15.9)
MCH RBC QN AUTO: 27.4 PG (ref 26.6–33)
MCHC RBC AUTO-ENTMCNC: 30.9 G/DL (ref 31.5–35.7)
MCV RBC AUTO: 88.5 FL (ref 79–97)
PLATELET # BLD AUTO: 210 10*3/MM3 (ref 140–450)
PMV BLD AUTO: 11.2 FL (ref 6–12)
POTASSIUM SERPL-SCNC: 4.1 MMOL/L (ref 3.5–5.2)
RBC # BLD AUTO: 4.35 10*6/MM3 (ref 3.77–5.28)
SODIUM SERPL-SCNC: 144 MMOL/L (ref 136–145)
WBC NRBC COR # BLD AUTO: 7.64 10*3/MM3 (ref 3.4–10.8)

## 2025-07-08 PROCEDURE — 36415 COLL VENOUS BLD VENIPUNCTURE: CPT | Performed by: STUDENT IN AN ORGANIZED HEALTH CARE EDUCATION/TRAINING PROGRAM

## 2025-07-08 PROCEDURE — 97530 THERAPEUTIC ACTIVITIES: CPT

## 2025-07-08 PROCEDURE — 82948 REAGENT STRIP/BLOOD GLUCOSE: CPT

## 2025-07-08 PROCEDURE — 80048 BASIC METABOLIC PNL TOTAL CA: CPT | Performed by: STUDENT IN AN ORGANIZED HEALTH CARE EDUCATION/TRAINING PROGRAM

## 2025-07-08 PROCEDURE — 63710000001 INSULIN LISPRO (HUMAN) PER 5 UNITS: Performed by: INTERNAL MEDICINE

## 2025-07-08 PROCEDURE — 85027 COMPLETE CBC AUTOMATED: CPT | Performed by: STUDENT IN AN ORGANIZED HEALTH CARE EDUCATION/TRAINING PROGRAM

## 2025-07-08 PROCEDURE — 97116 GAIT TRAINING THERAPY: CPT

## 2025-07-08 RX ADMIN — INSULIN LISPRO 6 UNITS: 100 INJECTION, SOLUTION INTRAVENOUS; SUBCUTANEOUS at 11:28

## 2025-07-08 RX ADMIN — ROSUVASTATIN CALCIUM 10 MG: 10 TABLET, FILM COATED ORAL at 08:11

## 2025-07-08 RX ADMIN — Medication 10 ML: at 10:11

## 2025-07-08 RX ADMIN — INSULIN LISPRO 3 UNITS: 100 INJECTION, SOLUTION INTRAVENOUS; SUBCUTANEOUS at 16:38

## 2025-07-08 RX ADMIN — INSULIN LISPRO 3 UNITS: 100 INJECTION, SOLUTION INTRAVENOUS; SUBCUTANEOUS at 08:11

## 2025-07-08 NOTE — PLAN OF CARE
Goal Outcome Evaluation:  Plan of Care Reviewed With: patient, child        Progress: improving   Patient to transfer to [facility] via wheelchair van. Daughters at bedside to follow.

## 2025-07-08 NOTE — PROGRESS NOTES
Continued Stay Note  [facility]     Patient Name: Elissa Marrero  MRN: [id number]  Today's Date: 7/8/2025    Admit Date: 7/3/2025    Plan: [facility] vs. return home with daughter and Caret[employer]    Discharge Plan       Row Name 07/08/25 1207       Plan    Plan Comments Received message from RN that daughter would like referral to [facility].  Per Agnes/[facility] skilled bed available.  Call to daughter and left message that [facility] can accept and awaiting return call.  [facility]  has accepted.  [facility] following.  Ron ISIDRO      Row Name 07/08/25 1206       Plan    Plan [facility] vs. return home with daughter and Caret[employer]                       Expected Discharge Date and Time       Expected Discharge Date Expected Discharge Time    Jul 8, 2025               Becky S. [place], RN

## 2025-07-08 NOTE — DISCHARGE SUMMARY
Date of Discharge:  7/8/2025    PCP: Onel Mann MD    Discharge Diagnosis:   Active Hospital Problems    Diagnosis  POA    **Subdural hematoma [S06.5XAA]  Yes    SDH (subdural hematoma) [S06.5XAA]  Yes    Weakness [R53.1]  Unknown    Anemia [D64.9]  Unknown    Hypothyroid [E03.9]  Unknown    HTN (hypertension) [I10]  Unknown    HLD (hyperlipidemia) [E78.5]  Unknown    UTI (urinary tract infection) [N39.0]  Unknown    DM (diabetes mellitus) [E11.9]  Unknown    CKD (chronic kidney disease) [N18.9]  Unknown      Resolved Hospital Problems   No resolved problems to display.          Consults       Date and Time Order Name Status Description    7/3/2025  8:33 PM [facility] (on-call MD unless specified) Details      7/3/2025  8:32 PM Neurosurgery (on-call MD unless specified) Completed           Hospital Course  Patient is a 89 y.o. female     #Weakness    -Suspect increased weakness due to UTI and subdural hematoma    -PT/OT/SLP    -Treatment as below     #UTI    -UA shows UTI    -Urine culture, follow results, showing E. Coli.     - She has completed Rocephin course.     #Subdural Hematoma    -CT head shows acute subdural hematoma along the falx, generally about 3 to 5 mm in thickness, though maximally 9 mm in thickness, there is no midline shift    -Neurosurgery consulted by ER recommends repeat CT head, no further acute changes noted.    -Family states patient is on Plavix, will hold    -Family admits to fall 1 to 2 weeks ago and patient hit her head on wall    -PT/OT     #CKD    -Creatinine Stable    - Monitor     #Anemia    -Hemoglobin 11.1 on admission, base around 12    -Monitor labs     #DM    -ISS     #Hypothyroid    -Resume home Synthroid, check TSH     #Hypertension    -Hydralazine 10 mg IV every 6 hours prerenal parameters     #Hyperlipidemia    - Resume home statin     Discussed with family and she is DNR/DNI    Patient is stable for discharge from the hospital.  I have seen and examined patient at bedside,  total time spent on discharge management is more than 30 minutes.    Temp:  [97.5 °F (36.4 °C)-98.1 °F (36.7 °C)] 97.5 °F (36.4 °C)  Heart Rate:  [101-125] 108  Resp:  [16-18] 18  BP: (106-169)/(68-85) 138/85  Body mass index is 20.72 kg/m².    Physical Exam  Constitutional:       General: She is not in acute distress.     Comments: AAOx2   HENT:      Head: Normocephalic and atraumatic.      Nose: Nose normal. No congestion.      Mouth/Throat:      Pharynx: Oropharynx is clear. No oropharyngeal exudate.   Eyes:      General: No scleral icterus.  Cardiovascular:      Rate and Rhythm: Normal rate and regular rhythm.      Heart sounds: No murmur heard.     No friction rub. No gallop.   Pulmonary:      Effort: No respiratory distress.      Breath sounds: No wheezing, rhonchi or rales.   Abdominal:      General: There is no distension.      Tenderness: There is no abdominal tenderness. There is no guarding.   Musculoskeletal:         General: No swelling, deformity or signs of injury.      Cervical back: Normal range of motion. No rigidity.   Skin:     Coloration: Skin is not jaundiced.      Findings: No bruising or lesion.   Neurological:      General: No focal deficit present.      Mental Status: She is disoriented.      Motor: Weakness present.            Disposition: Home or Self Care       Discharge Medications        Continue These Medications        Instructions Start Date   amLODIPine 2.5 MG tablet  Commonly known as: NORVASC   2.5 mg, Oral, Daily      BD Pen Needle Sally U/F 32G X 4 MM misc  Generic drug: Insulin Pen Needle   1 Units, Not Applicable, 4 Times Daily      FreeStyle Chris 3 Plus Sensor   Not Applicable, Every 14 Days      Dexcom G7 Sensor misc   1 Units, Not Applicable, Every 10 Days      FreeStyle Chris 3 [place] device   1 Units, Not Applicable, Every 14 Days      Lantus SoloStar 100 UNIT/ML injection pen  Generic drug: Insulin Glargine   Inject 10 Units under the skin Daily. **Discard open pen 28  days after first use**      levothyroxine 88 MCG tablet  Commonly known as: SYNTHROID, LEVOTHROID   88 mcg, Oral, Daily      metoprolol tartrate 25 MG tablet  Commonly known as: LOPRESSOR   25 mg, Oral, 2 Times Daily      pramipexole 1.5 MG tablet  Commonly known as: MIRAPEX   1.5 mg, Oral, Daily      rosuvastatin 10 MG tablet  Commonly known as: Crestor   10 mg, Oral, Daily      SITagliptin 25 MG tablet  Commonly known as: Januvia   25 mg, Oral, Daily      traZODone 50 MG tablet  Commonly known as: DESYREL   50 mg, Oral, Nightly      VITAMIN D PO   125 mcg, Daily             Stop These Medications      clopidogrel 75 MG tablet  Commonly known as: PLAVIX     glipizide 5 MG tablet  Commonly known as: GLUCOTROL               Additional Instructions for the Follow-ups that You Need to Schedule       CT Head Without Contrast   Jul 18, 2025      STEREOTACTIC GUIDANCE PROTOCOL?: No   Exam reason: Subdural hematoma   Release to patient: Routine Release               Contact information for follow-up providers       Onel Mann MD .    Specialty: Family Medicine  Contact information:  [address]  [place] [address] [phone]                       Contact information for after-discharge care       Destination       [facility] .    Service: Skilled Nursing  Contact information:  [address] [address] [phone]                     Home Medical Care       [facility] .    Service: Home Health Services  Contact information:  [address]  [place] [address] [phone]                                  Future Appointments   Date Time Provider Department Center   7/22/2025  9:30 AM NURSE/MA [facility] 2 [facility]   9/8/2025 10:30 AM Donovan Childers MD [facility]  [facility]   12/16/2025  8:15 AM [facility]   12/23/2025  9:45 AM Onel Mann MD [facility]  MD Julio  [facility]  07/08/25 17:08 EDT    Discharge time spent greater than 30 minutes.

## 2025-07-08 NOTE — THERAPY TREATMENT NOTE
Patient Name: Elissa Marrero  : 3/15/1925    MRN: [id number]                              Today's Date: 2025       Admit Date: 7/3/2025    Visit Dx:     ICD-10-CM ICD-9-CM   1. Subdural hematoma  S06.5XAA 432.1   2. Urinary tract infection without hematuria, site unspecified  N39.0 599.0   3. SDH (subdural hematoma)  S06.5XAA 432.1     Patient Active Problem List   Diagnosis    DM (diabetes mellitus)    HLD (hyperlipidemia)    HTN (hypertension)    Hypothyroid    Insomnia    Lymphedema    Osteoarthritis    Osteopenia    CKD (chronic kidney disease)    Restless leg syndrome    Vitamin D deficiency    Hip pain, right    Acute kidney failure    Chronic kidney disease, stage 4 (severe)    Disorder of bone and cartilage    Edema    Hyperkalemia    UTI (urinary tract infection)    TIA (transient ischemic attack)    Acute metabolic encephalopathy    Acute cystitis without hematuria    Kidney stone    Subdural hematoma    Weakness    Anemia    SDH (subdural hematoma)     Past Medical History:   Diagnosis Date    Benign essential hypertension     Diabetes mellitus, type 2 2012    Encounter for diabetic foot exam 2015    History of complete eye exam 2017    Dr Leslie    Hyperlipidemia     Hypothyroidism     Insomnia     Lymphedema 2010    Osteoarthritis     Osteopenia     Renal insufficiency 2009    Restless leg syndrome 2012    Vitamin D deficiency 10/12/2011     Past Surgical History:   Procedure Laterality Date    APPENDECTOMY      CHOLECYSTECTOMY      EYE SURGERY      TONSILLECTOMY        General Information       Row Name 25 1105          Physical Therapy Time and Intention    Document Type therapy note (daily note)  -UVALDO (clarence) ALMAZ (santi) UVALDO (c)     Mode of Treatment individual therapy;physical therapy  -UVALDO (clarnece) ALMAZ (santi) UVALDO (c)       Row Name 25 1105          General Information    Patient Profile Reviewed yes  -UVALDO (clarence) ALMAZ (santi) UVALDO (c)     Existing Precautions/Restrictions fall  -UVALDO  (r) AA (t) BH (c)       Row Name 07/08/25 1105          Cognition    Orientation Status (Cognition) oriented x 3  -BH (r) AA (t) BH (c)       Row Name 07/08/25 1105          Safety Issues/Impairments Affecting Functional Mobility    Impairments Affecting Function (Mobility) balance;coordination;endurance/activity tolerance;strength  -BH (r) AA (t) BH (c)               User Key  (r) = Recorded By, (t) = Taken By, (c) = Cosigned By      Initials Name Provider Type     Arelis Welsh, PT Physical Therapist    Eligio Garay, PT Student PT Student                   Mobility       Row Name 07/08/25 1105          Bed Mobility    Bed Mobility supine-sit  -BH (r) AA (t) BH (c)     Supine-Sit [place] (Bed Mobility) standby assist;verbal cues  -BH (r) AA (t) BH (c)     Assistive Device (Bed Mobility) bed rails;head of bed elevated  -BH (r) AA (t) BH (c)       Row Name 07/08/25 1105          Sit-Stand Transfer    Sit-Stand [place] (Transfers) minimum assist (75% patient effort);1 person assist;verbal cues  -BH (r) AA (t) BH (c)     Assistive Device (Sit-Stand Transfers) walker, front-wheeled  -BH (r) AA (t) BH (c)       Row Name 07/08/25 1105          Gait/Stairs (Locomotion)    [place] Level (Gait) minimum assist (75% patient effort);verbal cues;contact guard;1 person assist  -BH (r) AA (t) BH (c)     Assistive Device (Gait) walker, front-wheeled  -BH (r) AA (t) BH (c)     Patient was able to Ambulate yes  -BH (r) AA (t) BH (c)     Distance in Feet (Gait) 120  -BH (r) AA (t) BH (c)     Deviations/Abnormal Patterns (Gait) festinating/shuffling;gait speed decreased;stride length decreased  -BH (r) AA (t) BH (c)     Bilateral Gait Deviations forward flexed posture  -BH (r) AA (t) BH (c)               User Key  (r) = Recorded By, (t) = Taken By, (c) = Cosigned By      Initials Name Provider Type     Arelis Welsh, PT Physical Therapist    Eligio Garay, PT Student PT Student                    Obj/Interventions    No documentation.                  Goals/Plan    No documentation.                  Clinical Impression       Row Name 07/08/25 1106          Pain    Pretreatment Pain Rating 0/10 - no pain  -BH (r) AA (t) BH (c)     Posttreatment Pain Rating 0/10 - no pain  -BH (r) AA (t) BH (c)       Row Name 07/08/25 1106          Plan of Care Review    Plan of Care Reviewed With patient  -BH (r) AA (t) BH (c)     Progress improving  -BH (r) AA (t) BH (c)     Outcome Evaluation Pt seen for PT this AM. Pt in bed on arrival on RA. Pt moved from supine to sitting EOB w/ SBA and max VC for sequencing and hand placement. Pt completed STS to rwx with minAx1. Pt amb to bathroom and required assistance for removing brief but was independent with toilet hygiene tasks. Pt completed STS from toilet with Villa x1 and stood while brief was put back on. Pt took 1 seated rest break and then amb 120' in hallway with CGA-Villa x1 and rwx. Pt returned to room and sat [facility] with VC for positioning and sequencing. Noted pt seemed a bit confused at times but was easily redirected. Rec DC home w/ 24/7 assist and HHPT- it appears daughters plan to provide care. Rec continued use of rwx at home. PT will continue to follow.  -BH (r) AA (t) BH (c)       Row Name 07/08/25 1106          Therapy Assessment/Plan (PT)    Rehab Potential (PT) fair  -BH (r) AA (t) BH (c)     Criteria for Skilled Interventions Met (PT) skilled treatment is necessary  -BH (r) AA (t) BH (c)     Therapy Frequency (PT) 5 times/wk  -BH (r) AA (t) BH (c)       Row Name 07/08/25 1106          Vital Signs    O2 Delivery Pre Treatment room air  -BH (r) AA (t) BH (c)     O2 Delivery Intra Treatment room air  -BH (r) AA (t) BH (c)     O2 Delivery Post Treatment room air  -BH (r) AA (t) BH (c)       Row Name 07/08/25 1106          Positioning and Restraints    Pre-Treatment Position in bed  -BH (r) AA (t) BH (c)     Post Treatment Position chair  -BH (r) AA (t) BH (c)     In  Chair reclined;call light within reach;encouraged to call for assist;exit alarm on  -BH (r) AA (t) BH (c)               User Key  (r) = Recorded By, (t) = Taken By, (c) = Cosigned By      Initials Name Provider Type    Arelis Akhtar, PT Physical Therapist    Eligio Garay, PT Student PT Student                   Outcome Measures       Row Name 07/08/25 1122 07/08/25 0811       How much help from another person do you currently need...    Turning from your back to your side while in flat bed without using bedrails? 3  -BH (r) AA (t) BH (c) 3  -KP    Moving from lying on back to sitting on the side of a flat bed without bedrails? 3  -BH (r) AA (t) BH (c) 3  -KP    Moving to and from a bed to a chair (including a wheelchair)? 3  -BH (r) AA (t) BH (c) 3  -KP    Standing up from a chair using your arms (e.g., wheelchair, bedside chair)? 3  -BH (r) AA (t) BH (c) 2  -KP    Climbing 3-5 steps with a railing? 2  -BH (r) AA (t) BH (c) 2  -KP    To walk in hospital room? 3  -BH (r) AA (t) BH (c) 2  -KP    AM-PAC 6 Clicks Score (PT) 17  -BH (r) AA (t) 15  -KP    Highest Level of Mobility Goal Stand (1 or More Minutes)-5  -BH (r) AA (t) Move to Chair/Commode-4  -KP      Row Name 07/08/25 1122          Functional Assessment    Outcome Measure Options AM-PAC 6 Clicks Basic Mobility (PT)  -BH (r) AA (t) BH (c)               User Key  (r) = Recorded By, (t) = Taken By, (c) = Cosigned By      Initials Name Provider Type    Shikha Garcia, RN Registered Nurse    Arelis Akhtar, PT Physical Therapist    Eligio Garay, PT Student PT Student                                 Physical Therapy Education       Title: PT OT SLP Therapies (Done)       Topic: Physical Therapy (Done)       Point: Mobility training (Done)       Learning Progress Summary            Patient Acceptance, E,TB, VU,NR by AA at 7/8/2025 1122    Acceptance, E, VU by VÍCTOR at 7/8/2025 0970    Acceptance, E, VU,NR by MAINOR at 7/5/2025 1320                       Point: Body mechanics (Done)       Learning Progress Summary            Patient Acceptance, E,TB, VU,NR by AA at 7/8/2025 1122    Acceptance, E, VU by  at 7/8/2025 0937    Acceptance, E, VU,NR by  at 7/5/2025 1320                      Point: Precautions (Done)       Learning Progress Summary            Patient Acceptance, E,TB, VU,NR by AA at 7/8/2025 1122    Acceptance, E, VU by VÍCTOR at 7/8/2025 0937    Acceptance, E, VU,NR by MAINOR at 7/5/2025 1320                                      User Key       Initials Effective Dates Name Provider Type Discipline     10/25/19 -  Kristy Hernandez, PT Physical Therapist PT     06/11/25 -  Shikha Krause, RN Registered Nurse Nurse    ALMAZ 01/22/25 -  Eligio Verduzco, MAIKOL Student PT Student PT                  PT Recommendation and Plan     Progress: improving  Outcome Evaluation: Pt seen for PT this AM. Pt in bed on arrival on RA. Pt moved from supine to sitting EOB w/ SBA and max VC for sequencing and hand placement. Pt completed STS to rwx with minAx1. Pt amb to bathroom and required assistance for removing brief but was independent with toilet hygiene tasks. Pt completed STS from toilet with [place] x1 and stood while brief was put back on. Pt took 1 seated rest break and then amb 120' in hallway with CGA-Villa x1 and rwx. Pt returned to room and sat [facility] with VC for positioning and sequencing. Noted pt seemed a bit confused at times but was easily redirected. Rec DC home w/ 24/7 assist and HHPT- it appears daughters plan to provide care. Rec continued use of rwx at home. PT will continue to follow.     Time Calculation:         PT Charges       Row Name 07/08/25 1123             Time Calculation    Start Time 1033  -BH (r) AA (t) BH (c)      Stop Time 1059  -BH (r) AA (t) BH (c)      Time Calculation (min) 26 min  -BH (r) AA (t)      PT Received On 07/08/25  -BH (r) AA (t) BH (c)      PT - Next Appointment 07/09/25  -BH (r) AA (t) BH (c)      PT Goal Re-Cert Due Date 07/15/25  -BH (r)  AA (t) BH (c)         Time Calculation- PT    Total Timed Code Minutes- PT 26 minute(s)  -BH (r) AA (t) BH (c)         Timed Charges    75452 - Gait Training Minutes  10  -BH (r) AA (t) BH (c)      65286 - PT Therapeutic Activity Minutes 13  -BH (r) AA (t) BH (c)         Total Minutes    Timed Charges Total Minutes 23  -BH (r) AA (t)       Total Minutes 23  -BH (r) AA (t)                User Key  (r) = Recorded By, (t) = Taken By, (c) = Cosigned By      Initials Name Provider Type     Arelis Welsh, PT Physical Therapist    Eligio Garay, PT Student PT Student                      PT G-Codes  Outcome Measure Options: AM-PAC 6 Clicks Basic Mobility (PT)  AM-PAC 6 Clicks Score (PT): 17  AM-PAC 6 Clicks Score (OT): 12  Modified [place] Scale: 4 - Moderately severe disability.  Unable to walk without assistance, and unable to attend to own bodily needs without assistance.  PT Discharge Summary  Anticipated Discharge Disposition (PT): home with home health, home, home with 24/7 care    Eligio Verduzco, PT Student  7/8/2025

## 2025-07-08 NOTE — CASE MANAGEMENT/SOCIAL WORK
Case Management Discharge Note      Final Note: [facility] per [facility]    Provided Post Acute Provider List?: N/A  N/A Provider List Comment: Has used [facility]  in the past    Selected Continued Care - Admitted Since 7/3/2025       Destination Coordination complete.      Service Provider Services Address Phone Fax Patient Preferred    [facility] [address] [address] [phone] --              Durable Medical Equipment    No services have been selected for the patient.                Dialysis/Infusion    No services have been selected for the patient.                Home Medical Care Coordination complete.      Service Provider Services Address Phone Fax Patient Preferred    [facility] [address] [address] [phone] --              Therapy    No services have been selected for the patient.                Community Resources    No services have been selected for the patient.                [facility]    No services have been selected for the patient.                    Selected Continued Care - Episodes Includes continued care and service providers with selected services from the active episodes listed below      [facility] Episode start date: 6/10/2025   There are no active outsourced providers for this episode.                 Transportation Services  Transportation: [facility]C [facility]  [facility]: [facility]    Final Discharge Disposition Code: 03 - skilled nursing facility (SNF)

## 2025-07-08 NOTE — OUTREACH NOTE
Prep Survey      Flowsheet Row Responses   Religion facility patient discharged from? [facility]   Is LACE score < 7 ? No   Eligibility Not Eligible   What are the reasons patient is not eligible? [facility]   Does the patient have one of the following disease processes/diagnoses(primary or secondary)? Stroke   Prep survey completed? Yes            CRUZ GONZALEZ - Registered Nurse

## 2025-07-08 NOTE — PLAN OF CARE
Goal Outcome Evaluation:  Plan of Care Reviewed With: patient        Progress: improving  Outcome Evaluation: Pt seen for PT this AM. Pt in bed on arrival on RA. Pt moved from supine to sitting EOB w/ SBA and max VC for sequencing and hand placement. Pt completed STS to rwx with minAx1. Pt amb to bathroom and required assistance for removing brief but was independent with toilet hygiene tasks. Pt completed STS from toilet with [place] x1 and stood while brief was put back on. Pt took 1 seated rest break and then amb 120' in hallway with CGA-Villa x1 and rwx. Pt returned to room and sat [facility] with VC for positioning and sequencing. Noted pt seemed a bit confused at times but was easily redirected. Rec DC home w/ 24/7 assist and HHPT- it appears daughters plan to provide care. Rec continued use of rwx at home. PT will continue to follow.    Anticipated Discharge Disposition (PT): home with home health, home, home with 24/7 care

## 2025-07-08 NOTE — NURSING NOTE
Called facility to give report; nurse not available. Waiting for call back. Transport arranged for 1800.

## 2025-07-08 NOTE — CASE MANAGEMENT/SOCIAL WORK
Continued Stay Note  [facility]     Patient Name: Elissa Marrero  MRN: [id number]  Today's Date: 7/8/2025    Admit Date: 7/3/2025    Plan: [facility] accepted and bed available today.   Discharge Plan       Row Name 07/08/25 1703       Plan    Plan [facility] accepted and bed available today.    Plan Comments Call placed to [facility] who confirms bed available today at [facility]. Packet: zaid  Pharmacy: updated  Transport: [facility]    Final Discharge Disposition Code 03 - skilled nursing facility (SNF)    Final Note [facility] per [facility]                   Discharge Codes    No documentation.                 Expected Discharge Date and Time       Expected Discharge Date Expected Discharge Time    Jul 8, 2025               Ana Luisa Malloy RN

## 2025-07-14 ENCOUNTER — TELEPHONE (OUTPATIENT)
Dept: FAMILY MEDICINE CLINIC | Facility: CLINIC | Age: OVER 89
End: 2025-07-14

## 2025-07-16 ENCOUNTER — TELEPHONE (OUTPATIENT)
Dept: NEUROSURGERY | Facility: CLINIC | Age: OVER 89
End: 2025-07-16
Payer: MEDICARE

## 2025-07-16 NOTE — TELEPHONE ENCOUNTER
LM for patient's daughter informing patient is scheduled for F/U with [place] [facility] on 7/24/2025 zoey/Nicky @ 11:30 to go over CT Head that will be done on 7/18

## 2025-07-22 ENCOUNTER — TELEPHONE (OUTPATIENT)
Dept: NEUROSURGERY | Facility: CLINIC | Age: OVER 89
End: 2025-07-22
Payer: MEDICARE

## 2025-07-22 NOTE — TELEPHONE ENCOUNTER
Called and spoke to patient's daughter Michelle who reports patient is currently in rehab.  Patient no showed to CT on 07/18/2025.  Michelle is aware patient needs to have CT completed piror to appointment here in the office.  Michelle reports she will call back once patient is out or rehab.

## 2025-08-21 DIAGNOSIS — E11.65 TYPE 2 DIABETES MELLITUS WITH HYPERGLYCEMIA, WITHOUT LONG-TERM CURRENT USE OF INSULIN: ICD-10-CM

## 2025-08-21 RX ORDER — GLIPIZIDE 5 MG/1
5 TABLET ORAL
Qty: 180 TABLET | Refills: 0 | Status: SHIPPED | OUTPATIENT
Start: 2025-08-21

## 2025-08-27 ENCOUNTER — SPECIALTY PHARMACY (OUTPATIENT)
Dept: ENDOCRINOLOGY | Age: OVER 89
End: 2025-08-27
Payer: MEDICARE